# Patient Record
Sex: FEMALE | Race: BLACK OR AFRICAN AMERICAN | NOT HISPANIC OR LATINO | Employment: OTHER | URBAN - METROPOLITAN AREA
[De-identification: names, ages, dates, MRNs, and addresses within clinical notes are randomized per-mention and may not be internally consistent; named-entity substitution may affect disease eponyms.]

---

## 2020-08-10 ENCOUNTER — HISTORICAL (OUTPATIENT)
Dept: ADMINISTRATIVE | Facility: HOSPITAL | Age: 55
End: 2020-08-10

## 2020-08-10 LAB
ABS NEUT (OLG): 3.25 X10(3)/MCL (ref 2.1–9.2)
ALBUMIN SERPL-MCNC: 4.2 GM/DL (ref 3.4–5)
ALBUMIN/GLOB SERPL: 0.9 RATIO (ref 1.1–2)
ALP SERPL-CCNC: 122 UNIT/L (ref 45–117)
ALT SERPL-CCNC: 18 UNIT/L (ref 12–78)
APPEARANCE, UA: CLEAR
AST SERPL-CCNC: 15 UNIT/L (ref 15–37)
BACTERIA #/AREA URNS AUTO: ABNORMAL /HPF
BASOPHILS # BLD AUTO: 0 X10(3)/MCL (ref 0–0.2)
BASOPHILS NFR BLD AUTO: 1 %
BILIRUB SERPL-MCNC: 0.3 MG/DL (ref 0.2–1)
BILIRUB UR QL STRIP: NEGATIVE
BILIRUBIN DIRECT+TOT PNL SERPL-MCNC: <0.1 MG/DL (ref 0–0.2)
BILIRUBIN DIRECT+TOT PNL SERPL-MCNC: >0.2 MG/DL
BUN SERPL-MCNC: 9 MG/DL (ref 7–18)
CALCIUM SERPL-MCNC: 9.7 MG/DL (ref 8.5–10.1)
CHLORIDE SERPL-SCNC: 108 MMOL/L (ref 98–107)
CHOLEST SERPL-MCNC: 279 MG/DL
CHOLEST/HDLC SERPL: 5 {RATIO} (ref 0–4.4)
CO2 SERPL-SCNC: 26 MMOL/L (ref 21–32)
COLOR UR: ABNORMAL
CREAT SERPL-MCNC: 0.9 MG/DL (ref 0.6–1.3)
EOSINOPHIL # BLD AUTO: 0.1 X10(3)/MCL (ref 0–0.9)
EOSINOPHIL NFR BLD AUTO: 2 %
ERYTHROCYTE [DISTWIDTH] IN BLOOD BY AUTOMATED COUNT: 12 % (ref 11.5–14.5)
EST. AVERAGE GLUCOSE BLD GHB EST-MCNC: 146 MG/DL
GLOBULIN SER-MCNC: 4.9 GM/ML (ref 2.3–3.5)
GLUCOSE (UA): NEGATIVE
GLUCOSE SERPL-MCNC: 107 MG/DL (ref 74–106)
HBA1C MFR BLD: 6.7 % (ref 4.2–6.3)
HCT VFR BLD AUTO: 44.9 % (ref 35–46)
HDLC SERPL-MCNC: 56 MG/DL (ref 40–59)
HGB BLD-MCNC: 13.8 GM/DL (ref 12–16)
HGB UR QL STRIP: 0.03 MG/DL
HYALINE CASTS #/AREA URNS LPF: ABNORMAL /LPF
IMM GRANULOCYTES # BLD AUTO: 0.01 10*3/UL
IMM GRANULOCYTES NFR BLD AUTO: 0 %
KETONES UR QL STRIP: NEGATIVE
LDLC SERPL CALC-MCNC: 193 MG/DL
LEUKOCYTE ESTERASE UR QL STRIP: NEGATIVE
LYMPHOCYTES # BLD AUTO: 2.8 X10(3)/MCL (ref 0.6–4.6)
LYMPHOCYTES NFR BLD AUTO: 43 %
MCH RBC QN AUTO: 28.3 PG (ref 26–34)
MCHC RBC AUTO-ENTMCNC: 30.7 GM/DL (ref 31–37)
MCV RBC AUTO: 92 FL (ref 80–100)
MONOCYTES # BLD AUTO: 0.3 X10(3)/MCL (ref 0.1–1.3)
MONOCYTES NFR BLD AUTO: 5 %
NEUTROPHILS # BLD AUTO: 3.25 X10(3)/MCL (ref 2.1–9.2)
NEUTROPHILS NFR BLD AUTO: 50 %
NITRITE UR QL STRIP: NEGATIVE
PH UR STRIP: 6 [PH] (ref 4.5–8)
PLATELET # BLD AUTO: 256 X10(3)/MCL (ref 130–400)
PMV BLD AUTO: 10 FL (ref 7.4–10.4)
POTASSIUM SERPL-SCNC: 3.6 MMOL/L (ref 3.5–5.1)
PROT SERPL-MCNC: 9.1 GM/DL (ref 6.4–8.2)
PROT UR QL STRIP: NEGATIVE
RBC # BLD AUTO: 4.88 X10(6)/MCL (ref 4–5.2)
RBC #/AREA URNS AUTO: ABNORMAL /HPF
SODIUM SERPL-SCNC: 140 MMOL/L (ref 136–145)
SP GR UR STRIP: 1 (ref 1–1.03)
SQUAMOUS #/AREA URNS LPF: ABNORMAL /LPF
T4 FREE SERPL-MCNC: 1.09 NG/DL (ref 0.76–1.46)
TRIGL SERPL-MCNC: 152 MG/DL
TSH SERPL-ACNC: 1.26 MIU/L (ref 0.36–3.74)
UROBILINOGEN UR STRIP-ACNC: NORMAL
VLDLC SERPL CALC-MCNC: 30 MG/DL
WBC # SPEC AUTO: 6.5 X10(3)/MCL (ref 4.5–11)
WBC #/AREA URNS AUTO: ABNORMAL /HPF

## 2020-09-04 ENCOUNTER — HISTORICAL (OUTPATIENT)
Dept: RADIOLOGY | Facility: HOSPITAL | Age: 55
End: 2020-09-04

## 2020-09-09 ENCOUNTER — HISTORICAL (OUTPATIENT)
Dept: ADMINISTRATIVE | Facility: HOSPITAL | Age: 55
End: 2020-09-09

## 2020-09-09 LAB
CREAT UR-MCNC: 50 MG/DL
MICROALBUMIN UR-MCNC: <5 MG/L (ref 0–19)
MICROALBUMIN/CREAT RATIO PNL UR: <10 MCG/MG CR (ref 0–29)

## 2020-09-25 LAB
C TRACH DNA SPEC QL NAA+PROBE: NEGATIVE
HUMAN PAPILLOMAVIRUS (HPV): NORMAL
NEISSERIA GONORRHEA BY PCR: NEGATIVE
PAP RECOMMENDATION EXT: NORMAL
PAP SMEAR: NORMAL

## 2020-10-08 ENCOUNTER — HISTORICAL (OUTPATIENT)
Dept: RADIOLOGY | Facility: HOSPITAL | Age: 55
End: 2020-10-08

## 2020-12-24 ENCOUNTER — HISTORICAL (OUTPATIENT)
Dept: ADMINISTRATIVE | Facility: HOSPITAL | Age: 55
End: 2020-12-24

## 2020-12-24 LAB
ABS NEUT (OLG): 3.2 X10(3)/MCL (ref 2.1–9.2)
ALBUMIN SERPL-MCNC: 4.1 GM/DL (ref 3.5–5)
ALBUMIN/GLOB SERPL: 1.2 RATIO (ref 1.1–2)
ALP SERPL-CCNC: 108 UNIT/L (ref 40–150)
ALT SERPL-CCNC: 14 UNIT/L (ref 0–55)
AST SERPL-CCNC: 18 UNIT/L (ref 5–34)
BASOPHILS # BLD AUTO: 0 X10(3)/MCL (ref 0–0.2)
BASOPHILS NFR BLD AUTO: 0 %
BILIRUB SERPL-MCNC: 0.6 MG/DL
BILIRUBIN DIRECT+TOT PNL SERPL-MCNC: 0.3 MG/DL (ref 0–0.5)
BILIRUBIN DIRECT+TOT PNL SERPL-MCNC: 0.3 MG/DL (ref 0–0.8)
BUN SERPL-MCNC: 14 MG/DL (ref 9.8–20.1)
CALCIUM SERPL-MCNC: 9.5 MG/DL (ref 8.4–10.2)
CHLORIDE SERPL-SCNC: 105 MMOL/L (ref 98–107)
CHOLEST SERPL-MCNC: 169 MG/DL
CHOLEST/HDLC SERPL: 4 {RATIO} (ref 0–5)
CO2 SERPL-SCNC: 26 MMOL/L (ref 22–29)
CREAT SERPL-MCNC: 0.97 MG/DL (ref 0.55–1.02)
CREAT UR-MCNC: 82.8 MG/DL (ref 45–106)
EOSINOPHIL # BLD AUTO: 0.1 X10(3)/MCL (ref 0–0.9)
EOSINOPHIL NFR BLD AUTO: 1 %
ERYTHROCYTE [DISTWIDTH] IN BLOOD BY AUTOMATED COUNT: 12.5 % (ref 11.5–14.5)
EST. AVERAGE GLUCOSE BLD GHB EST-MCNC: 125.5 MG/DL
GLOBULIN SER-MCNC: 3.4 GM/DL (ref 2.4–3.5)
GLUCOSE SERPL-MCNC: 95 MG/DL (ref 74–100)
HBA1C MFR BLD: 6 %
HCT VFR BLD AUTO: 36.7 % (ref 35–46)
HDLC SERPL-MCNC: 39 MG/DL (ref 35–60)
HGB BLD-MCNC: 11.2 GM/DL (ref 12–16)
IMM GRANULOCYTES # BLD AUTO: 0.01 10*3/UL
IMM GRANULOCYTES NFR BLD AUTO: 0 %
LDLC SERPL CALC-MCNC: 116 MG/DL (ref 50–140)
LYMPHOCYTES # BLD AUTO: 2.4 X10(3)/MCL (ref 0.6–4.6)
LYMPHOCYTES NFR BLD AUTO: 40 %
MCH RBC QN AUTO: 28 PG (ref 26–34)
MCHC RBC AUTO-ENTMCNC: 30.5 GM/DL (ref 31–37)
MCV RBC AUTO: 91.8 FL (ref 80–100)
MICROALBUMIN UR-MCNC: <5 UG/ML
MICROALBUMIN/CREAT RATIO PNL UR: NORMAL MG/GM CR (ref 0–30)
MONOCYTES # BLD AUTO: 0.3 X10(3)/MCL (ref 0.1–1.3)
MONOCYTES NFR BLD AUTO: 5 %
NEUTROPHILS # BLD AUTO: 3.2 X10(3)/MCL (ref 2.1–9.2)
NEUTROPHILS NFR BLD AUTO: 53 %
PLATELET # BLD AUTO: 224 X10(3)/MCL (ref 130–400)
PMV BLD AUTO: 10.6 FL (ref 7.4–10.4)
POTASSIUM SERPL-SCNC: 4.1 MMOL/L (ref 3.5–5.1)
PROT SERPL-MCNC: 7.5 GM/DL (ref 6.4–8.3)
RBC # BLD AUTO: 4 X10(6)/MCL (ref 4–5.2)
SODIUM SERPL-SCNC: 143 MMOL/L (ref 136–145)
T4 FREE SERPL-MCNC: 1.19 NG/DL (ref 0.7–1.48)
TRIGL SERPL-MCNC: 72 MG/DL (ref 37–140)
TSH SERPL-ACNC: 1.03 UIU/ML (ref 0.35–4.94)
VLDLC SERPL CALC-MCNC: 14 MG/DL
WBC # SPEC AUTO: 6 X10(3)/MCL (ref 4.5–11)

## 2021-04-19 ENCOUNTER — HISTORICAL (OUTPATIENT)
Dept: LAB | Facility: HOSPITAL | Age: 56
End: 2021-04-19

## 2021-04-19 LAB
ABS NEUT (OLG): 3.39 X10(3)/MCL (ref 2.1–9.2)
ALBUMIN SERPL-MCNC: 4.3 GM/DL (ref 3.5–5)
ALBUMIN/GLOB SERPL: 1.3 RATIO (ref 1.1–2)
ALP SERPL-CCNC: 100 UNIT/L (ref 40–150)
ALT SERPL-CCNC: 14 UNIT/L (ref 0–55)
AST SERPL-CCNC: 19 UNIT/L (ref 5–34)
BASOPHILS # BLD AUTO: 0 X10(3)/MCL (ref 0–0.2)
BASOPHILS NFR BLD AUTO: 0 %
BILIRUB SERPL-MCNC: 0.5 MG/DL
BILIRUBIN DIRECT+TOT PNL SERPL-MCNC: 0.2 MG/DL (ref 0–0.8)
BILIRUBIN DIRECT+TOT PNL SERPL-MCNC: 0.3 MG/DL (ref 0–0.5)
BUN SERPL-MCNC: 9 MG/DL (ref 9.8–20.1)
CALCIUM SERPL-MCNC: 9.6 MG/DL (ref 8.4–10.2)
CHLORIDE SERPL-SCNC: 107 MMOL/L (ref 98–107)
CHOLEST SERPL-MCNC: 167 MG/DL
CHOLEST/HDLC SERPL: 4 {RATIO} (ref 0–5)
CO2 SERPL-SCNC: 25 MMOL/L (ref 22–29)
CREAT SERPL-MCNC: 0.79 MG/DL (ref 0.55–1.02)
CREAT UR-MCNC: 61.1 MG/DL (ref 45–106)
EOSINOPHIL # BLD AUTO: 0.1 X10(3)/MCL (ref 0–0.9)
EOSINOPHIL NFR BLD AUTO: 1 %
ERYTHROCYTE [DISTWIDTH] IN BLOOD BY AUTOMATED COUNT: 12.6 % (ref 11.5–17)
EST. AVERAGE GLUCOSE BLD GHB EST-MCNC: 125.5 MG/DL
GLOBULIN SER-MCNC: 3.4 GM/DL (ref 2.4–3.5)
GLUCOSE SERPL-MCNC: 82 MG/DL (ref 74–100)
HBA1C MFR BLD: 6 %
HCT VFR BLD AUTO: 39.9 % (ref 37–47)
HDLC SERPL-MCNC: 43 MG/DL (ref 35–60)
HGB BLD-MCNC: 12 GM/DL (ref 12–16)
LDLC SERPL CALC-MCNC: 108 MG/DL (ref 50–140)
LYMPHOCYTES # BLD AUTO: 2.4 X10(3)/MCL (ref 0.6–4.6)
LYMPHOCYTES NFR BLD AUTO: 39 %
MCH RBC QN AUTO: 28.2 PG (ref 27–31)
MCHC RBC AUTO-ENTMCNC: 30.1 GM/DL (ref 33–36)
MCV RBC AUTO: 93.9 FL (ref 80–94)
MICROALBUMIN UR-MCNC: <5 UG/ML
MICROALBUMIN/CREAT RATIO PNL UR: <8.2 MG/GM CR (ref 0–30)
MONOCYTES # BLD AUTO: 0.3 X10(3)/MCL (ref 0.1–1.3)
MONOCYTES NFR BLD AUTO: 5 %
NEUTROPHILS # BLD AUTO: 3.39 X10(3)/MCL (ref 1.4–7.9)
NEUTROPHILS NFR BLD AUTO: 55 %
PLATELET # BLD AUTO: 236 X10(3)/MCL (ref 130–400)
PMV BLD AUTO: 9.6 FL (ref 9.4–12.4)
POTASSIUM SERPL-SCNC: 3.8 MMOL/L (ref 3.5–5.1)
PROT SERPL-MCNC: 7.7 GM/DL (ref 6.4–8.3)
RBC # BLD AUTO: 4.25 X10(6)/MCL (ref 4.2–5.4)
SODIUM SERPL-SCNC: 144 MMOL/L (ref 136–145)
T4 FREE SERPL-MCNC: 1.06 NG/DL (ref 0.7–1.48)
TRIGL SERPL-MCNC: 82 MG/DL (ref 37–140)
TSH SERPL-ACNC: 1.05 UIU/ML (ref 0.35–4.94)
VLDLC SERPL CALC-MCNC: 16 MG/DL
WBC # SPEC AUTO: 6.2 X10(3)/MCL (ref 4.5–11.5)

## 2021-09-27 ENCOUNTER — HISTORICAL (OUTPATIENT)
Dept: FAMILY MEDICINE | Facility: CLINIC | Age: 56
End: 2021-09-27

## 2021-09-27 LAB
ABS NEUT (OLG): 2.49 X10(3)/MCL (ref 2.1–9.2)
ALBUMIN SERPL-MCNC: 4 GM/DL (ref 3.5–5)
ALBUMIN/GLOB SERPL: 1.1 RATIO (ref 1.1–2)
ALP SERPL-CCNC: 93 UNIT/L (ref 40–150)
ALT SERPL-CCNC: 11 UNIT/L (ref 0–55)
AST SERPL-CCNC: 18 UNIT/L (ref 5–34)
BASOPHILS # BLD AUTO: 0 X10(3)/MCL (ref 0–0.2)
BASOPHILS NFR BLD AUTO: 0 %
BILIRUB SERPL-MCNC: 0.5 MG/DL
BILIRUBIN DIRECT+TOT PNL SERPL-MCNC: 0.2 MG/DL (ref 0–0.5)
BILIRUBIN DIRECT+TOT PNL SERPL-MCNC: 0.3 MG/DL (ref 0–0.8)
BUN SERPL-MCNC: 9.6 MG/DL (ref 9.8–20.1)
CALCIUM SERPL-MCNC: 10.1 MG/DL (ref 8.4–10.2)
CHLORIDE SERPL-SCNC: 107 MMOL/L (ref 98–107)
CHOLEST SERPL-MCNC: 173 MG/DL
CHOLEST/HDLC SERPL: 4 {RATIO} (ref 0–5)
CO2 SERPL-SCNC: 27 MMOL/L (ref 22–29)
CREAT SERPL-MCNC: 0.84 MG/DL (ref 0.55–1.02)
CREAT UR-MCNC: 34.2 MG/DL (ref 45–106)
EOSINOPHIL # BLD AUTO: 0.1 X10(3)/MCL (ref 0–0.9)
EOSINOPHIL NFR BLD AUTO: 3 %
ERYTHROCYTE [DISTWIDTH] IN BLOOD BY AUTOMATED COUNT: 12.7 % (ref 11.5–14.5)
EST. AVERAGE GLUCOSE BLD GHB EST-MCNC: 122.6 MG/DL
GLOBULIN SER-MCNC: 3.7 GM/DL (ref 2.4–3.5)
GLUCOSE SERPL-MCNC: 78 MG/DL (ref 74–100)
HBA1C MFR BLD: 5.9 %
HCT VFR BLD AUTO: 38.8 % (ref 35–46)
HDLC SERPL-MCNC: 44 MG/DL (ref 35–60)
HGB BLD-MCNC: 11.8 GM/DL (ref 12–16)
IMM GRANULOCYTES # BLD AUTO: 0.01 10*3/UL
IMM GRANULOCYTES NFR BLD AUTO: 0 %
LDLC SERPL CALC-MCNC: 115 MG/DL (ref 50–140)
LYMPHOCYTES # BLD AUTO: 2.2 X10(3)/MCL (ref 0.6–4.6)
LYMPHOCYTES NFR BLD AUTO: 44 %
MCH RBC QN AUTO: 28.1 PG (ref 26–34)
MCHC RBC AUTO-ENTMCNC: 30.4 GM/DL (ref 31–37)
MCV RBC AUTO: 92.4 FL (ref 80–100)
MICROALBUMIN UR-MCNC: <5 MG/L
MICROALBUMIN/CREAT RATIO PNL UR: <14.6 MG/GM CR (ref 0–30)
MONOCYTES # BLD AUTO: 0.2 X10(3)/MCL (ref 0.1–1.3)
MONOCYTES NFR BLD AUTO: 4 %
NEUTROPHILS # BLD AUTO: 2.49 X10(3)/MCL (ref 2.1–9.2)
NEUTROPHILS NFR BLD AUTO: 49 %
NRBC BLD AUTO-RTO: 0 % (ref 0–0.2)
PLATELET # BLD AUTO: 265 X10(3)/MCL (ref 130–400)
PMV BLD AUTO: 10.1 FL (ref 7.4–10.4)
POTASSIUM SERPL-SCNC: 3.9 MMOL/L (ref 3.5–5.1)
PROT SERPL-MCNC: 7.7 GM/DL (ref 6.4–8.3)
RBC # BLD AUTO: 4.2 X10(6)/MCL (ref 4–5.2)
SODIUM SERPL-SCNC: 142 MMOL/L (ref 136–145)
T4 FREE SERPL-MCNC: 1.09 NG/DL (ref 0.7–1.48)
TRIGL SERPL-MCNC: 70 MG/DL (ref 37–140)
TSH SERPL-ACNC: 1.16 UIU/ML (ref 0.35–4.94)
VLDLC SERPL CALC-MCNC: 14 MG/DL
WBC # SPEC AUTO: 5.1 X10(3)/MCL (ref 4.5–11)

## 2021-10-07 ENCOUNTER — HISTORICAL (OUTPATIENT)
Dept: RADIOLOGY | Facility: HOSPITAL | Age: 56
End: 2021-10-07

## 2021-10-07 LAB — BCS RECOMMENDATION EXT: NORMAL

## 2022-01-14 ENCOUNTER — HISTORICAL (OUTPATIENT)
Dept: ADMINISTRATIVE | Facility: HOSPITAL | Age: 57
End: 2022-01-14

## 2022-01-14 LAB — SARS-COV-2 RNA RESP QL NAA+PROBE: NEGATIVE

## 2022-02-11 ENCOUNTER — HISTORICAL (OUTPATIENT)
Dept: RADIOLOGY | Facility: HOSPITAL | Age: 57
End: 2022-02-11

## 2022-02-11 ENCOUNTER — HISTORICAL (OUTPATIENT)
Dept: ADMINISTRATIVE | Facility: HOSPITAL | Age: 57
End: 2022-02-11

## 2022-02-16 ENCOUNTER — HISTORICAL (OUTPATIENT)
Dept: ADMINISTRATIVE | Facility: HOSPITAL | Age: 57
End: 2022-02-16

## 2022-02-16 LAB — SARS-COV-2 AG RESP QL IA.RAPID: NEGATIVE

## 2022-02-18 ENCOUNTER — HISTORICAL (OUTPATIENT)
Dept: ADMINISTRATIVE | Facility: HOSPITAL | Age: 57
End: 2022-02-18

## 2022-02-18 ENCOUNTER — HISTORICAL (OUTPATIENT)
Dept: SURGERY | Facility: HOSPITAL | Age: 57
End: 2022-02-18

## 2022-02-18 LAB
CBG: 119 (ref 70–115)
CBG: 130 (ref 70–115)
CBG: 224 (ref 70–115)

## 2022-03-02 ENCOUNTER — HISTORICAL (OUTPATIENT)
Dept: RADIATION THERAPY | Facility: HOSPITAL | Age: 57
End: 2022-03-02

## 2022-03-10 ENCOUNTER — HISTORICAL (OUTPATIENT)
Dept: ADMINISTRATIVE | Facility: HOSPITAL | Age: 57
End: 2022-03-10

## 2022-03-15 ENCOUNTER — HISTORICAL (OUTPATIENT)
Dept: RADIATION THERAPY | Facility: HOSPITAL | Age: 57
End: 2022-03-15

## 2022-03-17 ENCOUNTER — HISTORICAL (OUTPATIENT)
Dept: SPEECH THERAPY | Facility: HOSPITAL | Age: 57
End: 2022-03-17

## 2022-03-18 ENCOUNTER — HISTORICAL (OUTPATIENT)
Dept: ADMINISTRATIVE | Facility: HOSPITAL | Age: 57
End: 2022-03-18

## 2022-03-18 LAB
ABS NEUT (OLG): 7.61 (ref 2.1–9.2)
ALBUMIN SERPL-MCNC: 3.6 G/DL (ref 3.5–5)
ALBUMIN/GLOB SERPL: 0.8 {RATIO} (ref 1.1–2)
ALP SERPL-CCNC: 100 U/L (ref 40–150)
ALT SERPL-CCNC: 6 U/L (ref 0–55)
AST SERPL-CCNC: 12 U/L (ref 5–34)
BASOPHILS # BLD AUTO: 0 10*3/UL (ref 0–0.2)
BASOPHILS NFR BLD AUTO: 0 %
BILIRUB SERPL-MCNC: 0.5 MG/DL
BILIRUBIN DIRECT+TOT PNL SERPL-MCNC: 0.2 (ref 0–0.5)
BILIRUBIN DIRECT+TOT PNL SERPL-MCNC: 0.3 (ref 0–0.8)
BUN SERPL-MCNC: 19.5 MG/DL (ref 9.8–20.1)
CALCIUM SERPL-MCNC: 10.7 MG/DL (ref 8.7–10.5)
CHLORIDE SERPL-SCNC: 101 MMOL/L (ref 98–107)
CO2 SERPL-SCNC: 33 MMOL/L (ref 22–29)
CREAT SERPL-MCNC: 0.83 MG/DL (ref 0.55–1.02)
EOSINOPHIL # BLD AUTO: 0.1 10*3/UL (ref 0–0.9)
EOSINOPHIL NFR BLD AUTO: 1 %
ERYTHROCYTE [DISTWIDTH] IN BLOOD BY AUTOMATED COUNT: 12.9 % (ref 11.5–14.5)
FLAG2 (OHS): 70
FLAG3 (OHS): 80
FLAGS (OHS): 80
GLOBULIN SER-MCNC: 4.7 G/DL (ref 2.4–3.5)
GLUCOSE SERPL-MCNC: 182 MG/DL (ref 74–100)
HCT VFR BLD AUTO: 37.6 % (ref 35–46)
HEMOLYSIS INTERF INDEX SERPL-ACNC: <0
HGB BLD-MCNC: 11.7 G/DL (ref 12–16)
ICTERIC INTERF INDEX SERPL-ACNC: 0
IMM GRANULOCYTES # BLD AUTO: 0.04 10*3/UL
IMM GRANULOCYTES NFR BLD AUTO: 0 %
IMM. NE 2 SUSPECT FLAG (OHS): 10
LIPEMIC INTERF INDEX SERPL-ACNC: <0
LOW EVENT # SUSPECT FLAG (OHS): 80
LYMPHOCYTES # BLD AUTO: 1.7 10*3/UL (ref 0.6–4.6)
LYMPHOCYTES NFR BLD AUTO: 18 %
MANUAL DIFF? (OHS): NO
MCH RBC QN AUTO: 28.1 PG (ref 26–34)
MCHC RBC AUTO-ENTMCNC: 31.1 G/DL (ref 31–37)
MCV RBC AUTO: 90.4 FL (ref 80–100)
MO BLASTS SUSPECT FLAG (OHS): 40
MONOCYTES # BLD AUTO: 0.3 10*3/UL (ref 0.1–1.3)
MONOCYTES NFR BLD AUTO: 3 %
NEUTROPHILS # BLD AUTO: 7.61 10*3/UL (ref 2.1–9.2)
NEUTROPHILS NFR BLD AUTO: 78 %
NRBC BLD AUTO-RTO: 0 % (ref 0–0.2)
PLATELET # BLD AUTO: 373 10*3/UL (ref 130–400)
PLATELET CLUMPS SUSPECT FLAG (OHS): 30
PMV BLD AUTO: 10.3 FL (ref 7.4–10.4)
POTASSIUM SERPL-SCNC: 3.9 MMOL/L (ref 3.5–5.1)
PROT SERPL-MCNC: 8.3 G/DL (ref 6.4–8.3)
RBC # BLD AUTO: 4.16 10*6/UL (ref 4–5.2)
SARS-COV-2 AG RESP QL IA.RAPID: NEGATIVE
SODIUM SERPL-SCNC: 144 MMOL/L (ref 136–145)
WBC # SPEC AUTO: 9.8 10*3/UL (ref 4.5–11)

## 2022-03-19 ENCOUNTER — HISTORICAL (OUTPATIENT)
Dept: ADMINISTRATIVE | Facility: HOSPITAL | Age: 57
End: 2022-03-19

## 2022-03-21 ENCOUNTER — HISTORICAL (OUTPATIENT)
Dept: ADMINISTRATIVE | Facility: HOSPITAL | Age: 57
End: 2022-03-21

## 2022-04-04 ENCOUNTER — HISTORICAL (OUTPATIENT)
Dept: ADMINISTRATIVE | Facility: HOSPITAL | Age: 57
End: 2022-04-04

## 2022-04-06 ENCOUNTER — HISTORICAL (OUTPATIENT)
Dept: ADMINISTRATIVE | Facility: HOSPITAL | Age: 57
End: 2022-04-06

## 2022-04-10 ENCOUNTER — HISTORICAL (OUTPATIENT)
Dept: ADMINISTRATIVE | Facility: HOSPITAL | Age: 57
End: 2022-04-10
Payer: COMMERCIAL

## 2022-04-26 VITALS
OXYGEN SATURATION: 100 % | HEIGHT: 62 IN | WEIGHT: 125.25 LBS | SYSTOLIC BLOOD PRESSURE: 164 MMHG | BODY MASS INDEX: 23.05 KG/M2 | DIASTOLIC BLOOD PRESSURE: 15 MMHG

## 2022-04-28 ENCOUNTER — HISTORICAL (OUTPATIENT)
Dept: RADIATION THERAPY | Facility: HOSPITAL | Age: 57
End: 2022-04-28
Payer: COMMERCIAL

## 2022-04-29 DIAGNOSIS — Z90.02 STATUS POST LARYNGECTOMY: Primary | ICD-10-CM

## 2022-04-30 DIAGNOSIS — E11.9 TYPE 2 DIABETES MELLITUS WITHOUT COMPLICATION, WITHOUT LONG-TERM CURRENT USE OF INSULIN: ICD-10-CM

## 2022-04-30 DIAGNOSIS — I10 HYPERTENSION, UNSPECIFIED TYPE: ICD-10-CM

## 2022-04-30 DIAGNOSIS — D64.9 ANEMIA, UNSPECIFIED TYPE: ICD-10-CM

## 2022-04-30 DIAGNOSIS — C32.9 LARYNGEAL SQUAMOUS CELL CARCINOMA: Primary | ICD-10-CM

## 2022-05-02 ENCOUNTER — OFFICE VISIT (OUTPATIENT)
Dept: OTOLARYNGOLOGY | Facility: CLINIC | Age: 57
End: 2022-05-02
Payer: COMMERCIAL

## 2022-05-02 VITALS — DIASTOLIC BLOOD PRESSURE: 65 MMHG | TEMPERATURE: 98 F | SYSTOLIC BLOOD PRESSURE: 106 MMHG | HEART RATE: 101 BPM

## 2022-05-02 DIAGNOSIS — Z90.02 H/O LARYNGECTOMY: Primary | ICD-10-CM

## 2022-05-02 PROCEDURE — 77334 RADIATION TREATMENT AID(S): CPT | Performed by: RADIOLOGY

## 2022-05-02 PROCEDURE — 99213 OFFICE O/P EST LOW 20 MIN: CPT | Mod: PBBFAC | Performed by: OTOLARYNGOLOGY

## 2022-05-02 RX ORDER — PREDNISOLONE ACETATE 10 MG/ML
SUSPENSION/ DROPS OPHTHALMIC
COMMUNITY
Start: 2022-04-23 | End: 2023-03-01

## 2022-05-02 RX ORDER — APIXABAN 5 MG/1
5 TABLET, FILM COATED ORAL 2 TIMES DAILY
COMMUNITY
Start: 2022-04-24 | End: 2022-07-11 | Stop reason: ALTCHOICE

## 2022-05-02 RX ORDER — AMLODIPINE BESYLATE 5 MG/1
5 TABLET ORAL DAILY
COMMUNITY
Start: 2022-02-10 | End: 2022-08-04 | Stop reason: SDUPTHER

## 2022-05-02 RX ORDER — MONTELUKAST SODIUM 5 MG/1
TABLET, CHEWABLE ORAL
COMMUNITY
Start: 2022-03-15 | End: 2022-08-04 | Stop reason: SDUPTHER

## 2022-05-02 RX ORDER — CARBAMAZEPINE 200 MG/1
200 CAPSULE, EXTENDED RELEASE ORAL
COMMUNITY
Start: 2022-01-27 | End: 2023-03-01 | Stop reason: CLARIF

## 2022-05-02 RX ORDER — AZELASTINE 1 MG/ML
1 SPRAY, METERED NASAL
COMMUNITY
Start: 2021-06-28 | End: 2023-03-01 | Stop reason: CLARIF

## 2022-05-02 RX ORDER — METOPROLOL SUCCINATE 25 MG/1
25 TABLET, EXTENDED RELEASE ORAL
COMMUNITY
Start: 2022-01-14 | End: 2022-08-04 | Stop reason: SDUPTHER

## 2022-05-02 RX ORDER — METFORMIN HYDROCHLORIDE 500 MG/1
500 TABLET ORAL DAILY
COMMUNITY
Start: 2022-03-02 | End: 2022-08-04 | Stop reason: SDUPTHER

## 2022-05-02 RX ORDER — OMEPRAZOLE 40 MG/1
40 CAPSULE, DELAYED RELEASE ORAL DAILY
COMMUNITY
Start: 2022-01-27 | End: 2023-03-01

## 2022-05-02 RX ORDER — CETIRIZINE HYDROCHLORIDE 10 MG/1
10 TABLET ORAL DAILY
COMMUNITY
Start: 2022-03-31 | End: 2023-09-15 | Stop reason: SDUPTHER

## 2022-05-02 RX ORDER — ATORVASTATIN CALCIUM 80 MG/1
80 TABLET, FILM COATED ORAL
COMMUNITY
Start: 2022-02-10 | End: 2022-08-04 | Stop reason: SDUPTHER

## 2022-05-02 NOTE — PROGRESS NOTES
[facility]   [facility] OTOLARYNGOLOGY H&P NOTE      HISTORY OF PRESENT ILLNESS:   Heaven Boston is a 57 y.o. 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  Patient had an uneventful hospitalization course and was discharged on postoperative day 9 after an esophagram showed no pharyngeal leak.  However she re-presented to our facility on [id number] if it neck infection and a pharyngo cutaneous fistula.  Patient underwent a neck washout with primary repair on 04/11/2022.  Additionally she also underwent placement of a G-tube on the same day.  She was maintained on NPO and transferred to [place] for further evaluation and management on 415th 2022 after evidence of persistent pharyngocutaneous fistula.  There she underwent a 2nd neck washout with primary closure and placement of salivary bypass tube.  Patient had an uneventful hospitalization cord was healing as expected.  She was discharged last week and now presenting for postoperative evaluation.  Patient presents today without any complaints.  She is wondering about when he pointed that her x-rays for swallowing then.  She seen Dr. Laguna today after our visit to undergo-stimulation for radiation.    REVIEW OF SYSTEMS:     As above and otherwise negative X 10-point review.     PAST MEDICAL HISTORY:  No past medical history on file.     PAST SURGICAL HISTORY:  No past surgical history on file.      SOCIAL HISTORY:  Social History     Socioeconomic History    Marital status: Single   Tobacco Use    Smoking status: Former Smoker    Smokeless tobacco: Former User      ALLERGIES:  Review of patient's allergies indicates:  No Known Allergies     FAMILY  HISTORY:  No family history on file.     CURRENT MEDICATIONS:  Current Outpatient Medications on File Prior to Visit   Medication Sig Dispense Refill    atorvastatin (LIPITOR) 80 MG tablet Take 80 mg by mouth.      azelastine (ASTELIN) 137 mcg (0.1 %) nasal spray 1 spray by Nasal route.      carBAMazepine (CARBATROL) 200 MG CM12 Take 200 mg by mouth.      metoprolol succinate (TOPROL-XL) 25 MG 24 hr tablet Take 25 mg by mouth.      amLODIPine (NORVASC) 5 MG tablet Take 5 mg by mouth once daily.      cetirizine (ZYRTEC) 10 MG tablet Take 10 mg by mouth once daily.      ELIQUIS 5 mg Tab Take 5 mg by mouth 2 (two) times daily.      metFORMIN (GLUCOPHAGE) 500 MG tablet Take 500 mg by mouth once daily.      montelukast (SINGULAIR) 5 MG chewable tablet SMARTSI Tablet(s) By Mouth Every Evening      omeprazole (PRILOSEC) 40 MG capsule Take 40 mg by mouth once daily.      prednisoLONE acetate (PRED FORTE) 1 % DrpS Place into both eyes.       No current facility-administered medications on file prior to visit.        PHYSICAL EXAMINATION:  Vitals:    22 1151   BP: 106/65   Pulse: 101   Temp: 98.2 °F (36.8 °C)        General/ Voice: NAD, AAO, no dyspnea/inc WOB, no stridor, tolerates secretions;aphonic Neuro: CN II - XII exam: intact    Head/Face: normal  Eyes: EOMI, PERRLA  Ears: Hearing well at normal conversation volume  Nose: nares normal, septum midline, MMM  Oral Cavity: MMM, no lesions or ulcerations, no leukoplakia, no edema; BOT and FOM are soft/NT and without lesions/ ulcerations/ leukoplakia; dentitions is fair   Oropharynx: No uvular deviation or deviation of the oropharyngeal wall noted  Neck:  Well-healed stoma without any signs of dehiscence, incision line in the neck is also intact with staples intact-- these were removed today, no signs of erythema warmth or any fluctuance  Cardiovascular: RRR, no r/g/m, 2+ distal pulses,      ASSESSMENT/PLAN:    Heaven Boston is a 57 y.o. female with  T3N1M0 supraglottic squamous cell carcinoma now status post total laryngectomy.  Postoperative course complicated by pharyngocutaneous fistula for which patient would require repeated neck washout and primary closure.  Recently discharged from admission after her 2nd washout and placement of salivary bypass tube.  On discharge salivary bypass tube was removed    -- NPO. Continue PEG feeds  -- XR esophagram 5/6  -- f/u RAD ONC  -- RTC telemedicine 5/9    A total of 30 minutes were spent in this encounter.    Lynnette Chavez MD   [facility] Department of Otolaryngology  PGY 5

## 2022-05-04 PROCEDURE — 77301 RADIOTHERAPY DOSE PLAN IMRT: CPT | Performed by: RADIOLOGY

## 2022-05-04 PROCEDURE — 77338 DESIGN MLC DEVICE FOR IMRT: CPT | Performed by: RADIOLOGY

## 2022-05-04 PROCEDURE — 77300 RADIATION THERAPY DOSE PLAN: CPT | Performed by: RADIOLOGY

## 2022-05-05 PROCEDURE — 77386 HC IMRT, COMPLEX: CPT | Performed by: RADIOLOGY

## 2022-05-06 ENCOUNTER — HOSPITAL ENCOUNTER (OUTPATIENT)
Dept: RADIOLOGY | Facility: HOSPITAL | Age: 57
Discharge: HOME | End: 2022-05-06
Attending: STUDENT IN AN ORGANIZED HEALTH CARE EDUCATION/TRAINING PROGRAM
Payer: COMMERCIAL

## 2022-05-06 DIAGNOSIS — Z90.02 H/O LARYNGECTOMY: ICD-10-CM

## 2022-05-06 PROCEDURE — 77386 HC IMRT, COMPLEX: CPT | Performed by: RADIOLOGY

## 2022-05-06 PROCEDURE — 74220 X-RAY XM ESOPHAGUS 1CNTRST: CPT | Mod: TC

## 2022-05-09 ENCOUNTER — OFFICE VISIT (OUTPATIENT)
Dept: OTOLARYNGOLOGY | Facility: CLINIC | Age: 57
End: 2022-05-09
Payer: COMMERCIAL

## 2022-05-09 DIAGNOSIS — Z90.02 H/O LARYNGECTOMY: Primary | ICD-10-CM

## 2022-05-09 PROCEDURE — 77386 HC IMRT, COMPLEX: CPT | Performed by: RADIOLOGY

## 2022-05-09 NOTE — PROGRESS NOTES
U OTOLARYNGOLOGY Audio Only Telehealth Visit     The patient location is: [place]  The chief complaint leading to consultation is: s/p laryngectomy  Visit type: Virtual visit with audio only (telephone)  Total time spent with patient: No answer     Pt. Did not answer. Let voicemail to advance diet to [facility]. Also discussed this with son.  Plan to see in clinic in 2 weeks    Lynnette Chavez  [facility] Department of Otolaryngology   PGY 5

## 2022-05-10 ENCOUNTER — HOSPITAL ENCOUNTER (EMERGENCY)
Facility: HOSPITAL | Age: 57
Discharge: HOME | End: 2022-05-10
Attending: EMERGENCY MEDICINE
Payer: COMMERCIAL

## 2022-05-10 VITALS
RESPIRATION RATE: 20 BRPM | HEART RATE: 88 BPM | WEIGHT: 127.88 LBS | HEIGHT: 65 IN | BODY MASS INDEX: 21.31 KG/M2 | DIASTOLIC BLOOD PRESSURE: 72 MMHG | OXYGEN SATURATION: 100 % | TEMPERATURE: 98 F | SYSTOLIC BLOOD PRESSURE: 145 MMHG

## 2022-05-10 DIAGNOSIS — S31.109A WOUND OF ABDOMEN: ICD-10-CM

## 2022-05-10 DIAGNOSIS — Z93.1 GASTROSTOMY TUBE IN PLACE: Primary | ICD-10-CM

## 2022-05-10 DIAGNOSIS — K94.23 LEAKING PEG TUBE: ICD-10-CM

## 2022-05-10 LAB
ALBUMIN SERPL-MCNC: 3.8 GM/DL (ref 3.5–5)
ALBUMIN/GLOB SERPL: 1.1 RATIO (ref 1.1–2)
ALP SERPL-CCNC: 85 UNIT/L (ref 40–150)
ALT SERPL-CCNC: 17 UNIT/L (ref 0–55)
AST SERPL-CCNC: 22 UNIT/L (ref 5–34)
BASOPHILS # BLD AUTO: 0.02 X10(3)/MCL (ref 0–0.2)
BASOPHILS NFR BLD AUTO: 0.6 %
BILIRUBIN DIRECT+TOT PNL SERPL-MCNC: 0.1 MG/DL (ref 0–0.8)
BILIRUBIN DIRECT+TOT PNL SERPL-MCNC: 0.2 MG/DL (ref 0–0.5)
BILIRUBIN DIRECT+TOT PNL SERPL-MCNC: 0.3 MG/DL
BUN SERPL-MCNC: 15.1 MG/DL (ref 9.8–20.1)
CALCIUM SERPL-MCNC: 10 MG/DL (ref 8.4–10.2)
CHLORIDE SERPL-SCNC: 102 MMOL/L (ref 98–107)
CO2 SERPL-SCNC: 28 MMOL/L (ref 22–29)
CREAT SERPL-MCNC: 0.69 MG/DL (ref 0.55–1.02)
EOSINOPHIL # BLD AUTO: 0.04 X10(3)/MCL (ref 0–0.9)
EOSINOPHIL NFR BLD AUTO: 1.2 %
ERYTHROCYTE [DISTWIDTH] IN BLOOD BY AUTOMATED COUNT: 13.6 % (ref 11.5–17)
GLOBULIN SER-MCNC: 3.6 GM/DL (ref 2.4–3.5)
GLUCOSE SERPL-MCNC: 88 MG/DL (ref 74–100)
HCT VFR BLD AUTO: 34.8 % (ref 37–47)
HGB BLD-MCNC: 10.4 GM/DL (ref 12–16)
IMM GRANULOCYTES # BLD AUTO: 0 X10(3)/MCL (ref 0–0.02)
IMM GRANULOCYTES NFR BLD AUTO: 0 % (ref 0–0.43)
LYMPHOCYTES # BLD AUTO: 0.93 X10(3)/MCL (ref 0.6–4.6)
LYMPHOCYTES NFR BLD AUTO: 28.7 %
MCH RBC QN AUTO: 27.7 PG (ref 27–31)
MCHC RBC AUTO-ENTMCNC: 29.9 MG/DL (ref 33–36)
MCV RBC AUTO: 92.8 FL (ref 80–94)
MONOCYTES # BLD AUTO: 0.22 X10(3)/MCL (ref 0.1–1.3)
MONOCYTES NFR BLD AUTO: 6.8 %
NEUTROPHILS # BLD AUTO: 2 X10(3)/MCL (ref 2.1–9.2)
NEUTROPHILS NFR BLD AUTO: 62.7 %
NRBC BLD AUTO-RTO: 0 %
PLATELET # BLD AUTO: 255 X10(3)/MCL (ref 130–400)
PMV BLD AUTO: 10.8 FL (ref 9.4–12.4)
POTASSIUM SERPL-SCNC: 4.7 MMOL/L (ref 3.5–5.1)
PROT SERPL-MCNC: 7.4 GM/DL (ref 6.4–8.3)
RBC # BLD AUTO: 3.75 X10(6)/MCL (ref 4.2–5.4)
SODIUM SERPL-SCNC: 140 MMOL/L (ref 136–145)
WBC # SPEC AUTO: 3.2 X10(3)/MCL (ref 4.5–11.5)

## 2022-05-10 PROCEDURE — 85025 COMPLETE CBC W/AUTO DIFF WBC: CPT | Performed by: NURSE PRACTITIONER

## 2022-05-10 PROCEDURE — 36415 COLL VENOUS BLD VENIPUNCTURE: CPT | Performed by: NURSE PRACTITIONER

## 2022-05-10 PROCEDURE — 80053 COMPREHEN METABOLIC PANEL: CPT | Performed by: NURSE PRACTITIONER

## 2022-05-10 PROCEDURE — 25500020 PHARM REV CODE 255: Performed by: EMERGENCY MEDICINE

## 2022-05-10 PROCEDURE — 77386 HC IMRT, COMPLEX: CPT | Performed by: RADIOLOGY

## 2022-05-10 PROCEDURE — 99285 EMERGENCY DEPT VISIT HI MDM: CPT | Mod: 25

## 2022-05-10 PROCEDURE — 96372 THER/PROPH/DIAG INJ SC/IM: CPT | Performed by: NURSE PRACTITIONER

## 2022-05-10 PROCEDURE — 63600175 PHARM REV CODE 636 W HCPCS: Performed by: NURSE PRACTITIONER

## 2022-05-10 RX ORDER — MORPHINE SULFATE 2 MG/ML
4 INJECTION, SOLUTION INTRAMUSCULAR; INTRAVENOUS
Status: COMPLETED | OUTPATIENT
Start: 2022-05-10 | End: 2022-05-10

## 2022-05-10 RX ORDER — ONDANSETRON 2 MG/ML
4 INJECTION INTRAMUSCULAR; INTRAVENOUS ONCE
Status: COMPLETED | OUTPATIENT
Start: 2022-05-10 | End: 2022-05-10

## 2022-05-10 RX ADMIN — ONDANSETRON 4 MG: 2 INJECTION INTRAMUSCULAR; INTRAVENOUS at 12:05

## 2022-05-10 RX ADMIN — IOPAMIDOL 100 ML: 755 INJECTION, SOLUTION INTRAVENOUS at 04:05

## 2022-05-10 RX ADMIN — MORPHINE SULFATE 4 MG: 2 INJECTION, SOLUTION INTRAMUSCULAR; INTRAVENOUS at 12:05

## 2022-05-10 NOTE — CONSULTS
[facility]  Inpatient Consult        HPI:   56 yo female hx of DM, sinus tach, T3 N1 M1 supraglottic SCC s/p total laryngectomy, left hemithyroidectomy, cricopharyngeal myotomy, primary TEP on 2022.    Pt for evaluation of PEG site erythema, pain and drainage. She noticed it 3 days ago. She has cont to use it for feeds. Denies fever, chills, SOB.    PEG was placed 3/21     PMH:  History reviewed. No pertinent past medical history.      PSH:  History reviewed. No pertinent surgical history.      Medications:  No current facility-administered medications on file prior to encounter.     Current Outpatient Medications on File Prior to Encounter   Medication Sig Dispense Refill    amLODIPine (NORVASC) 5 MG tablet Take 5 mg by mouth once daily.      atorvastatin (LIPITOR) 80 MG tablet Take 80 mg by mouth.      azelastine (ASTELIN) 137 mcg (0.1 %) nasal spray 1 spray by Nasal route.      carBAMazepine (CARBATROL) 200 MG CM12 Take 200 mg by mouth.      cetirizine (ZYRTEC) 10 MG tablet Take 10 mg by mouth once daily.      ELIQUIS 5 mg Tab Take 5 mg by mouth 2 (two) times daily.      metFORMIN (GLUCOPHAGE) 500 MG tablet Take 500 mg by mouth once daily.      metoprolol succinate (TOPROL-XL) 25 MG 24 hr tablet Take 25 mg by mouth.      montelukast (SINGULAIR) 5 MG chewable tablet SMARTSI Tablet(s) By Mouth Every Evening      omeprazole (PRILOSEC) 40 MG capsule Take 40 mg by mouth once daily.      prednisoLONE acetate (PRED FORTE) 1 % DrpS Place into both eyes.          Allergies:  Review of patient's allergies indicates:  No Known Allergies      Social Hx:  Social History     Tobacco Use   Smoking Status Former Smoker   Smokeless Tobacco Former User      Social History     Substance and Sexual Activity   Alcohol Use Not Currently         Relevant Family Hx:  History reviewed. No pertinent family history.      Objective:     Vitals:  Temp:  [98.1 °F (36.7 °C)] 98.1 °F (36.7 °C)  Pulse:  [88]  88  Resp:  [16-20] 20  SpO2:  [100 %] 100 %  BP: (145)/(72) 145/72     Physical Exam:  Gen: NAD  Neuro: awake, alert, answering questions appropriately  CV: RRR  Resp: non-labored breathing, CHE  Abd: soft, ND, NT, Peg tube in place with no evidence of erythema or induration. Small amount of fibrinous exudate and tube feeds but no purulent material             Labs:  WBC 3  Hb 10     Imaging:  X-Ray Abdomen Flat And Erect   Final Result      Residual contrast throughout the colon.      Nonspecific gas pattern         Electronically signed by: Andrew Tan   Date:    05/10/2022   Time:    12:01      CT Abdomen Pelvis With Contrast    (Results Pending)          Assessment/Plan:  58 yo female hx of DM, sinus tach, T3 N1 M1 supraglottic SCC s/p total laryngectomy, left hemithyroidectomy, cricopharyngeal myotomy, primary TEP on 03/21/2022.    Pt for evaluation of PEG site erythema, pain and drainage. She noticed it 3 days ago. She has cont to use it for feeds. Denies fever, chills, SOB.    PEG was placed 3/21    - small amount of myositis from the tube which is normal. This can be treated with ibuprofen daily  - no evidence of overlying skin infection   - Granulation tissue and leakage is from tube being loose, tube needs to be bolstered and this will stabilize it.   - no surgical intervention, patient can be discharged from Surgical standpoint      Connor Castellanos MD   [facility], PGY1  05/10/2022 1:26 PM

## 2022-05-10 NOTE — ED PROVIDER NOTES
"Encounter Date: 5/10/2022       History     Chief Complaint   Patient presents with    Abdominal Pain     Patient here for PEG tube site infection. Patient reports pain around the site with discharge for multiple days.      Heaven Boston is a 57 y.o. 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     She reports drainage from PEG site with tenderness and redness to area x 3 days. Denies worsening drainage after feedings, change in stool color or consistency, fever, chest pain, or SOB; pt reports PEG has been in place since 4/11 and was provided here at [facility]; Pt is nonverbal and communicates with note pad.    The history is provided by the patient.     Review of patient's allergies indicates:  No Known Allergies  History reviewed. No pertinent past medical history.  History reviewed. No pertinent surgical history.  History reviewed. No pertinent family history.  Social History     Tobacco Use    Smoking status: Former Smoker    Smokeless tobacco: Former User   Substance Use Topics    Alcohol use: Not Currently    Drug use: Not Currently     Review of Systems   Constitutional: Negative for chills and fever.   HENT: Negative.    Respiratory: Negative for chest tightness and shortness of breath.    Cardiovascular: Negative for chest pain and palpitations.   Gastrointestinal: Negative for abdominal distention, diarrhea, nausea, rectal pain and vomiting.   Genitourinary: Negative for dysuria and frequency.   Musculoskeletal: Negative for back pain and myalgias.   Skin: Positive for wound (with drainage). " Negative for rash.   Neurological: Negative for weakness and light-headedness.   Hematological: Does not bruise/bleed easily.   All other systems reviewed and are negative.      Physical Exam     Initial Vitals [05/10/22 1110]   BP Pulse Resp Temp SpO2   (!) 145/72 88 16 98.1 °F (36.7 °C) 100 %      MAP       --         Physical Exam    Nursing note and vitals reviewed.  Constitutional: She appears well-developed.   HENT:   Head: Normocephalic and atraumatic.   Eyes: Pupils are equal, round, and reactive to light.   Neck: Neck supple. No tracheal tenderness present.       Normal range of motion.  Cardiovascular: Normal rate and regular rhythm.   Pulmonary/Chest: Breath sounds normal.   Abdominal: Abdomen is soft and flat. Bowel sounds are normal.     There is no rebound, no guarding, no tenderness at McBurney's point and negative Allen's sign. negative psoas sign  Musculoskeletal:         General: Normal range of motion.      Cervical back: Normal range of motion and neck supple.     Neurological: She is alert and oriented to person, place, and time. She has normal strength and normal reflexes.   Skin: Skin is warm and dry. Capillary refill takes less than 2 seconds.   Mild tenderness and drainage from PEG tube site; no warmth or fluctuance noted   Psychiatric: She has a normal mood and affect. Her speech is normal and behavior is normal. Judgment and thought content normal.         ED Course   Procedures  Labs Reviewed   COMPREHENSIVE METABOLIC PANEL - Abnormal; Notable for the following components:       Result Value    Globulin 3.6 (*)     All other components within normal limits   CBC WITH DIFFERENTIAL - Abnormal; Notable for the following components:    WBC 3.2 (*)     RBC 3.75 (*)     Hgb 10.4 (*)     Hct 34.8 (*)     MCHC 29.9 (*)     Neut # 2.0 (*)     All other components within normal limits   CBC W/ AUTO DIFFERENTIAL    Narrative:     The following orders were created for panel order CBC auto  differential.  Procedure                               Abnormality         Status                     ---------                               -----------         ------                     CBC with Differential[[id number]]        Abnormal            Final result                 Please view results for these tests on the individual orders.          Imaging Results          CT Abdomen Pelvis With Contrast (Final result)  Result time 05/10/22 16:54:30    Final result by Floresita Bynum MD (05/10/22 16:54:30)                 Impression:      Gastrostomy in place without evidence of malposition, abscess or leak.      Electronically signed by: Floresita Bynum  Date:    05/10/2022  Time:    16:54             Narrative:    EXAMINATION:  CT ABDOMEN PELVIS WITH CONTRAST    CLINICAL HISTORY:  Abdominal abscess/infection suspected;Eval G tube placement and surrounding skin; Gastrostomy status    TECHNIQUE:  Helically acquired images with axial, sagittal and coronal reformations were obtained from the lung bases to the pubic symphysis after the IV administration of contrast. Positive enteric contrast was administered.    Automated tube current modulation, weight-based exposure dosing, and/or iterative reconstruction technique utilized to reach lowest reasonably achievable exposure rate.    DLP: 251 mGy*cm    COMPARISON:  Plain radiograph 05/10/2022    FINDINGS:  HEART: Normal in size. No pericardial effusion.    LUNG BASES: Well aerated.    LIVER: Normal attenuation. No appreciable focal hepatic lesion.    BILIARY: No calcified gallstones.    PANCREAS: No inflammatory change.    SPLEEN: Normal in size    ADRENALS: No mass.    KIDNEYS/URETERS: The kidneys enhance symmetrically.  No hydronephrosis.    GI TRACT/MESENTERY: Percutaneous gastrostomy balloon is in place within the gastric lumen.  No fluid surrounding the gastrostomy tract.  Intraluminal enteric contrast is seen within the stomach.  No appreciable leak.  No  evidence of bowel obstruction or inflammation. Retained contrast is seen within the colon from a prior exam.  Moderate colonic stool burden.  Normal appendix.    PERITONEUM: No free fluid.No free air.    LYMPH NODES: No enlarged lymph nodes by size criteria.    VASCULATURE: Aortoiliac atherosclerosis without aneurysm.    BLADDER: Normal appearance given degree of distention.    REPRODUCTIVE ORGANS: Normal as visualized.    SOFT TISSUES: Unremarkable.    BONES: No acute osseous abnormality.                               X-Ray Abdomen Flat And Erect (Final result)  Result time 05/10/22 12:01:06    Final result by Andrew Tan MD (05/10/22 12:01:06)                 Impression:      Residual contrast throughout the colon.    Nonspecific gas pattern      Electronically signed by: Andrew Tan  Date:    05/10/2022  Time:    12:01             Narrative:    EXAMINATION:  XR ABDOMEN FLAT AND ERECT    CLINICAL HISTORY:  , Unspecified open wound of abdominal wall, unspecified quadrant without penetration into peritoneal cavity, initial encounter.    FINDINGS:  There is evidence of residual contrast throughout the colon gas pattern is nonspecific with no clear evidence of ileus or obstruction.    Gastrostomy catheter projects over the left hemiabdomen.    No evidence of free air                                 Medications   barium sulfate (READI-CAT) 2 % (w/v) suspension (has no administration in time range)   morphine injection 4 mg (4 mg Intramuscular Given 5/10/22 1200)   ondansetron injection 4 mg (4 mg Intramuscular Given 5/10/22 1245)   iopamidoL (ISOVUE-370) injection 100 mL (100 mLs Intravenous Given 5/10/22 1611)                 ED Course as of 05/10/22 1659   Tue May 10, 2022   1311 I have discussed pt status with Dr. Persaud, Surgery On Call. Physician recommends CT of abdomen and will evaluate pt in ED. Discussed plan with pt. Understanding and agreement voiced. [JA]   2084 I have transitioned pt care to  ANIBAL MOREJON. [JA]   1606 Assumed care from URIAH Elizabeth; will await CT results [AL]   1635 Discussed with surgery Dr Santizo, he recommends discharge patient home; a[pply new dressing with guaze and tape to peg tube site [AL]      ED Course User Index  [AL] DARLEEN Hawley  [JA] Travis Elizabeth Jr., FNP             Clinical Impression:   Final diagnoses:  [S31.109A] Wound of abdomen  [K94.23] Leaking PEG tube          ED Disposition Condition    Discharge Stable        ED Prescriptions     None        Follow-up Information     Follow up With Specialties Details Why Contact Info    discharge info    Discussed all pertinent ED information, results, diagnosis and treatment plan; All questions and concerns were addressed at this time. Patient voices understanding of information and instructions. Patient is comfortable with plan and discharge    Jacky Singh MD Family Medicine   [address] [address]  [phone]             DARLEEN Hawley  05/10/22 7219

## 2022-05-11 DIAGNOSIS — Z90.02 H/O LARYNGECTOMY: Primary | ICD-10-CM

## 2022-05-11 PROCEDURE — 77386 HC IMRT, COMPLEX: CPT | Performed by: RADIOLOGY

## 2022-05-12 ENCOUNTER — APPOINTMENT (OUTPATIENT)
Dept: RADIATION THERAPY | Facility: HOSPITAL | Age: 57
End: 2022-05-12
Attending: RADIOLOGY
Payer: COMMERCIAL

## 2022-05-12 PROCEDURE — 77386 HC IMRT, COMPLEX: CPT | Performed by: RADIOLOGY

## 2022-05-13 PROCEDURE — 77386 HC IMRT, COMPLEX: CPT | Performed by: RADIOLOGY

## 2022-05-14 NOTE — OP NOTE
Patient:   Heaven Boston             MRN: [id number]            FIN: [id number]               Age:   57 years     Sex:  Female     :  1965   Associated Diagnoses:   None   Author:   Lili Weinstein MD      DATE OF SURGERY: 22    ATTENDING PHYSICIAN: Orville Valencia MD    RESIDENT SURGEON: Lili Weinstein MD    PREOPERATIVE DIAGNOSIS: Left supraglottic mass    POSTOPERATIVE DIAGNOSIS: Left supraglottic squamous cell carcinoma    FINDINGS: Large bulky, exophytic and friable necrotic tumor appearing to emanate from the left supraglottis, specifically aryepiglottic fold. Left true cord was not involved nor piriform sinus. There was also extension along the left arytenoid, interarytenoid area, and superior post cricoid mucosa.     PROCEDURE:   1. Direct Laryngoscopy with biopsy  2. Bronchoscopy  3. EGD    INDICATIONS: This is a 57 year old female with past medical history significant for HTN, DM2, mixed HLD, Occipital neuralgia of left side presents to ENT clinic with hoarseness and dysphagia since 2021.  On flexible laryngoscopy she was found to have a large left supraglottic mass concerning for carcinoma. CT neck showed a large left tumor of the supraglottis with sparing of the left true vocal cord and a 1.3cm necrotic node of left Level IV. She was consented for panendoscopy, fiberoptic intubation with possible tracheostomy. All risk, benefits, and alternatives discussed.     PROCEDURE IN DETAIL: The patient was brought to the operating room and placed in supine position. Anesthesia was induced via awake nasal fiberoptic intubation.  The head of the bed was turned 90 degrees. The patient was draped in standard surgical fashion for direct laryngoscopy. The head was wrapped. The neck was palpated.     The tonsils, base of tongue, oral cavity and preepiglottic space were palpated. No fullness was appreciated and the tissue felt soft and healthy. The upper teeth were then cushioned with a  tooth guard and the dedo laryngoscope was introduced. The tonsils appeared healthy , symmetrically 2+ in size and there were no exophytic lesions or ulcerations. The base of tongue was visualized and palpated with no discrete lesions as well as the vallecula. The epiglottis was normal appearing along the lingual surface. The right piriform was normal. Upon entering the left piriform there was a small amount of irregularity along the lateral aspect of the left arytenoid but did not involve any of the remaining mucosa. The majority of the mass was in the left supraglottis, appearing to emanate from the left aryepiglottic fold extending to the left false vocal fold and left arytenoid. Biopsy was taken and this returned positive for SCCa. The interarytenoid area appeared to be involved with tumor and the post cricoid area appear largely free of tumor with the exception of the most superior aspect. The true cords were normal as well as the majority of the laryngeal surface of the epiglottis. Hemostasis was obtained with afrin soaked pledgets.   We then performed tracheoscopy and bronchoscopy with a flexible bronchoscope. No lesions were noted in the trachea or bronchi. Next the dedo laryngoscope was removed and the flexible esophagoscope was used to visualize the esophagus and stomach. The esophagus was without lesions and within the stomach he did not have any visible lesions; however, there was significant bile and thickened mucus in the stomach.  After confirming hemostasis, care was turned over the anesthesia. The patient was then extubated without difficulty and moved to recovery in stable condition. All counts were corrected x 2 at the end of the case and Dr. Valencia was available for all key portions.    Patient was handed back to Anesthesia to be awakened and transferred to the postanesthesia care unit in stable condition.    COMPLICATIONS: None.    ESTIMATED BLOOD LOSS: 5 cc.    SPECIMENS:   1. Left supraglottis  (frozen)- squamous cell carcinoma  2. Left supraglottis (permanent)    Lili Weinstein MD  HO-V

## 2022-05-16 PROCEDURE — 77386 HC IMRT, COMPLEX: CPT | Performed by: RADIOLOGY

## 2022-05-16 PROCEDURE — 77336 RADIATION PHYSICS CONSULT: CPT | Performed by: RADIOLOGY

## 2022-05-17 PROCEDURE — 77386 HC IMRT, COMPLEX: CPT | Performed by: RADIOLOGY

## 2022-05-18 PROCEDURE — 77386 HC IMRT, COMPLEX: CPT | Performed by: RADIOLOGY

## 2022-05-19 ENCOUNTER — OFFICE VISIT (OUTPATIENT)
Dept: OTOLARYNGOLOGY | Facility: CLINIC | Age: 57
End: 2022-05-19
Payer: COMMERCIAL

## 2022-05-19 VITALS
SYSTOLIC BLOOD PRESSURE: 109 MMHG | BODY MASS INDEX: 18.37 KG/M2 | TEMPERATURE: 98 F | WEIGHT: 107.63 LBS | HEIGHT: 64 IN | DIASTOLIC BLOOD PRESSURE: 65 MMHG

## 2022-05-19 DIAGNOSIS — Z92.3 HISTORY OF RADIATION TO HEAD AND NECK REGION: ICD-10-CM

## 2022-05-19 DIAGNOSIS — Z90.02 H/O LARYNGECTOMY: ICD-10-CM

## 2022-05-19 DIAGNOSIS — Z85.21 HISTORY OF LARYNGEAL CANCER: Primary | ICD-10-CM

## 2022-05-19 DIAGNOSIS — Z93.1 GASTROSTOMY TUBE IN PLACE: ICD-10-CM

## 2022-05-19 PROCEDURE — 77386 HC IMRT, COMPLEX: CPT | Performed by: RADIOLOGY

## 2022-05-19 PROCEDURE — 99213 OFFICE O/P EST LOW 20 MIN: CPT | Mod: PBBFAC

## 2022-05-19 NOTE — PROGRESS NOTES
[facility]  Otolaryngology Clinic Note    Heaven Boston  Encounter Date: 5/19/2022  YOB: 1965  Physician: Lauren Westfall MD    Chief Complaint: s/p total laryngectomy    HPI: Heaven Boston is a 57 y.o. 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 Patient had an uneventful hospitalization course and was discharged on postoperative day 9 after an esophagram showed no pharyngeal leak.  However she re-presented to our facility on 4/6/22 with surgical site infection and a pharyngocutaneous fistula. Patient underwent a neck washout with primary repair on 4/11/2022, with placement of a gastrostomy tube the same day. She was maintained on NPO and transferred to [place] for further evaluation and management on 4/15/22 after evidence of persistent pharyngocutaneous fistula. There she underwent a 2nd neck washout with primary closure and placement of salivary bypass tube.     5/2/22: Patient had an uneventful hospitalization course and removal of salivary bypass tube before discharge last week and now presenting for postoperative evaluation.  Patient presents today without any complaints.  She is wondering about when he pointed that her x-rays for swallowing then.  She seen Dr. Laguna today after our visit to undergo-stimulation for radiation.    5/9/22: Pt. Did not answer. Let voicemail to advance diet to CLD. Also discussed this with son.  Plan to see in clinic in 2 weeks    5/19/22: In radiation therapy, 5x weekly until 6/16/22. Has TEP in place, brought another TEP requesting swap out. Will go to speech therapy for exchange of TEP. Went to ER for bleeding from G-tube site 1 week ago but declines visit to Gen Surg clinic today. On CLD taking Ensure by G-tube,  water and soups by mouth. Had questions about advancing diet. No weight loss or appetite change. No other issues.     ROS:   As in HPI      Review of patient's allergies indicates:  No Known Allergies    No past medical history on file.    No past surgical history on file.    Social History     Socioeconomic History    Marital status: Single   Tobacco Use    Smoking status: Former Smoker    Smokeless tobacco: Former User   Substance and Sexual Activity    Alcohol use: Not Currently    Drug use: Not Currently    Sexual activity: Not Currently       No family history on file.    Outpatient Encounter Medications as of 2022   Medication Sig Dispense Refill    amLODIPine (NORVASC) 5 MG tablet Take 5 mg by mouth once daily.      atorvastatin (LIPITOR) 80 MG tablet Take 80 mg by mouth.      azelastine (ASTELIN) 137 mcg (0.1 %) nasal spray 1 spray by Nasal route.      carBAMazepine (CARBATROL) 200 MG CM12 Take 200 mg by mouth.      cetirizine (ZYRTEC) 10 MG tablet Take 10 mg by mouth once daily.      ELIQUIS 5 mg Tab Take 5 mg by mouth 2 (two) times daily.      metFORMIN (GLUCOPHAGE) 500 MG tablet Take 500 mg by mouth once daily.      metoprolol succinate (TOPROL-XL) 25 MG 24 hr tablet Take 25 mg by mouth.      montelukast (SINGULAIR) 5 MG chewable tablet SMARTSI Tablet(s) By Mouth Every Evening      omeprazole (PRILOSEC) 40 MG capsule Take 40 mg by mouth once daily.      prednisoLONE acetate (PRED FORTE) 1 % DrpS Place into both eyes.       No facility-administered encounter medications on file as of 2022.       Physical Exam:  There were no vitals filed for this visit.     General/ Voice: NAD, AAO, no dyspnea/inc WOB, no stridor, tolerates secretions;aphonic Neuro: CN II - XII exam: grossly intact    Head/Face: normal  Eyes: EOMI, PERRLA  Ears: Hearing well at normal conversation volume  Nose: nares normal, septum midline, MMM  Oral Cavity: MMM, no lesions or ulcerations, no leukoplakia, no  edema; BOT and FOM are soft/NT and without lesions/ ulcerations/ leukoplakia; dentition fair   Oropharynx: No uvular deviation or deviation of the oropharyngeal wall noted  Neck:  Well-healed stoma without any signs of dehiscence, sutures removed from superior aspect of stoma, incision line in the neck is also intact, TEP in place, leonie tube cleaned and replaced  Cardiovascular: RR, no r/g/m, 2+ distal pulses,        Pertinent Data:  ? LABS:  ? AUDIO:  ? CT Scan:  FL esophagram 5/2/22: no leak present    Imaging:   I personally reviewed the following images: FL esophagram 5/2/22      Assessment/Plan:  Heaven Boston is a 57 y.o. female with T3N1M0 supraglottic squamous cell carcinoma now status post total laryngectomy.  Postoperative course complicated by pharyngocutaneous fistula for which patient required repeated neck washout and primary closure.  Recently discharged from admission after her 2nd washout and placement of salivary bypass tube.  On discharge salivary bypass tube was removed. Staples removed previous visit, sutures removed this visit. Currently undergoing radiation therapy, will finish June 16.    - f/u Speech therapy for TEP management and education - referral placed today  - f/u RAD ONC - complete radiation as scheduled  - Continue PEG tube - okay to advance diet to soft by mouth  - RTC after radiation completed       Note adapted from Rachell Caceres MS3      Lauren Westfall  Otolaryngology PGYIV

## 2022-05-19 NOTE — PROGRESS NOTES
[facility]  Otolaryngology Clinic Note    Heaven Boston  Encounter Date: 5/19/2022  YOB: 1965  Physician: Lauren Westfall MD    Chief Complaint: s/p total laryngectomy    HPI: Heaven Boston is a 57 y.o. 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 Patient had an uneventful hospitalization course and was discharged on postoperative day 9 after an esophagram showed no pharyngeal leak.  However she re-presented to our facility on 4/6/22 with surgical site infection and a pharyngocutaneous fistula. Patient underwent a neck washout with primary repair on 4/11/2022, with placement of a gastrostomy tube the same day. She was maintained on NPO and transferred to [place] for further evaluation and management on 4/15/22 after evidence of persistent pharyngocutaneous fistula. There she underwent a 2nd neck washout with primary closure and placement of salivary bypass tube.     5/2/22: Patient had an uneventful hospitalization course and removal of salivary bypass tube before discharge last week and now presenting for postoperative evaluation.  Patient presents today without any complaints.  She is wondering about when he pointed that her x-rays for swallowing then.  She seen Dr. Laguna today after our visit to undergo-stimulation for radiation.    5/9/22: Pt. Did not answer. Let voicemail to advance diet to CLD. Also discussed this with son.  Plan to see in clinic in 2 weeks    5/19/22: In radiation therapy, 5x weekly until 6/16/22. Has TEP in place, brought another TEP requesting swap out. Will go to speech therapy for exchange of TEP. Went to ER for bleeding from G-tube site 1 week ago but declines visit to Gen Surg clinic today. On CLD taking Ensure by G-tube,  water and soups by mouth. Had questions about advancing diet. No weight loss or appetite change. No other issues.     ROS:   As in HPI      Review of patient's allergies indicates:  No Known Allergies    No past medical history on file.    No past surgical history on file.    Social History     Socioeconomic History    Marital status: Single   Tobacco Use    Smoking status: Former Smoker    Smokeless tobacco: Former User   Substance and Sexual Activity    Alcohol use: Not Currently    Drug use: Not Currently    Sexual activity: Not Currently       No family history on file.    Outpatient Encounter Medications as of 2022   Medication Sig Dispense Refill    amLODIPine (NORVASC) 5 MG tablet Take 5 mg by mouth once daily.      atorvastatin (LIPITOR) 80 MG tablet Take 80 mg by mouth.      azelastine (ASTELIN) 137 mcg (0.1 %) nasal spray 1 spray by Nasal route.      carBAMazepine (CARBATROL) 200 MG CM12 Take 200 mg by mouth.      cetirizine (ZYRTEC) 10 MG tablet Take 10 mg by mouth once daily.      ELIQUIS 5 mg Tab Take 5 mg by mouth 2 (two) times daily.      metFORMIN (GLUCOPHAGE) 500 MG tablet Take 500 mg by mouth once daily.      metoprolol succinate (TOPROL-XL) 25 MG 24 hr tablet Take 25 mg by mouth.      montelukast (SINGULAIR) 5 MG chewable tablet SMARTSI Tablet(s) By Mouth Every Evening      omeprazole (PRILOSEC) 40 MG capsule Take 40 mg by mouth once daily.      prednisoLONE acetate (PRED FORTE) 1 % DrpS Place into both eyes.       No facility-administered encounter medications on file as of 2022.       Physical Exam:  There were no vitals filed for this visit.     General/ Voice: NAD, AAO, no dyspnea/inc WOB, no stridor, tolerates secretions;aphonic Neuro: CN II - XII exam: grossly intact    Head/Face: normal  Eyes: EOMI, PERRLA  Ears: Hearing well at normal conversation volume  Nose: nares normal, septum midline, MMM  Oral Cavity: MMM, no lesions or ulcerations, no leukoplakia, no  edema; BOT and FOM are soft/NT and without lesions/ ulcerations/ leukoplakia; dentition fair   Oropharynx: No uvular deviation or deviation of the oropharyngeal wall noted  Neck:  Well-healed stoma without any signs of dehiscence, sutures removed from superior aspect of stoma, incision line in the neck is also intact, TEP in place, leonie tube cleaned and replaced  Cardiovascular: RR, no r/g/m, 2+ distal pulses,        Pertinent Data:  ? LABS:  ? AUDIO:  ? CT Scan:  FL esophagram 5/2/22: no leak present    Imaging:   I personally reviewed the following images: FL esophagram 5/2/22      Assessment/Plan:  Heaven Boston is a 57 y.o. female with T3N1M0 supraglottic squamous cell carcinoma now status post total laryngectomy.  Postoperative course complicated by pharyngocutaneous fistula for which patient required repeated neck washout and primary closure.  Recently discharged from admission after her 2nd washout and placement of salivary bypass tube.  On discharge salivary bypass tube was removed. Staples removed previous visit, sutures removed this visit. Currently undergoing radiation therapy, will finish June 16.    - f/u Speech therapy for TEP management and education - referral placed today  - f/u RAD ONC - complete radiation as scheduled  - Continue PEG tube - okay to advance diet to soft by mouth  - RTC after radiation completed       Note adapted from Rachell Caceres MS3      Lauren Westfall  Otolaryngology PGYIV

## 2022-05-20 PROCEDURE — 77386 HC IMRT, COMPLEX: CPT | Performed by: RADIOLOGY

## 2022-05-23 PROCEDURE — 77336 RADIATION PHYSICS CONSULT: CPT | Performed by: RADIOLOGY

## 2022-05-23 PROCEDURE — 77386 HC IMRT, COMPLEX: CPT | Performed by: RADIOLOGY

## 2022-05-24 PROCEDURE — 77386 HC IMRT, COMPLEX: CPT | Performed by: RADIOLOGY

## 2022-05-25 PROCEDURE — 77386 HC IMRT, COMPLEX: CPT | Performed by: RADIOLOGY

## 2022-05-26 PROCEDURE — 77386 HC IMRT, COMPLEX: CPT | Performed by: RADIOLOGY

## 2022-05-27 PROCEDURE — 77336 RADIATION PHYSICS CONSULT: CPT | Performed by: RADIOLOGY

## 2022-05-27 PROCEDURE — 77386 HC IMRT, COMPLEX: CPT | Performed by: RADIOLOGY

## 2022-05-27 NOTE — PROGRESS NOTES
I have reviewed the notes, assessments, and/or procedures performed by resident, I concur with her/his documentation of Heaven Boston.     Connor Patrick M.D.

## 2022-05-30 ENCOUNTER — CLINICAL SUPPORT (OUTPATIENT)
Dept: REHABILITATION | Facility: HOSPITAL | Age: 57
End: 2022-05-30
Payer: COMMERCIAL

## 2022-05-30 DIAGNOSIS — Z90.02 H/O LARYNGECTOMY: ICD-10-CM

## 2022-05-30 DIAGNOSIS — Z90.02 STATUS POST LARYNGECTOMY: ICD-10-CM

## 2022-05-30 DIAGNOSIS — Z85.21 HISTORY OF LARYNGEAL CANCER: ICD-10-CM

## 2022-05-30 PROCEDURE — 92597 ORAL SPEECH DEVICE EVAL: CPT

## 2022-05-30 PROCEDURE — L8509 TRACH-ESOPH VOICE PROS MD IN: HCPCS

## 2022-05-30 NOTE — PROGRESS NOTES
[facility]  Speech Therapy Evaluation   Laryngectomy  Date: 2022     Name: Heaven Boston   MRN: [id number]    Therapy Diagnosis:   Encounter Diagnoses   Name Primary?    History of laryngeal cancer     H/O laryngectomy       Physician: Lauren Westfall MD    Time In:  1300  Time Out:  1400     Procedure Min.   Prosthesis Evalution 60   Total Untimed Units: 60  Charges Billed/# of units: 1    Precautions: Standard  Subjective    Chief Complaint: ready to talk    AFFECTnormal affect    Pain:   0/10  Pain Location / Description: NA  Current Medical History: Heaven Boston is a 57 y.o. female referred by Dr. Westfall for TEP management to maximize alaryngeal communication without respiratory compromise.    Past Medical History: She is a 57 y.o. female of T4N1M0 left supraglottic squamous cell carcinoma s/p total laryngectomy hemithyroidectomy, BND 2-4, CPM and primary TEP on 3/21/22 at [facility]. Patient was discharged from [facility] post-op day 9 after esophagram ruled out any leak. She was re-admitted on 2022 with left neck dehiscence, left neck infection, and leak. Ms. Boston underwent neck washout and g-tube placement 2022. She was transferred to [facility] on 2022 for repair of salivary leak. Taken to the OR on 4/15/2022 - small pinhole leak identified and over sewn. She was discharged home from [facility] on 2022.     Past Medical History:   Diagnosis Date    Cancer     Diabetes mellitus     Hypertension     Heaven Boston  has a past surgical history that includes pr removal of larynx and  section.  Medical Hx and Allergies: Heaven has a current medication list which includes the following prescription(s): amlodipine, atorvastatin, azelastine, carbamazepine, cetirizine, eliquis, metformin, metoprolol succinate, montelukast, omeprazole, and prednisolone acetate. Review of patient's allergies indicates:  No Known Allergies    Current Voice Function: aphonia   Current  Level of Swallow Function/Complaints:  No complaints of dysphagia  Prior Therapy: 4/12/2022 while inpatient at [facility]. 17FR, 12.5mm Aquino placed. No voicing at that time due to recent surgery.    TEP ASSESSMENT   Initial Fitting: no    Re-fitting:yes    TEP Initial Fitting Date: 03/21/2022    TEP Current Status:Pt is currently wearing 17FR, 12.5mm Aquino placed during inpatient stay on 4/12/2022. Pt additionally wearing 10/55 standard leonie tube and push to talk HMEs.    TEP Patient Complaints:No complaints, pt here for voice initiation    TEP Accessories: 10/55 standard leonie tube and push to talk HMEs.    Stoma Size:  adequate     Lymphedema:  yes    TEP Fitting/Re-sizing:     Removed current TEP: yes no biofilm noted    TEP Removed catheter : no     TEP Sizing:yes 8mm    Puncture Dilation: no     TEP Prosthesis Placed:yes 8mm, 17FR [employer] Aquino    TEP Voicing with Open Tract:no      TEP Voicing with Prosthesis:no intermittent, strained vocal productions noted with frequent attempts    TEP Leaking through Prothesis:no     TEP Leaking around Prosthesis:no       Treatment   Total Treatment Time Separate from Evaluation: n/a   no treatment performed 2/2 time to complete evaluation.    Education: Plan of Care, role of SLP in care and TEP, stomal care and daily HME usage, RRC placement in case of TEP dislodgement were discussed with pt. Patient and family members expressed understanding. All comments and questions were addressed and comprehension was demonstrated by both parties.     Assessment       LongTerm Goals:  Current Progress:   1.  Patient will utilize alaryngeal communication via TEP to express needs and desires without respiratory compromise >90% of the time.  Initial       Short Term Goals:  Current Progress:   1. Patient will demonstrate care and use of HME system for maximum pulmonary benefit >90% of the time. Initial   2.  Patient will demonstrate adequate care and use of TEP to maintain TEP voicing >90% of the  "time.  Initial   3.  Patient will demonstrate adequate occlusion and cleaning of TEP to maintain voicing >90% of the time.  Initial   4. Patient will increase laryngectomy tube usage daily or PRN to maintain and increase stomal patency.  Initial     Ms. 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  She will begin utilizing esophageal speech via a voice prosthesis for alaryngeal communication to effectively communicate basic and medical wants and needs and participate socially in all functional environments.  She will continue to receive SLP services for TEP management and to maximize alaryngeal communication without respiratory compromise 1 x month, or PRN for communication needs. Ms. Boston requires the use of a laryngectomy tube at all times to keep the airway patent and prevent stomal stenosis. HMEs are required daily for mucus management and pulmonary optimization .     Plan     SLP intervention is warranted 1-2 times a month or PRN for communication needs for a minimum of 1 year due to the chronic nature of the impairment.     Additional Information     Ms. Boston, son, Cee, and grandson entered this date anxious to initiate voicing. SLP assessing stoma and TEP. SLP visualizing thick, discolored mucus in leonie tube. Patient reported removing tube for 3 hours at a time for "a break". SLP provided pt educational video from [employer] regarding "benefits of using HME". SLP educated that HME should be used 24 hours daily and changed every 24 hours. SLP noted increased TEP length. SLP cleaning TEP and attempting voice production with finger occlusion use of leonie tube and HME push to talk system. Minimal vocal productions noted this date. Patient note to have very strained voice when attempts successful. SLP opting to change TEP to 8mm, 17 FR Aquino for possible vocal improvement. No change in vocal quality noted. Patient becoming emotional regarding lack of voice. SLP to follow up in 2 weeks and " encouraged pt to continue with voice attempts. SLP to contact ENT to discuss next course of action.     Sunitha Jean MS, CCC-SLP   5/30/2022

## 2022-05-31 PROCEDURE — 77386 HC IMRT, COMPLEX: CPT | Performed by: RADIOLOGY

## 2022-05-31 PROCEDURE — 77336 RADIATION PHYSICS CONSULT: CPT | Performed by: RADIOLOGY

## 2022-06-01 ENCOUNTER — APPOINTMENT (OUTPATIENT)
Dept: RADIATION THERAPY | Facility: HOSPITAL | Age: 57
End: 2022-06-01
Attending: RADIOLOGY
Payer: COMMERCIAL

## 2022-06-13 ENCOUNTER — CLINICAL SUPPORT (OUTPATIENT)
Dept: REHABILITATION | Facility: HOSPITAL | Age: 57
End: 2022-06-13
Payer: COMMERCIAL

## 2022-06-13 DIAGNOSIS — Z90.02 H/O LARYNGECTOMY: Primary | ICD-10-CM

## 2022-06-13 PROCEDURE — 92507 TX SP LANG VOICE COMM INDIV: CPT

## 2022-06-13 NOTE — PROGRESS NOTES
[facility]  Speech Therapy Progress Note  Laryngectomy  Date: 2022     Name: Heaven Boston   MRN: [id number]    Therapy Diagnosis:   S/P Total Laryngectomy  Physician: Dr. Barkley    Time In:  1055  Time Out:  1120     Procedure Min.   Speech-Language Therapy 25   Total Untimed Units: 25  Charges Billed/# of units: 1    Precautions: Standard  Subjective    Chief Complaint: Can't talk    AFFECTnormal affect    Pain:   0/10  Pain Location / Description: NA  Current Medical History: Heaven Boston is a 57 y.o. female referred by Dr. Barkley for TEP management to maximize alaryngeal communication without respiratory compromise.    Past Medical History: She is a 57 y.o. female of T4N1M0 left supraglottic squamous cell carcinoma s/p total laryngectomy hemithyroidectomy, BND 2-4, CPM and primary TEP on 3/21/22 at [facility]. Patient was discharged from [facility] post-op day 9 after esophagram ruled out any leak. She was re-admitted on 2022 with left neck dehiscence, left neck infection, and leak. Ms. Boston underwent neck washout and g-tube placement 2022. She was transferred to [facility] on 2022 for repair of salivary leak. Taken to the OR on 4/15/2022 - small pinhole leak identified and over sewn. She was discharged home from [facility] on 2022.     Past Medical History:   Diagnosis Date    Cancer     Diabetes mellitus     Hypertension     Heaven Boston  has a past surgical history that includes pr removal of larynx and  section.  Medical Hx and Allergies: Heaven has a current medication list which includes the following prescription(s): amlodipine, atorvastatin, azelastine, carbamazepine, cetirizine, eliquis, metformin, metoprolol succinate, montelukast, omeprazole, and prednisolone acetate. Review of patient's allergies indicates:  No Known Allergies    Current Voice Function: aphonia   Current Level of Swallow Function/Complaints:  No complaints of dysphagia  Prior  Therapy: 05/30/2022, TEP downsized to 8mm. Pt aphonic despite downsize    TEP ASSESSMENT   Initial Fitting: no    Re-fitting: no    TEP Initial Fitting Date: 03/21/2022    TEP Current Status:Pt is currently wearing 17FR, 8 mm Aquino placed on 05/30/2022. Pt additionally wearing 10/55 fenestrated leonie tube and push to talk HMEs.    TEP Patient Complaints:No complaints, pt here to re-attempt voicing    TEP Accessories: 10/55 fenestrated leonie tube and push to talk HMEs.    Stoma Size:  adequate     Lymphedema:  yes    TEP Fitting/Re-sizing:     Removed current TEP: no    TEP Removed catheter : no     TEP Sizing: no    Puncture Dilation: no     TEP Prosthesis Placed: no    TEP Voicing with Open Tract:no      TEP Voicing with Prosthesis:no, no voicing noted with frequent attempts and MAX pressure    TEP Leaking through Prothesis:no     TEP Leaking around Prosthesis:no       Treatment   Education: Plan of Care, role of SLP in care and TEP, stomal care and daily HME usage, RRC placement in case of TEP dislodgement were discussed with pt. Patient and family members expressed understanding. All comments and questions were addressed and comprehension was demonstrated by both parties.     Assessment       LongTerm Goals:  Current Progress:   1.  Patient will utilize alaryngeal communication via TEP to express needs and desires without respiratory compromise >90% of the time.  Initial       Short Term Goals:  Current Progress:   1. Patient will demonstrate care and use of HME system for maximum pulmonary benefit >90% of the time. Initial   2.  Patient will demonstrate adequate care and use of TEP to maintain TEP voicing >90% of the time.  Initial   3.  Patient will demonstrate adequate occlusion and cleaning of TEP to maintain voicing >90% of the time.  Initial   4. Patient will increase laryngectomy tube usage daily or PRN to maintain and increase stomal patency.  Initial     Ms. Boston presents with chronic aphonia due to total  laryngectomy.  She will begin utilizing esophageal speech via a voice prosthesis for alaryngeal communication to effectively communicate basic and medical wants and needs and participate socially in all functional environments.  She will continue to receive SLP services for TEP management and to maximize alaryngeal communication without respiratory compromise 1 x month, or PRN for communication needs. Ms. Boston requires the use of a laryngectomy tube at all times to keep the airway patent and prevent stomal stenosis. HMEs are required daily for mucus management and pulmonary optimization .     Plan     SLP intervention is warranted 1-2 times a month or PRN for communication needs for a minimum of 1 year due to the chronic nature of the impairment.     Additional Information     Ms. Boston entered for voicing re-attempt this date. No voice noted this date despite MAX pressure and numerous trials. SLP noted external lymphedema and suspecting internal lymphedema as to reason for aphonia. SLP to contact ENT to discuss need to imaging and further assessment recommendation of MBSS or laryngoscope. SLP to initiate lymphedema intervention.       Sunitha Jean MS, CCC-SLP   6/13/2022

## 2022-06-21 ENCOUNTER — OFFICE VISIT (OUTPATIENT)
Dept: OTOLARYNGOLOGY | Facility: CLINIC | Age: 57
End: 2022-06-21
Payer: COMMERCIAL

## 2022-06-21 VITALS
HEIGHT: 63 IN | SYSTOLIC BLOOD PRESSURE: 131 MMHG | DIASTOLIC BLOOD PRESSURE: 56 MMHG | BODY MASS INDEX: 20.02 KG/M2 | WEIGHT: 113 LBS | HEART RATE: 76 BPM | TEMPERATURE: 99 F

## 2022-06-21 DIAGNOSIS — Z90.02 H/O LARYNGECTOMY: Primary | ICD-10-CM

## 2022-06-21 DIAGNOSIS — C32.9 LARYNGEAL CANCER: ICD-10-CM

## 2022-06-21 PROCEDURE — 31575 DIAGNOSTIC LARYNGOSCOPY: CPT | Mod: PBBFAC | Performed by: STUDENT IN AN ORGANIZED HEALTH CARE EDUCATION/TRAINING PROGRAM

## 2022-06-21 PROCEDURE — 99214 OFFICE O/P EST MOD 30 MIN: CPT | Mod: PBBFAC,25

## 2022-06-21 RX ORDER — LIDOCAINE HYDROCHLORIDE 40 MG/ML
2 INJECTION, SOLUTION RETROBULBAR
Status: DISCONTINUED | OUTPATIENT
Start: 2022-06-21 | End: 2023-02-14

## 2022-06-21 NOTE — PROGRESS NOTES
[facility]  Otolaryngology Clinic Note    Heaven Boston  Encounter Date: 5/19/2022  YOB: 1965  Physician: Lauren Westfall MD    Chief Complaint: s/p total laryngectomy    HPI: Heaven Boston is a 57 y.o. 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 Patient had an uneventful hospitalization course and was discharged on postoperative day 9 after an esophagram showed no pharyngeal leak.  However she re-presented to our facility on 4/6/22 with surgical site infection and a pharyngocutaneous fistula. Patient underwent a neck washout with primary repair on 4/11/2022, with placement of a gastrostomy tube the same day. She was maintained on NPO and transferred to [place] for further evaluation and management on 4/15/22 after evidence of persistent pharyngocutaneous fistula. There she underwent a 2nd neck washout with primary closure and placement of salivary bypass tube.     5/2/22: Patient had an uneventful hospitalization course and removal of salivary bypass tube before discharge last week and now presenting for postoperative evaluation.  Patient presents today without any complaints.  She is wondering about when he pointed that her x-rays for swallowing then.  She seen Dr. Laguna today after our visit to undergo-stimulation for radiation.    5/9/22: Pt. Did not answer. Let voicemail to advance diet to CLD. Also discussed this with son.  Plan to see in clinic in 2 weeks    5/19/22: In radiation therapy, 5x weekly until 6/16/22. Has TEP in place, brought another TEP requesting swap out. Will go to speech therapy for exchange of TEP. Went to ER for bleeding from G-tube site 1 week ago but declines visit to Gen Surg clinic today. On CLD taking Ensure by G-tube,  water and soups by mouth. Had questions about advancing diet. No weight loss or appetite change. No other issues.     22:  Returns today for follow up.  Completed radiation last week 22.  Is having difficulty voicing with TEP though it has been swapped out by SLP.  Able to eat what she wants.  Having some tightness of her throat and soreness, otherwise no issues.    ROS:   As in HPI      Review of patient's allergies indicates:  No Known Allergies    No past medical history on file.    No past surgical history on file.    Social History     Socioeconomic History    Marital status: Single   Tobacco Use    Smoking status: Former Smoker    Smokeless tobacco: Former User   Substance and Sexual Activity    Alcohol use: Not Currently    Drug use: Not Currently    Sexual activity: Not Currently       No family history on file.    Outpatient Encounter Medications as of 2022   Medication Sig Dispense Refill    amLODIPine (NORVASC) 5 MG tablet Take 5 mg by mouth once daily.      atorvastatin (LIPITOR) 80 MG tablet Take 80 mg by mouth.      azelastine (ASTELIN) 137 mcg (0.1 %) nasal spray 1 spray by Nasal route.      carBAMazepine (CARBATROL) 200 MG CM12 Take 200 mg by mouth.      cetirizine (ZYRTEC) 10 MG tablet Take 10 mg by mouth once daily.      ELIQUIS 5 mg Tab Take 5 mg by mouth 2 (two) times daily.      metFORMIN (GLUCOPHAGE) 500 MG tablet Take 500 mg by mouth once daily.      metoprolol succinate (TOPROL-XL) 25 MG 24 hr tablet Take 25 mg by mouth.      montelukast (SINGULAIR) 5 MG chewable tablet SMARTSI Tablet(s) By Mouth Every Evening      omeprazole (PRILOSEC) 40 MG capsule Take 40 mg by mouth once daily.      prednisoLONE acetate (PRED FORTE) 1 % DrpS Place into both eyes.       No facility-administered encounter medications on file as of 2022.       Physical Exam:  There were no vitals filed for this visit.     General/ Voice: NAD, AAO, no dyspnea/inc WOB, no stridor,  tolerates secretions;aphonic Neuro: CN II - XII exam: grossly intact    Head/Face: normal  Eyes: EOMI, PERRLA  Ears: Hearing well at normal conversation volume  Nose: nares normal, septum midline, MMM  Oral Cavity: MMM, no lesions or ulcerations, no leukoplakia, no edema; BOT and FOM are soft/NT and without lesions/ ulcerations/ leukoplakia; dentition poor  Oropharynx: No uvular deviation or deviation of the oropharyngeal wall noted  Neck:  Well-healed stoma without any signs of dehiscence, skin excoriations secondary to radiation, peristomal, submental, and left anteriolateral neck, sutures removed from superior aspect of stoma, incision line in the neck well healed, TEP in place, not wearing leonie tube currently  Cardiovascular: RR, no r/g/m, 2+ distal pulses,        Flexible Fiberoptic Neopharyngoscopy via right nare  Anesthesia: Topical 2% lidocaine  Adverse Events: None  Resident Surgeon: Gregory Martins MD  Blood loss: none  Condition: good    Procedure in Detail: Informed consent was obtained from the patient after explanation of procedure, indications, risks and benefits. Flexible endoscopy was performed on Heaven Boston through the right nasal passages.     Findings:  NP/OP: no masses/lesions of NC, eustachian tube, fossa of Rosenmuller, no adenoid hypertrophy  BOT/vallecula: no lingual hypertrophy, no masses/lesions, no secretions obscuring visualization  Neopharynx:  Unable to visualize TEP, mild pharyngeal edema, no evidence of disease    Tracheoscopy: Laryngoscope advanced to aleja.  No evidence of disease, pink mucosa, no secretions      Pertinent Data:  ? LABS:  ? AUDIO:  ? CT Scan:  FL esophagram 5/2/22: no leak present    Imaging:   I personally reviewed the following images: FL esophagram 5/2/22      Assessment/Plan:  Heaven Boston is a 57 y.o. female with T3N1M0 supraglottic squamous cell carcinoma now status post total laryngectomy.  Postoperative course complicated by pharyngocutaneous fistula  for which patient required repeated neck washout and primary closure x 2.  RT completed 6/16/22.  Unable to voice currently with TEP    - f/u Speech therapy for TEP management and education - may not be able to voice currently due to significant pharyngeal edema, unable to visualize end of TEP in neopharynx  - f/u RAD ONC - will reach out to see when posttreatment PET is scheduled  - Continue PEG tube - okay to advance diet to soft by mouth; will discuss PEG removal once posttreatment Pet is complete  - RTC 1 month    Gregory Martins MD  [facility] Otolaryngology PGY III

## 2022-06-22 NOTE — PROGRESS NOTES
I have reviewed the notes, assessments, and/or procedures performed this visit, and I concur with the documentation.    Connor Patrick M.D.

## 2022-06-24 ENCOUNTER — TELEPHONE (OUTPATIENT)
Dept: OTOLARYNGOLOGY | Facility: CLINIC | Age: 57
End: 2022-06-24

## 2022-07-11 ENCOUNTER — OFFICE VISIT (OUTPATIENT)
Dept: CARDIOLOGY | Facility: CLINIC | Age: 57
End: 2022-07-11
Payer: COMMERCIAL

## 2022-07-11 VITALS
HEIGHT: 63 IN | DIASTOLIC BLOOD PRESSURE: 79 MMHG | WEIGHT: 114.63 LBS | SYSTOLIC BLOOD PRESSURE: 144 MMHG | RESPIRATION RATE: 20 BRPM | HEART RATE: 82 BPM | BODY MASS INDEX: 20.31 KG/M2 | TEMPERATURE: 98 F | OXYGEN SATURATION: 100 %

## 2022-07-11 DIAGNOSIS — E78.01 FAMILIAL HYPERCHOLESTEROLEMIA: Primary | ICD-10-CM

## 2022-07-11 DIAGNOSIS — Z93.0 TRACHEOSTOMY IN PLACE: ICD-10-CM

## 2022-07-11 DIAGNOSIS — I10 HYPERTENSION, UNSPECIFIED TYPE: ICD-10-CM

## 2022-07-11 DIAGNOSIS — R55 SYNCOPE, UNSPECIFIED SYNCOPE TYPE: ICD-10-CM

## 2022-07-11 DIAGNOSIS — Z93.1 PEG (PERCUTANEOUS ENDOSCOPIC GASTROSTOMY) STATUS: ICD-10-CM

## 2022-07-11 DIAGNOSIS — I47.10 SVT (SUPRAVENTRICULAR TACHYCARDIA): ICD-10-CM

## 2022-07-11 DIAGNOSIS — E11.9 TYPE 2 DIABETES MELLITUS WITHOUT COMPLICATION, WITHOUT LONG-TERM CURRENT USE OF INSULIN: ICD-10-CM

## 2022-07-11 PROCEDURE — 99214 OFFICE O/P EST MOD 30 MIN: CPT | Mod: PBBFAC | Performed by: INTERNAL MEDICINE

## 2022-07-11 RX ORDER — ASPIRIN 81 MG/1
81 TABLET ORAL DAILY
Refills: 3
Start: 2022-07-11 | End: 2023-03-01

## 2022-07-11 NOTE — PROGRESS NOTES
Chief Complaint   Patient presents with    4 month f/u denies chest pain/sob       This is a 56-year-old AAF with HTN, DM, HLP, occipital neuralgia of left side who presents for routine follow-up and ongoing care.  In Feb 2022, she was diagnosed with squamous cell carcinoma of larynx (T4N1M0) now s/p laryngectomy, trach and PEG and XRT. She was diagnosed with a DVT in April 2022 and was given eliquis for 30 days.  Today, she reports feeling well. She is not followed by oncology. She has been able to eat and has gained a few pounds. She has not been using the PEG tube.She is doing well from a cardiovascular standpoint and denies chest pain, SOB, palpitations, lower ext edema, syncope, presyncope. She checks her BP daily and it is around 120-130/70s.  To note, pt was initially referred to cardiology for 2 episodes of syncope in 2020 that were thought to be neurocardiogenic. EKG, Echocardiogram, Carotid US were all benign. She completed a subsequent 30-day event monitor from July -August 2021 that revealed runs of SVT, also nonsustained VT versus SVT with aberrancy. She was started on metoprolol 12.5 mg at that time.       Review of Systems   Constitutional: negative for fever,chills, sweats, weakness, fatigue, decreased activity   Eye: negative for blurry vision, vision change  ENMT: negative for sore throat, nasal congestion  Respiratory: negative for shortness of breath, cough, wheezing  Cardiovascular: negative for chest pain, dyspnea on exertion, orthopnea, PND, lower extremity edema, palpitations, claudication  Gastrointestinal: negative for nausea, vomiting, abdominal pain, constipation, diarrhea, blood in stool  Genitourinary: negative for hematuria, nocturia, dysuria  Endocrine: negative for abnormal thirst, temperature  Musculoskeletal: negative for back pain, joint pain  Integumentary: negative for abnormal mole  Neurologic: negative for weakness, numbness, headaches, shooting pain  Hematology: negative for  easy bruising, easy bleeding  Allergy: negative for watery eyes, sneezing  Psychiatric: negative for loss of interest, anxiety, stress      Physical Exam Vitals & Measurements  Vitals:    22 0833   BP: (!) 144/79   Pulse: 82   Resp: 20   Temp: 98.4 °F (36.9 °C)         General: alert and oriented/no acute distress  Eye: EOMI/normal conjunctiva  HENT: normocephalic/moist oral mucosa  Neck: trach in place   Respiratory: lungs CTA/nonlabored respirations/BS equal/symmetrical expansion/no chest wall tenderness  Cardiovascular: normal rate/normal rhythm/no murmur/normal peripheral perfusion/no edema  Gastrointestinal: soft/nontender/nondistended, PEG in place  Musculoskeletal: normal ROM/normal strength  Integumentary: warm/dry/pink/intact  Neurologic: alert/oriented/normal sensory/no focal deficits  Psychiatric: cooperative/appropriate mood and affect/normal judgment       Current Outpatient Medications:  . ASA 81mg daily     amLODIPine (NORVASC) 5 MG tablet, Take 5 mg by mouth once daily., Disp: , Rfl:     atorvastatin (LIPITOR) 80 MG tablet, Take 80 mg by mouth., Disp: , Rfl:     azelastine (ASTELIN) 137 mcg (0.1 %) nasal spray, 1 spray by Nasal route., Disp: , Rfl:     carBAMazepine (CARBATROL) 200 MG CM12, Take 200 mg by mouth., Disp: , Rfl:     cetirizine (ZYRTEC) 10 MG tablet, Take 10 mg by mouth once daily., Disp: , Rfl:     metFORMIN (GLUCOPHAGE) 500 MG tablet, Take 500 mg by mouth once daily., Disp: , Rfl:     metoprolol succinate (TOPROL-XL) 25 MG 24 hr tablet, Take 25 mg by mouth., Disp: , Rfl:     montelukast (SINGULAIR) 5 MG chewable tablet, SMARTSI Tablet(s) By Mouth Every Evening, Disp: , Rfl:     prednisoLONE acetate (PRED FORTE) 1 % DrpS, Place into both eyes., Disp: , Rfl:     ELIQUIS 5 mg Tab, Take 5 mg by mouth 2 (two) times daily., Disp: , Rfl:     omeprazole (PRILOSEC) 40 MG capsule, Take 40 mg by mouth once daily., Disp: , Rfl:       Assessment/Plan   Syncope   - Denies any  further syncopal episodes. Denies CP, SOB or palpitations  - Previous EKG and EKG on 7/8, TTE, CXR, and carotid Doppler reviewed  - 30 day event monitor- revealed runs of SVT, also nonsustained VT versus SVT with aberrancy  - continue metoprolol succinate 25 mg qd d/t Holter findings  - Carotid stenosis of less than 50% noted on right carotid ultrasound in 2020  - Recommended repeat carotid ultrasound in 2 years (2023)    Laryngeal cancer  S/p laryngectomy and trach and XRT  Currently not followed by onc, but referral sent in Feb 2022    Tobacco user   She quit smoking    DVT  Diagnosed in April 2022  Was placed on eliquis in [place] but only given 1 month supply  Eliquis was unaffordable and will send xarelto 20mg with supper to be taken at least for a total duration of 6 months (till Oct 2022)  If pt with active cancer, will need to continue for longer than 6 months.    DM  - A1C-5.9 in 9/2021  - Continue management per PCP    HTN -144/79  Pt has not taken her AM meds yet  BP at home in 120s   Will not make changes today, continue amlodipine 5mg and toprol    HLD   patient's LDL was greater than 190 in the past consistent with familial hypercholesterolemia.  Goal <100  LDL in 9/2021 was 115, lipitor increased to 80mg on last visit  Check FLP with next labs

## 2022-07-13 RX ORDER — FLUTICASONE PROPIONATE 50 MCG
1 SPRAY, SUSPENSION (ML) NASAL 2 TIMES DAILY
COMMUNITY
Start: 2022-02-13 | End: 2023-09-15 | Stop reason: SDUPTHER

## 2022-07-13 RX ORDER — FLUTICASONE PROPIONATE AND SALMETEROL 100; 50 UG/1; UG/1
1 POWDER RESPIRATORY (INHALATION) EVERY 12 HOURS
COMMUNITY
End: 2023-03-01 | Stop reason: CLARIF

## 2022-07-13 RX ORDER — HYDROCODONE BITARTRATE AND ACETAMINOPHEN 5; 300 MG/1; MG/1
1 TABLET ORAL EVERY 8 HOURS PRN
COMMUNITY
Start: 2022-01-21 | End: 2023-03-01 | Stop reason: CLARIF

## 2022-07-13 RX ORDER — IBUPROFEN 800 MG/1
800 TABLET ORAL EVERY 6 HOURS PRN
COMMUNITY
Start: 2022-01-14 | End: 2023-03-01

## 2022-07-13 RX ORDER — DULOXETIN HYDROCHLORIDE 30 MG/1
30 CAPSULE, DELAYED RELEASE ORAL 2 TIMES DAILY
COMMUNITY
Start: 2022-02-07 | End: 2023-03-30 | Stop reason: SDUPTHER

## 2022-07-13 NOTE — TELEPHONE ENCOUNTER
Message sent for refill      ----- Message from Nikki Astorga sent at 7/12/2022  2:57 PM CDT -----  Regarding: Prescription Refill  Patient called and would like a refill on her blood pressure medicine.  Please and Thank You.

## 2022-07-14 DIAGNOSIS — Z93.1 PEG (PERCUTANEOUS ENDOSCOPIC GASTROSTOMY) STATUS: Primary | ICD-10-CM

## 2022-07-21 ENCOUNTER — OFFICE VISIT (OUTPATIENT)
Dept: OTOLARYNGOLOGY | Facility: CLINIC | Age: 57
End: 2022-07-21
Payer: COMMERCIAL

## 2022-07-21 VITALS
TEMPERATURE: 98 F | SYSTOLIC BLOOD PRESSURE: 129 MMHG | WEIGHT: 116.38 LBS | BODY MASS INDEX: 20.37 KG/M2 | HEART RATE: 72 BPM | DIASTOLIC BLOOD PRESSURE: 62 MMHG

## 2022-07-21 DIAGNOSIS — Z90.02 H/O LARYNGECTOMY: ICD-10-CM

## 2022-07-21 DIAGNOSIS — K94.23 PEG TUBE MALFUNCTION: Primary | ICD-10-CM

## 2022-07-21 DIAGNOSIS — B37.9 YEAST INFECTION: ICD-10-CM

## 2022-07-21 PROCEDURE — 99214 OFFICE O/P EST MOD 30 MIN: CPT | Mod: PBBFAC | Performed by: OTOLARYNGOLOGY

## 2022-07-21 RX ORDER — FLUCONAZOLE 150 MG/1
150 TABLET ORAL ONCE
Qty: 2 TABLET | Refills: 0 | Status: SHIPPED | OUTPATIENT
Start: 2022-07-21 | End: 2022-07-21

## 2022-07-21 NOTE — PROGRESS NOTES
The scope used for the exam was:  Flexible scope ENF-P4  Serial Number:  1)    [id number]    []   2)    [id number]    []   3)    [id number]    []   4)    [id number]    [x]   5)    [id number]    []   6)    [id number]    []       The scope used for the exam was:  Rigid scope   Serial Number:  1)   [id number]    []   2)   [id number]    []   3)   [id number]    []   4)    3384   []   5)    0824   []   6)    5554   []     7)   7425    []   8)   2240    []   9)   1109    []

## 2022-07-27 RX ORDER — AMLODIPINE BESYLATE 5 MG/1
5 TABLET ORAL DAILY
Qty: 30 TABLET | Refills: 5 | OUTPATIENT
Start: 2022-07-27

## 2022-07-27 RX ORDER — METOPROLOL SUCCINATE 25 MG/1
25 TABLET, EXTENDED RELEASE ORAL DAILY
Qty: 30 TABLET | Refills: 5 | OUTPATIENT
Start: 2022-07-27

## 2022-08-04 ENCOUNTER — OFFICE VISIT (OUTPATIENT)
Dept: FAMILY MEDICINE | Facility: CLINIC | Age: 57
End: 2022-08-04
Payer: COMMERCIAL

## 2022-08-04 VITALS
RESPIRATION RATE: 20 BRPM | HEART RATE: 96 BPM | DIASTOLIC BLOOD PRESSURE: 67 MMHG | WEIGHT: 120 LBS | SYSTOLIC BLOOD PRESSURE: 135 MMHG | BODY MASS INDEX: 21.26 KG/M2 | HEIGHT: 63 IN | OXYGEN SATURATION: 100 % | TEMPERATURE: 98 F

## 2022-08-04 DIAGNOSIS — H57.9 ITCHY EYES: ICD-10-CM

## 2022-08-04 DIAGNOSIS — E11.9 TYPE 2 DIABETES MELLITUS WITHOUT COMPLICATION, WITHOUT LONG-TERM CURRENT USE OF INSULIN: ICD-10-CM

## 2022-08-04 DIAGNOSIS — I47.10 SVT (SUPRAVENTRICULAR TACHYCARDIA): ICD-10-CM

## 2022-08-04 DIAGNOSIS — Z90.02 H/O LARYNGECTOMY: ICD-10-CM

## 2022-08-04 DIAGNOSIS — Z93.1 PEG (PERCUTANEOUS ENDOSCOPIC GASTROSTOMY) STATUS: ICD-10-CM

## 2022-08-04 DIAGNOSIS — I10 HYPERTENSION, UNSPECIFIED TYPE: ICD-10-CM

## 2022-08-04 DIAGNOSIS — M54.81 OCCIPITAL NEURALGIA, UNSPECIFIED LATERALITY: ICD-10-CM

## 2022-08-04 DIAGNOSIS — E78.01 FAMILIAL HYPERCHOLESTEROLEMIA: ICD-10-CM

## 2022-08-04 DIAGNOSIS — C32.9 LARYNGEAL CANCER: ICD-10-CM

## 2022-08-04 DIAGNOSIS — N76.1 SUBACUTE VAGINITIS: Primary | ICD-10-CM

## 2022-08-04 LAB
CLUE CELLS VAG QL WET PREP: NORMAL
T VAGINALIS VAG QL WET PREP: NORMAL
WBC #/AREA VAG WET PREP: NORMAL
YEAST SPEC QL WET PREP: NORMAL

## 2022-08-04 PROCEDURE — 99215 OFFICE O/P EST HI 40 MIN: CPT | Mod: PBBFAC | Performed by: FAMILY MEDICINE

## 2022-08-04 PROCEDURE — 1159F PR MEDICATION LIST DOCUMENTED IN MEDICAL RECORD: ICD-10-PCS | Mod: CPTII,,, | Performed by: FAMILY MEDICINE

## 2022-08-04 PROCEDURE — 3075F SYST BP GE 130 - 139MM HG: CPT | Mod: CPTII,,, | Performed by: FAMILY MEDICINE

## 2022-08-04 PROCEDURE — 3075F PR MOST RECENT SYSTOLIC BLOOD PRESS GE 130-139MM HG: ICD-10-PCS | Mod: CPTII,,, | Performed by: FAMILY MEDICINE

## 2022-08-04 PROCEDURE — 3008F PR BODY MASS INDEX (BMI) DOCUMENTED: ICD-10-PCS | Mod: CPTII,,, | Performed by: FAMILY MEDICINE

## 2022-08-04 PROCEDURE — 87210 SMEAR WET MOUNT SALINE/INK: CPT | Performed by: FAMILY MEDICINE

## 2022-08-04 PROCEDURE — 99214 PR OFFICE/OUTPT VISIT, EST, LEVL IV, 30-39 MIN: ICD-10-PCS | Mod: S$PBB,,, | Performed by: FAMILY MEDICINE

## 2022-08-04 PROCEDURE — 3078F PR MOST RECENT DIASTOLIC BLOOD PRESSURE < 80 MM HG: ICD-10-PCS | Mod: CPTII,,, | Performed by: FAMILY MEDICINE

## 2022-08-04 PROCEDURE — 3008F BODY MASS INDEX DOCD: CPT | Mod: CPTII,,, | Performed by: FAMILY MEDICINE

## 2022-08-04 PROCEDURE — 99214 OFFICE O/P EST MOD 30 MIN: CPT | Mod: S$PBB,,, | Performed by: FAMILY MEDICINE

## 2022-08-04 PROCEDURE — 1159F MED LIST DOCD IN RCRD: CPT | Mod: CPTII,,, | Performed by: FAMILY MEDICINE

## 2022-08-04 PROCEDURE — 3078F DIAST BP <80 MM HG: CPT | Mod: CPTII,,, | Performed by: FAMILY MEDICINE

## 2022-08-04 RX ORDER — METOPROLOL SUCCINATE 25 MG/1
12.5 TABLET, EXTENDED RELEASE ORAL DAILY
Qty: 45 TABLET | Refills: 3 | Status: SHIPPED | OUTPATIENT
Start: 2022-08-04 | End: 2023-05-04 | Stop reason: SDUPTHER

## 2022-08-04 RX ORDER — ATORVASTATIN CALCIUM 80 MG/1
80 TABLET, FILM COATED ORAL NIGHTLY
Qty: 90 TABLET | Refills: 3 | Status: SHIPPED | OUTPATIENT
Start: 2022-08-04 | End: 2023-05-04 | Stop reason: SDUPTHER

## 2022-08-04 RX ORDER — FLUCONAZOLE 150 MG/1
150 TABLET ORAL DAILY
Qty: 1 TABLET | Refills: 0 | Status: SHIPPED | OUTPATIENT
Start: 2022-08-04 | End: 2022-08-05

## 2022-08-04 RX ORDER — METFORMIN HYDROCHLORIDE 500 MG/1
500 TABLET ORAL DAILY
Qty: 90 TABLET | Refills: 3 | Status: SHIPPED | OUTPATIENT
Start: 2022-08-04 | End: 2024-02-20 | Stop reason: SDUPTHER

## 2022-08-04 RX ORDER — AMLODIPINE BESYLATE 5 MG/1
5 TABLET ORAL DAILY
Qty: 90 TABLET | Refills: 3 | Status: SHIPPED | OUTPATIENT
Start: 2022-08-04 | End: 2023-01-11 | Stop reason: ALTCHOICE

## 2022-08-04 RX ORDER — LORATADINE 10 MG/1
10 TABLET ORAL DAILY
Qty: 90 TABLET | Refills: 0 | Status: SHIPPED | OUTPATIENT
Start: 2022-08-04 | End: 2023-03-01 | Stop reason: CLARIF

## 2022-08-04 RX ORDER — MONTELUKAST SODIUM 5 MG/1
5 TABLET, CHEWABLE ORAL NIGHTLY
Qty: 90 TABLET | Refills: 3 | Status: SHIPPED | OUTPATIENT
Start: 2022-08-04 | End: 2023-03-01 | Stop reason: CLARIF

## 2022-08-04 NOTE — PROGRESS NOTES
Heaven Boston  2022  [id number]              Visit Type:a scheduled routine follow-up visit    Chief Complaint:  Chief Complaint   Patient presents with    Establish Care       History of Present Illness:  57 Years old Female presents to clinic to establish care  She has a past medical history of HTN, DM2, mixed HLD, Occipital neuralgia of left side, odynophagia,T3N1M0 supraglottic squamous cell carcinoma status post total laryngectomy. History difficult to obtain. Pen and paper used  Patient reporting itchy eyes  Ready for PEG tube to be removed  Needs medication refills  Has vaginal itching        History:  Past Medical History:   Diagnosis Date    Cancer     Diabetes mellitus     Hypertension      Past Surgical History:   Procedure Laterality Date     SECTION      VA REMOVAL OF LARYNX       History reviewed. No pertinent family history.  Social History     Socioeconomic History    Marital status: Single   Tobacco Use    Smoking status: Former Smoker    Smokeless tobacco: Former User   Substance and Sexual Activity    Alcohol use: Never    Drug use: Never    Sexual activity: Not Currently     Patient Active Problem List   Diagnosis    H/O laryngectomy    Laryngeal cancer    Hypertension    Familial hypercholesterolemia    Syncope    SVT (supraventricular tachycardia)    Type 2 diabetes mellitus without complication, without long-term current use of insulin    Tracheostomy in place    PEG (percutaneous endoscopic gastrostomy) status     Review of patient's allergies indicates:  No Known Allergies      Medications:  Current Outpatient Medications on File Prior to Visit   Medication Sig Dispense Refill    amLODIPine (NORVASC) 5 MG tablet Take 5 mg by mouth once daily.      aspirin (ECOTRIN) 81 MG EC tablet Take 1 tablet (81 mg total) by mouth once daily.  3    atorvastatin (LIPITOR) 80 MG tablet Take 80 mg by mouth.      azelastine (ASTELIN) 137 mcg (0.1 %) nasal spray 1 spray by  Nasal route.      carBAMazepine (CARBATROL) 200 MG CM12 Take 200 mg by mouth.      cetirizine (ZYRTEC) 10 MG tablet Take 10 mg by mouth once daily.      fluticasone propionate (FLONASE) 50 mcg/actuation nasal spray 1 spray by Each Nostril route 2 (two) times daily.      fluticasone-salmeterol diskus inhaler 100-50 mcg Inhale 1 puff into the lungs every 12 (twelve) hours.      ibuprofen (ADVIL,MOTRIN) 800 MG tablet Take 800 mg by mouth every 6 (six) hours as needed.      metFORMIN (GLUCOPHAGE) 500 MG tablet Take 500 mg by mouth once daily.      metoprolol succinate (TOPROL-XL) 25 MG 24 hr tablet Take 25 mg by mouth.      montelukast (SINGULAIR) 5 MG chewable tablet SMARTSI Tablet(s) By Mouth Every Evening      rivaroxaban (XARELTO) 10 mg Tab Take 2 tablets (20 mg total) by mouth daily with dinner or evening meal. 90 tablet 0    DULoxetine (CYMBALTA) 30 MG capsule Take 30 mg by mouth 2 (two) times daily.      HYDROcodone-acetaminophen 5-300 mg Tab Take 1 tablet by mouth every 8 (eight) hours as needed.      omeprazole (PRILOSEC) 40 MG capsule Take 40 mg by mouth once daily.      prednisoLONE acetate (PRED FORTE) 1 % DrpS Place into both eyes.       Current Facility-Administered Medications on File Prior to Visit   Medication Dose Route Frequency Provider Last Rate Last Admin    LIDOcaine (PF) 40 mg/mL (4 %) injection 2 mL  2 mL Other 1 time in Clinic/HOD Gregory Martins MD        phenylephrine HCL 1% nasal spray 1 spray  1 spray Each Nostril 1 time in Clinic/HOD Gregory Martins MD           Medications have been reviewed and reconciled with patient at this visit.    Adverse reactions to current medications reviewed with patient..      Exam:  Wt Readings from Last 3 Encounters:   22 54.4 kg (120 lb)   22 52.8 kg (116 lb 6.4 oz)   22 52 kg (114 lb 10.2 oz)     Temp Readings from Last 3 Encounters:   22 98.4 °F (36.9 °C)   22 98.3 °F (36.8 °C)   07/11/22 98.4 °F (36.9 °C)  (Oral)     BP Readings from Last 3 Encounters:   08/04/22 135/67   07/21/22 129/62   07/11/22 (!) 144/79     Pulse Readings from Last 3 Encounters:   08/04/22 96   07/21/22 72   07/11/22 82     Body mass index is 21.26 kg/m².      ROS:  See HPI for details    Constitutional: Denies Change in appetite. Denies Chills. Denies Fever. Denies Night sweats.  Eye: Denies Blurred vision. Denies Discharge. Denies Eye pain.  ENT: Denies Decreased hearing. Denies Sore throat. Denies Swollen glands.  Respiratory: Denies Cough. Denies Shortness of breath. Denies Shortness of breath with exertion. Denies Wheezing.  Cardiovascular: Denies Chest pain at rest. Denies Chest pain with exertion. Denies Irregular heartbeat. Denies Palpitations.  Gastrointestinal: Denies Abdominal pain. Denies Diarrhea. Denies Nausea. Denies Vomiting. Denies Hematemesis or Hematochezia.  Genitourinary: Denies Dysuria. Denies Urinary frequency. Denies Urinary urgency. Denies Blood in urine.  Endocrine: Denies Cold intolerance. Denies Excessive thirst. Denies Heat intolerance. Denies Weight loss. Denies Weight gain.  Musculoskeletal: Denies Painful joints. Denies Weakness.  Integumentary: Denies Rash. Denies Itching. Denies Dry skin.  Neurologic: Denies Dizziness. Denies Fainting. Denies Headache.  Psychiatric: Denies Depression. Denies Anxiety. Denies Suicidal/Homicidal ideations.    All Other ROS: Negative except as stated in HPI.    PE:  General: well-developed well-nourished in no acute distress  Eye: PERRLA, EOMI, clear conjunctiva, eyelids normal  HENT: Head-normocephalic and atraumatic  Neck: full range of motion, LT in place without erythema or exudate surrounding entry.  Minimal secretions.  Respiratory: clear to auscultation bilaterally without wheezes, rales, rhonchi  Cardiovascular: regular rate and NSR without murmurs. Nondisplaced PMI.  No gallops or rubs.  No JVD.  Capillary refill within normal limits  .Gastrointestinal: soft, non-tender,  non-distended with normal bowel sounds, without masses to palpation PEG tube with surrounding erythema, no drainage, non tender  Genitourinary: no CVA tenderness to palpation.    Musculoskeletal: full range of motion of all extremities/spine without limitation or discomfort  Integumentary: no rashes or skin lesions present  Neurologic: no signs of peripheral neurological deficit, motor/sensory function intact  Psychiatric:  alert and oriented, cognitive function intact, cooperative with exam, good eye contact, judgement and insight intact, mood and affect full range.     Laboratory Reviewed ({Yes)  Lab Results   Component Value Date    WBC 3.2 (L) 05/10/2022    HGB 10.4 (L) 05/10/2022    HCT 34.8 (L) 05/10/2022     05/10/2022    CHOL 173 09/27/2021    TRIG 70 09/27/2021    HDL 44 09/27/2021    ALT 17 05/10/2022    AST 22 05/10/2022     05/10/2022    K 4.7 05/10/2022    CREATININE 0.69 05/10/2022    BUN 15.1 05/10/2022    CO2 28 05/10/2022    TSH 2.0747 04/06/2022    INR 1.04 04/14/2022    HGBA1C 5.9 09/27/2021       Heaven was seen today for establish care.    Diagnoses and all orders for this visit:    Subacute vaginitis  -     Wet Prep, Genital    Itchy eyes    Type 2 diabetes mellitus without complication, without long-term current use of insulin    Familial hypercholesterolemia    Hypertension, unspecified type    SVT (supraventricular tachycardia)    Laryngeal cancer    H/O laryngectomy    PEG (percutaneous endoscopic gastrostomy) status    Occipital neuralgia, unspecified laterality        Self swab wet prep performed  Diflucan sent to pharmacy  No medication changes  Patient to discontinue Zyrtec, claritin sent to pharmacy for itchy eyes  Has appt with surgery for Peg tub eval/removal  ED precuations  Labs before next visit        Care Plan/Goals: Reviewed    Goals    None         Follow up: Follow up in about 6 months (around 2/4/2023).

## 2022-08-08 ENCOUNTER — OUTPATIENT CASE MANAGEMENT (OUTPATIENT)
Dept: ADMINISTRATIVE | Facility: OTHER | Age: 57
End: 2022-08-08
Payer: COMMERCIAL

## 2022-08-09 ENCOUNTER — HOSPITAL ENCOUNTER (EMERGENCY)
Facility: HOSPITAL | Age: 57
Discharge: HOME | End: 2022-08-09
Attending: INTERNAL MEDICINE
Payer: COMMERCIAL

## 2022-08-09 VITALS
SYSTOLIC BLOOD PRESSURE: 168 MMHG | BODY MASS INDEX: 21.48 KG/M2 | OXYGEN SATURATION: 100 % | HEIGHT: 63 IN | TEMPERATURE: 97 F | WEIGHT: 121.25 LBS | HEART RATE: 76 BPM | RESPIRATION RATE: 20 BRPM | DIASTOLIC BLOOD PRESSURE: 53 MMHG

## 2022-08-09 DIAGNOSIS — Z43.0 ENCOUNTER FOR TRACHEOSTOMY TUBE CHANGE: Primary | ICD-10-CM

## 2022-08-09 PROCEDURE — 99282 EMERGENCY DEPT VISIT SF MDM: CPT

## 2022-08-10 NOTE — ED PROVIDER NOTES
Encounter Date: 2022       History     Chief Complaint   Patient presents with    Tracheostomy Tube Change     Trach tube almanzar broke earlier this PM. No resp distress observed. Trach cannula still intact     Presents requesting a tracheostomy replacement due to broken part that attached her voice box to the tracheostomy.    The history is provided by the patient.     Review of patient's allergies indicates:  No Known Allergies  Past Medical History:   Diagnosis Date    Cancer     Diabetes mellitus     Hypertension      Past Surgical History:   Procedure Laterality Date     SECTION      AL REMOVAL OF LARYNX       No family history on file.  Social History     Tobacco Use    Smoking status: Former Smoker    Smokeless tobacco: Former User   Substance Use Topics    Alcohol use: Never    Drug use: Never     Review of Systems   Constitutional: Negative for fever.   HENT: Negative for sore throat.    Respiratory: Negative for shortness of breath.    Cardiovascular: Negative for chest pain.   Gastrointestinal: Negative for nausea.   Genitourinary: Negative for dysuria.   Musculoskeletal: Negative for back pain.   Skin: Negative for rash.   Neurological: Negative for weakness.   Hematological: Does not bruise/bleed easily.   All other systems reviewed and are negative.      Physical Exam     Initial Vitals   BP Pulse Resp Temp SpO2   -- -- -- -- --      MAP       --         Physical Exam    Nursing note and vitals reviewed.  Constitutional: She appears well-developed and well-nourished. No distress.   HENT:   Head: Normocephalic and atraumatic.   Mouth/Throat: Oropharynx is clear and moist.   Neck: Neck supple.   Tracheostomy noticed with tracheal tube   Normal range of motion.  Cardiovascular: Normal rate, regular rhythm, normal heart sounds and intact distal pulses.   Pulmonary/Chest: Breath sounds normal.   Musculoskeletal:         General: No edema. Normal range of motion.      Cervical back:  Normal range of motion and neck supple.     Neurological: She is alert and oriented to person, place, and time. She has normal strength. GCS score is 15. GCS eye subscore is 4. GCS verbal subscore is 5. GCS motor subscore is 6.   Skin: Skin is warm and dry. No rash noted.   Psychiatric: Her behavior is normal.         ED Course   Procedures  Labs Reviewed - No data to display       Imaging Results    None          Medications - No data to display      Pt is stable and in no distress, tracheal tube is functional and no impending respiratory problem expected. No adequate tracheal tube available in the hospital at this time for which will discharge with F/U at ENT clinic for further assistance with device.                  Clinical Impression:   Final diagnoses:  [Z43.0] Encounter for tracheostomy tube change (Primary)          ED Disposition Condition    Discharge Stable        ED Prescriptions     None        Follow-up Information    None          Shaw Trotter MD  08/09/22 [id number]

## 2022-08-16 ENCOUNTER — OFFICE VISIT (OUTPATIENT)
Dept: SURGERY | Facility: CLINIC | Age: 57
End: 2022-08-16
Payer: COMMERCIAL

## 2022-08-16 ENCOUNTER — DOCUMENTATION ONLY (OUTPATIENT)
Dept: ADMINISTRATIVE | Facility: HOSPITAL | Age: 57
End: 2022-08-16
Payer: COMMERCIAL

## 2022-08-16 VITALS
DIASTOLIC BLOOD PRESSURE: 70 MMHG | SYSTOLIC BLOOD PRESSURE: 136 MMHG | HEART RATE: 88 BPM | HEIGHT: 63 IN | BODY MASS INDEX: 21.61 KG/M2 | RESPIRATION RATE: 18 BRPM | TEMPERATURE: 98 F | OXYGEN SATURATION: 100 % | WEIGHT: 121.94 LBS

## 2022-08-16 DIAGNOSIS — K94.23 PEG TUBE MALFUNCTION: ICD-10-CM

## 2022-08-16 PROCEDURE — 99215 OFFICE O/P EST HI 40 MIN: CPT | Mod: PBBFAC

## 2022-08-16 RX ORDER — SILVER NITRATE 38.21; 12.74 MG/1; MG/1
1 STICK TOPICAL
Status: SHIPPED | OUTPATIENT
Start: 2022-08-16

## 2022-08-16 NOTE — PROGRESS NOTES
[facility] General Surgery   Clinic Note    Subjective  Patient seen approximately 5 months after total laryngectomy. She has healed well since surgery but does have significant pain and discomfort related to her PEG. She is not using this tube and has been able to tolerate adequate PO. The area has some skin breakdown, erythema and granulation tissue.       Physical Exam  Wt Readings from Last 3 Encounters:   08/16/22 55.3 kg (121 lb 14.6 oz)   08/09/22 55 kg (121 lb 4.1 oz)   08/04/22 54.4 kg (120 lb)     Temp Readings from Last 3 Encounters:   08/16/22 98.4 °F (36.9 °C)   08/09/22 97.2 °F (36.2 °C) (Temporal)   08/04/22 98.4 °F (36.9 °C)     BP Readings from Last 3 Encounters:   08/16/22 136/70   08/09/22 (!) 168/53   08/04/22 135/67     Pulse Readings from Last 3 Encounters:   08/16/22 88   08/09/22 76   08/04/22 96       NAD  RRR  Non-labored breathing, CHE  S, ND, NT  Gastrostomy site with erythema and granulation tissue   Moves all extremities      A/P  58 yo F s/p laryngectomy presenting for PEG tube removal.     - Treated granulation tissue with silver nitrate   - Removed PEG  - Re-check 3 weeks for wound check    Patricia Montes MD   [facility] General Surgery, PGY3  08/16/2022 10:06 AM

## 2022-08-21 NOTE — DISCHARGE INSTRUCTIONS
Case Management Assessment & Discharge Planning Note    Patient name Usama Cottrell  Location CW2 210/CW2 210-02 MRN [id number]  : 1950 Date 2022       Current Admission Date: 2022  Current Admission Diagnosis:Ambulatory dysfunction   Patient Active Problem List    Diagnosis Date Noted    Incidental pulmonary nodule 2022    Urinary catheter in place 2022    Abnormal chest x-ray 2022    Hallucinations 2022    Vitamin B12 deficiency 2022    Cervical spondylosis 02/15/2022    Cervical radiculopathy 2021    Stress fracture of femur 2020    Hearing loss, bilateral 2020    Compression fracture of T12 first lumbar vertebra ([facility] [address] ) 2019    Back pain 2019    Ambulatory dysfunction 2019    Abnormal thyroid function test 2019    Hyperlipidemia 2017    Vitamin D deficiency 2017    Mild cognitive impairment 2014    REM sleep behavior disorder 2014    Esophagitis, reflux 2012    Localized osteoporosis with current pathological fracture with routine healing 2012    Parkinson's disease ([facility] [address] ) 2012    Torticollis 10/21/2009      LOS (days): 0  Geometric Mean LOS (GMLOS) (days):   Days to GMLOS:     OBJECTIVE:    Risk of Unplanned Readmission Score: 10 14         Current admission status: Inpatient       Preferred Pharmacy:   [address]  Phone: [phone] Fax: [address] [place], [address] [facility]  [address] [address]  Phone: [phone] Fax: [phone]    [facility] (Specialty) [address], [address]  [address]  Phone: [phone] Fax: [phone]    Primary Care Provider: Jimmie Walker MD    Primary Insurance: MEDICARE  Secondary Insurance: Melody Followup with your PCP within x5 days, or return to ER if your symptoms do not improve or worsen; also follow-up with ENT as scheduled     CROSS    ASSESSMENT:  [place] Suzette Rollins Representative - Daughter   Primary Phone: [phone] (Home)               Advance Directives  Does patient have a [address]?: Yes  Does patient have Advance Directives?: Yes  Advance Directives: Power of  for health care, Power of  for finance, Living will  Primary Contact: Kristopher Farmer (Daughter)    Readmission Root Cause  30 Day Readmission: No    Patient Information  Admitted from[de-identified] Home  Mental Status: Alert  During Assessment patient was accompanied by: Daughter  Assessment information provided by[de-identified] Daughter  Primary Caregiver: Family  Caregiver's Name[de-identified] Support is provided by patient's daughters Ruth Raphael, and ΦΑΡΜΑΚΑΣ  Daughter Brayan Chiang lives next door and two daughters live close by  (Daughter reports patient has applied for Critical access hospital waiver program to assist with additional help in the home  Applicaiton for same is currently in progress at this time )  Caregiver's Relationship to Patient[de-identified] Family Member  Support Systems: Spouse/significant other, Family members, Daughter  Home entry access options   Select all that apply : Stairs  Number of steps to enter home : 2  Type of Current Residence: [address] [place] home  Upon entering residence, is there a bedroom on the main floor (no further steps)?: No  A bedroom is located on the following floor levels of residence (select all that apply):: 2nd Floor  Upon entering residence, is there a bathroom on the main floor (no further steps)?: No  Indicate which floors of current residence have a bathroom (select all the apply):: 2nd Floor  Number of steps to 2nd floor from main floor: One Flight  Living Arrangements: Lives w/ Spouse/significant other (Daughters reside within close proximity to pt)    Activities of Daily Living Prior to Admission  Functional Status: Assistance  Completes ADLs independently?: No  Level of ADL dependence: Assistance  Ambulates independently?: Yes  Does patient use assisted devices?: Yes  Assisted Devices (DME) used: [employer]  Does patient currently own DME?: Yes  What DME does the patient currently own?: [employer]  Does patient have a history of Outpatient Therapy (PT/OT)?: Yes  Does the patient have a history of Short-Term Rehab?: No  Does patient have a history of HHC?: Yes  Does patient currently have [facility]?: Yes    Current Home Health Care  Type of Current Home Care Services: Home PT, [address][de-identified] Other (please enter name in comment) ([facility])  [address] Provider[de-identified] PCP    Patient Information Continued  Does patient have prescription coverage?: Yes  Does patient have a history of substance abuse?: No  Does patient have a history of Mental Health Diagnosis?: No         Means of Transportation  Means of Transport to Appts[de-identified] Family transport        DISCHARGE DETAILS:    Discharge planning discussed with[de-identified] Pt and daughter Pacheco Cortez at bedside  Freedom of Choice: Yes  Comments - Freedom of Choice: PT/OT evaluations pending, Daughter interested in STR for patient if recommendated  Pt [facility]  Were Treatment Team discharge recommendations reviewed with patient/caregiver?: Yes  Did patient/caregiver verbalize understanding of patient care needs?: Yes  Were patient/caregiver advised of the risks associated with not following Treatment Team discharge recommendations?: Yes    Other Referral/Resources/Interventions Provided:  Referral Comments: [facility] in the event [facility] would be recommended  PT/OT evaluations currently pending, Patient/family open to any therapy recommendations  CM team will continue to  follow

## 2022-08-23 ENCOUNTER — OFFICE VISIT (OUTPATIENT)
Dept: OTOLARYNGOLOGY | Facility: CLINIC | Age: 57
End: 2022-08-23
Payer: COMMERCIAL

## 2022-08-23 ENCOUNTER — TELEPHONE (OUTPATIENT)
Dept: FAMILY MEDICINE | Facility: CLINIC | Age: 57
End: 2022-08-23
Payer: COMMERCIAL

## 2022-08-23 VITALS
SYSTOLIC BLOOD PRESSURE: 139 MMHG | WEIGHT: 124.38 LBS | OXYGEN SATURATION: 100 % | TEMPERATURE: 98 F | DIASTOLIC BLOOD PRESSURE: 68 MMHG | HEART RATE: 95 BPM | BODY MASS INDEX: 22.04 KG/M2

## 2022-08-23 DIAGNOSIS — C32.9 LARYNGEAL CANCER: Primary | ICD-10-CM

## 2022-08-23 DIAGNOSIS — Z12.31 VISIT FOR SCREENING MAMMOGRAM: Primary | ICD-10-CM

## 2022-08-23 PROCEDURE — 31575 DIAGNOSTIC LARYNGOSCOPY: CPT | Mod: PBBFAC | Performed by: STUDENT IN AN ORGANIZED HEALTH CARE EDUCATION/TRAINING PROGRAM

## 2022-08-23 PROCEDURE — 99214 OFFICE O/P EST MOD 30 MIN: CPT | Mod: PBBFAC,25

## 2022-08-23 NOTE — PROGRESS NOTES
[facility]  Otolaryngology Clinic Note    Heaven Boston  Encounter Date: 8/23/2022  YOB: 1965    Chief Complaint: s/p total laryngectomy     HPI: Heaven Boston is a 57 y.o. 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 Patient had an uneventful hospitalization course and was discharged on postoperative day 9 after an esophagram showed no pharyngeal leak.  However she re-presented to our facility on 4/6/22 with surgical site infection and a pharyngocutaneous fistula. Patient underwent a neck washout with primary repair on 4/11/2022, with placement of a gastrostomy tube the same day. She was maintained on NPO and transferred to [place] for further evaluation and management on 4/15/22 after evidence of persistent pharyngocutaneous fistula. There she underwent a 2nd neck washout with primary closure and placement of salivary bypass tube.      5/2/22: Patient had an uneventful hospitalization course and removal of salivary bypass tube before discharge last week and now presenting for postoperative evaluation.  Patient presents today without any complaints.  She is wondering about when he pointed that her x-rays for swallowing then.  She seen Dr. Laguna today after our visit to undergo-stimulation for radiation.     5/9/22: Pt. Did not answer. Let voicemail to advance diet to CLD. Also discussed this with son.  Plan to see in clinic in 2 weeks     5/19/22: In radiation therapy, 5x weekly until 6/16/22. Has TEP in place, brought another TEP requesting swap out. Will go to speech therapy for exchange of TEP. Went to ER for bleeding from G-tube site 1 week ago but declines visit to Gen Surg clinic today. On CLD taking Ensure by G-tube, water and soups by mouth.  Had questions about advancing diet. No weight loss or appetite change. No other issues.      6/21/22:  Returns today for follow up.  Completed radiation last week 6/16/22.  Is having difficulty voicing with TEP though it has been swapped out by SLP.  Able to eat what she wants.  Having some tightness of her throat and soreness, otherwise no issues.     7/21/22: Here today for follow up. Has overall been doing very well. She is tolerating her diet by mouth and gaining weight. No dysphagia. Has not used her PEG tube since prior to radiation. She is interested in having it removed. Has some fullness in the left neck which intermittently swells and then resolves again. Otherwise no new lumps/bumps of the head and neck.     8/23/22: Ms. Boston returns today for follow up. She complains of neck pain exacerbated by flexion and movement to the right that began 2 weeks ago. She also reports neck swelling and tightness which occasionally increases in size and is tender to palpation. She reports cough with clear mucus production as well as nasal congestion without drainage. She also notes dry, itchy eyes that have not improved with Visine. She also notes dysuria. She is tolerating her diet by mouth and gaining weight. She had her PEG tube removed on 8/16/22. She has been to SLP twice and is still having difficulty voicing with TEP.    ROS:   General: Negative except per HPI  Skin: Denies rash, ulcer, or lesion.  Eyes: Denies vision changes or diplopia.  Ears: Negative except per HPI  Nose: Negative except per HPI  Throat/mouth: Negative except per HPI  Cardiovascular: Negative except per HPI  Respiratory: Negative except per HPI  Neck: Negative except per HPI  Endocrine: Negative except per HPI  Neurologic: Negative except per HPI    Other 10-point review of systems negative except per HPI    Review of patient's allergies indicates:  No Known Allergies    Past Medical History:   Diagnosis Date    Cancer     Diabetes mellitus      Hypertension        Past Surgical History:   Procedure Laterality Date     SECTION      CA REMOVAL OF LARYNX         Social History     Socioeconomic History    Marital status: Single   Tobacco Use    Smoking status: Former Smoker    Smokeless tobacco: Former User   Substance and Sexual Activity    Alcohol use: Never    Drug use: Never    Sexual activity: Not Currently       History reviewed. No pertinent family history.    Outpatient Encounter Medications as of 2022   Medication Sig Dispense Refill    amLODIPine (NORVASC) 5 MG tablet Take 1 tablet (5 mg total) by mouth once daily. 90 tablet 3    aspirin (ECOTRIN) 81 MG EC tablet Take 1 tablet (81 mg total) by mouth once daily.  3    atorvastatin (LIPITOR) 80 MG tablet Take 1 tablet (80 mg total) by mouth every evening. 90 tablet 3    azelastine (ASTELIN) 137 mcg (0.1 %) nasal spray 1 spray by Nasal route.      carBAMazepine (CARBATROL) 200 MG CM12 Take 200 mg by mouth.      cetirizine (ZYRTEC) 10 MG tablet Take 10 mg by mouth once daily.      DULoxetine (CYMBALTA) 30 MG capsule Take 30 mg by mouth 2 (two) times daily.      fluticasone propionate (FLONASE) 50 mcg/actuation nasal spray 1 spray by Each Nostril route 2 (two) times daily.      fluticasone-salmeterol diskus inhaler 100-50 mcg Inhale 1 puff into the lungs every 12 (twelve) hours.      HYDROcodone-acetaminophen 5-300 mg Tab Take 1 tablet by mouth every 8 (eight) hours as needed.      ibuprofen (ADVIL,MOTRIN) 800 MG tablet Take 800 mg by mouth every 6 (six) hours as needed.      loratadine (CLARITIN) 10 mg tablet Take 1 tablet (10 mg total) by mouth once daily. 90 tablet 0    metFORMIN (GLUCOPHAGE) 500 MG tablet Take 1 tablet (500 mg total) by mouth once daily. 90 tablet 3    metoprolol succinate (TOPROL-XL) 25 MG 24 hr tablet Take 0.5 tablets (12.5 mg total) by mouth once daily. 45 tablet 3    montelukast (SINGULAIR) 5 MG chewable tablet Take 1 tablet (5 mg total) by  mouth every evening. 90 tablet 3    omeprazole (PRILOSEC) 40 MG capsule Take 40 mg by mouth once daily.      prednisoLONE acetate (PRED FORTE) 1 % DrpS Place into both eyes.      rivaroxaban (XARELTO) 10 mg Tab Take 2 tablets (20 mg total) by mouth daily with dinner or evening meal. 90 tablet 0     Facility-Administered Encounter Medications as of 8/23/2022   Medication Dose Route Frequency Provider Last Rate Last Admin    LIDOcaine (PF) 40 mg/mL (4 %) injection 2 mL  2 mL Other 1 time in Clinic/HOD Gregory Martins MD        phenylephrine HCL 1% nasal spray 1 spray  1 spray Each Nostril 1 time in Clinic/HOD Gregory Martins MD        silver nitrate applicators applicator 1 applicator  1 applicator Topical (Top) 1 time in Clinic/HOD Patricia Montes MD         Physical Exam:  Vitals:    08/23/22 0809   BP: 139/68   Pulse: 95   Temp: 97.7 °F (36.5 °C)   TempSrc: Oral   SpO2: 100%   Weight: 56.4 kg (124 lb 6.4 oz)     Physical Exam:  General/ Voice: NAD, AAO, no increased WOB, no stridor, clear secretions; aphonic   Head/Face: normal  Eyes: EOMI, PERRLA. Crusting noted at lower lids bilaterally without erythema or drainage. Conjunctiva normal  Ears: Hearing well at normal conversation volume  Nose: nares normal, septum midline, MMM. Erythema and dryness noted at opening of nares.  Oral Cavity: MMM, no lesions or ulcerations, no leukoplakia, no edema; BOT and FOM are soft/NT and without lesions/ ulcerations/ leukoplakia  Oropharynx: No uvular deviation or deviation of the oropharyngeal wall noted  Neck:  Well-healed stoma without any signs of dehiscence. TEP in place without any leakage. Prior radiation burns well healed. Suprastomal neck fullness along the incision  to palpation.   Neuro: CN II - XII exam: grossly intact       Procedures:Flexible Fiberoptic Laryngoscopy/Nasopharyngoscopy via right nare    Procedure in Detail: Informed consent was obtained from the patient after explanation of  procedure, indications, risks and benefits. Flexible endoscopy was performed through the nasal passages. The nasal cavity, nasopharynx, and neopharynx were adequately visualized. Tracheoscopy performed via laryngectomy stoma.     Anesthesia: None  Adverse Events: None  Resident Surgeon: Lili Thornton  Blood loss: none  Condition: good    Findings:  NP/OP: no masses/lesions of NC, eustachian tube, fossa of Rosenmuller, no adenoid hypertrophy  BOT/vallecula: no lingual hypertrophy, no masses/lesions, no secretions obscuring visualization  Neopharynx:  Unable to visualize TEP, mild pharyngeal edema, no evidence of disease  Tracheoscopy: Laryngoscope advanced to aleja.  No evidence of disease, pink mucosa, thin clear secretions    Assessment/Plan:  Heaven Boston is a 57 y.o. female with T3N1M0 supraglottic squamous cell carcinoma now status post total laryngectomy with bilateral necks in May 2022. Postoperative course complicated by pharyngocutaneous fistula for which patient required repeated neck washout and primary closure x 2. She completed radiation therapy in June 2022.      - Still unable to voice via TEP. Will follow up with speech therapy for further evaluation.   - Needs post treatment PET at 3 months. Will continue to monitor neck swelling until that time as may be related to healing.    - CT scan in 1 month.  - TSH, T4  - Advised to use OTC eye cream for dry eyes and Guaifenesin for cough.  - RTC in 1 month for continued surveillance.    HEAD & NECK CANCER SURVEILLANCE   Primary Surgical Treatment     Head & Neck Staff: Dr. Celina Arboleda  Radiation Oncologist: Dr. Laguna    Primary tumor: T3N1M0  Squamous cell carcinoma     Date of Diagnosis: 2/18/2022  Surgery Date: 3/21/2022  Induction Chemotherapy: No  Adjuvant Therapy: Radiation  Completion Date: 6/16/2022    Pertinent Treatment History:  Initial post operative course complicated by pharyngocutaneous fistula.   Transferred to [facility] for  management - wash out, oversewn, and treated by salivary bypass tube    Place date if completed   3 6 12 18 24 36 48 60   CT Neck X X X X X X X X   PET/CT X          CXR  X X X X X X X   TFT X  X  X X X X     Current Surveillance Interval: 1 month         Ashely Gil, MS3  [facility] Department of Otolaryngology    Lili Thornton MD  [facility] HO-III

## 2022-08-23 NOTE — TELEPHONE ENCOUNTER
----- Message from Sorin Colno LPN sent at 8/23/2022 11:00 AM CDT -----  Regarding: FW: Mammo order    ----- Message -----  From: Jeanette Iraheta  Sent: 8/23/2022  10:44 AM CDT  To: [facility] Family Medicine Clinical Support Staff  Subject: Mammo order                                      General Phone Message    Caller is:  ( x) Patient  (  ) Family  (  ) Pharmacy    (  ) Home Health  (  ) Other     Provider: Dr. Marli Corado    Last Visit:    Next Visit: 2/6/2023    Reason for Call: Patient states she received a letter stating that she is due for her next mammogram in 10/2022 , can you please put in an order?                     Best Time to Contact:    Preferred Phone Number:         Subjective:      Joanne Pardo is a 25 y.o. female being seen today for her obstetrical visit. She is at 38w1d gestation. Patient reports no complaints. Fetal movement: normal.    Menstrual History:  OB History        1    Para        Term                AB        Living           SAB        IAB        Ectopic        Multiple        Live Births                    Menarche age:    Patient's last menstrual period was 2021.       The following portions of the patient's history were reviewed and updated as appropriate: allergies, current medications, past family history, past medical history, past social history, past surgical history and problem list.    Review of Systems  Pertinent items are noted in HPI.     Objective:      /80   Wt 80.7 kg (178 lb)   LMP 2021   BMI 31.04 kg/m²   FHT: 142 BPM   Uterine Size: size equals dates   Presentations: cephalic   Pelvic Exam:               Dilation: Closed        Effacement: 25%              Station:  Floating     Consistency: soft             Position: middle        Assessment:      Pregnancy 38 and 1/7 weeks     Plan:    Plans for delivery: Vaginal anticipated  Beta strep culture: done  Counseling: L&D discussion: discussed hospital routine.  Fetal testing: none  Follow up in 1 Week.

## 2022-08-23 NOTE — PROGRESS NOTES
The scope used for the exam was:  Flexible scope ENF-P4  Serial Number:  1)    [id number]    []   2)    [id number]    []   3)    [id number]    []   4)    [id number]    [x]   5)    [id number]    []   6)    [id number]    []       The scope used for the exam was:  Rigid scope   Serial Number:  1)   [id number]    []   2)   [id number]    []   3)   7330    []   4)    3384   []   5)    0824   []   6)    5554   []     7)   7425    []   8)   2240    []   9)   1109    []

## 2022-08-25 NOTE — PROGRESS NOTES
I have reviewed the notes, assessments, and/or procedures performed this visit, and I concur with the documentation.    Connor Patirck M.D.

## 2022-09-13 ENCOUNTER — OFFICE VISIT (OUTPATIENT)
Dept: SURGERY | Facility: CLINIC | Age: 57
End: 2022-09-13
Payer: COMMERCIAL

## 2022-09-13 ENCOUNTER — LAB VISIT (OUTPATIENT)
Dept: LAB | Facility: HOSPITAL | Age: 57
End: 2022-09-13
Payer: COMMERCIAL

## 2022-09-13 VITALS
SYSTOLIC BLOOD PRESSURE: 149 MMHG | WEIGHT: 127.81 LBS | RESPIRATION RATE: 18 BRPM | DIASTOLIC BLOOD PRESSURE: 77 MMHG | HEIGHT: 63 IN | OXYGEN SATURATION: 97 % | BODY MASS INDEX: 22.64 KG/M2 | HEART RATE: 81 BPM

## 2022-09-13 DIAGNOSIS — K94.23 PEG TUBE MALFUNCTION: Primary | ICD-10-CM

## 2022-09-13 DIAGNOSIS — K94.23 PEG TUBE MALFUNCTION: ICD-10-CM

## 2022-09-13 LAB — PREALB SERPL-MCNC: 27 MG/DL (ref 16–38)

## 2022-09-13 PROCEDURE — 84134 ASSAY OF PREALBUMIN: CPT

## 2022-09-13 PROCEDURE — 99215 OFFICE O/P EST HI 40 MIN: CPT | Mod: PBBFAC

## 2022-09-13 RX ORDER — SILVER NITRATE 38.21; 12.74 MG/1; MG/1
1 STICK TOPICAL ONCE
Status: COMPLETED | OUTPATIENT
Start: 2022-09-13 | End: 2022-09-13

## 2022-09-13 RX ADMIN — SILVER NITRATE 1 APPLICATOR: 38.21; 12.74 STICK TOPICAL at 10:09

## 2022-09-13 NOTE — PROGRESS NOTES
Surgery Clinic Note:    Chief Complaint: Prior PEG site drainage    HPI: Heaven Boston is a 57 y.o. 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 She is 6 months s/p total laryngectomy, she was seen 4 weeks ago for trouble using her surgical G-tube, and G-tube was removed. She reports some redness, pain and drainage from the site. She is changing her dressing >10 x daily and has a towel covering it that she reports changing up to 3 times daily. Otherwise denies any fevers, chills, SOB, chest pains, or abdominal pain, having good PO intake, bowel function and UOP. On Xarelto for hospital acquired DVT during her stay for laryngectomy.    Of note, pt unable to communicate over the phone 2/2 laryngectomy. Please call her son Danilo Ramos at [phone] for telephone correspondence regarding appointments/plans. Will have phone number added to chart    PMH: DM, HTN, supraglottic squamous cell carcinoma s/p total laryngectomy and TEP  PSH:  section; total larygectomy, left hemithyroidectomy, cricopharyngeal myotomy, TEP  Soc: former smoker, denies alcohol use, denies other substance use  Allergies: NKDA    Physical Exam:  Vitals:    22 0904   BP: (!) 149/77   Pulse: 81   Resp: 18     Gen: NAD, alert and oriented to person, place, and date  CV: RR, well perfused extremities  Resp: even and unlabored respirations, no accessory muscle use  Abdomen: soft, nontender, nondistended  Extremities: warm and dry    Wound/Incision site: prior G-tube site with erythema, excoriation, and white opaque drainage from fistula tract. Per patient increased oral intake yields increased drainage, particularly liquids.      Assessment/Plan:  Heaven Boston is a 57 y.o. female presenting with drainage  from prior G-tube site that was removed 4 weeks ago, possible enterocutaneous fistula tract from the site.  -silver nitrate applied to granulation tissue  -Prealbumin ordered to assess nutritional status  -referral to GI for endoscopic fistula tract clipping  -RTC after clipping with GI      -Hipolito Carcamo, [employer] MS4    Above note by Hipolito Carcamo, MS4 edited as needed  Dale Nash MD  [employer] General Surgery PGY-1

## 2022-09-13 NOTE — PATIENT INSTRUCTIONS
Pt seen by provider. Pt will RTC after clipping with GI. Blood work was also ordered for pt to have done. Pt education given both written and verbal.

## 2022-09-19 ENCOUNTER — HOSPITAL ENCOUNTER (OUTPATIENT)
Dept: RADIOLOGY | Facility: HOSPITAL | Age: 57
Discharge: HOME | End: 2022-09-19
Attending: STUDENT IN AN ORGANIZED HEALTH CARE EDUCATION/TRAINING PROGRAM
Payer: COMMERCIAL

## 2022-09-19 DIAGNOSIS — C32.9 LARYNGEAL CANCER: ICD-10-CM

## 2022-09-19 LAB
CREAT SERPL-MCNC: 1.06 MG/DL (ref 0.55–1.02)
GFR SERPLBLD CREATININE-BSD FMLA CKD-EPI: >60 MLS/MIN/1.73/M2

## 2022-09-19 PROCEDURE — 25500020 PHARM REV CODE 255

## 2022-09-19 PROCEDURE — 70491 CT SOFT TISSUE NECK W/DYE: CPT | Mod: TC

## 2022-09-19 PROCEDURE — 82565 ASSAY OF CREATININE: CPT | Performed by: STUDENT IN AN ORGANIZED HEALTH CARE EDUCATION/TRAINING PROGRAM

## 2022-09-19 PROCEDURE — 36415 COLL VENOUS BLD VENIPUNCTURE: CPT | Performed by: STUDENT IN AN ORGANIZED HEALTH CARE EDUCATION/TRAINING PROGRAM

## 2022-09-19 RX ADMIN — IOPAMIDOL: 755 INJECTION, SOLUTION INTRAVENOUS at 08:09

## 2022-09-27 ENCOUNTER — OFFICE VISIT (OUTPATIENT)
Dept: OTOLARYNGOLOGY | Facility: CLINIC | Age: 57
End: 2022-09-27
Payer: COMMERCIAL

## 2022-09-27 VITALS
DIASTOLIC BLOOD PRESSURE: 68 MMHG | WEIGHT: 127.19 LBS | BODY MASS INDEX: 22.53 KG/M2 | SYSTOLIC BLOOD PRESSURE: 148 MMHG | HEART RATE: 80 BPM | TEMPERATURE: 98 F

## 2022-09-27 DIAGNOSIS — C32.9 LARYNGEAL CANCER: Primary | ICD-10-CM

## 2022-09-27 DIAGNOSIS — I89.0 LYMPHEDEMA: ICD-10-CM

## 2022-09-27 PROCEDURE — 99214 OFFICE O/P EST MOD 30 MIN: CPT | Mod: PBBFAC | Performed by: OTOLARYNGOLOGY

## 2022-09-27 NOTE — PROGRESS NOTES
[facility]  Otolaryngology Clinic Note    Heaven Boston  Encounter Date: 9/27/2022  YOB: 1965    Chief Complaint: s/p total laryngectomy     HPI: Heaven Boston is a 57 y.o. 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 Patient had an uneventful hospitalization course and was discharged on postoperative day 9 after an esophagram showed no pharyngeal leak.  However she re-presented to our facility on 4/6/22 with surgical site infection and a pharyngocutaneous fistula. Patient underwent a neck washout with primary repair on 4/11/2022, with placement of a gastrostomy tube the same day. She was maintained on NPO and transferred to [place] for further evaluation and management on 4/15/22 after evidence of persistent pharyngocutaneous fistula. There she underwent a 2nd neck washout with primary closure and placement of salivary bypass tube.      5/2/22: Patient had an uneventful hospitalization course and removal of salivary bypass tube before discharge last week and now presenting for postoperative evaluation.  Patient presents today without any complaints.  She is wondering about when he pointed that her x-rays for swallowing then.  She seen Dr. Laguna today after our visit to undergo-stimulation for radiation.     5/9/22: Pt. Did not answer. Let voicemail to advance diet to CLD. Also discussed this with son.  Plan to see in clinic in 2 weeks     5/19/22: In radiation therapy, 5x weekly until 6/16/22. Has TEP in place, brought another TEP requesting swap out. Will go to speech therapy for exchange of TEP. Went to ER for bleeding from G-tube site 1 week ago but declines visit to Gen Surg clinic today. On CLD taking Ensure by G-tube, water and soups by mouth.  Had questions about advancing diet. No weight loss or appetite change. No other issues.      6/21/22:  Returns today for follow up.  Completed radiation last week 6/16/22.  Is having difficulty voicing with TEP though it has been swapped out by SLP.  Able to eat what she wants.  Having some tightness of her throat and soreness, otherwise no issues.     7/21/22: Here today for follow up. Has overall been doing very well. She is tolerating her diet by mouth and gaining weight. No dysphagia. Has not used her PEG tube since prior to radiation. She is interested in having it removed. Has some fullness in the left neck which intermittently swells and then resolves again. Otherwise no new lumps/bumps of the head and neck.     8/23/22: Ms. Boston returns today for follow up. She complains of neck pain exacerbated by flexion and movement to the right that began 2 weeks ago. She also reports neck swelling and tightness which occasionally increases in size and is tender to palpation. She reports cough with clear mucus production as well as nasal congestion without drainage. She also notes dry, itchy eyes that have not improved with Visine. She also notes dysuria. She is tolerating her diet by mouth and gaining weight. She had her PEG tube removed on 8/16/22. She has been to SLP twice and is still having difficulty voicing with TEP.    9/27/22: Here for follow up. Reports she is doing okay. Still coughing a lot and not voicing well with TEP. No weight loss, tolerating diet. No otalgia/lumps bumps. Still having intermittent neck swelling/pain.     ROS:   General: Negative except per HPI  Skin: Denies rash, ulcer, or lesion.  Eyes: Denies vision changes or diplopia.  Ears: Negative except per HPI  Nose: Negative except per HPI  Throat/mouth: Negative except per HPI  Cardiovascular: Negative except per HPI  Respiratory: Negative except per HPI  Neck: Negative except per HPI  Endocrine: Negative except per HPI  Neurologic: Negative  except per HPI    Other 10-point review of systems negative except per HPI    Review of patient's allergies indicates:  No Known Allergies    Past Medical History:   Diagnosis Date    Cancer     Diabetes mellitus     Hypertension        Past Surgical History:   Procedure Laterality Date     SECTION      OH REMOVAL OF LARYNX         Social History     Socioeconomic History    Marital status: Single   Tobacco Use    Smoking status: Former    Smokeless tobacco: Former   Substance and Sexual Activity    Alcohol use: Never    Drug use: Never    Sexual activity: Not Currently       History reviewed. No pertinent family history.    Outpatient Encounter Medications as of 2022   Medication Sig Dispense Refill    amLODIPine (NORVASC) 5 MG tablet Take 1 tablet (5 mg total) by mouth once daily. 90 tablet 3    aspirin (ECOTRIN) 81 MG EC tablet Take 1 tablet (81 mg total) by mouth once daily.  3    atorvastatin (LIPITOR) 80 MG tablet Take 1 tablet (80 mg total) by mouth every evening. 90 tablet 3    azelastine (ASTELIN) 137 mcg (0.1 %) nasal spray 1 spray by Nasal route.      carBAMazepine (CARBATROL) 200 MG CM12 Take 200 mg by mouth.      cetirizine (ZYRTEC) 10 MG tablet Take 10 mg by mouth once daily.      DULoxetine (CYMBALTA) 30 MG capsule Take 30 mg by mouth 2 (two) times daily.      fluticasone propionate (FLONASE) 50 mcg/actuation nasal spray 1 spray by Each Nostril route 2 (two) times daily.      fluticasone-salmeterol diskus inhaler 100-50 mcg Inhale 1 puff into the lungs every 12 (twelve) hours.      HYDROcodone-acetaminophen 5-300 mg Tab Take 1 tablet by mouth every 8 (eight) hours as needed.      ibuprofen (ADVIL,MOTRIN) 800 MG tablet Take 800 mg by mouth every 6 (six) hours as needed.      loratadine (CLARITIN) 10 mg tablet Take 1 tablet (10 mg total) by mouth once daily. 90 tablet 0    metFORMIN (GLUCOPHAGE) 500 MG tablet Take 1 tablet (500 mg total) by mouth once daily. 90 tablet 3    metoprolol  succinate (TOPROL-XL) 25 MG 24 hr tablet Take 0.5 tablets (12.5 mg total) by mouth once daily. 45 tablet 3    montelukast (SINGULAIR) 5 MG chewable tablet Take 1 tablet (5 mg total) by mouth every evening. 90 tablet 3    omeprazole (PRILOSEC) 40 MG capsule Take 40 mg by mouth once daily.      prednisoLONE acetate (PRED FORTE) 1 % DrpS Place into both eyes.      rivaroxaban (XARELTO) 10 mg Tab Take 2 tablets (20 mg total) by mouth daily with dinner or evening meal. 90 tablet 0     Facility-Administered Encounter Medications as of 9/27/2022   Medication Dose Route Frequency Provider Last Rate Last Admin    LIDOcaine (PF) 40 mg/mL (4 %) injection 2 mL  2 mL Other 1 time in Clinic/HOD Gregory Martins MD        phenylephrine HCL 1% nasal spray 1 spray  1 spray Each Nostril 1 time in Clinic/HOD Gregory Martins MD        silver nitrate applicators applicator 1 applicator  1 applicator Topical (Top) 1 time in Clinic/HOD Patricia Montes MD         Physical Exam:  Vitals:    09/27/22 0807   BP: (!) 148/68   Pulse: 80   Temp: 98.1 °F (36.7 °C)   Weight: 57.7 kg (127 lb 3.2 oz)     Physical Exam:  General/ Voice: NAD, AAO, no increased WOB, no stridor, clear secretions; aphonic   Head/Face: normal  Eyes: EOMI, PERRLA. Crusting noted at lower lids bilaterally without erythema or drainage. Conjunctiva normal  Ears: Hearing well at normal conversation volume  Nose: nares normal, septum midline, MMM. Erythema and dryness noted at opening of nares.  Oral Cavity: MMM, no lesions or ulcerations, no leukoplakia, no edema; BOT and FOM are soft/NT and without lesions/ ulcerations/ leukoplakia  Oropharynx: No uvular deviation or deviation of the oropharyngeal wall noted  Neck:  Well-healed stoma without any signs of dehiscence. TEP in place without any leakage. Prior radiation burns well healed. Suprastomal neck fullness along the incision  to palpation.   Neuro: CN II - XII exam: grossly intact        Procedures:Flexible Fiberoptic Laryngoscopy/Nasopharyngoscopy via right nare    Procedure in Detail: Informed consent was obtained from the patient after explanation of procedure, indications, risks and benefits. Flexible endoscopy was performed through the nasal passages. The nasal cavity, nasopharynx, and neopharynx were adequately visualized. Tracheoscopy performed via laryngectomy stoma.     Anesthesia: None  Adverse Events: None  Resident Surgeon: Lili Thornton  Blood loss: none  Condition: good    Findings:  NP/OP: no masses/lesions of NC, eustachian tube, fossa of Rosenmuller, no adenoid hypertrophy  BOT/vallecula: no lingual hypertrophy, no masses/lesions, no secretions obscuring visualization  Neopharynx:  Unable to visualize TEP, mild pharyngeal edema, no evidence of disease  Tracheoscopy: Laryngoscope advanced to aleja.  No evidence of disease, pink mucosa, thin clear secretions    IMAGING:  CT scan 9/19/22  FINDINGS:  There are postoperative changes of prior total laryngectomy.  There are also treatment related changes with diffuse mucosal edema and subcutaneous soft tissue edema.  There is no definite suspicious enhancement.  There are no abnormally enlarged cervical lymph nodes.  There is no drainable fluid collection.     There are treatment related changes of the parotid and submandibular glands.  Residual thyroid gland is unremarkable.  The major arteries in the neck are patent.  There are no acute findings in the orbits or visualized brain parenchyma.  There is no destructive bone lesion.  The lung apices are clear.     Impression:     Post treatment changes of the neck without evidence to suggest progressive disease.    Assessment/Plan:  Heaven Boston is a 57 y.o. female with T3N1M0 supraglottic squamous cell carcinoma now status post total laryngectomy with bilateral necks in May 2022. Postoperative course complicated by pharyngocutaneous fistula for which patient required repeated neck  washout and primary closure x 2. She completed radiation therapy in June 2022.      - Still unable to voice via TEP. Will follow up with speech therapy for further evaluation.   - Needs post treatment PET at 3 months.   - Will refer to SLP for lymphedema therapy   - TSH, T4  - Advised to use OTC eye cream for dry eyes and Guaifenesin for cough.  - RTC in 1 month for continued surveillance.    HEAD & NECK CANCER SURVEILLANCE   Primary Surgical Treatment     Head & Neck Staff: Dr. Celina Arboleda  Radiation Oncologist: Dr. Laguna    Primary tumor: T3N1M0  Squamous cell carcinoma     Date of Diagnosis: 2/18/2022  Surgery Date: 3/21/2022  Induction Chemotherapy:  No  Adjuvant Therapy:  Radiation  Completion Date: 6/16/2022    Pertinent Treatment History:  Initial post operative course complicated by pharyngocutaneous fistula.   Transferred to [facility] for management - wash out, oversewn, and treated by salivary bypass tube    Place date if completed   3 6 12 18 24 36 48 60   CT Neck X X X X X X X X   PET/CT X          CXR  X X X X X X X   TFT X  X  X X X X     Current Surveillance Interval:  1 month       Shelby Marcos MD  [facility]

## 2022-09-27 NOTE — PROGRESS NOTES
The scope used for the exam was:  Flexible scope ENF-P4  Serial Number:  1)    [id number]    []   2)    [id number]    [x]   3)    [id number]    []   4)    [id number]    []   5)    [id number]    []   6)    [id number]    []       The scope used for the exam was:  Rigid scope   Serial Number:  1)   [id number]    []   2)   [id number]    []   3)   7330    []   4)    3384   []   5)    0824   []   6)    5554   []     7)   7425    []   8)   2240    []   9)   1109    []

## 2022-09-30 DIAGNOSIS — C32.9 CARCINOMA LARYNX: Primary | ICD-10-CM

## 2022-10-03 ENCOUNTER — CLINICAL SUPPORT (OUTPATIENT)
Dept: REHABILITATION | Facility: HOSPITAL | Age: 57
End: 2022-10-03
Payer: COMMERCIAL

## 2022-10-03 DIAGNOSIS — C32.9 LARYNGEAL CANCER: ICD-10-CM

## 2022-10-03 DIAGNOSIS — I89.0 LYMPHEDEMA: ICD-10-CM

## 2022-10-03 PROCEDURE — 92507 TX SP LANG VOICE COMM INDIV: CPT

## 2022-10-03 NOTE — PROGRESS NOTES
[facility]  Speech Therapy Progress Note  Laryngectomy-Lymphedema    Date: 10/3/2022     Name: Heaven Boston   MRN: [id number]    Therapy Diagnosis:   Encounter Diagnoses   Name Primary?    Laryngeal cancer     Lymphedema       Physician: Shelby Marcos MD    PATIENT IS A NECK-BREATHER - DO NOT ORALLY INTUBATE    Time In:  0800   Time Out:  0900     Procedure Min.   Speech Language Voice Therapy  60   Total Untimed Units: 60  Charges Billed/# of units: 60/1    Precautions: Standard  Subjective    Chief Complaint: difficulty voicing despite esophageal dilation. Pt referred to SLP services for lymphedema management.     AFFECTnormal affect    Pain:   0/10  Pain Location / Description: NA  Current Medical History: Heaven Boston is a 57 y.o. female referred by Dr. Marcos for TEP management to maximize alaryngeal communication without respiratory compromise.    Past Medical History:   Past Medical History:   Diagnosis Date    Cancer     Diabetes mellitus     Hypertension     Heaven Boston  has a past surgical history that includes pr removal of larynx and  section.  Medical Hx and Allergies: Heaven has a current medication list which includes the following prescription(s): amlodipine, aspirin, atorvastatin, azelastine, carbamazepine, cetirizine, duloxetine, fluticasone propionate, fluticasone-salmeterol 100-50 mcg/dose, hydrocodone-acetaminophen, ibuprofen, loratadine, metformin, metoprolol succinate, montelukast, omeprazole, prednisolone acetate, and rivaroxaban, and the following Facility-Administered Medications: lidocaine (pf), phenylephrine hcl 1%, and silver nitrate applicators. Review of patient's allergies indicates:  No Known Allergies    Current Voice Function: alaryngeal communication via voice prosthesis   Current Level of Swallow Function/Complaints:  no complaints of dysphagia. Currently on regular consistency diet.   Prior Therapy:  2022. TEP change. No voice  required esophogeal dilation.     OBJECTIVE   TEP Current Status: 17FR, 12.5mm Aquino placed on 04/12/2022    TEP Patient Complaints:no complaints, intermittent good voicing with periods of strained voicing.     TEP Accessories: 10/55 fenestrated leonie tube with push to talk HMEs.     Stoma Size:  adequate     Lymphedema:  yes      Manual therapy: MLD/CDP continues for head and neck. Measurement taken of face and neck and is as follows.     Facial Lymphedema Measurement Form  PRE-TREATMENT  Neck Composite Right   Superior Circumference (A):  (just below mandible)  8.5 7.5   Middle Circumference (B):  (midway between top & bottom 7 6.5   Inferior Circumference (C):  (lowest circumferential location)  6.5 5   TOTAL: 22 19       Facial Composite   Location Right Left   1.Tragus to mental protuberance  5 5.75   2.Tragus to mouth angle  4.5 5   3.Mandibular angle to nasal wing  4 4   4.Mandibular angle to internal eye corner  3 4.5   5.Mandibular angle to external eye corner  3 4   6.Mental Protuberance to internal eye corner to external eye corner  6 4.25   7.Mandibular angle to mental protuberance 3.5 4   TOTALS: 29 31.5     POST-TREATMENT  Neck Composite Right   Superior Circumference (A):  (just below mandible)  7.5 6.75   Middle Circumference (B):  (midway between top & bottom 6.5 5.25   Inferior Circumference (C):  (lowest circumferential location)  6.5 5   TOTAL: 20.5 17       Facial Composite   Location Right Left   1.Tragus to mental protuberance  5 5.75   2.Tragus to mouth angle  4 4   3.Mandibular angle to nasal wing  3.5 4   4.Mandibular angle to internal eye corner  4.5 4   5.Mandibular angle to external eye corner  3.5 3.5   6.Mental Protuberance to internal eye corner to external eye corner  3.75 3.5   7.Mandibular angle to mental protuberance 3 3.5   TOTALS: 27.25 28.25   Greater than a 2% difference from pre-to post-treatment? yes     Treatment   Heaven Borel noted to tolerated MLD/CDP with good results  this date. Decreased facial composite total of 1.75 inches on the right and 3.25 inches on the left and neck composite total of 1.5 inches on left and 2.0 inches on the right. SLP to follow up on 10/5/2022 at 10:30.  Good de-congestion and compliance with all training.    Education: Plan of Care, role of SLP in care, and neck stretches  were discussed with pt. Patient expressed understanding.     Assessment       LongTerm Goals:  Current Progress:   1. Patient will demonstrate understanding and implementation of long-term home management interventions as noted by improvement in lymphatic function, conduction of oral care, completion of daily exercises/stretches, and use of home management tools  Initial       Short Term Goals:  Current Progress:   1. Patient will participate in manual interventions to target improvement in alaryngeal communication function related to reduced lymphatic function Initial   2.  Patient will completed diaphragmatic breathing exercises at an independent level.  Initial   3.  Patient will improve alaryngeal communication to maintain voicing >90% of the time.  Initial     Ms Boston presents with chronic aphonia due to total laryngectomy.  She is currently utilizing esophageal speech via a voice prosthesis for alaryngeal communication for functional communication with her family, friends, medical providers, and others to perform daily life activities.  Ms. Boston requires the use of a laryngectomy tube at all times to keep the airway patent and prevent stomal stenosis. HMEs are required daily for mucus management and pulmonary optimization . SLP to additionally provided MLD/CDP for head and neck lymphedema to improve functional communication.    Plan     SLP intervention is warranted 1-2 times a week or PRN for communication needs for 1-6 weeks due to the chronic nature of the impairment.     Additional Information     SLP completed intervention in ENT clinic this session. All comments and  questions addressed and comprehension demonstrated. SLP to follow up on 10/5/2022 for continued intervention. SLP to contact [employer]  for possible initiation of Flexitouch device.     Sunitha Jean CCC-SLP   10/3/2022

## 2022-10-05 ENCOUNTER — CLINICAL SUPPORT (OUTPATIENT)
Dept: REHABILITATION | Facility: HOSPITAL | Age: 57
End: 2022-10-05
Payer: COMMERCIAL

## 2022-10-05 DIAGNOSIS — Z90.02 H/O LARYNGECTOMY: Primary | ICD-10-CM

## 2022-10-05 PROCEDURE — 92507 TX SP LANG VOICE COMM INDIV: CPT

## 2022-10-05 NOTE — PROGRESS NOTES
[facility]  Speech Therapy Progress Note  Laryngectomy-Lymphedema    Date: 10/5/2022     Name: Heaven Boston   MRN: [id number]    Therapy Diagnosis:   No diagnosis found.     Physician: Daniella ref. provider found    PATIENT IS A NECK-BREATHER - DO NOT ORALLY INTUBATE    Time In:  1030  Time Out:  1130    Procedure Min.   Speech Language Voice Therapy  60   Total Untimed Units: 60  Charges Billed/# of units: 60/1    Precautions: Standard  Subjective    Chief Complaint: it's better    AFFECTnormal affect    Pain:   0/10  Pain Location / Description: NA  Current Medical History: Heaven Boston is a 57 y.o. female referred by Dr. Daniella willis. provider found for TEP management to maximize alaryngeal communication without respiratory compromise.    Past Medical History:   Past Medical History:   Diagnosis Date    Cancer     Diabetes mellitus     Hypertension     Heaven Boston  has a past surgical history that includes pr removal of larynx and  section.  Medical Hx and Allergies: Heaven has a current medication list which includes the following prescription(s): amlodipine, aspirin, atorvastatin, azelastine, carbamazepine, cetirizine, duloxetine, fluticasone propionate, fluticasone-salmeterol 100-50 mcg/dose, hydrocodone-acetaminophen, ibuprofen, loratadine, metformin, metoprolol succinate, montelukast, omeprazole, prednisolone acetate, and rivaroxaban, and the following Facility-Administered Medications: lidocaine (pf), phenylephrine hcl 1%, and silver nitrate applicators. Review of patient's allergies indicates:  No Known Allergies    Current Voice Function: alaryngeal communication via voice prosthesis   Current Level of Swallow Function/Complaints:  no complaints of dysphagia. Currently on regular consistency diet.   Prior Therapy:  10/096150. TEP change. No voice required esophogeal dilation.     OBJECTIVE   TEP Current Status: 17FR, 12.5mm Aquino placed on 2022    TEP Patient  Complaints:no complaints, intermittent good voicing with periods of strained voicing.     TEP Accessories: 10/55 fenestrated leonie tube with push to talk HMEs.     Stoma Size:  adequate     Lymphedema:  yes      Manual therapy: MLD/CDP continues for head and neck. Measurement taken of face and neck and is as follows.     Facial Lymphedema Measurement Form  PRE-TREATMENT  Neck Composite Right   Superior Circumference (A):  (just below mandible)  8 7.5   Middle Circumference (B):  (midway between top & bottom 6.5 5.5   Inferior Circumference (C):  (lowest circumferential location)  6 5.5   TOTAL: 20.5 18.5       Facial Composite   Location Right Left   1.Tragus to mental protuberance  5 5.25   2.Tragus to mouth angle  4 4.25   3.Mandibular angle to nasal wing  3.5 3.5   4.Mandibular angle to internal eye corner  4 4   5.Mandibular angle to external eye corner  3.25 3.5   6.Mental Protuberance to internal eye corner to external eye corner  4.25 4   7.Mandibular angle to mental protuberance 4.25 4   TOTALS: 32.5 28.5     POST-TREATMENT  Neck Composite Right   Superior Circumference (A):  (just below mandible)  7 7.5   Middle Circumference (B):  (midway between top & bottom 5.5 5.5   Inferior Circumference (C):  (lowest circumferential location)  6 5.5   TOTAL: 18.5 18.5       Facial Composite   Location Right Left   1.Tragus to mental protuberance  5 5   2.Tragus to mouth angle  4 4.5   3.Mandibular angle to nasal wing  3.5 3.5   4.Mandibular angle to internal eye corner  4 4   5.Mandibular angle to external eye corner  3 3   6.Mental Protuberance to internal eye corner to external eye corner  3.5 3.5   7.Mandibular angle to mental protuberance 7 4   TOTALS: 30 27.5   Greater than a 2% difference from pre-to post-treatment? yes     Treatment   Heavenondina Malloryl noted to tolerated MLD/CDP with good results this date. Decreased facial composite total of 2.5 inches on the right and 1.0 inches on the left and neck composite  total of 2.0 inches on left and no change on the right. SLP to follow up on 10/7/2022 at 09:00  Good de-congestion and compliance with all training. SLP ended session with diaphragmatic breathing    Education: Plan of Care, role of SLP in care, and neck stretches  were discussed with pt. Patient expressed understanding.     Assessment       LongTerm Goals:  Current Progress:   1. Patient will demonstrate understanding and implementation of long-term home management interventions as noted by improvement in lymphatic function, conduction of oral care, completion of daily exercises/stretches, and use of home management tools  Initial       Short Term Goals:  Current Progress:   1. Patient will participate in manual interventions to target improvement in alaryngeal communication function related to reduced lymphatic function Initial   2.  Patient will completed diaphragmatic breathing exercises at an independent level.  Initial   3.  Patient will improve alaryngeal communication to maintain voicing >90% of the time.  Initial     Ms Boston presents with chronic aphonia due to total laryngectomy.  She is currently utilizing esophageal speech via a voice prosthesis for alaryngeal communication for functional communication with her family, friends, medical providers, and others to perform daily life activities.  Ms. Boston requires the use of a laryngectomy tube at all times to keep the airway patent and prevent stomal stenosis. HMEs are required daily for mucus management and pulmonary optimization . SLP to additionally provided MLD/CDP for head and neck lymphedema to improve functional communication.    Plan     SLP intervention is warranted 1-2 times a week or PRN for communication needs for 1-6 weeks due to the chronic nature of the impairment.     Additional Information     SLP completed intervention in wound care clinic this session. All comments and questions addressed and comprehension demonstrated. SLP to follow up  on 10/7/2022 for continued intervention. SLP to contact [employer]  for possible initiation of Flexitouch device.     Sunitha Jean CCC-SLP   10/5/2022

## 2022-10-07 ENCOUNTER — CLINICAL SUPPORT (OUTPATIENT)
Dept: REHABILITATION | Facility: HOSPITAL | Age: 57
End: 2022-10-07
Payer: COMMERCIAL

## 2022-10-07 DIAGNOSIS — Z90.02 H/O LARYNGECTOMY: Primary | ICD-10-CM

## 2022-10-07 PROCEDURE — 92507 TX SP LANG VOICE COMM INDIV: CPT

## 2022-10-07 NOTE — PROGRESS NOTES
[facility]  Speech Therapy Progress Note  Laryngectomy-Lymphedema    Date: 10/7/2022     Name: Heaven Boston   MRN: [id number]    Therapy Diagnosis:   Encounter Diagnosis   Name Primary?    H/O laryngectomy Yes        Physician: No ref. provider found    PATIENT IS A NECK-BREATHER - DO NOT ORALLY INTUBATE    Time In:  0900  Time Out:  1000    Procedure Min.   Speech Language Voice Therapy  60   Total Untimed Units: 60  Charges Billed/# of units: 60/1    Precautions: Standard  Subjective    Chief Complaint: Doing good, pt reported improvement in alaryngeal communication and swallow function since lymphedema treatment initiation.     AFFECTnormal affect    Pain:   0/10  Pain Location / Description: NA  Current Medical History: Heaven Bsoton is a 57 y.o. female referred by Dr. Patrick provider found for TEP management to maximize alaryngeal communication without respiratory compromise.    Past Medical History:   Past Medical History:   Diagnosis Date    Cancer     Diabetes mellitus     Hypertension     Heaven Boston  has a past surgical history that includes pr removal of larynx and  section.  Medical Hx and Allergies: Heaven has a current medication list which includes the following prescription(s): amlodipine, aspirin, atorvastatin, azelastine, carbamazepine, cetirizine, duloxetine, fluticasone propionate, fluticasone-salmeterol 100-50 mcg/dose, hydrocodone-acetaminophen, ibuprofen, loratadine, metformin, metoprolol succinate, montelukast, omeprazole, prednisolone acetate, and rivaroxaban, and the following Facility-Administered Medications: lidocaine (pf), phenylephrine hcl 1%, and silver nitrate applicators. Review of patient's allergies indicates:  No Known Allergies    Current Voice Function: alaryngeal communication via voice prosthesis   Current Level of Swallow Function/Complaints:  no complaints of dysphagia. Currently on regular consistency diet.   Prior Therapy:   Please see below and place order for holter monitor as appropriate  Routed to PCP   [id number]/170221. TEP change. No voice required esophogeal dilation.     OBJECTIVE   TEP Current Status: 17FR, 12.5mm Aquino placed on 04/12/2022    TEP Patient Complaints:no complaints, intermittent good voicing with periods of strained voicing.     TEP Accessories: 10/55 fenestrated leonie tube with push to talk HMEs.     Stoma Size:  adequate     Lymphedema:  yes      Manual therapy: MLD/CDP continues for head and neck. Measurement taken of face and neck and is as follows.     Facial Lymphedema Measurement Form  PRE-TREATMENT  Neck Composite Left   Superior Circumference (A):  (just below mandible)  8 7.5   Middle Circumference (B):  (midway between top & bottom 6 6   Inferior Circumference (C):  (lowest circumferential location)  5.5 6   TOTAL: 19.5 19       Facial Composite   Location Right Left   1.Tragus to mental protuberance  5.5 5.5   2.Tragus to mouth angle  4 4.5   3.Mandibular angle to nasal wing  3.25 3.5   4.Mandibular angle to internal eye corner  4.25 4   5.Mandibular angle to external eye corner  3.25 3   6.Mental Protuberance to internal eye corner to external eye corner  3.5 3.5   7.Mandibular angle to mental protuberance 3.5 4   TOTALS: 27.25 28     POST-TREATMENT  Neck Composite Left   Superior Circumference (A):  (just below mandible)  7.5 7   Middle Circumference (B):  (midway between top & bottom 6 6   Inferior Circumference (C):  (lowest circumferential location)  6 5.5   TOTAL: 19.5 18.5       Facial Composite   Location Right Left   1.Tragus to mental protuberance  5 5.25   2.Tragus to mouth angle  4 4.25   3.Mandibular angle to nasal wing  3.25 3.5   4.Mandibular angle to internal eye corner  4.25 4   5.Mandibular angle to external eye corner  3 3   6.Mental Protuberance to internal eye corner to external eye corner  3.5 3.5   7.Mandibular angle to mental protuberance 3.5 4   TOTALS: 26.5 27.5   Greater than a 2% difference from pre-to post-treatment? yes     Treatment   Heaven Borel noted to  tolerated MLD/CDP with good results this date. Decreased facial composite total of .75 inches on the right and inches on the 1.5 left and neck composite total of 0 inches on left and 1.5 inches on the right. SLP to follow up on 10/10/2022 at 10:00.  Good de-congestion and compliance with all training. SLP ended session with diaphragmatic breathing    Education: Plan of Care, role of SLP in care, and neck stretches  were discussed with pt. Patient expressed understanding.     Assessment       LongTerm Goals:  Current Progress:   1. Patient will demonstrate understanding and implementation of long-term home management interventions as noted by improvement in lymphatic function, conduction of oral care, completion of daily exercises/stretches, and use of home management tools  Progressing towards goal       Short Term Goals:  Current Progress:   1. Patient will participate in manual interventions to target improvement in alaryngeal communication function related to reduced lymphatic function Progressing towards goal   2.  Patient will completed diaphragmatic breathing exercises at an independent level.  Progressing towards goal   3.  Patient will improve alaryngeal communication to maintain voicing >90% of the time.  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  She is currently utilizing esophageal speech via a voice prosthesis for alaryngeal communication for functional communication with her family, friends, medical providers, and others to perform daily life activities.  Ms. Boston requires the use of a laryngectomy tube at all times to keep the airway patent and prevent stomal stenosis. HMEs are required daily for mucus management and pulmonary optimization . SLP to additionally provided MLD/CDP for head and neck lymphedema to improve functional communication.    Plan     SLP intervention is warranted 1-2 times a week or PRN for communication needs for 1-6 weeks due  to the chronic nature of the impairment.     Additional Information     SLP completed intervention in wound care clinic this session. All comments and questions addressed and comprehension demonstrated. SLP to follow up on 10/10/2022 for continued intervention. SLP to contact [employer]  as schedule permits for possible initiation of Flexitouch device.     Sunitha Jean CCC-SLP   10/7/2022

## 2022-10-10 ENCOUNTER — CLINICAL SUPPORT (OUTPATIENT)
Dept: REHABILITATION | Facility: HOSPITAL | Age: 57
End: 2022-10-10
Payer: COMMERCIAL

## 2022-10-10 DIAGNOSIS — I89.0 LYMPHEDEMA: Primary | ICD-10-CM

## 2022-10-10 PROCEDURE — 92507 TX SP LANG VOICE COMM INDIV: CPT

## 2022-10-10 NOTE — PROGRESS NOTES
[facility]  Speech Therapy Progress Note  Laryngectomy-Lymphedema    Date: 10/10/2022     Name: Heaven Boston   MRN: [id number]    Therapy Diagnosis:   Encounter Diagnosis   Name Primary?    Lymphedema Yes        Physician: No ref. provider found    PATIENT IS A NECK-BREATHER - DO NOT ORALLY INTUBATE    Time In:  1000  Time Out:  1100    Procedure Min.   Speech Language Voice Therapy  60   Total Untimed Units: 60  Charges Billed/# of units: 60/1    Precautions: Standard  Subjective    Chief Complaint: Doing good, pt reported improvement in alaryngeal communication and swallow function since lymphedema treatment initiation.     AFFECT: normal affect    Pain:   0/10  Pain Location / Description: NA  Current Medical History: Heaven Boston is a 57 y.o. female referred by Dr. Patrick provider found for TEP management to maximize alaryngeal communication without respiratory compromise.    Past Medical History:   Past Medical History:   Diagnosis Date    Cancer     Diabetes mellitus     Hypertension     Heaven Boston  has a past surgical history that includes pr removal of larynx and  section.  Medical Hx and Allergies: Heaven has a current medication list which includes the following prescription(s): amlodipine, aspirin, atorvastatin, azelastine, carbamazepine, cetirizine, duloxetine, fluticasone propionate, fluticasone-salmeterol 100-50 mcg/dose, hydrocodone-acetaminophen, ibuprofen, loratadine, metformin, metoprolol succinate, montelukast, omeprazole, prednisolone acetate, and rivaroxaban, and the following Facility-Administered Medications: lidocaine (pf), phenylephrine hcl 1%, and silver nitrate applicators. Review of patient's allergies indicates:  No Known Allergies    Current Voice Function: alaryngeal communication via voice prosthesis   Current Level of Swallow Function/Complaints:  no complaints of dysphagia. Currently on regular consistency diet.   Prior Therapy:   [id number]. TEP change. No voice required esophogeal dilation.     OBJECTIVE   TEP Current Status: 17FR, 12.5mm Aquino placed on 04/12/2022    TEP Patient Complaints:no complaints, intermittent good voicing with periods of strained voicing.     TEP Accessories: 10/55 fenestrated leonie tube with push to talk HMEs.     Stoma Size:  adequate     Lymphedema:  yes      Manual therapy: MLD/CDP continues for head and neck. Measurement taken of face and neck and is as follows.     Facial Lymphedema Measurement Form  PRE-TREATMENT  Neck Composite                                        Right Left   Superior Circumference (A):  (just below mandible)  8 8   Middle Circumference (B):  (midway between top & bottom 6.25 6.25   Inferior Circumference (C):  (lowest circumferential location)  6 6   TOTAL: 20.25 20.25       Facial Composite   Location Right Left   1.Tragus to mental protuberance  5 5.25   2.Tragus to mouth angle  3.75 4.25   3.Mandibular angle to nasal wing  3.75 3.5   4.Mandibular angle to internal eye corner  4 4   5.Mandibular angle to external eye corner  3 3   6.Mental Protuberance to internal eye corner to external eye corner  3.5 3.5   7.Mandibular angle to mental protuberance 4 3.75   TOTALS: 27 27.25     POST-TREATMENT  Neck Composite                                       Right Left   Superior Circumference (A):  (just below mandible)  7.5 7.5   Middle Circumference (B):  (midway between top & bottom 5.25 5.75   Inferior Circumference (C):  (lowest circumferential location)  5 5.25   TOTAL: 17.75 18.5       Facial Composite   Location Right Left   1.Tragus to mental protuberance  4.75 5   2.Tragus to mouth angle  3.75 4   3.Mandibular angle to nasal wing  3.75 3.5   4.Mandibular angle to internal eye corner  4 4   5.Mandibular angle to external eye corner  3 3   6.Mental Protuberance to internal eye corner to external eye corner  3.5 3.5   7.Mandibular angle to mental protuberance 4 3.75   TOTALS: 26.75 26.75    Greater than a 2% difference from pre-to post-treatment? yes     Treatment   Heaven Boston noted to tolerated MLD/CDP with good results this date. Decreased facial composite total of .25 of an inch on the right and 1 inch on the left. Decreased neck composite total of 1.75 inches on the left and 2.5 inches on the right. SLP to follow up on 10/24/2022 at 9:00.  Good de-congestion and compliance with all training. SLP ended session with diaphragmatic breathing    Education: Plan of Care, role of SLP in care, and neck stretches  were discussed with pt. Patient expressed understanding.     Assessment       LongTerm Goals:  Current Progress:   1. Patient will demonstrate understanding and implementation of long-term home management interventions as noted by improvement in lymphatic function, conduction of oral care, completion of daily exercises/stretches, and use of home management tools  Progressing towards goal       Short Term Goals:  Current Progress:   1. Patient will participate in manual interventions to target improvement in alaryngeal communication function related to reduced lymphatic function Progressing towards goal   2.  Patient will completed diaphragmatic breathing exercises at an independent level.  Progressing towards goal   3.  Patient will improve alaryngeal communication to maintain voicing >90% of the time.  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  She is currently utilizing esophageal speech via a voice prosthesis for alaryngeal communication for functional communication with her family, friends, medical providers, and others to perform daily life activities.  Ms. Boston requires the use of a laryngectomy tube at all times to keep the airway patent and prevent stomal stenosis. HMEs are required daily for mucus management and pulmonary optimization . SLP to additionally provided MLD/CDP for head and neck lymphedema to improve functional  communication.    Plan     SLP intervention is warranted 1-2 times a week or PRN for communication needs for 1-6 weeks due to the chronic nature of the impairment.     Additional Information     SLP completed intervention in wound care clinic this session. All comments and questions addressed and comprehension demonstrated. SLP to follow up on 10/10/2022 for continued intervention. SLP to contact [employer]  as schedule permits for possible initiation of Flexitouch device.     Sunitha Jean MS, CCC-SLP   10/10/2022

## 2022-10-11 ENCOUNTER — OFFICE VISIT (OUTPATIENT)
Dept: SURGERY | Facility: CLINIC | Age: 57
End: 2022-10-11
Payer: COMMERCIAL

## 2022-10-11 ENCOUNTER — HOSPITAL ENCOUNTER (OUTPATIENT)
Dept: RADIOLOGY | Facility: HOSPITAL | Age: 57
Discharge: HOME | End: 2022-10-11
Attending: FAMILY MEDICINE
Payer: COMMERCIAL

## 2022-10-11 VITALS
HEIGHT: 63 IN | RESPIRATION RATE: 20 BRPM | SYSTOLIC BLOOD PRESSURE: 156 MMHG | WEIGHT: 128 LBS | DIASTOLIC BLOOD PRESSURE: 74 MMHG | BODY MASS INDEX: 22.68 KG/M2 | HEART RATE: 58 BPM | TEMPERATURE: 98 F

## 2022-10-11 DIAGNOSIS — K31.6 GASTROCUTANEOUS FISTULA DUE TO GASTROSTOMY TUBE: Primary | ICD-10-CM

## 2022-10-11 DIAGNOSIS — Z12.31 VISIT FOR SCREENING MAMMOGRAM: ICD-10-CM

## 2022-10-11 PROCEDURE — 77063 BREAST TOMOSYNTHESIS BI: CPT | Mod: 26,,, | Performed by: RADIOLOGY

## 2022-10-11 PROCEDURE — 99215 OFFICE O/P EST HI 40 MIN: CPT | Mod: PBBFAC

## 2022-10-11 PROCEDURE — 77067 SCR MAMMO BI INCL CAD: CPT | Mod: 26,,, | Performed by: RADIOLOGY

## 2022-10-11 PROCEDURE — 77063 MAMMO DIGITAL SCREENING BILAT WITH TOMO: ICD-10-PCS | Mod: 26,,, | Performed by: RADIOLOGY

## 2022-10-11 PROCEDURE — 77067 SCR MAMMO BI INCL CAD: CPT | Mod: TC

## 2022-10-11 PROCEDURE — 77067 MAMMO DIGITAL SCREENING BILAT WITH TOMO: ICD-10-PCS | Mod: 26,,, | Performed by: RADIOLOGY

## 2022-10-11 RX ORDER — SILVER NITRATE 38.21; 12.74 MG/1; MG/1
1 STICK TOPICAL
Status: COMPLETED | OUTPATIENT
Start: 2022-10-11 | End: 2022-10-11

## 2022-10-11 RX ADMIN — SILVER NITRATE 1 APPLICATOR: 38.21; 12.74 STICK TOPICAL at 10:10

## 2022-10-11 NOTE — PROGRESS NOTES
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 Fistula developed following PEG tube placement. PEG tube removed in clinic 22. Patient states that she has not yet had her appointment with GI for endoscopic fistula clipping. She states that the fistula is producing far less output than previously and has become smaller in size. She states that she does not desire surgical intervention at this time. Denies f/c/n/v/CP/SOB.    PMH:   Past Medical History:   Diagnosis Date    Cancer     Diabetes mellitus     Hypertension     -SCC of the larynx    PSH:   Past Surgical History:   Procedure Laterality Date     SECTION      DC REMOVAL OF LARYNX  2022        Fam Hx:   No family history on file.     Social Hx:   Social History     Socioeconomic History    Marital status: Single   Tobacco Use    Smoking status: Former    Smokeless tobacco: Former   Substance and Sexual Activity    Alcohol use: Never    Drug use: Never    Sexual activity: Not Currently        Allergies:   Review of patient's allergies indicates:  No Known Allergies     ROS: Negative except above     Current Outpatient Medications on File Prior to Visit   Medication Sig Dispense Refill    amLODIPine (NORVASC) 5 MG tablet Take 1 tablet (5 mg total) by mouth once daily. 90 tablet 3    aspirin (ECOTRIN) 81 MG EC tablet Take 1 tablet (81 mg total) by mouth once daily.  3    atorvastatin (LIPITOR) 80 MG tablet Take 1 tablet (80 mg total) by mouth every evening. 90 tablet 3    azelastine (ASTELIN) 137 mcg (0.1 %) nasal spray 1 spray by Nasal route.      carBAMazepine (CARBATROL) 200 MG CM12 Take 200 mg by mouth.      cetirizine (ZYRTEC) 10 MG tablet Take 10 mg by mouth once daily.      DULoxetine (CYMBALTA) 30 MG capsule Take 30 mg by  "mouth 2 (two) times daily.      fluticasone propionate (FLONASE) 50 mcg/actuation nasal spray 1 spray by Each Nostril route 2 (two) times daily.      fluticasone-salmeterol diskus inhaler 100-50 mcg Inhale 1 puff into the lungs every 12 (twelve) hours.      HYDROcodone-acetaminophen 5-300 mg Tab Take 1 tablet by mouth every 8 (eight) hours as needed.      ibuprofen (ADVIL,MOTRIN) 800 MG tablet Take 800 mg by mouth every 6 (six) hours as needed.      loratadine (CLARITIN) 10 mg tablet Take 1 tablet (10 mg total) by mouth once daily. 90 tablet 0    metFORMIN (GLUCOPHAGE) 500 MG tablet Take 1 tablet (500 mg total) by mouth once daily. 90 tablet 3    metoprolol succinate (TOPROL-XL) 25 MG 24 hr tablet Take 0.5 tablets (12.5 mg total) by mouth once daily. 45 tablet 3    montelukast (SINGULAIR) 5 MG chewable tablet Take 1 tablet (5 mg total) by mouth every evening. 90 tablet 3    omeprazole (PRILOSEC) 40 MG capsule Take 40 mg by mouth once daily.      prednisoLONE acetate (PRED FORTE) 1 % DrpS Place into both eyes.      rivaroxaban (XARELTO) 10 mg Tab Take 2 tablets (20 mg total) by mouth daily with dinner or evening meal. 90 tablet 0     Current Facility-Administered Medications on File Prior to Visit   Medication Dose Route Frequency Provider Last Rate Last Admin    LIDOcaine (PF) 40 mg/mL (4 %) injection 2 mL  2 mL Other 1 time in Clinic/HOD Gregory Martins MD        phenylephrine HCL 1% nasal spray 1 spray  1 spray Each Nostril 1 time in Clinic/HOD Gregory Martins MD        silver nitrate applicators applicator 1 applicator  1 applicator Topical (Top) 1 time in Clinic/HOD Patricia Montes MD           Physical Exam  BP (!) 156/74 (BP Location: Right arm, Patient Position: Sitting, BP Method: Medium (Automatic))   Pulse (!) 58   Temp 97.7 °F (36.5 °C) (Oral)   Resp 20   Ht 5' 2.99" (1.6 m)   Wt 58.1 kg (128 lb)   BMI 22.68 kg/m²   General: NAD, AAO X 3  CV: Regular rate and rhythm without murmurs  Resp: " Clear to ascultation bilaterally  Abdomen: soft, non-tender, non-distended, bowel sounds present; fistula opening in middle left abdomen without erythema or swelling  and with small amount of purulent drainage on bandage, opening is tender to palpation and has a small knot of granulation tissue on superior aspect      ASSESSMENT/PLAN  57-year-old female presents to clinic with smaller than previously noted enterocutaneous fistula. Patient does not desire surgical intervention at this time and prefers silver nitrate treatment. She has a GI appointment scheduled in November.    -applied silver nitrate to fistula opening in clinic; instructed patient to cover with gauze and that she may shower and clean the area with soap and water tomorrow  -patient to return to clinic in 2 weeks for follow-up      Pool Hurley MD  General Surgery [facility]

## 2022-10-12 ENCOUNTER — HOSPITAL ENCOUNTER (OUTPATIENT)
Dept: RADIOLOGY | Facility: HOSPITAL | Age: 57
Discharge: HOME | End: 2022-10-12
Attending: RADIOLOGY
Payer: COMMERCIAL

## 2022-10-12 DIAGNOSIS — C32.9 CARCINOMA LARYNX: ICD-10-CM

## 2022-10-12 LAB — POCT GLUCOSE: 115 MG/DL (ref 70–110)

## 2022-10-12 PROCEDURE — 78815 PET IMAGE W/CT SKULL-THIGH: CPT | Mod: TC

## 2022-10-24 ENCOUNTER — CLINICAL SUPPORT (OUTPATIENT)
Dept: REHABILITATION | Facility: HOSPITAL | Age: 57
End: 2022-10-24
Payer: COMMERCIAL

## 2022-10-24 DIAGNOSIS — Z90.02 H/O LARYNGECTOMY: Primary | ICD-10-CM

## 2022-10-24 PROCEDURE — 92507 TX SP LANG VOICE COMM INDIV: CPT

## 2022-10-24 NOTE — PROGRESS NOTES
[facility]  Speech Therapy Progress Note  Laryngectomy-Lymphedema    Date: 10/24/2022     Name: Heaven Boston   MRN: [id number]    Therapy Diagnosis:   Encounter Diagnosis   Name Primary?    H/O laryngectomy Yes      Physician: Shelby Marcos MD    PATIENT IS A NECK-BREATHER - DO NOT ORALLY INTUBATE    Time In:  1000   Time Out:  1050     Procedure Min.   Speech Language Voice Therapy  50   Total Untimed Units: 50  Charges Billed/# of units: 50/1    Precautions: Standard  Subjective    Chief Complaint: Lymphedema    AFFECT: normal affect    Pain:   0/10  Pain Location / Description: NA  Current Medical History: Heaven Boston is a 57 y.o. female referred by Dr. Marcos for TEP management to maximize alaryngeal communication without respiratory compromise.    Past Medical History:   Past Medical History:   Diagnosis Date    Cancer     Laryngeal    Diabetes mellitus     Hypertension     Heaven Boston  has a past surgical history that includes pr removal of larynx (2022) and  section.  Medical Hx and Allergies: Heaven has a current medication list which includes the following prescription(s): amlodipine, aspirin, atorvastatin, azelastine, carbamazepine, cetirizine, duloxetine, fluticasone propionate, fluticasone-salmeterol 100-50 mcg/dose, hydrocodone-acetaminophen, ibuprofen, loratadine, metformin, metoprolol succinate, montelukast, omeprazole, prednisolone acetate, and rivaroxaban, and the following Facility-Administered Medications: lidocaine (pf), phenylephrine hcl 1%, and silver nitrate applicators. Review of patient's allergies indicates:  No Known Allergies    Current Voice Function: currently using alaryngeal communication via voice prosthesis   Current Level of Swallow Function/Complaints:  Pt does not report dysphagia  Prior Therapy:  10/10/2022    OBJECTIVE   TEP Current Status:17FR, 12.5mm Aquino placed on 2022    TEP Patient Complaints: Intermittent strained  voicing with periods of good voicing    TEP Accessories: 10/55 fenestrated leonie tube with push to talk HMEs.     Stoma Size:  adequate     Lymphedema:  yes      Manual therapy: MLD/CDP continues for head and neck. Measurement taken of face and neck and is as follows.     Facial Lymphedema Measurement Form  PRE-TREATMENT  Neck Composite   Location Right Left   Superior Circumference (A):   (just below mandible)  8 7.5   Middle Circumference (B):  (midway between top & bottom 6 6   Inferior Circumference (C):  (lowest circumferential location)  6 5.75   TOTAL: 20 19.25       Facial Composite   Location Right Left   1.Tragus to mental protuberance  5.5 5.5   2.Tragus to mouth angle  4.5 4.25   3.Mandibular angle to nasal wing  3.75 3.75   4.Mandibular angle to internal eye corner  4 4.5   5.Mandibular angle to external eye corner  3 3.25   6.Mental Protuberance to internal eye corner to external eye corner  3.75 3.75   7.Mandibular angle to mental protuberance 4 4   TOTALS: 28.5 29     POST-TREATMENT  Neck Composite   Location Right Left   Superior Circumference (A):   (just below mandible)  8 7.5   Middle Circumference (B):   (midway between top & bottom 6 6   Inferior Circumference (C):   (lowest circumferential location)  6 5.75   TOTAL: 20 19.25       Facial Composite   Location Right Left   1.Tragus to mental protuberance  5.25 5   2.Tragus to mouth angle  4.25 4.25   3.Mandibular angle to nasal wing  3.75 3.75   4.Mandibular angle to internal eye corner  4 4   5.Mandibular angle to external eye corner  3 3   6.Mental Protuberance to internal eye corner to external eye corner  3.75 3.75   7.Mandibular angle to mental protuberance 4 4   TOTALS: 28 27.75   Greater than a 2% difference from pre-to post-treatment? yes     Treatment   Heaven Boston noted to tolerated MLD/CDP with good results this date. Decreased Neck composite total of 0 inches on right and 0 inches on left and Face composite total of .5 of an inch on  right and 1.25 inches on left. SLP to follow up on 10/26/2022.  Good de-congestion and compliance with all training.    Education: Plan of Care, role of SLP in care, and scheduling/ cancellation policy were discussed with pt. Patient expressed understanding.     Assessment       LongTerm Goals:  Current Progress:   1. Patient will demonstrate understanding and implementation of long-term home management interventions as noted by improvement in lymphatic function, conduction of oral care, completion of daily exercises/stretches, and use of home management tools  Progressing towards goal       Short Term Goals:  Current Progress:   1. Patient will participate in manual interventions to target improvement in alaryngeal communication function related to reduced lymphatic function Progressing towards goal   2.  Patient will completed diaphragmatic breathing exercises at an independent level.  Progressing towards goal   3.  Patient will improve alaryngeal communication to maintain voicing >90% of the time.  Progressing towards goal     Ms. 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  She is currently utilizing esophageal speech via a voice prosthesis for alaryngeal communication for functional communication with her family, friends, medical providers, and others to perform her daily life activities.  She requires the use of a laryngectomy tube at all times to keep the airway patent and prevent stomal stenosis. HMEs are required daily for mucus management and pulmonary optimization .     SLP to additionally provided MLD/CDP for head and neck lymphedema to improve functional communication.    Plan     SLP intervention is warranted 1-2 times a week or PRN for communication needs for 1-6 weeks due to the chronic nature of the impairment.     Additional Information   Ms. Boston had minimal changes with manual lymphedema treatment today. SLP will continue to address POC. Pt reported that a pneumatic  compression rep came to her house and that she was satisfied with the results. SLP working on getting a signature from ENT clinic for a home pneumatic compression device.    Sunitha Jean MS, CCC-SLP   10/24/2022

## 2022-10-25 ENCOUNTER — OFFICE VISIT (OUTPATIENT)
Dept: SURGERY | Facility: CLINIC | Age: 57
End: 2022-10-25
Payer: COMMERCIAL

## 2022-10-25 VITALS
SYSTOLIC BLOOD PRESSURE: 145 MMHG | BODY MASS INDEX: 23.57 KG/M2 | RESPIRATION RATE: 20 BRPM | HEART RATE: 80 BPM | TEMPERATURE: 98 F | DIASTOLIC BLOOD PRESSURE: 77 MMHG | WEIGHT: 133 LBS | HEIGHT: 63 IN | OXYGEN SATURATION: 99 %

## 2022-10-25 DIAGNOSIS — K63.2 ENTEROCUTANEOUS FISTULA: Primary | ICD-10-CM

## 2022-10-25 PROCEDURE — 99215 OFFICE O/P EST HI 40 MIN: CPT | Mod: PBBFAC

## 2022-10-25 RX ORDER — SILVER NITRATE 38.21; 12.74 MG/1; MG/1
1 STICK TOPICAL
Status: COMPLETED | OUTPATIENT
Start: 2022-10-25 | End: 2022-10-25

## 2022-10-25 RX ADMIN — SILVER NITRATE 1 APPLICATOR: 38.21; 12.74 STICK TOPICAL at 09:10

## 2022-10-25 NOTE — PROGRESS NOTES
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 She states that the fistula does not drain. The area is non tender with no overlying skin changes. She endorses mild pruritis. Denies N/V/F/C, abdominal pain, and diarrhea. Pt is following up with GI in November for possible endoscopic fistula clipping. No additional complaints.     PMH:   Past Medical History:   Diagnosis Date    Cancer     Laryngeal    Diabetes mellitus     Hypertension         PSH:   Past Surgical History:   Procedure Laterality Date     SECTION      ND REMOVAL OF LARYNX  2022     Fam Hx: No family history on file.    Social Hx:   Social History     Tobacco Use    Smoking status: Former    Smokeless tobacco: Former   Substance Use Topics    Alcohol use: Never    Drug use: Never         Allergies: Review of patient's allergies indicates:  No Known Allergies     ROS: Negative except above     Current Outpatient Medications on File Prior to Visit   Medication Sig Dispense Refill    amLODIPine (NORVASC) 5 MG tablet Take 1 tablet (5 mg total) by mouth once daily. 90 tablet 3    aspirin (ECOTRIN) 81 MG EC tablet Take 1 tablet (81 mg total) by mouth once daily.  3    atorvastatin (LIPITOR) 80 MG tablet Take 1 tablet (80 mg total) by mouth every evening. 90 tablet 3    azelastine (ASTELIN) 137 mcg (0.1 %) nasal spray 1 spray by Nasal route.      carBAMazepine (CARBATROL) 200 MG CM12 Take 200 mg by mouth.      cetirizine (ZYRTEC) 10 MG tablet Take 10 mg by mouth once daily.      DULoxetine (CYMBALTA) 30 MG capsule Take 30 mg by mouth 2 (two) times daily.      fluticasone propionate (FLONASE) 50 mcg/actuation nasal spray 1 spray by Each Nostril route 2 (two) times daily.      fluticasone-salmeterol diskus inhaler 100-50 mcg Inhale 1 puff into the lungs every 12 (twelve) hours.       HYDROcodone-acetaminophen 5-300 mg Tab Take 1 tablet by mouth every 8 (eight) hours as needed.      ibuprofen (ADVIL,MOTRIN) 800 MG tablet Take 800 mg by mouth every 6 (six) hours as needed.      loratadine (CLARITIN) 10 mg tablet Take 1 tablet (10 mg total) by mouth once daily. (Patient not taking: Reported on 10/11/2022) 90 tablet 0    metFORMIN (GLUCOPHAGE) 500 MG tablet Take 1 tablet (500 mg total) by mouth once daily. 90 tablet 3    metoprolol succinate (TOPROL-XL) 25 MG 24 hr tablet Take 0.5 tablets (12.5 mg total) by mouth once daily. 45 tablet 3    montelukast (SINGULAIR) 5 MG chewable tablet Take 1 tablet (5 mg total) by mouth every evening. 90 tablet 3    omeprazole (PRILOSEC) 40 MG capsule Take 40 mg by mouth once daily.      prednisoLONE acetate (PRED FORTE) 1 % DrpS Place into both eyes.      rivaroxaban (XARELTO) 10 mg Tab Take 2 tablets (20 mg total) by mouth daily with dinner or evening meal. 90 tablet 0     Current Facility-Administered Medications on File Prior to Visit   Medication Dose Route Frequency Provider Last Rate Last Admin    LIDOcaine (PF) 40 mg/mL (4 %) injection 2 mL  2 mL Other 1 time in Clinic/HOD Gregory Martins MD        phenylephrine HCL 1% nasal spray 1 spray  1 spray Each Nostril 1 time in Clinic/HOD Gregory Martins MD        silver nitrate applicators applicator 1 applicator  1 applicator Topical (Top) 1 time in Clinic/HOD Patricia Montes MD           Physical Exam  There were no vitals taken for this visit.  General: NAD, AAO X 3  CV: Regular rate and rhythm without murmurs  Resp: Clear to ascultation bilaterally  Abdomen: soft, non-tender, non-distended, bowel sounds present, fistula opening on lower abdomen. No erythema or swelling. Granulation tissue present at opening.     ASSESSMENT/PLAN  Heaven Boston is a 57F with pmh T2DM, HTN and SCC of the larynx who presents to clinic for evaluation of an enterocutaneous fistula.     - Follow up with GI on Nov 29  - Applied  Silver Nitrate to fistula opening in clinic. Pt covering wound with gauze routinely. Instructed to keep area clean with soap and water.   - No surgical intervention at this time  - RTC in December after GI appointment    Becca Patel, MS3    Haritha Robbins MD  [employer] General Surgery PGY-1

## 2022-10-25 NOTE — PROGRESS NOTES
Seen by Dr. Robbins.  RTC 12/6/22 after GI appt.  Discharge instructions verbal and written given.

## 2022-10-26 ENCOUNTER — CLINICAL SUPPORT (OUTPATIENT)
Dept: REHABILITATION | Facility: HOSPITAL | Age: 57
End: 2022-10-26
Payer: COMMERCIAL

## 2022-10-26 DIAGNOSIS — Z90.02 H/O LARYNGECTOMY: Primary | ICD-10-CM

## 2022-10-26 PROCEDURE — 92507 TX SP LANG VOICE COMM INDIV: CPT

## 2022-10-26 NOTE — PROGRESS NOTES
[facility]  Speech Therapy Progress Note  Laryngectomy-Lymphedema    Date: 10/26/2022     Name: Heaven Boston   MRN: [id number]    Therapy Diagnosis:   No diagnosis found.     Physician: Shelby Marcos MD    PATIENT IS A NECK-BREATHER - DO NOT ORALLY INTUBATE    Time In:  0900  Time Out:  1000    Procedure Min.   Speech Language Voice Therapy  60   Total Untimed Units: 60  Charges Billed/# of units: 0/1    Precautions: Standard  Subjective    Chief Complaint:Increased facial lymphedema noted on L side noted this date versus noted last session. Pt seen in conjunction with representatives from [employer], who completed demonstration of pneumatic compression device.     AFFECT: normal affect    Pain:   0/10  Pain Location / Description: NA  Current Medical History: Heaven Boston is a 57 y.o. female referred by Dr. Marcos for TEP management to maximize alaryngeal communication without respiratory compromise.    Past Medical History:   Past Medical History:   Diagnosis Date    Cancer     Laryngeal    Diabetes mellitus     Hypertension     Heaven Boston  has a past surgical history that includes pr removal of larynx (2022) and  section.  Medical Hx and Allergies: Heaven has a current medication list which includes the following prescription(s): amlodipine, aspirin, atorvastatin, azelastine, carbamazepine, cetirizine, duloxetine, fluticasone propionate, fluticasone-salmeterol 100-50 mcg/dose, hydrocodone-acetaminophen, ibuprofen, loratadine, metformin, metoprolol succinate, montelukast, omeprazole, prednisolone acetate, and rivaroxaban, and the following Facility-Administered Medications: lidocaine (pf), phenylephrine hcl 1%, and silver nitrate applicators. Review of patient's allergies indicates:  No Known Allergies    Current Voice Function: currently using alaryngeal communication via voice prosthesis   Current Level of Swallow Function/Complaints:  Pt  does not report dysphagia  Prior Therapy:  10/24/2022    OBJECTIVE   TEP Current Status:17FR, 12.5mm Aquino placed on 04/12/2022    TEP Patient Complaints: Intermittent strained voicing with periods of good voicing    TEP Accessories: 10/55 fenestrated leonie tube with push to talk HMEs.     Stoma Size:  adequate     Lymphedema:  yes      Manual therapy: MLD not completed this date a [employer] rep placed pneumatic compression devices. Measurement taken of face and neck and is as follows.     Facial Lymphedema Measurement Form  PRE-TREATMENT  Neck Composite   Location Right Left   Superior Circumference (A):   (just below mandible)  7.5 8   Middle Circumference (B):  (midway between top & bottom 6 6   Inferior Circumference (C):  (lowest circumferential location)  6 6   TOTAL: 19.5 20       Facial Composite   Location Right Left   1.Tragus to mental protuberance  5 5.25   2.Tragus to mouth angle  4 4   3.Mandibular angle to nasal wing  3.75 4   4.Mandibular angle to internal eye corner  4 4   5.Mandibular angle to external eye corner  3 3.5   6.Mental Protuberance to internal eye corner to external eye corner  3.5 4   7.Mandibular angle to mental protuberance 4 4.5   TOTALS: 27.25 29.25     POST-TREATMENT  Neck Composite   Location Right Left   Superior Circumference (A):   (just below mandible)  7.5 7.5   Middle Circumference (B):   (midway between top & bottom 5.5 5.75   Inferior Circumference (C):   (lowest circumferential location)  5 5.75   TOTAL: 18 19       Facial Composite   Location Right Left   1.Tragus to mental protuberance  5 5   2.Tragus to mouth angle  4 4   3.Mandibular angle to nasal wing  3.25 3.75   4.Mandibular angle to internal eye corner  4 4.25   5.Mandibular angle to external eye corner  2.75 3   6.Mental Protuberance to internal eye corner to external eye corner  3.5 3.5   7.Mandibular angle to mental protuberance 4 4.25   TOTALS: 26.5 27.75   Greater than a 2% difference from pre-to  post-treatment? yes     Treatment   Heaven Boston noted to tolerated pneumatic compression with good results this date. Decreased Neck composite total of 1.5 inches on right and 1.0 inches on left and Face composite total of .75 of an inch on right and 1.5 inches on left. SLP to follow up on 10/27/2022.  SLP ended session with neck stretches, ROM and diaphragmatic breathing. Good de-congestion and compliance with all training.    Education: Plan of Care, role of SLP in care, and scheduling/ cancellation policy were discussed with pt. Patient expressed understanding.     Assessment       LongTerm Goals:  Current Progress:   1. Patient will demonstrate understanding and implementation of long-term home management interventions as noted by improvement in lymphatic function, conduction of oral care, completion of daily exercises/stretches, and use of home management tools  Progressing towards goal       Short Term Goals:  Current Progress:   1. Patient will participate in manual interventions to target improvement in alaryngeal communication function related to reduced lymphatic function Progressing towards goal   2.  Patient will completed diaphragmatic breathing exercises at an independent level.  Progressing towards goal   3.  Patient will improve alaryngeal communication to maintain voicing >90% of the time.  Progressing towards goal     Ms. 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  She is currently utilizing esophageal speech via a voice prosthesis for alaryngeal communication for functional communication with her family, friends, medical providers, and others to perform her daily life activities.  She requires the use of a laryngectomy tube at all times to keep the airway patent and prevent stomal stenosis. HMEs are required daily for mucus management and pulmonary optimization .     SLP to additionally provided MLD/CDP for head and neck lymphedema to improve functional communication.    Plan      SLP intervention is warranted 1-2 times a week or PRN for communication needs for 1-6 weeks due to the chronic nature of the impairment.     Additional Information   Ms. Boston had improved changes with head and neck lymphedema intervention due to pneumatic compression device. SLP will continue to address POC as stated. [employer] representatives working on getting insurance approval for home pneumatic device.       Sunitha Jean MS, CCC-SLP   10/26/2022

## 2022-10-27 ENCOUNTER — CLINICAL SUPPORT (OUTPATIENT)
Dept: REHABILITATION | Facility: HOSPITAL | Age: 57
End: 2022-10-27
Payer: COMMERCIAL

## 2022-10-27 ENCOUNTER — OFFICE VISIT (OUTPATIENT)
Dept: OTOLARYNGOLOGY | Facility: CLINIC | Age: 57
End: 2022-10-27
Payer: COMMERCIAL

## 2022-10-27 VITALS
BODY MASS INDEX: 23.21 KG/M2 | HEART RATE: 71 BPM | SYSTOLIC BLOOD PRESSURE: 147 MMHG | WEIGHT: 131 LBS | DIASTOLIC BLOOD PRESSURE: 75 MMHG | TEMPERATURE: 98 F

## 2022-10-27 DIAGNOSIS — C32.9 LARYNGEAL CANCER: Primary | ICD-10-CM

## 2022-10-27 DIAGNOSIS — Z90.02 H/O LARYNGECTOMY: Primary | ICD-10-CM

## 2022-10-27 PROCEDURE — 99214 OFFICE O/P EST MOD 30 MIN: CPT | Mod: PBBFAC,25 | Performed by: STUDENT IN AN ORGANIZED HEALTH CARE EDUCATION/TRAINING PROGRAM

## 2022-10-27 PROCEDURE — 92507 TX SP LANG VOICE COMM INDIV: CPT

## 2022-10-27 PROCEDURE — 31575 DIAGNOSTIC LARYNGOSCOPY: CPT | Mod: PBBFAC | Performed by: STUDENT IN AN ORGANIZED HEALTH CARE EDUCATION/TRAINING PROGRAM

## 2022-10-27 NOTE — PROGRESS NOTES
[facility]  Otolaryngology Clinic Note    Haeven Boston  Encounter Date: 10/27/2022  YOB: 1965    Chief Complaint: s/p total laryngectomy     HPI: Heaven Boston is a 57 y.o. 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 Patient had an uneventful hospitalization course and was discharged on postoperative day 9 after an esophagram showed no pharyngeal leak.  However she re-presented to our facility on 4/6/22 with surgical site infection and a pharyngocutaneous fistula. Patient underwent a neck washout with primary repair on 4/11/2022, with placement of a gastrostomy tube the same day. She was maintained on NPO and transferred to [facility] for further evaluation and management on 4/15/22 after evidence of persistent pharyngocutaneous fistula. There she underwent a 2nd neck washout with primary closure and placement of salivary bypass tube.      5/2/22: Patient had an uneventful hospitalization course and removal of salivary bypass tube before discharge last week and now presenting for postoperative evaluation.  Patient presents today without any complaints.  She is wondering about when he pointed that her x-rays for swallowing then.  She seen Dr. Laguna today after our visit to undergo-stimulation for radiation.     5/9/22: Pt. Did not answer. Let voicemail to advance diet to CLD. Also discussed this with son.  Plan to see in clinic in 2 weeks     5/19/22: In radiation therapy, 5x weekly until 6/16/22. Has TEP in place, brought another TEP requesting swap out. Will go to speech therapy for exchange of TEP. Went to ER for bleeding from G-tube site 1 week ago but declines visit to Gen Surg clinic today. On CLD taking Ensure by G-tube, water and soups by mouth.  Had questions about advancing diet. No weight loss or appetite change. No other issues.      6/21/22:  Returns today for follow up.  Completed radiation last week 6/16/22.  Is having difficulty voicing with TEP though it has been swapped out by SLP.  Able to eat what she wants.  Having some tightness of her throat and soreness, otherwise no issues.     7/21/22: Here today for follow up. Has overall been doing very well. She is tolerating her diet by mouth and gaining weight. No dysphagia. Has not used her PEG tube since prior to radiation. She is interested in having it removed. Has some fullness in the left neck which intermittently swells and then resolves again. Otherwise no new lumps/bumps of the head and neck.     8/23/22: Ms. Boston returns today for follow up. She complains of neck pain exacerbated by flexion and movement to the right that began 2 weeks ago. She also reports neck swelling and tightness which occasionally increases in size and is tender to palpation. She reports cough with clear mucus production as well as nasal congestion without drainage. She also notes dry, itchy eyes that have not improved with Visine. She also notes dysuria. She is tolerating her diet by mouth and gaining weight. She had her PEG tube removed on 8/16/22. She has been to SLP twice and is still having difficulty voicing with TEP.    9/27/22: Here for follow up. Reports she is doing okay. Still coughing a lot and not voicing well with TEP. No weight loss, tolerating diet. No otalgia/lumps bumps. Still having intermittent neck swelling/pain.     10/27/22:  Returns for surveillance.  Did not get TSH, T4.  Has been going to SLP, now can talk well.  Cough has improved.  No new H&N complaints    ROS:   General: Negative except per HPI  Skin: Denies rash, ulcer, or lesion.  Eyes: Denies vision changes or diplopia.  Ears: Negative except per HPI  Nose: Negative except per HPI  Throat/mouth: Negative except per HPI  Cardiovascular:  Negative except per HPI  Respiratory: Negative except per HPI  Neck: Negative except per HPI  Endocrine: Negative except per HPI  Neurologic: Negative except per HPI    Other 10-point review of systems negative except per HPI    Review of patient's allergies indicates:  No Known Allergies    Past Medical History:   Diagnosis Date    Cancer     Laryngeal    Diabetes mellitus     Hypertension        Past Surgical History:   Procedure Laterality Date     SECTION      SC REMOVAL OF LARYNX  2022       Social History     Socioeconomic History    Marital status: Single   Tobacco Use    Smoking status: Former    Smokeless tobacco: Former   Substance and Sexual Activity    Alcohol use: Never    Drug use: Never    Sexual activity: Not Currently       No family history on file.    Outpatient Encounter Medications as of 10/27/2022   Medication Sig Dispense Refill    amLODIPine (NORVASC) 5 MG tablet Take 1 tablet (5 mg total) by mouth once daily. 90 tablet 3    aspirin (ECOTRIN) 81 MG EC tablet Take 1 tablet (81 mg total) by mouth once daily.  3    atorvastatin (LIPITOR) 80 MG tablet Take 1 tablet (80 mg total) by mouth every evening. 90 tablet 3    azelastine (ASTELIN) 137 mcg (0.1 %) nasal spray 1 spray by Nasal route.      carBAMazepine (CARBATROL) 200 MG CM12 Take 200 mg by mouth.      cetirizine (ZYRTEC) 10 MG tablet Take 10 mg by mouth once daily.      DULoxetine (CYMBALTA) 30 MG capsule Take 30 mg by mouth 2 (two) times daily.      fluticasone propionate (FLONASE) 50 mcg/actuation nasal spray 1 spray by Each Nostril route 2 (two) times daily.      fluticasone-salmeterol diskus inhaler 100-50 mcg Inhale 1 puff into the lungs every 12 (twelve) hours.      HYDROcodone-acetaminophen 5-300 mg Tab Take 1 tablet by mouth every 8 (eight) hours as needed.      ibuprofen (ADVIL,MOTRIN) 800 MG tablet Take 800 mg by mouth every 6 (six) hours as needed.      loratadine (CLARITIN) 10 mg tablet Take 1 tablet (10 mg total)  by mouth once daily. (Patient not taking: Reported on 10/25/2022) 90 tablet 0    metFORMIN (GLUCOPHAGE) 500 MG tablet Take 1 tablet (500 mg total) by mouth once daily. 90 tablet 3    metoprolol succinate (TOPROL-XL) 25 MG 24 hr tablet Take 0.5 tablets (12.5 mg total) by mouth once daily. 45 tablet 3    montelukast (SINGULAIR) 5 MG chewable tablet Take 1 tablet (5 mg total) by mouth every evening. 90 tablet 3    omeprazole (PRILOSEC) 40 MG capsule Take 40 mg by mouth once daily.      prednisoLONE acetate (PRED FORTE) 1 % DrpS Place into both eyes.      rivaroxaban (XARELTO) 10 mg Tab Take 2 tablets (20 mg total) by mouth daily with dinner or evening meal. 90 tablet 0     Facility-Administered Encounter Medications as of 10/27/2022   Medication Dose Route Frequency Provider Last Rate Last Admin    LIDOcaine (PF) 40 mg/mL (4 %) injection 2 mL  2 mL Other 1 time in Clinic/HOD Gregory Martins MD        phenylephrine HCL 1% nasal spray 1 spray  1 spray Each Nostril 1 time in Clinic/HOD Gregory Martins MD        silver nitrate applicators applicator 1 applicator  1 applicator Topical (Top) 1 time in Clinic/HOD Patricia Montes MD         Physical Exam:  Vitals:    10/27/22 1158 10/27/22 1204   BP: (!) 156/64 (!) 147/75   BP Method:  Medium (Manual)   Pulse: 71    Temp: 98.1 °F (36.7 °C)    Weight: 59.4 kg (131 lb)      Physical Exam:  General/ Voice: NAD, AAO, no increased WOB, no stridor, clear secretions; aphonic   Head/Face: normal  Eyes: EOMI, PERRLA. Crusting noted at lower lids bilaterally without erythema or drainage. Conjunctiva normal  Ears: Hearing well at normal conversation volume  Nose: nares normal, septum midline, MMM. Erythema and dryness noted at opening of nares.  Oral Cavity: MMM, no lesions or ulcerations, no leukoplakia, no edema; BOT and FOM are soft/NT and without lesions/ ulcerations/ leukoplakia  Oropharynx: No uvular deviation or deviation of the oropharyngeal wall noted  Neck:   Well-healed stoma without any signs of dehiscence. TEP in place without any leakage. Prior radiation burns well healed. Suprastomal neck fullness along the incision  to palpation.   Neuro: CN II - XII exam: grossly intact       Procedures:Flexible Fiberoptic Laryngoscopy/Nasopharyngoscopy via right nare    Procedure in Detail: Informed consent was obtained from the patient after explanation of procedure, indications, risks and benefits. Flexible endoscopy was performed through the nasal passages. The nasal cavity, nasopharynx, and neopharynx were adequately visualized. Tracheoscopy performed via laryngectomy stoma.     Anesthesia: None  Adverse Events: None  Resident Surgeon: Lili Thornton  Blood loss: none  Condition: good    Findings:  NP/OP: no masses/lesions of NC, eustachian tube, fossa of Rosenmuller, no adenoid hypertrophy  BOT/vallecula: no lingual hypertrophy, no masses/lesions, no secretions obscuring visualization  Neopharynx:  Unable to visualize TEP, no  pharyngeal edema, no evidence of disease  Tracheoscopy: Laryngoscope advanced to aleja.  No evidence of disease, pink mucosa, thin clear secretions    IMAGING:  CT scan 9/19/22  FINDINGS:  There are postoperative changes of prior total laryngectomy.  There are also treatment related changes with diffuse mucosal edema and subcutaneous soft tissue edema.  There is no definite suspicious enhancement.  There are no abnormally enlarged cervical lymph nodes.  There is no drainable fluid collection.     There are treatment related changes of the parotid and submandibular glands.  Residual thyroid gland is unremarkable.  The major arteries in the neck are patent.  There are no acute findings in the orbits or visualized brain parenchyma.  There is no destructive bone lesion.  The lung apices are clear.     Impression:     Post treatment changes of the neck without evidence to suggest progressive disease.    NM PET CT ROUTINE     CLINICAL  HISTORY  c32.9; Malignant neoplasm of larynx, unspecified (surveillance/restaging)     TECHNIQUE  Intravenous injection of 9.4 mCi 18F-FDG was performed, with subsequent PET imaging from skull base through the upper thighs.  Corresponding non-contrast helical-acquisition CT images were obtained for anatomic correlation and attenuation correction.  Fused-modality multiplanar, PET rotational planar, and PET coronal MIP reconstructions were accomplished by a technologist at a separate workstation and submitted to PACS for physician review.     COMPARISON  *Outside facility PET-CT dated 8 March 2022.  *Contrast enhanced CT of the neck dated 19 September 2022.     FINDINGS  Exam quality: adequate for evaluation     Head / Neck: There is symmetric radiotracer activity through the visualized brain. The previously visualized hypermetabolic left supraglottic neck mass is no longer clearly identified, with interval changes of laryngectomy and placement of tracheostomy tube.  There is extensive edematous appearing soft tissue prominence surrounding the surgical bed, with diffuse low-grade FDG activity likely physiologic/reactive.  Localized hypermetabolic activity is appreciated at the upper esophagus near the site of tracheostomy insertion (fused axial series, image 53), with regional SUV max of 4.4; no definite expansile CT-correlate lesion is visualized.  Another focal area of locally increased metabolic activity is appreciated at the right lower neck with SUV max of 5.3 (fused axial series, image 48); no discrete CT-correlate expansile lesion is identified within limitations of non-contrast protocol and regional postoperative changes, but this region appears to correlate to the residual right thyroid lobe identified on the above referenced neck CT.  There is a mildly FDG-avid left supraclavicular region with SUV max of 3.8 (fused axial series, image 51); this of limited assessment secondary to exam protocol and poor  delineation from adjacent tissues but favored artifactual secondary to muscular activity given overall appearance and corresponding anatomy on the recent neck CT.  Otherwise, no definite newly enlarged or hypermetabolic adenopathy is identified through the neck.     Chest: No new or enlarging hypermetabolic lung lesion, and no development of organized airspace consolidation or pleural effusion. Mediastinal vasculature is without significant change.  The heart chambers are of normal-appearing volume. There is no significant pericardial fluid. No development of FDG-avid adenopathy or necrotic ravin changes.     Abdomen / Pelvis: Normal physiologic distribution of activity is appreciated through the abdomen and pelvis.  No findings to suggest new or worsened focal FDG-avid process or CT-evident lesion involving the upper abdominal solid organs, and no acute findings appreciated.  The urinary bladder and reproductive structures are unremarkable for PET-CT evaluation. Abdominopelvic vascular structures are unchanged.  No evidence of new/worsening hypermetabolic ravin disease.     Musculoskeletal / Subcutaneous: No development of suspicious FDG uptake, destructive lesion, or acute process.     IMPRESSION  1. Postoperative changes of the neck, overall similar CT appearance to the recent contrast enhanced evaluation obtained September 2022.  2. No definite sites of mass-like hypermetabolic activity to suggest residual disease or distant metastasis.  3. Suspected physiologic/reactive diffuse low-grade FDG activity through the surgical bed and through the left neck musculature; recommend close inspection on follow-up imaging in order to ensure stability/improvement.  4. Diffuse metabolic uptake through the right thyroid lobe, otherwise limited assessment.     RADIATION DOSE  Automated tube current modulation, weight-based exposure dosing, and/or iterative reconstruction technique utilized to reach lowest reasonably achievable  exposure rate.     DLP: 461 mGy*cm        Electronically signed by: Jesus Joy  Date:                                            10/12/2022    Assessment/Plan:  Heaven Boston is a 57 y.o. female with T3N1M0 supraglottic squamous cell carcinoma now status post total laryngectomy with bilateral necks in May 2022. Postoperative course complicated by pharyngocutaneous fistula for which patient required repeated neck washout and primary closure x 2. She completed radiation therapy in June 2022.      - Voicing improved, continue SLP   - Needs post treatment PET at 3 months.   - Continue SLP for lymphedema therapy   - TSH, T4  - RTC in 1 month for continued surveillance.    HEAD & NECK CANCER SURVEILLANCE   Primary Surgical Treatment     Head & Neck Staff: Dr. Celina Arboleda  Radiation Oncologist: Dr. Laguna    Primary tumor: T3N1M0  Squamous cell carcinoma     Date of Diagnosis: 2/18/2022  Surgery Date: 3/21/2022  Induction Chemotherapy:  No  Adjuvant Therapy:  Radiation  Completion Date: 6/16/2022    Pertinent Treatment History:  Initial post operative course complicated by pharyngocutaneous fistula.   Transferred to [facility] for management - wash out, oversewn, and treated by salivary bypass tube    Place date if completed   3 6 12 18 24 36 48 60   CT Neck X X X X X X X X   PET/CT 10/12/22          CXR  X X X X X X X   TFT 10/27/22  X  X X X X     Current Surveillance Interval:  1 month       Gregory Martins MD  [facility] Otolaryngology PGY IV

## 2022-10-27 NOTE — PROGRESS NOTES
[facility]  Speech Therapy Progress Note  Laryngectomy-Lymphedema    Date: 10/27/2022     Name: Heaven Boston   MRN: [id number]    Therapy Diagnosis:   Encounter Diagnosis   Name Primary?    H/O laryngectomy Yes        Physician: Shelby Marcos MD    PATIENT IS A NECK-BREATHER - DO NOT ORALLY INTUBATE    Time In:  1000  Time Out:  1040    Procedure Min.   Speech Language Voice Therapy  40   Total Untimed Units: 40  Charges Billed/# of units: 40/1    Precautions: Standard  Subjective    Chief Complaint: Patient noted to have good symmetry this date. Reduced lymphedema noted overall as compared to yesterday.    AFFECT: normal affect    Pain:   0/10  Pain Location / Description: NA  Current Medical History: Heaven Boston is a 57 y.o. female referred by Dr. Marcos for TEP management to maximize alaryngeal communication without respiratory compromise.    Past Medical History:   Past Medical History:   Diagnosis Date    Cancer     Laryngeal    Diabetes mellitus     Hypertension     Heaven Boston  has a past surgical history that includes pr removal of larynx (2022) and  section.  Medical Hx and Allergies: Heaven has a current medication list which includes the following prescription(s): amlodipine, aspirin, atorvastatin, azelastine, carbamazepine, cetirizine, duloxetine, fluticasone propionate, fluticasone-salmeterol 100-50 mcg/dose, hydrocodone-acetaminophen, ibuprofen, loratadine, metformin, metoprolol succinate, montelukast, omeprazole, prednisolone acetate, and rivaroxaban, and the following Facility-Administered Medications: lidocaine (pf), phenylephrine hcl 1%, and silver nitrate applicators. Review of patient's allergies indicates:  No Known Allergies    Current Voice Function: currently using alaryngeal communication via voice prosthesis   Current Level of Swallow Function/Complaints:  Pt does not report dysphagia  Prior Therapy:  10/26/2022    OBJECTIVE   TEP Current  Status:17FR, 12.5mm Aquino placed on 04/12/2022    TEP Patient Complaints: Intermittent strained voicing with periods of good voicing    TEP Accessories: 10/55 fenestrated leonie tube with push to talk HMEs.     Stoma Size:  adequate     Lymphedema:  yes      Manual therapy: MLD not completed this date a [employer] rep placed pneumatic compression devices. Measurement taken of face and neck and is as follows.     Facial Lymphedema Measurement Form  PRE-TREATMENT  Neck Composite   Location Right Left   Superior Circumference (A):   (just below mandible)  8 8   Middle Circumference (B):  (midway between top & bottom 6 6   Inferior Circumference (C):  (lowest circumferential location)  6 6   TOTAL: 20 20       Facial Composite   Location Right Left   1.Tragus to mental protuberance  5 5   2.Tragus to mouth angle  4 4.25   3.Mandibular angle to nasal wing  3.5 3.5   4.Mandibular angle to internal eye corner  4 4   5.Mandibular angle to external eye corner  3 3   6.Mental Protuberance to internal eye corner to external eye corner  3.5 3.5   7.Mandibular angle to mental protuberance 4 4   TOTALS: 27 27.25     POST-TREATMENT  Neck Composite   Location Right Left   Superior Circumference (A):   (just below mandible)  8 7.5   Middle Circumference (B):   (midway between top & bottom 6 6   Inferior Circumference (C):   (lowest circumferential location)  5.75 6   TOTAL: 19.75 19.5       Facial Composite   Location Right Left   1.Tragus to mental protuberance  5 5   2.Tragus to mouth angle  4 4   3.Mandibular angle to nasal wing  3.5 3.5   4.Mandibular angle to internal eye corner  4 4   5.Mandibular angle to external eye corner  3 3   6.Mental Protuberance to internal eye corner to external eye corner  3.5 3.5   7.Mandibular angle to mental protuberance 4 4   TOTALS: 27 27   Greater than a 2% difference from pre-to post-treatment? no     Treatment   Heaven Borel noted to tolerated pneumatic compression with good results this  date. Decreased Neck composite total of .25 inches on right and 0 inches on left and Face composite total of .5 of an inch on right and .25 inches on left. SLP ended session with neck stretches, ROM and diaphragmatic breathing. Good de-congestion and compliance with all training. SLP to follow up on 10/31/2022.    Education: Plan of Care, role of SLP in care, and scheduling/ cancellation policy were discussed with pt. Patient expressed understanding.     Assessment       LongTerm Goals:  Current Progress:   1. Patient will demonstrate understanding and implementation of long-term home management interventions as noted by improvement in lymphatic function, conduction of oral care, completion of daily exercises/stretches, and use of home management tools  Progressing towards goal       Short Term Goals:  Current Progress:   1. Patient will participate in manual interventions to target improvement in alaryngeal communication function related to reduced lymphatic function Progressing towards goal   2.  Patient will completed diaphragmatic breathing exercises at an independent level.  Progressing towards goal   3.  Patient will improve alaryngeal communication to maintain voicing >90% of the time.  Progressing towards goal     Ms. 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  She is currently utilizing esophageal speech via a voice prosthesis for alaryngeal communication for functional communication with her family, friends, medical providers, and others to perform her daily life activities.  She requires the use of a laryngectomy tube at all times to keep the airway patent and prevent stomal stenosis. HMEs are required daily for mucus management and pulmonary optimization .     SLP to additionally provided MLD/CDP for head and neck lymphedema to improve functional communication.    Plan     SLP intervention is warranted 1-2 times a week or PRN for communication needs for 1-6 weeks due to the chronic  nature of the impairment.     Additional Information   Ms. Boston had good facial symmetry upon entering session. Good decongestion noted with manual techniques this date. Shortened session due to ENT appointment immediately after session. SLP will continue to address POC as stated. [employer] representatives working on getting insurance approval for home pneumatic device.       Sunitha Jean MS, CCC-SLP   10/27/2022

## 2022-10-31 ENCOUNTER — CLINICAL SUPPORT (OUTPATIENT)
Dept: REHABILITATION | Facility: HOSPITAL | Age: 57
End: 2022-10-31
Payer: COMMERCIAL

## 2022-10-31 DIAGNOSIS — I89.0 LYMPHEDEMA: Primary | ICD-10-CM

## 2022-10-31 PROCEDURE — 92507 TX SP LANG VOICE COMM INDIV: CPT

## 2022-10-31 NOTE — PROGRESS NOTES
[facility]  Speech Therapy Progress Note  Laryngectomy-Lymphedema    Date: 10/31/2022     Name: Heaven Boston   MRN: [id number]    Therapy Diagnosis:   Encounter Diagnosis   Name Primary?    Lymphedema Yes        Physician: Shelby Marcos MD    PATIENT IS A NECK-BREATHER - DO NOT ORALLY INTUBATE    Time In:  905  Time Out:  50    Procedure Min.   Speech Language Voice Therapy  45   Total Untimed Units: 45  Charges Billed/# of units: 45/1    Precautions: Standard  Subjective    Chief Complaint: Patient noted to have good symmetry this date. Reduced lymphedema noted overall as compared to last session.    AFFECT: normal affect    Pain:   0/10  Pain Location / Description: NA  Current Medical History: Heaven Boston is a 57 y.o. female referred by Dr. Marcos for TEP management to maximize alaryngeal communication without respiratory compromise.    Past Medical History:   Past Medical History:   Diagnosis Date    Cancer     Laryngeal    Diabetes mellitus     Hypertension     Heaven Boston  has a past surgical history that includes pr removal of larynx (2022) and  section.  Medical Hx and Allergies: Heaven has a current medication list which includes the following prescription(s): amlodipine, aspirin, atorvastatin, azelastine, carbamazepine, cetirizine, duloxetine, fluticasone propionate, fluticasone-salmeterol 100-50 mcg/dose, hydrocodone-acetaminophen, ibuprofen, loratadine, metformin, metoprolol succinate, montelukast, omeprazole, prednisolone acetate, and rivaroxaban, and the following Facility-Administered Medications: lidocaine (pf), phenylephrine hcl 1%, and silver nitrate applicators. Review of patient's allergies indicates:  No Known Allergies    Current Voice Function: currently using alaryngeal communication via voice prosthesis   Current Level of Swallow Function/Complaints:  Pt does not report dysphagia  Prior Therapy:  10/26/2022    OBJECTIVE   TEP Current  Status:17FR, 12.5mm Aquino placed on 04/12/2022    TEP Patient Complaints: Intermittent strained voicing with periods of good voicing    TEP Accessories: 10/55 fenestrated leonie tube with push to talk HMEs.     Stoma Size:  adequate     Lymphedema:  yes      Manual therapy: MLD not completed this date a [employer] rep placed pneumatic compression devices. Measurement taken of face and neck and is as follows.     Facial Lymphedema Measurement Form  PRE-TREATMENT  Neck Composite   Location Right Left   Superior Circumference (A):   (just below mandible)  8 8   Middle Circumference (B):  (midway between top & bottom 6 6.5   Inferior Circumference (C):  (lowest circumferential location)  6 6   TOTAL: 20 20.5       Facial Composite   Location Right Left   1.Tragus to mental protuberance  5.5 5   2.Tragus to mouth angle  4.5 4   3.Mandibular angle to nasal wing  3.75 3.75   4.Mandibular angle to internal eye corner  4 4   5.Mandibular angle to external eye corner  2.75 3   6.Mental Protuberance to internal eye corner to external eye corner  3.75 4   7.Mandibular angle to mental protuberance 4.5 4.5   TOTALS: 28.75 28.25     POST-TREATMENT  Neck Composite   Location Right Left   Superior Circumference (A):   (just below mandible)  8 8   Middle Circumference (B):   (midway between top & bottom 5.75 6.25   Inferior Circumference (C):   (lowest circumferential location)  5.75 6   TOTAL: 19.5 20.25       Facial Composite   Location Right Left   1.Tragus to mental protuberance  5.5 5   2.Tragus to mouth angle  4.5 4   3.Mandibular angle to nasal wing  3.75 3.75   4.Mandibular angle to internal eye corner  4 4   5.Mandibular angle to external eye corner  2.75 3   6.Mental Protuberance to internal eye corner to external eye corner  3.75 4   7.Mandibular angle to mental protuberance 4.5 4.5   TOTALS: 28.75 28..25   Greater than a 2% difference from pre-to post-treatment? no     Treatment   Heaven Borel noted to tolerated  pneumatic compression with good results this date. Decreased Neck composite total of  .5 inches on right and .25 inches on left and Face composite total of 0 inches on right and 0 inches on left. SLP ended session with neck stretches, ROM and diaphragmatic breathing. Good de-congestion and compliance with all training. SLP to follow up on 10/31/2022.    Education: Plan of Care, role of SLP in care, and scheduling/ cancellation policy were discussed with pt. Patient expressed understanding.     Assessment       LongTerm Goals:  Current Progress:   1. Patient will demonstrate understanding and implementation of long-term home management interventions as noted by improvement in lymphatic function, conduction of oral care, completion of daily exercises/stretches, and use of home management tools  Progressing towards goal       Short Term Goals:  Current Progress:   1. Patient will participate in manual interventions to target improvement in alaryngeal communication function related to reduced lymphatic function Progressing towards goal   2.  Patient will completed diaphragmatic breathing exercises at an independent level.  Progressing towards goal   3.  Patient will improve alaryngeal communication to maintain voicing >90% of the time.  Progressing towards goal     Ms. 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  She is currently utilizing esophageal speech via a voice prosthesis for alaryngeal communication for functional communication with her family, friends, medical providers, and others to perform her daily life activities.  She requires the use of a laryngectomy tube at all times to keep the airway patent and prevent stomal stenosis. HMEs are required daily for mucus management and pulmonary optimization .     SLP to additionally provided MLD/CDP for head and neck lymphedema to improve functional communication.    Plan     SLP intervention is warranted 1-2 times a week or PRN for communication  needs for 1-6 weeks due to the chronic nature of the impairment.     Additional Information   Ms. Boston had good facial symmetry upon entering session. Fair decongestion noted with manual techniques this date. SLP will continue to address POC as stated. [employer] representatives working on getting insurance approval for home pneumatic device.     Sunitha Jean MS, CCC-SLP   10/31/2022

## 2022-11-01 ENCOUNTER — CLINICAL SUPPORT (OUTPATIENT)
Dept: REHABILITATION | Facility: HOSPITAL | Age: 57
End: 2022-11-01
Payer: COMMERCIAL

## 2022-11-01 DIAGNOSIS — I89.0 LYMPHEDEMA: Primary | ICD-10-CM

## 2022-11-01 PROCEDURE — 92507 TX SP LANG VOICE COMM INDIV: CPT

## 2022-11-01 NOTE — PROGRESS NOTES
[facility]  Speech Therapy Progress Note  Laryngectomy-Lymphedema    Date: 2022     Name: Heaven Boston   MRN: [id number]    Therapy Diagnosis:   Encounter Diagnosis   Name Primary?    Lymphedema Yes        Physician: Shelby Marcos MD    PATIENT IS A NECK-BREATHER - DO NOT ORALLY INTUBATE    Time In:  1005  Time Out:  1100    Procedure Min.   Speech Language Voice Therapy  55   Total Untimed Units: 55  Charges Billed/# of units: 55/1    Precautions: Standard  Subjective    Chief Complaint: Patient noted to mildly increased facial swelling noted this date.    AFFECT: normal affect    Pain:   0/10  Pain Location / Description: NA  Current Medical History: Heaven Boston is a 57 y.o. female referred by Dr. Marcos for TEP management to maximize alaryngeal communication without respiratory compromise.    Past Medical History:   Past Medical History:   Diagnosis Date    Cancer     Laryngeal    Diabetes mellitus     Hypertension     Heaven Boston  has a past surgical history that includes pr removal of larynx (2022) and  section.  Medical Hx and Allergies: Heaven has a current medication list which includes the following prescription(s): amlodipine, aspirin, atorvastatin, azelastine, carbamazepine, cetirizine, duloxetine, fluticasone propionate, fluticasone-salmeterol 100-50 mcg/dose, hydrocodone-acetaminophen, ibuprofen, loratadine, metformin, metoprolol succinate, montelukast, omeprazole, prednisolone acetate, and rivaroxaban, and the following Facility-Administered Medications: lidocaine (pf), phenylephrine hcl 1%, and silver nitrate applicators. Review of patient's allergies indicates:  No Known Allergies    Current Voice Function: currently using alaryngeal communication via voice prosthesis   Current Level of Swallow Function/Complaints:  Pt does not report dysphagia  Prior Therapy:  10/31/2022    OBJECTIVE   TEP Current Status:17FR, 12.5mm Aquino placed on  04/12/2022    TEP Patient Complaints: Intermittent strained voicing with periods of good voicing    TEP Accessories: 10/55 fenestrated leonie tube with push to talk HMEs.     Stoma Size:  adequate     Lymphedema:  yes      Manual therapy: MLD completed this date. Measurement taken of face and neck as follows.     Facial Lymphedema Measurement Form  PRE-TREATMENT  Neck Composite   Location Right Left   Superior Circumference (A):   (just below mandible)  7.75 7.5   Middle Circumference (B):  (midway between top & bottom 5.5 6   Inferior Circumference (C):  (lowest circumferential location)  5.5 6   TOTAL: 18.75 19.5       Facial Composite   Location Right Left   1.Tragus to mental protuberance  5 5.5   2.Tragus to mouth angle  3.75 4.5   3.Mandibular angle to nasal wing  3.75 3.5   4.Mandibular angle to internal eye corner  4.25 4   5.Mandibular angle to external eye corner  3 3.5   6.Mental Protuberance to internal eye corner to external eye corner  4 4   7.Mandibular angle to mental protuberance 4 4   TOTALS: 27.75 29     POST-TREATMENT  Neck Composite   Location Right Left   Superior Circumference (A):   (just below mandible)  7.5 7.5   Middle Circumference (B):   (midway between top & bottom 5.5 6   Inferior Circumference (C):   (lowest circumferential location)  5.5 5.5   TOTAL: 18.5 19       Facial Composite   Location Right Left   1.Tragus to mental protuberance  5 5   2.Tragus to mouth angle  3.75 4   3.Mandibular angle to nasal wing  3.5 3.5   4.Mandibular angle to internal eye corner  4 4   5.Mandibular angle to external eye corner  3 3.25   6.Mental Protuberance to internal eye corner to external eye corner  3.75 3.75   7.Mandibular angle to mental protuberance 4 4   TOTALS: 27 27.5   Greater than a 2% difference from pre-to post-treatment? no     Treatment   Heaven Borel noted to tolerated pneumatic compression with good results this date. Decreased Neck composite total of  .25 inches on right and .5  inches on left and Face composite total of .75 inches on right and 2.0 inches on left. SLP ended session with neck stretches, ROM and diaphragmatic breathing. Good de-congestion and compliance with all training. SLP to follow up on 11/03/2022.    Education: Plan of Care, role of SLP in care, and scheduling/ cancellation policy were discussed with pt. Patient expressed understanding.     Assessment       LongTerm Goals:  Current Progress:   1. Patient will demonstrate understanding and implementation of long-term home management interventions as noted by improvement in lymphatic function, conduction of oral care, completion of daily exercises/stretches, and use of home management tools  Progressing towards goal       Short Term Goals:  Current Progress:   1. Patient will participate in manual interventions to target improvement in alaryngeal communication function related to reduced lymphatic function Progressing towards goal   2.  Patient will completed diaphragmatic breathing exercises at an independent level.  Progressing towards goal   3.  Patient will improve alaryngeal communication to maintain voicing >90% of the time.  Progressing towards goal     Ms. Bosotn presents with chronic aphonia due to total laryngectomy.  She is currently utilizing esophageal speech via a voice prosthesis for alaryngeal communication for functional communication with her family, friends, medical providers, and others to perform her daily life activities.  She requires the use of a laryngectomy tube at all times to keep the airway patent and prevent stomal stenosis. HMEs are required daily for mucus management and pulmonary optimization .     SLP to additionally provided MLD/CDP for head and neck lymphedema to improve functional communication.    Plan     SLP intervention is warranted 1-2 times a week or PRN for communication needs for 1-6 weeks due to the chronic nature of the impairment.     Additional Information   Ms. Boston had  good facial symmetry upon entering session. Good decongestion noted with manual techniques this date. SLP will continue to address POC as stated. [employer] representatives working on getting insurance approval for home pneumatic device.     Sunitha Jean MS, CCC-SLP   11/1/2022

## 2022-11-03 ENCOUNTER — CLINICAL SUPPORT (OUTPATIENT)
Dept: REHABILITATION | Facility: HOSPITAL | Age: 57
End: 2022-11-03
Payer: COMMERCIAL

## 2022-11-03 DIAGNOSIS — I89.0 LYMPHEDEMA: Primary | ICD-10-CM

## 2022-11-03 PROCEDURE — 92507 TX SP LANG VOICE COMM INDIV: CPT

## 2022-11-03 NOTE — PROGRESS NOTES
HistoryObtained From:  Patient  and Father     CHIEF COMPLAINT: Juana Maldonado is here today for Well Child (8 year)    Parental/PhysicianConcerns:  Concerns for celiac disease- has celiac, mother is as well  Chronic Illness:  None  Diet/Eating Habits:     Milk:  12 ounces per day   Sweetened beverages: 0-1 servings per day   Foods:  fruits Yes, vegetables Yes and favorites corn ,apple   Traditional fast food:  0-1 days per week   Family dinner:  6+ times per week   Multivitamin:  No  Oral Hygiene:  Brushes in the morning and Brushes at bedtime  Dentist:  Within last 6 months  ScreenTime:  4 hours per day  School:  In/entering 2nd grade and Favorite subjects are art  Activity:  30 minutes or more 4-5 days per week; favorites ride bike      Medications Reviewed:   No current outpatient medications on file.     No current facility-administered medications for this visit.     Immunization History   Administered Date(s) Administered   • DTaP/HIB/IPV 2014, 2014, 2014, 12/15/2015   • DTaP/IPV 07/05/2018   • Hep A, ped/adol, 2 dose 09/28/2015, 06/16/2016   • Hep B, adolescent or pediatric 2014, 2014, 03/17/2015   • Influenza, injectable, quadrivalent 01/31/2020   • Influenza, injectable, quadrivalent, preservative-free 2014, 09/28/2015, 08/28/2020   • Measles Mumps Rubella Varicella 06/26/2015, 07/05/2018   • Pneumococcal Conjugate 13 valent 2014, 2014, 2014, 06/26/2015   • Rotavirus - pentavalent 2014, 2014, 2014      [facility]  Speech Therapy Progress Note  Laryngectomy-Lymphedema    Date: 11/3/2022     Name: Heaven Boston   MRN: [id number]    Therapy Diagnosis:   Encounter Diagnosis   Name Primary?    Lymphedema Yes        Physician: Shelby Marcos MD    PATIENT IS A NECK-BREATHER - DO NOT ORALLY INTUBATE    Time In:  1005  Time Out:  1100    Procedure Min.   Speech Language Voice Therapy  55   Total Untimed Units: 55  Charges Billed/# of units: 55/1    Precautions: Standard  Subjective    Chief Complaint: Patient noted to reduced neck swelling due to use of decompression sleeve. Ridge on lower neck superior to stoma due to decompression chip bag.     AFFECT: normal affect    Pain:   0/10  Pain Location / Description: NA  Current Medical History: Heaven Boston is a 57 y.o. female referred by Dr. Marcos for TEP management to maximize alaryngeal communication without respiratory compromise.    Past Medical History:   Past Medical History:   Diagnosis Date    Cancer     Laryngeal    Diabetes mellitus     Hypertension     Heaven Boston  has a past surgical history that includes pr removal of larynx (2022) and  section.  Medical Hx and Allergies: Heaven has a current medication list which includes the following prescription(s): amlodipine, aspirin, atorvastatin, azelastine, carbamazepine, cetirizine, duloxetine, fluticasone propionate, fluticasone-salmeterol 100-50 mcg/dose, hydrocodone-acetaminophen, ibuprofen, loratadine, metformin, metoprolol succinate, montelukast, omeprazole, prednisolone acetate, and rivaroxaban, and the following Facility-Administered Medications: lidocaine (pf), phenylephrine hcl 1%, and silver nitrate applicators. Review of patient's allergies indicates:  No Known Allergies    Current Voice Function: currently using alaryngeal communication via voice prosthesis   Current Level of Swallow Function/Complaints:  Pt does not report dysphagia  Prior Therapy:   1/1/2022    OBJECTIVE   TEP Current Status:17FR, 12.5mm Aquino placed on 04/12/2022    TEP Patient Complaints: Intermittent strained voicing with periods of good voicing    TEP Accessories: 10/55 fenestrated leonie tube with push to talk HMEs.     Stoma Size:  adequate     Lymphedema:  yes      Manual therapy: MLD completed this date. Measurement taken of face and neck as follows.     Facial Lymphedema Measurement Form  PRE-TREATMENT  Neck Composite   Location Right Left   Superior Circumference (A):   (just below mandible)  7.5 7.5   Middle Circumference (B):  (midway between top & bottom 5.5 6   Inferior Circumference (C):  (lowest circumferential location)  5.25 5.5   TOTAL: 18.25 19       Facial Composite   Location Right Left   1.Tragus to mental protuberance  4.5 5   2.Tragus to mouth angle  3.75 4.25   3.Mandibular angle to nasal wing  3.5 3.5   4.Mandibular angle to internal eye corner  4 4   5.Mandibular angle to external eye corner  3 3   6.Mental Protuberance to internal eye corner to external eye corner  3.75 3.75   7.Mandibular angle to mental protuberance 4 4   TOTALS: 26.5 27.5     POST-TREATMENT  Neck Composite   Location Right Left   Superior Circumference (A):   (just below mandible)  7 7.5   Middle Circumference (B):   (midway between top & bottom 5 6   Inferior Circumference (C):   (lowest circumferential location)  5 5   TOTAL: 17 18.5       Facial Composite   Location Right Left   1.Tragus to mental protuberance  4.5 5   2.Tragus to mouth angle  3.75 4.25   3.Mandibular angle to nasal wing  3.5 3.5   4.Mandibular angle to internal eye corner  4 4   5.Mandibular angle to external eye corner  3 3   6.Mental Protuberance to internal eye corner to external eye corner  3.75 3.75   7.Mandibular angle to mental protuberance 4 4   TOTALS: 26.5 27.5   Greater than a 2% difference from pre-to post-treatment? no     Treatment   Heaven Borel noted to tolerated pneumatic compression with good results this  date. Decreased Neck composite total of  1.25 inches on right and .5 inches on left and Face composite total of .0 inches on right and 0 inches on left. SLP ended session with neck stretches, ROM and diaphragmatic breathing. Good de-congestion and compliance with all training. SLP to follow up on 11/03/2022.    Education: Plan of Care, role of SLP in care, and scheduling/ cancellation policy were discussed with pt. Patient expressed understanding.     Assessment       LongTerm Goals:  Current Progress:   1. Patient will demonstrate understanding and implementation of long-term home management interventions as noted by improvement in lymphatic function, conduction of oral care, completion of daily exercises/stretches, and use of home management tools  Progressing towards goal       Short Term Goals:  Current Progress:   1. Patient will participate in manual interventions to target improvement in alaryngeal communication function related to reduced lymphatic function Progressing towards goal   2.  Patient will completed diaphragmatic breathing exercises at an independent level.  Progressing towards goal   3.  Patient will improve alaryngeal communication to maintain voicing >90% of the time.  Progressing towards goal     Ms. 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  She is currently utilizing esophageal speech via a voice prosthesis for alaryngeal communication for functional communication with her family, friends, medical providers, and others to perform her daily life activities.  She requires the use of a laryngectomy tube at all times to keep the airway patent and prevent stomal stenosis. HMEs are required daily for mucus management and pulmonary optimization .     SLP to additionally provided MLD/CDP for head and neck lymphedema to improve functional communication.    Plan     SLP intervention is warranted 1-2 times a week or PRN for communication needs for 1-6 weeks due to the chronic  "nature of the impairment.     Additional Information   Ms. Boston had good facial symmetry upon entering session. Good decongestion noted with manual techniques this date.  SLP provided decompression sleeve with "chip" bag for additional home management. Bilateral neck taping complete this date to assist with decongestion. SLP will continue to address POC as stated. [employer] representatives working on getting insurance approval for home pneumatic device.     Sunitha Jean MS, CCC-SLP   11/3/2022                    "

## 2022-11-08 ENCOUNTER — CLINICAL SUPPORT (OUTPATIENT)
Dept: REHABILITATION | Facility: HOSPITAL | Age: 57
End: 2022-11-08
Payer: COMMERCIAL

## 2022-11-08 DIAGNOSIS — I89.0 LYMPHEDEMA: Primary | ICD-10-CM

## 2022-11-08 PROCEDURE — 92507 TX SP LANG VOICE COMM INDIV: CPT

## 2022-11-08 NOTE — PROGRESS NOTES
[facility]  Speech Therapy Progress Note  Laryngectomy-Lymphedema    Date: 2022     Name: Heaven Boston   MRN: [id number]    Therapy Diagnosis:   Encounter Diagnosis   Name Primary?    Lymphedema Yes        Physician: Dr. Aparicio    PATIENT IS A NECK-BREATHER - DO NOT ORALLY INTUBATE    Time In:  0900  Time Out:  0950    Procedure Min.   Speech Language Voice Therapy  50   Total Untimed Units: 50  Charges Billed/# of units: 50/1    Precautions: Standard  Subjective    Chief Complaint: Patient noted to reduced neck swelling due to use of taping technique. However, SLP noted area of irritation on L lateral aspect of stoma approximately 1.75 inches x 1.25 inches.      AFFECT: normal affect    Pain:   0/10  Pain Location / Description: NA  Current Medical History: Heaven Boston is a 57 y.o. female referred by Dr. Aparicio for TEP management to maximize alaryngeal communication without respiratory compromise and lymphedema management.     Past Medical History:   Past Medical History:   Diagnosis Date    Cancer     Laryngeal    Diabetes mellitus     Hypertension     Heaven Boston  has a past surgical history that includes pr removal of larynx (2022) and  section.  Medical Hx and Allergies: Heaven has a current medication list which includes the following prescription(s): amlodipine, aspirin, atorvastatin, azelastine, carbamazepine, cetirizine, duloxetine, fluticasone propionate, fluticasone-salmeterol 100-50 mcg/dose, hydrocodone-acetaminophen, ibuprofen, loratadine, metformin, metoprolol succinate, montelukast, omeprazole, prednisolone acetate, and rivaroxaban, and the following Facility-Administered Medications: lidocaine (pf), phenylephrine hcl 1%, and silver nitrate applicators. Review of patient's allergies indicates:  No Known Allergies    Current Voice Function: currently using alaryngeal communication via voice prosthesis   Current Level of Swallow Function/Complaints:   Pt does not report dysphagia  Prior Therapy:  11/13/2022    OBJECTIVE   TEP Current Status:17FR, 12.5mm Aquino placed on 04/12/2022    TEP Patient Complaints: Intermittent strained voicing with periods of good voicing    TEP Accessories: 10/55 fenestrated leonie tube with push to talk HMEs.     Stoma Size:  adequate     Lymphedema:  yes      Manual therapy: MLD/CDP continues for head and neck.    Measurement taken of face and neck as follows.     Facial Lymphedema Measurement Form  PRE-TREATMENT  Neck Composite   Location Right Left   Superior Circumference (A):   (just below mandible)  7 7   Middle Circumference (B):  (midway between top & bottom 6 5.75   Inferior Circumference (C):  (lowest circumferential location)  5.5 5.5   TOTAL: 18.5 18.25       Facial Composite   Location Right Left   1.Tragus to mental protuberance  5 4.75   2.Tragus to mouth angle  4 4   3.Mandibular angle to nasal wing  3.75 3.5   4.Mandibular angle to internal eye corner  4 4   5.Mandibular angle to external eye corner  3 3   6.Mental Protuberance to internal eye corner to external eye corner  3.75 4   7.Mandibular angle to mental protuberance 4 4   TOTALS: 27.5 27.25     POST-TREATMENT  Neck Composite   Location Right Left   Superior Circumference (A):   (just below mandible)  7 7   Middle Circumference (B):   (midway between top & bottom 5 5.5   Inferior Circumference (C):   (lowest circumferential location)  5 5.25   TOTAL: 17 17.75       Facial Composite   Location Right Left   1.Tragus to mental protuberance  4.75 4.75   2.Tragus to mouth angle  4 4   3.Mandibular angle to nasal wing  3.75 3.5   4.Mandibular angle to internal eye corner  4 4   5.Mandibular angle to external eye corner  3 3   6.Mental Protuberance to internal eye corner to external eye corner  3.75 4   7.Mandibular angle to mental protuberance 4 4   TOTALS: 27.25 27.25   Greater than a 2% difference from pre-to post-treatment? no     Treatment   Heaven Borel noted to  tolerated pneumatic compression with good results this date. Decreased Neck composite total of  1.5 inches on right and .5 inches on left and Face composite total of.25 inches on right and 0 inches on left. SLP ended session with neck stretches, ROM and diaphragmatic breathing. Good de-congestion and compliance with all training. SLP to follow up on 11/10/2022.    Education: Plan of Care, role of SLP in care, and scheduling/ cancellation policy were discussed with pt. Patient expressed understanding.     Assessment       LongTerm Goals:  Current Progress:   1. Patient will demonstrate understanding and implementation of long-term home management interventions as noted by improvement in lymphatic function, conduction of oral care, completion of daily exercises/stretches, and use of home management tools  Progressing towards goal       Short Term Goals:  Current Progress:   1. Patient will participate in manual interventions to target improvement in alaryngeal communication function related to reduced lymphatic function Progressing towards goal   2.  Patient will completed diaphragmatic breathing exercises at an independent level.  Progressing towards goal   3.  Patient will improve alaryngeal communication to maintain voicing >90% of the time.  Progressing towards goal     Ms. 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  She is currently utilizing esophageal speech via a voice prosthesis for alaryngeal communication for functional communication with her family, friends, medical providers, and others to perform her daily life activities.  She requires the use of a laryngectomy tube at all times to keep the airway patent and prevent stomal stenosis. HMEs are required daily for mucus management and pulmonary optimization .     SLP to additionally provided MLD/CDP for head and neck lymphedema to improve functional communication.    Plan     SLP intervention is warranted 1-2 times a week or PRN for  "communication needs for 1-6 weeks due to the chronic nature of the impairment.     Additional Information   Ms. Boston had increased sublingual swelling upon entering session. Overall reduced swelling noted in neck region this date. Kinesio tape remained in tact from session on 11/03/2022.  SLP visualized area of irritation on L lateral aspect of stoma approximately 1.75 inches x 1.25 inches.  SLP discussed not replacing tape to allow skin to "rest", pt acknowledeged. Good decongestion noted with manual techniques this date.  SLP educated pt to continue use of decompression sleeve with "chip" bag for additional home management. SLP will continue to address POC as stated. [employer] representatives working on getting insurance approval for home pneumatic device.     Sunitha Jean MS, CCC-SLP   11/8/2022                      "

## 2022-11-10 ENCOUNTER — CLINICAL SUPPORT (OUTPATIENT)
Dept: REHABILITATION | Facility: HOSPITAL | Age: 57
End: 2022-11-10
Payer: COMMERCIAL

## 2022-11-10 DIAGNOSIS — I89.0 LYMPHEDEMA: Primary | ICD-10-CM

## 2022-11-10 PROCEDURE — 92507 TX SP LANG VOICE COMM INDIV: CPT

## 2022-11-10 NOTE — PROGRESS NOTES
[facility]  Speech Therapy Progress Note  Laryngectomy-Lymphedema    Date: 11/10/2022     Name: Heaven Boston   MRN: [id number]    Therapy Diagnosis:   Encounter Diagnosis   Name Primary?    Lymphedema Yes        Physician: Dr. Aparicio    PATIENT IS A NECK-BREATHER - DO NOT ORALLY INTUBATE    Time In:  0950  Time Out:  1030    Procedure Min.   Speech Language Voice Therapy  40   Total Untimed Units: 40  Charges Billed/# of units: 40/1    Precautions: Standard  Subjective    Chief Complaint: Patient noted to reduced neck swelling due to use of taping technique. Area of irritation on L lateral aspect of stoma noted to be reduced in size and color this date.     AFFECT: normal affect    Pain:   0/10  Pain Location / Description: NA  Current Medical History: Heaven Boston is a 57 y.o. female referred by Dr. Aparicio for TEP management to maximize alaryngeal communication without respiratory compromise and lymphedema management.     Past Medical History:   Past Medical History:   Diagnosis Date    Cancer     Laryngeal    Diabetes mellitus     Hypertension     Heaven Boston  has a past surgical history that includes pr removal of larynx (2022) and  section.  Medical Hx and Allergies: Heaven has a current medication list which includes the following prescription(s): amlodipine, aspirin, atorvastatin, azelastine, carbamazepine, cetirizine, duloxetine, fluticasone propionate, fluticasone-salmeterol 100-50 mcg/dose, hydrocodone-acetaminophen, ibuprofen, loratadine, metformin, metoprolol succinate, montelukast, omeprazole, prednisolone acetate, and rivaroxaban, and the following Facility-Administered Medications: lidocaine (pf), phenylephrine hcl 1%, and silver nitrate applicators. Review of patient's allergies indicates:  No Known Allergies    Current Voice Function: currently using alaryngeal communication via voice prosthesis   Current Level of Swallow Function/Complaints:  Pt does not  report dysphagia  Prior Therapy:  11/13/2022    OBJECTIVE   TEP Current Status:17FR, 12.5mm Aquino placed on 04/12/2022    TEP Patient Complaints: Intermittent strained voicing with periods of good voicing    TEP Accessories: 10/55 fenestrated leonie tube with push to talk HMEs.     Stoma Size:  adequate     Lymphedema:  yes      Manual therapy: MLD/CDP continues for head and neck.    Measurement taken of face and neck as follows.     Facial Lymphedema Measurement Form  PRE-TREATMENT  Neck Composite   Location Right Left   Superior Circumference (A):   (just below mandible)  7 7   Middle Circumference (B):  (midway between top & bottom 5.5 5.5   Inferior Circumference (C):  (lowest circumferential location)  5.5 5.5   TOTAL: 18 18       Facial Composite   Location Right Left   1.Tragus to mental protuberance  4.75 5   2.Tragus to mouth angle  3.75 3.75   3.Mandibular angle to nasal wing  3.5 3.5   4.Mandibular angle to internal eye corner  4 4   5.Mandibular angle to external eye corner  3 3   6.Mental Protuberance to internal eye corner to external eye corner  3.5 3.5   7.Mandibular angle to mental protuberance 4 4   TOTALS: 26.5 26.75     POST-TREATMENT  Neck Composite   Location Right Left   Superior Circumference (A):   (just below mandible)  6.5 6.5   Middle Circumference (B):   (midway between top & bottom 5 5   Inferior Circumference (C):   (lowest circumferential location)  4.75 5   TOTAL: 16.25 16.5       Facial Composite   Location Right Left   1.Tragus to mental protuberance  4.75 5   2.Tragus to mouth angle  3.75 3.75   3.Mandibular angle to nasal wing  3.5 3.5   4.Mandibular angle to internal eye corner  4 4   5.Mandibular angle to external eye corner  3 3   6.Mental Protuberance to internal eye corner to external eye corner  3.5 3.5   7.Mandibular angle to mental protuberance 4 4   TOTALS: 26.5 26.75   Greater than a 2% difference from pre-to post-treatment? no     Treatment   Heaven Borel noted to  tolerated pneumatic compression with good results this date. Decreased Neck composite total of  1.75 inches on right and 1.5 inches on left and Face composite total of 0 inches on right and 0 inches on left. SLP ended session with neck stretches, ROM and diaphragmatic breathing. Good de-congestion and compliance with all training. SLP to follow up on 11/10/2022.    Education: Plan of Care, role of SLP in care, and scheduling/ cancellation policy were discussed with pt. Patient expressed understanding.     Assessment       LongTerm Goals:  Current Progress:   1. Patient will demonstrate understanding and implementation of long-term home management interventions as noted by improvement in lymphatic function, conduction of oral care, completion of daily exercises/stretches, and use of home management tools  Progressing towards goal       Short Term Goals:  Current Progress:   1. Patient will participate in manual interventions to target improvement in alaryngeal communication function related to reduced lymphatic function Progressing towards goal   2.  Patient will completed diaphragmatic breathing exercises at an independent level.  Progressing towards goal   3.  Patient will improve alaryngeal communication to maintain voicing >90% of the time.  Progressing towards goal     Ms. 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  She is currently utilizing esophageal speech via a voice prosthesis for alaryngeal communication for functional communication with her family, friends, medical providers, and others to perform her daily life activities.  She requires the use of a laryngectomy tube at all times to keep the airway patent and prevent stomal stenosis. HMEs are required daily for mucus management and pulmonary optimization .     SLP to additionally provided MLD/CDP for head and neck lymphedema to improve functional communication.    Plan     SLP intervention is warranted 1-2 times a week or PRN for  "communication needs for 1-6 weeks due to the chronic nature of the impairment.     Additional Information   Ms. Boston had reduced sublingual swelling upon entering session. Overall reduced swelling noted in neck region this date. SLP assessed area of irritation on L lateral aspect of stoma and noted reduced irritation and size since last visit. SLP continued to recommend not replacing tape to allow skin to "rest", pt acknowledeged. Good decongestion noted with manual techniques this date.  SLP educated pt to continue use of decompression sleeve with "chip" bag for additional home management. SLP will continue to address POC as stated. [employer] representatives working on getting insurance approval for home pneumatic device.     Sunitha Jean MS, CCC-SLP   11/10/2022                        "

## 2022-11-15 ENCOUNTER — DOCUMENTATION ONLY (OUTPATIENT)
Dept: REHABILITATION | Facility: HOSPITAL | Age: 57
End: 2022-11-15

## 2022-11-15 NOTE — PROGRESS NOTES
[facility]  No Show Note    Patient: Heaven Boston  Date of Session: 11/15/2022  Diagnosis: s/p total laryngectomy   MRN: [id number]    Heaven Boston did not attend her  scheduled OP SLP therapy session at 09:00 on 11/15/. She did not call to cancel nor reschedule. Office staff contacted pt to question missed session. Pt's son reported that pt requested he call to cancel as pt was feel unwell; however, he forgot.   /She will resume sessions on 11/17/2022    Sunitha Jean MS, CCC-SLP  11/15/2022

## 2022-11-17 ENCOUNTER — CLINICAL SUPPORT (OUTPATIENT)
Dept: REHABILITATION | Facility: HOSPITAL | Age: 57
End: 2022-11-17
Payer: COMMERCIAL

## 2022-11-17 ENCOUNTER — OFFICE VISIT (OUTPATIENT)
Dept: SURGERY | Facility: CLINIC | Age: 57
End: 2022-11-17
Payer: COMMERCIAL

## 2022-11-17 VITALS
DIASTOLIC BLOOD PRESSURE: 80 MMHG | WEIGHT: 130 LBS | HEART RATE: 81 BPM | BODY MASS INDEX: 23.04 KG/M2 | OXYGEN SATURATION: 100 % | HEIGHT: 63 IN | RESPIRATION RATE: 18 BRPM | TEMPERATURE: 98 F | SYSTOLIC BLOOD PRESSURE: 140 MMHG

## 2022-11-17 DIAGNOSIS — K31.6 GASTROCUTANEOUS FISTULA DUE TO GASTROSTOMY TUBE: Primary | ICD-10-CM

## 2022-11-17 DIAGNOSIS — I89.0 LYMPHEDEMA: Primary | ICD-10-CM

## 2022-11-17 PROCEDURE — 99215 OFFICE O/P EST HI 40 MIN: CPT | Mod: PBBFAC

## 2022-11-17 PROCEDURE — 92507 TX SP LANG VOICE COMM INDIV: CPT

## 2022-11-17 RX ORDER — CARBAMAZEPINE 200 MG/1
200 TABLET, EXTENDED RELEASE ORAL 2 TIMES DAILY
COMMUNITY
Start: 2022-09-12 | End: 2023-08-15 | Stop reason: SDUPTHER

## 2022-11-17 NOTE — PROGRESS NOTES
[facility]  Speech Therapy Progress Note  Laryngectomy-Lymphedema    Date: 2022     Name: Heaven Boston   MRN: [id number]    Therapy Diagnosis:   Encounter Diagnosis   Name Primary?    Lymphedema Yes        Physician: Dr. Aparicio    PATIENT IS A NECK-BREATHER - DO NOT ORALLY INTUBATE    Time In:  905  Time Out:  955    Procedure Min.   Speech Language Voice Therapy  50   Total Untimed Units: 50  Charges Billed/# of units: 50/1    Precautions: Standard  Subjective    Chief Complaint: Patient noted to reduced neck swelling overall this date.     AFFECT: normal affect    Pain:   0/10  Pain Location / Description: NA  Current Medical History: Heaven Boston is a 57 y.o. female referred by Dr. Aparicio for TEP management to maximize alaryngeal communication without respiratory compromise and lymphedema management.     Past Medical History:   Past Medical History:   Diagnosis Date    Cancer     Laryngeal    Diabetes mellitus     Hypertension     Heaven Boston  has a past surgical history that includes pr removal of larynx (2022) and  section.  Medical Hx and Allergies: Heaven has a current medication list which includes the following prescription(s): amlodipine, aspirin, atorvastatin, azelastine, carbamazepine, carbamazepine, cetirizine, duloxetine, fluticasone propionate, fluticasone-salmeterol 100-50 mcg/dose, hydrocodone-acetaminophen, ibuprofen, loratadine, metformin, metoprolol succinate, montelukast, omeprazole, prednisolone acetate, and rivaroxaban, and the following Facility-Administered Medications: lidocaine (pf), phenylephrine hcl 1%, and silver nitrate applicators. Review of patient's allergies indicates:  No Known Allergies    Current Voice Function: currently using alaryngeal communication via voice prosthesis   Current Level of Swallow Function/Complaints:  Pt does not report dysphagia  Prior Therapy:  2022    OBJECTIVE   TEP Current Status:17FR, 12.5mm  Aquino placed on 04/12/2022    TEP Patient Complaints: Intermittent strained voicing with periods of good voicing    TEP Accessories: 10/55 fenestrated leonie tube with push to talk HMEs.     Stoma Size:  adequate     Lymphedema:  yes      Manual therapy: MLD/CDP continues for head and neck.    Measurement taken of face and neck as follows.     Facial Lymphedema Measurement Form  PRE-TREATMENT  Neck Composite   Location Right Left   Superior Circumference (A):   (just below mandible)  7 6.5   Middle Circumference (B):  (midway between top & bottom 5.5 5   Inferior Circumference (C):  (lowest circumferential location)  5 5   TOTAL: 17.5 16.5       Facial Composite   Location Right Left   1.Tragus to mental protuberance  5 5   2.Tragus to mouth angle  4 4   3.Mandibular angle to nasal wing  3.5 3.5   4.Mandibular angle to internal eye corner  3.75 4   5.Mandibular angle to external eye corner  2.75 2.5   6.Mental Protuberance to internal eye corner to external eye corner  3.75 3.5   7.Mandibular angle to mental protuberance 4 4   TOTALS: 26.75 26.5     POST-TREATMENT  Neck Composite   Location Right Left   Superior Circumference (A):   (just below mandible)  6.5 6.5   Middle Circumference (B):   (midway between top & bottom 5 5   Inferior Circumference (C):   (lowest circumferential location)  5 5   TOTAL: 16.5 16.5       Facial Composite   Location Right Left   1.Tragus to mental protuberance  4.5 5   2.Tragus to mouth angle  3.75 4   3.Mandibular angle to nasal wing  3.5 3.5   4.Mandibular angle to internal eye corner  3.75 4   5.Mandibular angle to external eye corner  2.75 2.5   6.Mental Protuberance to internal eye corner to external eye corner  3.5 3.5   7.Mandibular angle to mental protuberance 4 4   TOTALS: 25.75 26.5   Greater than a 2% difference from pre-to post-treatment? no     Treatment   Heaven Borel noted to tolerated pneumatic compression with good results this date. Decreased Neck composite total of   1.0 inches on right and  inches on left and Face composite total of 1 inches on right and 0 inches on left. SLP ended session with neck stretches, ROM and diaphragmatic breathing. Good de-congestion and compliance with all training. SLP to follow up on 11/22/2022.    Education: Plan of Care, role of SLP in care, and scheduling/ cancellation policy were discussed with pt. Patient expressed understanding.     Assessment       LongTerm Goals:  Current Progress:   1. Patient will demonstrate understanding and implementation of long-term home management interventions as noted by improvement in lymphatic function, conduction of oral care, completion of daily exercises/stretches, and use of home management tools  Progressing towards goal       Short Term Goals:  Current Progress:   1. Patient will participate in manual interventions to target improvement in alaryngeal communication function related to reduced lymphatic function Progressing towards goal   2.  Patient will completed diaphragmatic breathing exercises at an independent level.  Progressing towards goal   3.  Patient will improve alaryngeal communication to maintain voicing >90% of the time.  Progressing towards goal     Ms. 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  She is currently utilizing esophageal speech via a voice prosthesis for alaryngeal communication for functional communication with her family, friends, medical providers, and others to perform her daily life activities.  She requires the use of a laryngectomy tube at all times to keep the airway patent and prevent stomal stenosis. HMEs are required daily for mucus management and pulmonary optimization .     SLP to additionally provided MLD/CDP for head and neck lymphedema to improve functional communication.    Plan     SLP intervention is warranted 1-2 times a week or PRN for communication needs for 1-6 weeks due to the chronic nature of the impairment.     Additional  "Information   Ms. Boston had reduced overall swelling upon entering session. Good decongestion noted with manual techniques this date.  SLP educated pt to continue use of decompression sleeve with "chip" bag for additional home management. SLP completed taping post session this date. SLP educated to monitor skin changes and remove tape if changes occur. SLP will continue to address POC as stated. [employer] representatives working on getting insurance approval for home pneumatic device.     Sunitha Jean MS, CCC-SLP   11/17/2022                          "

## 2022-11-17 NOTE — PROGRESS NOTES
[facility]    HPI: Heaven Boston is a 57F with a medical hx of T2DM, HTN and SCC of the larynx who presents to clinic for follow-up of a PEG c/b gastrocutaneous fistula. Patient reports the fistula is much improved with decreased drainage. Now she reports only changing her dressing once a day and denies increased drainage after meals. Denies N/V/F/C, abdominal pain, and diarrhea. No additional complaints.     PMH:   Past Medical History:   Diagnosis Date    Cancer     Laryngeal    Diabetes mellitus     Hypertension       Meds:   Current Outpatient Medications:     amLODIPine (NORVASC) 5 MG tablet, Take 1 tablet (5 mg total) by mouth once daily., Disp: 90 tablet, Rfl: 3    aspirin (ECOTRIN) 81 MG EC tablet, Take 1 tablet (81 mg total) by mouth once daily., Disp: , Rfl: 3    atorvastatin (LIPITOR) 80 MG tablet, Take 1 tablet (80 mg total) by mouth every evening., Disp: 90 tablet, Rfl: 3    azelastine (ASTELIN) 137 mcg (0.1 %) nasal spray, 1 spray by Nasal route., Disp: , Rfl:     carBAMazepine (CARBATROL) 200 MG CM12, Take 200 mg by mouth., Disp: , Rfl:     carBAMazepine (TEGRETOL XR) 200 MG 12 hr tablet, Take 200 mg by mouth 2 (two) times daily., Disp: , Rfl:     cetirizine (ZYRTEC) 10 MG tablet, Take 10 mg by mouth once daily., Disp: , Rfl:     DULoxetine (CYMBALTA) 30 MG capsule, Take 30 mg by mouth 2 (two) times daily., Disp: , Rfl:     fluticasone propionate (FLONASE) 50 mcg/actuation nasal spray, 1 spray by Each Nostril route 2 (two) times daily., Disp: , Rfl:     fluticasone-salmeterol diskus inhaler 100-50 mcg, Inhale 1 puff into the lungs every 12 (twelve) hours., Disp: , Rfl:     HYDROcodone-acetaminophen 5-300 mg Tab, Take 1 tablet by mouth every 8 (eight) hours as needed., Disp: , Rfl:     ibuprofen (ADVIL,MOTRIN) 800 MG tablet, Take 800 mg by mouth every 6 (six) hours as needed., Disp: , Rfl:     loratadine (CLARITIN) 10 mg tablet, Take 1 tablet (10 mg total) by mouth once daily.  (Patient not taking: Reported on 10/25/2022), Disp: 90 tablet, Rfl: 0    metFORMIN (GLUCOPHAGE) 500 MG tablet, Take 1 tablet (500 mg total) by mouth once daily., Disp: 90 tablet, Rfl: 3    metoprolol succinate (TOPROL-XL) 25 MG 24 hr tablet, Take 0.5 tablets (12.5 mg total) by mouth once daily., Disp: 45 tablet, Rfl: 3    montelukast (SINGULAIR) 5 MG chewable tablet, Take 1 tablet (5 mg total) by mouth every evening., Disp: 90 tablet, Rfl: 3    omeprazole (PRILOSEC) 40 MG capsule, Take 40 mg by mouth once daily., Disp: , Rfl:     prednisoLONE acetate (PRED FORTE) 1 % DrpS, Place into both eyes., Disp: , Rfl:     rivaroxaban (XARELTO) 10 mg Tab, Take 2 tablets (20 mg total) by mouth daily with dinner or evening meal., Disp: 90 tablet, Rfl: 0    Current Facility-Administered Medications:     LIDOcaine (PF) 40 mg/mL (4 %) injection 2 mL, 2 mL, Other, 1 time in Clinic/HOD, Gregory Martins MD    phenylephrine HCL 1% nasal spray 1 spray, 1 spray, Each Nostril, 1 time in Clinic/HOD, Gregory Martins MD    silver nitrate applicators applicator 1 applicator, 1 applicator, Topical (Top), 1 time in Clinic/HOD, Patricia Montes MD  Allergies: Review of patient's allergies indicates:  No Known Allergies  Social History:   Social History     Tobacco Use    Smoking status: Former    Smokeless tobacco: Former   Substance Use Topics    Alcohol use: Never    Drug use: Never     Family History: History reviewed. No pertinent family history.  Surgical History:   Past Surgical History:   Procedure Laterality Date     SECTION      SD REMOVAL OF LARYNX  2022     Review of Systems:  Head: Denies headache   Respiratory: Denies shortness of breath or chest pain  Cardiac: HTN  Gastrointestinal: Denies nausea, denies vomiting.   Urinary: Denies dysuria or hematuria.    Objective:    Vitals:  Vitals:    22 1055   BP: (!) 140/80   Pulse: 81   Resp: 18   Temp: 97.9 °F (36.6 °C)        Physical Exam:  Gen: NAD  Neuro:  awake, alert, answering questions appropriately  CV: RRR  Resp: non-labored breathing  Ext: moves all 4 spontaneously and purposefully  Abd: Soft, Nondistended, Abdominal fistula with a small amount of drainage. Healing well, nontender to palpation in all quadrants. No fluid expressed                 Assessment/Plan:  Heaven Boston is a 57F with pmh T2DM, HTN and SCC of the larynx who presents to clinic for follow-up of an PEG c/b gastrocutaneous fistula that is healing well.    - Encouraged soap and water to clean wound daily. OK to shower. No baths until cleared by surgery.   - RTC in 1 mo    [facility] MS3  11/17/2022 10:56 AM      I have seen the patient with the medical student. I have reviewed and edited this note as appropriate.     Jenise Alegre MD  [employer] General Surgery PGY1

## 2022-11-22 ENCOUNTER — CLINICAL SUPPORT (OUTPATIENT)
Dept: REHABILITATION | Facility: HOSPITAL | Age: 57
End: 2022-11-22
Payer: COMMERCIAL

## 2022-11-22 DIAGNOSIS — C32.9 CARCINOMA LARYNX: Primary | ICD-10-CM

## 2022-11-22 DIAGNOSIS — I89.0 LYMPHEDEMA: Primary | ICD-10-CM

## 2022-11-22 PROCEDURE — 92507 TX SP LANG VOICE COMM INDIV: CPT

## 2022-11-22 NOTE — PROGRESS NOTES
[facility]  Speech Therapy Progress Note  Laryngectomy-Lymphedema    Date: 2022     Name: Heaven Boston   MRN: [id number]    Therapy Diagnosis:   Encounter Diagnosis   Name Primary?    Lymphedema Yes        Physician: Dr. Aparicio    PATIENT IS A NECK-BREATHER - DO NOT ORALLY INTUBATE    Time In:  905  Time Out:  955    Procedure Min.   Speech Language Voice Therapy  50   Total Untimed Units: 50  Charges Billed/# of units: 50/1    Precautions: Standard  Subjective    Chief Complaint: Patient noted to reduced neck swelling overall this date.     AFFECT: normal affect    Pain:   0/10  Pain Location / Description: NA  Current Medical History: Heaven Boston is a 57 y.o. female referred by Dr. Aparicio for TEP management to maximize alaryngeal communication without respiratory compromise and lymphedema management.     Past Medical History:   Past Medical History:   Diagnosis Date    Cancer     Laryngeal    Diabetes mellitus     Hypertension     Heaven Boston  has a past surgical history that includes pr removal of larynx (2022) and  section.  Medical Hx and Allergies: Heaven has a current medication list which includes the following prescription(s): amlodipine, aspirin, atorvastatin, azelastine, carbamazepine, carbamazepine, cetirizine, duloxetine, fluticasone propionate, fluticasone-salmeterol 100-50 mcg/dose, hydrocodone-acetaminophen, ibuprofen, loratadine, metformin, metoprolol succinate, montelukast, omeprazole, prednisolone acetate, and rivaroxaban, and the following Facility-Administered Medications: lidocaine (pf), phenylephrine hcl 1%, and silver nitrate applicators. Review of patient's allergies indicates:  No Known Allergies    Current Voice Function: currently using alaryngeal communication via voice prosthesis   Current Level of Swallow Function/Complaints:  Pt does not report dysphagia  Prior Therapy:  2022    OBJECTIVE   TEP Current Status:17FR, 12.5mm  Aquino placed on 04/12/2022    TEP Patient Complaints: Intermittent strained voicing with periods of good voicing    TEP Accessories: 10/55 fenestrated leonie tube with push to talk HMEs.     Stoma Size:  adequate     Lymphedema:  yes      Manual therapy: MLD/CDP continues for head and neck.    Measurement taken of face and neck as follows.     Facial Lymphedema Measurement Form  PRE-TREATMENT  Neck Composite   Location Right Left   Superior Circumference (A):   (just below mandible)  6.5 6.5   Middle Circumference (B):  (midway between top & bottom 5 5   Inferior Circumference (C):  (lowest circumferential location)  5 5   TOTAL: 16.5 16.5       Facial Composite   Location Right Left   1.Tragus to mental protuberance  4.75 4.75   2.Tragus to mouth angle  3.75 4   3.Mandibular angle to nasal wing  3.5 3.75   4.Mandibular angle to internal eye corner  4 4   5.Mandibular angle to external eye corner  3 3   6.Mental Protuberance to internal eye corner to external eye corner  3.75 4   7.Mandibular angle to mental protuberance 4 4.25   TOTALS: 26.75 27.75     POST-TREATMENT  Neck Composite   Location Right Left   Superior Circumference (A):   (just below mandible)  6.5 6.5   Middle Circumference (B):   (midway between top & bottom 5 5   Inferior Circumference (C):   (lowest circumferential location)  5 5   TOTAL: 16.5 16.5       Facial Composite   Location Right Left   1.Tragus to mental protuberance  4.75 4.75   2.Tragus to mouth angle  3.75 4   3.Mandibular angle to nasal wing  3.5 3.75   4.Mandibular angle to internal eye corner  4 4   5.Mandibular angle to external eye corner  3 3   6.Mental Protuberance to internal eye corner to external eye corner  3.75 4   7.Mandibular angle to mental protuberance 4 4.25   TOTALS: 26.75 27.75   Greater than a 2% difference from pre-to post-treatment? no     Treatment   Heaven Borel noted to tolerated pneumatic compression with good results this date. Decreased Neck composite total of   0 inches on right and  0 inches on left and Face composite total of 0 inches on right and 0 inches on left. SLP ended session with neck stretches, ROM and diaphragmatic breathing. Despite no measurable noted after de-congestion, Mrs. Boston exhibited visual reduction bilaterally. Additionally, reduced fibrotic texture and increased neck ROM noted after intervention. Good compliance with all training. SLP to follow up on 11/29/2022. 11/24/2022 to be missed due to holiday.    Education: Plan of Care, role of SLP in care, and scheduling/ cancellation policy were discussed with pt. Patient expressed understanding.     Assessment       LongTerm Goals:  Current Progress:   1. Patient will demonstrate understanding and implementation of long-term home management interventions as noted by improvement in lymphatic function, conduction of oral care, completion of daily exercises/stretches, and use of home management tools  Progressing towards goal       Short Term Goals:  Current Progress:   1. Patient will participate in manual interventions to target improvement in alaryngeal communication function related to reduced lymphatic function Progressing towards goal   2.  Patient will completed diaphragmatic breathing exercises at an independent level.  Progressing towards goal   3.  Patient will improve alaryngeal communication to maintain voicing >90% of the time.  Progressing towards goal     Ms. 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  She is currently utilizing esophageal speech via a voice prosthesis for alaryngeal communication for functional communication with her family, friends, medical providers, and others to perform her daily life activities.  She requires the use of a laryngectomy tube at all times to keep the airway patent and prevent stomal stenosis. HMEs are required daily for mucus management and pulmonary optimization .     SLP to additionally provided MLD/CDP for head and neck lymphedema  "to improve functional communication.    Plan     SLP intervention is warranted 1-2 times a week or PRN for communication needs for 1-6 weeks due to the chronic nature of the impairment.     Additional Information   Ms. Boston continues to exhibit reduced overall swelling upon entering session. Good decongestion noted visually with tissue texture with manual techniques this date.  SLP educated pt to continue use of decompression sleeve with "chip" bag for additional home management. Pt with reduced dysphagia symptoms and increased tolerance of "regular" consistencies. SLP will continue to address POC as stated. [employer] representatives working on getting insurance approval for home pneumatic device.     Sunitha Jean MS, CCC-SLP   11/22/2022                            "

## 2022-11-29 ENCOUNTER — CLINICAL SUPPORT (OUTPATIENT)
Dept: REHABILITATION | Facility: HOSPITAL | Age: 57
End: 2022-11-29
Payer: COMMERCIAL

## 2022-11-29 DIAGNOSIS — I89.0 LYMPHEDEMA: Primary | ICD-10-CM

## 2022-11-29 PROCEDURE — 92507 TX SP LANG VOICE COMM INDIV: CPT

## 2022-11-29 NOTE — PROGRESS NOTES
[facility]  Speech Therapy Progress Note  Laryngectomy-Lymphedema    Date: 2022     Name: Heaven Boston   MRN: [id number]    Therapy Diagnosis:   Encounter Diagnosis   Name Primary?    Lymphedema Yes        Physician: Dr. Aparicio    PATIENT IS A NECK-BREATHER - DO NOT ORALLY INTUBATE    Time In:  905  Time Out:  955    Procedure Min.   Speech Language Voice Therapy  50   Total Untimed Units: 50  Charges Billed/# of units: 50/1    Precautions: Standard  Subjective    Chief Complaint: Patient noted to reduced neck swelling overall this date.     AFFECT: normal affect    Pain:   0/10  Pain Location / Description: NA  Current Medical History: Heaven Boston is a 57 y.o. female referred by Dr. Aparicio for TEP management to maximize alaryngeal communication without respiratory compromise and lymphedema management.     Past Medical History:   Past Medical History:   Diagnosis Date    Cancer     Laryngeal    Diabetes mellitus     Hypertension     Heaven Boston  has a past surgical history that includes pr removal of larynx (2022) and  section.  Medical Hx and Allergies: Heaven has a current medication list which includes the following prescription(s): amlodipine, aspirin, atorvastatin, azelastine, carbamazepine, carbamazepine, cetirizine, duloxetine, fluticasone propionate, fluticasone-salmeterol 100-50 mcg/dose, hydrocodone-acetaminophen, ibuprofen, loratadine, metformin, metoprolol succinate, montelukast, omeprazole, prednisolone acetate, and rivaroxaban, and the following Facility-Administered Medications: lidocaine (pf), phenylephrine hcl 1%, and silver nitrate applicators. Review of patient's allergies indicates:  No Known Allergies    Current Voice Function: currently using alaryngeal communication via voice prosthesis   Current Level of Swallow Function/Complaints:  Pt does not report dysphagia  Prior Therapy:  2022    OBJECTIVE   TEP Current Status:17FR, 12.5mm  Aquino placed on 04/12/2022    TEP Patient Complaints: Intermittent strained voicing with periods of good voicing    TEP Accessories: 10/55 fenestrated leonie tube with push to talk HMEs.     Stoma Size:  adequate     Lymphedema:  yes      Manual therapy: MLD/CDP continues for head and neck.    Measurement taken of face and neck as follows.     Facial Lymphedema Measurement Form  PRE-TREATMENT  Neck Composite   Location Right Left   Superior Circumference (A):   (just below mandible)  6 7   Middle Circumference (B):  (midway between top & bottom 5 6   Inferior Circumference (C):  (lowest circumferential location)  5 6   TOTAL: 16 19       Facial Composite   Location Right Left   1.Tragus to mental protuberance  5 5   2.Tragus to mouth angle  3.75 4   3.Mandibular angle to nasal wing  3.5 3.75   4.Mandibular angle to internal eye corner  4 4   5.Mandibular angle to external eye corner  2.25 3   6.Mental Protuberance to internal eye corner to external eye corner  4 3.75   7.Mandibular angle to mental protuberance 4.25 4.5   TOTALS: 26.75 28     POST-TREATMENT  Neck Composite   Location Right Left   Superior Circumference (A):   (just below mandible)  6.5 6.5   Middle Circumference (B):   (midway between top & bottom 4.75 5.5   Inferior Circumference (C):   (lowest circumferential location)  4.75 5   TOTAL: 16 17       Facial Composite   Location Right Left   1.Tragus to mental protuberance  5 5   2.Tragus to mouth angle  3.75 4   3.Mandibular angle to nasal wing  3.5 3.75   4.Mandibular angle to internal eye corner  4 4   5.Mandibular angle to external eye corner  2.25 3   6.Mental Protuberance to internal eye corner to external eye corner  4 3.75   7.Mandibular angle to mental protuberance 4.25 4.5   TOTALS: 26.75 28   Greater than a 2% difference from pre-to post-treatment? no     Treatment   Heaven Borel noted to tolerated pneumatic compression with good results this date. Decreased Neck composite total of  0 inches  on right and  2 inches on left and Face composite total of 0 inches on right and 0 inches on left. SLP ended session with neck stretches, ROM and diaphragmatic breathing. Despite minimal measurable changes noted after de-congestion, Mrs. Boston exhibited visual reduction bilaterally. Additionally, reduced fibrotic texture and increased neck ROM noted after intervention. Good compliance with all training. SLP to follow up on 12/019/2022. SLP applied taping to neck region to aid with decongestion.     Education: Plan of Care, role of SLP in care, and scheduling/ cancellation policy were discussed with pt. Patient expressed understanding.     Assessment       LongTerm Goals:  Current Progress:   1. Patient will demonstrate understanding and implementation of long-term home management interventions as noted by improvement in lymphatic function, conduction of oral care, completion of daily exercises/stretches, and use of home management tools  Progressing towards goal       Short Term Goals:  Current Progress:   1. Patient will participate in manual interventions to target improvement in alaryngeal communication function related to reduced lymphatic function Progressing towards goal   2.  Patient will completed diaphragmatic breathing exercises at an independent level.  Progressing towards goal   3.  Patient will improve alaryngeal communication to maintain voicing >90% of the time.  Progressing towards goal     Ms. 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  She is currently utilizing esophageal speech via a voice prosthesis for alaryngeal communication for functional communication with her family, friends, medical providers, and others to perform her daily life activities.  She requires the use of a laryngectomy tube at all times to keep the airway patent and prevent stomal stenosis. HMEs are required daily for mucus management and pulmonary optimization .     SLP to additionally provided MLD/CDP for  "head and neck lymphedema to improve functional communication.    Plan     SLP intervention is warranted 1-2 times a week or PRN for communication needs for 1-6 weeks due to the chronic nature of the impairment.     Additional Information   Ms. Boston continues to exhibit reduced overall swelling upon entering session. Good decongestion noted visually with tissue texture with manual techniques this date.  SLP educated pt to continue use of decompression sleeve with "chip" bag for additional home management. Pt with reduced dysphagia symptoms and increased tolerance of "regular" consistencies. SLP will continue to address POC as stated. Patient entered with denial letter from [facility] this date. SLP reached out to representative to clarify due to suspected error in insurance.     Sunitha Jena MS, CCC-SLP   11/29/2022                              "

## 2022-12-01 ENCOUNTER — CLINICAL SUPPORT (OUTPATIENT)
Dept: REHABILITATION | Facility: HOSPITAL | Age: 57
End: 2022-12-01
Payer: COMMERCIAL

## 2022-12-01 DIAGNOSIS — I89.0 LYMPHEDEMA: Primary | ICD-10-CM

## 2022-12-01 PROCEDURE — 92507 TX SP LANG VOICE COMM INDIV: CPT

## 2022-12-01 NOTE — PROGRESS NOTES
[facility]  Speech Therapy Progress Note  Laryngectomy-Lymphedema    Date: 2022     Name: Heaven Boston   MRN: [id number]    Therapy Diagnosis:   Encounter Diagnosis   Name Primary?    Lymphedema Yes        Physician: Dr. Aparicio    PATIENT IS A NECK-BREATHER - DO NOT ORALLY INTUBATE    Time In:  0900  Time Out:  0955    Procedure Min.   Speech Language Voice Therapy  55   Total Untimed Units: 55  Charges Billed/# of units: 55/1    Precautions: Standard  Subjective    Chief Complaint: Patient noted to have bulging superiorly over kinesio tape bilaterally on neck    AFFECT: normal affect    Pain:   0/10  Pain Location / Description: NA  Current Medical History: Heaven Boston is a 57 y.o. female referred by Dr. Apariico for TEP management to maximize alaryngeal communication without respiratory compromise and lymphedema management.     Past Medical History:   Past Medical History:   Diagnosis Date    Cancer     Laryngeal    Diabetes mellitus     Hypertension     Heaven Boston  has a past surgical history that includes pr removal of larynx (2022) and  section.  Medical Hx and Allergies: Heaven has a current medication list which includes the following prescription(s): amlodipine, aspirin, atorvastatin, azelastine, carbamazepine, carbamazepine, cetirizine, duloxetine, fluticasone propionate, fluticasone-salmeterol 100-50 mcg/dose, hydrocodone-acetaminophen, ibuprofen, loratadine, metformin, metoprolol succinate, montelukast, omeprazole, prednisolone acetate, and rivaroxaban, and the following Facility-Administered Medications: lidocaine (pf), phenylephrine hcl 1%, and silver nitrate applicators. Review of patient's allergies indicates:  No Known Allergies    Current Voice Function: currently using alaryngeal communication via voice prosthesis   Current Level of Swallow Function/Complaints:  Pt does not report dysphagia  Prior Therapy:  2022    OBJECTIVE   TEP Current  Status:17FR, 12.5mm Aquino placed on 04/12/2022    TEP Patient Complaints: Intermittent strained voicing with periods of good voicing    TEP Accessories: 10/55 fenestrated leonie tube with push to talk HMEs.     Stoma Size:  adequate     Lymphedema:  yes      Manual therapy: MLD/CDP continues for head and neck.    Measurement taken of face and neck as follows.     Facial Lymphedema Measurement Form  PRE-TREATMENT  Neck Composite   Location Right Left   Superior Circumference (A):   (just below mandible)  7 6   Middle Circumference (B):  (midway between top & bottom 6 5.5   Inferior Circumference (C):  (lowest circumferential location)  5.5 5   TOTAL: 18.5 16.5       Facial Composite   Location Right Left   1.Tragus to mental protuberance  5 4.5   2.Tragus to mouth angle  3.75 4   3.Mandibular angle to nasal wing  3.5 3.5   4.Mandibular angle to internal eye corner  4 4   5.Mandibular angle to external eye corner  2.75 3   6.Mental Protuberance to internal eye corner to external eye corner  4 3.5   7.Mandibular angle to mental protuberance 4 4   TOTALS: 27 26.5     POST-TREATMENT  Neck Composite   Location Right Left   Superior Circumference (A):   (just below mandible)  6 6   Middle Circumference (B):   (midway between top & bottom 5.25 5   Inferior Circumference (C):   (lowest circumferential location)  5 4.5   TOTAL: 16.25 15.5       Facial Composite   Location Right Left   1.Tragus to mental protuberance  5 4.5   2.Tragus to mouth angle  3.75 4   3.Mandibular angle to nasal wing  3.5 3.5   4.Mandibular angle to internal eye corner  4 4   5.Mandibular angle to external eye corner  2.75 3   6.Mental Protuberance to internal eye corner to external eye corner  4 3.5   7.Mandibular angle to mental protuberance 4 4   TOTALS: 27 26.5   Greater than a 2% difference from pre-to post-treatment? no     Treatment   Heavne Borel noted to tolerated pneumatic compression with good results this date. Decreased Neck composite total  of  2.25 inches on right and  1 inch on left and Face composite total of 0 inches on right and 0 inches on left. SLP ended session with neck stretches, ROM and diaphragmatic breathing. Reduced fibrotic texture and increased neck ROM noted after intervention. Good compliance with all training. SLP to follow up on 12/06/2022. Pt with scheduled ENT appointment at 8:00 12/06/2022. Pt educated to contact clinic if ENT appointment running late and she is unable to make session.     Education: Plan of Care, role of SLP in care, and scheduling/ cancellation policy were discussed with pt. Patient expressed understanding.     Assessment       LongTerm Goals:  Current Progress:   1. Patient will demonstrate understanding and implementation of long-term home management interventions as noted by improvement in lymphatic function, conduction of oral care, completion of daily exercises/stretches, and use of home management tools  Progressing towards goal       Short Term Goals:  Current Progress:   1. Patient will participate in manual interventions to target improvement in alaryngeal communication function related to reduced lymphatic function Progressing towards goal   2.  Patient will completed diaphragmatic breathing exercises at an independent level.  Progressing towards goal   3.  Patient will improve alaryngeal communication to maintain voicing >90% of the time.  Progressing towards goal     Ms. 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  She is currently utilizing esophageal speech via a voice prosthesis for alaryngeal communication for functional communication with her family, friends, medical providers, and others to perform her daily life activities.  She requires the use of a laryngectomy tube at all times to keep the airway patent and prevent stomal stenosis. HMEs are required daily for mucus management and pulmonary optimization .     SLP to additionally provided MLD/CDP for head and neck  "lymphedema to improve functional communication.    Plan     SLP intervention is warranted 1-2 times a week or PRN for communication needs for 1-6 weeks due to the chronic nature of the impairment.     Additional Information   Ms. Boston continues to exhibit mild bulging bilaterally superiorly to kinesio tape. Good decongestion noted visually with tissue texture with manual techniques this date.  SLP educated pt to continue use of decompression sleeve with "chip" bag for additional home management. Pt with reduced dysphagia symptoms and increased tolerance of "regular" consistencies. SLP will continue to address POC as stated.   Sunitha Jean MS, CCC-SLP   12/1/2022                                "

## 2022-12-06 ENCOUNTER — CLINICAL SUPPORT (OUTPATIENT)
Dept: REHABILITATION | Facility: HOSPITAL | Age: 57
End: 2022-12-06
Payer: COMMERCIAL

## 2022-12-06 DIAGNOSIS — I89.0 LYMPHEDEMA: Primary | ICD-10-CM

## 2022-12-06 PROCEDURE — 92507 TX SP LANG VOICE COMM INDIV: CPT

## 2022-12-06 NOTE — PROGRESS NOTES
"[facility]  Speech Therapy Progress Note  Laryngectomy-Lymphedema    Date: 2022     Name: Heaven Boston   MRN: [id number]    Therapy Diagnosis:   Encounter Diagnosis   Name Primary?    Lymphedema Yes        Physician: Dr. Aparicio    PATIENT IS A NECK-BREATHER - DO NOT ORALLY INTUBATE    Time In:  0900  Time Out:  0945    Procedure Min.   Speech Language Voice Therapy  45   Total Untimed Units: 45  Charges Billed/# of units: 45/1    Precautions: Standard  Subjective    Chief Complaint: Patient noted to have skin irritation and areas of pink discoloration bilaterally. Pt reported changing soap yesterday, she reported "itching" since changing soap.     AFFECT: normal affect    Pain:   0/10  Pain Location / Description: NA  Current Medical History: Heaven Boston is a 57 y.o. female referred by Dr. Aparicio for TEP management to maximize alaryngeal communication without respiratory compromise and lymphedema management.     Past Medical History:   Past Medical History:   Diagnosis Date    Cancer     Laryngeal    Diabetes mellitus     Hypertension     Heaven Boston  has a past surgical history that includes pr removal of larynx (2022) and  section.  Medical Hx and Allergies: Heaven has a current medication list which includes the following prescription(s): amlodipine, aspirin, atorvastatin, azelastine, carbamazepine, carbamazepine, cetirizine, duloxetine, fluticasone propionate, fluticasone-salmeterol 100-50 mcg/dose, hydrocodone-acetaminophen, ibuprofen, loratadine, metformin, metoprolol succinate, montelukast, omeprazole, prednisolone acetate, and rivaroxaban, and the following Facility-Administered Medications: lidocaine (pf), phenylephrine hcl 1%, and silver nitrate applicators. Review of patient's allergies indicates:  No Known Allergies    Current Voice Function: currently using alaryngeal communication via voice prosthesis   Current Level of Swallow Function/Complaints:  " Pt does not report dysphagia  Prior Therapy:  11/13/2022    OBJECTIVE   TEP Current Status:17FR, 12.5mm Aquino placed on 04/12/2022    TEP Patient Complaints: Intermittent strained voicing with periods of good voicing    TEP Accessories: 10/55 fenestrated leonie tube with push to talk HMEs.     Stoma Size:  adequate     Lymphedema:  yes    Manual therapy: MLD/CDP continues for head and neck.    Measurement taken of face and neck as follows.     Facial Lymphedema Measurement Form  PRE-TREATMENT  Neck Composite   Location Right Left   Superior Circumference (A):   (just below mandible)  6.5 6.5   Middle Circumference (B):  (midway between top & bottom 5.25 5   Inferior Circumference (C):  (lowest circumferential location)  5 5   TOTAL: 16.75 16.5       Facial Composite   Location Right Left   1.Tragus to mental protuberance  4.5 4.75   2.Tragus to mouth angle  3.75 3.75   3.Mandibular angle to nasal wing  3.25 3.5   4.Mandibular angle to internal eye corner  4 4   5.Mandibular angle to external eye corner  2.5 3   6.Mental Protuberance to internal eye corner to external eye corner  4 4   7.Mandibular angle to mental protuberance 4 4.5   TOTALS: 26 27.5     POST-TREATMENT  Neck Composite   Location Right Left   Superior Circumference (A):   (just below mandible)  6 6.25   Middle Circumference (B):   (midway between top & bottom 5 5   Inferior Circumference (C):   (lowest circumferential location)  5 5   TOTAL: 16 16.25       Facial Composite   Location Right Left   1.Tragus to mental protuberance  4.5 4.75   2.Tragus to mouth angle  3.75 3.75   3.Mandibular angle to nasal wing  3.25 3.5   4.Mandibular angle to internal eye corner  4 4   5.Mandibular angle to external eye corner  2.5 3   6.Mental Protuberance to internal eye corner to external eye corner  4 4   7.Mandibular angle to mental protuberance 4 4.5   TOTALS: 26 27.5   Greater than a 2% difference from pre-to post-treatment? no     Treatment   Heaven Borel noted  to tolerated pneumatic compression with good results this date. Decreased Neck composite total of  .75 of an inches on right and  .25 of an inch on left and Face composite total of 0 inches on right and 0 inches on left. SLP ended session with neck stretches, ROM and diaphragmatic breathing. Reduced fibrotic texture and increased neck ROM noted after intervention. Good compliance with all training. SLP to follow up on 12/06/2022. Pt with scheduled ENT appointment at 8:00 12/06/2022. Pt educated to contact clinic if ENT appointment running late and she is unable to make session.     Education: Plan of Care, role of SLP in care, and scheduling/ cancellation policy were discussed with pt. Patient expressed understanding.     Assessment       LongTerm Goals:  Current Progress:   1. Patient will demonstrate understanding and implementation of long-term home management interventions as noted by improvement in lymphatic function, conduction of oral care, completion of daily exercises/stretches, and use of home management tools  Progressing towards goal       Short Term Goals:  Current Progress:   1. Patient will participate in manual interventions to target improvement in alaryngeal communication function related to reduced lymphatic function Progressing towards goal   2.  Patient will completed diaphragmatic breathing exercises at an independent level.  Progressing towards goal   3.  Patient will improve alaryngeal communication to maintain voicing >90% of the time.  Progressing towards goal     Ms. 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  She is currently utilizing esophageal speech via a voice prosthesis for alaryngeal communication for functional communication with her family, friends, medical providers, and others to perform her daily life activities.  She requires the use of a laryngectomy tube at all times to keep the airway patent and prevent stomal stenosis. HMEs are required daily for mucus  "management and pulmonary optimization .     SLP to additionally provided MLD/CDP for head and neck lymphedema to improve functional communication.    Plan     SLP intervention is warranted 1-2 times a week or PRN for communication needs for 1-6 weeks due to the chronic nature of the impairment.     Additional Information   Ms. Boston entered with areas of pink skin discoloration bilaterally. She reported "itching" since change of soap last night. SLP educated to stop use and return to previous soap. Good decongestion noted visually with tissue texture with manual techniques this date.  SLP educated pt to continue use of decompression sleeve with "chip" bag for additional home management. Pt with reduced dysphagia symptoms and increased tolerance of "regular" consistencies. SLP will continue to address POC as stated.   Sunitha Jean MS, CCC-SLP   12/6/2022                                  "

## 2022-12-08 ENCOUNTER — CLINICAL SUPPORT (OUTPATIENT)
Dept: REHABILITATION | Facility: HOSPITAL | Age: 57
End: 2022-12-08
Payer: COMMERCIAL

## 2022-12-08 DIAGNOSIS — I89.0 LYMPHEDEMA: Primary | ICD-10-CM

## 2022-12-08 PROCEDURE — 92507 TX SP LANG VOICE COMM INDIV: CPT

## 2022-12-08 NOTE — PROGRESS NOTES
[facility]  Speech Therapy Progress Note  Laryngectomy-Lymphedema    Date: 2022     Name: Heaven Boston   MRN: [id number]    Therapy Diagnosis:   Encounter Diagnosis   Name Primary?    Lymphedema Yes        Physician: Dr. Aparicio    PATIENT IS A NECK-BREATHER - DO NOT ORALLY INTUBATE    Time In:  0915  Time Out:  1000    Procedure Min.   Speech Language Voice Therapy  45   Total Untimed Units: 45  Charges Billed/# of units: 45/1    Precautions: Standard  Subjective    Chief Complaint: Patient noted to continue have areas of pink discoloration bilaterally.      AFFECT: normal affect    Pain:   0/10  Pain Location / Description: NA  Current Medical History: Heaven Boston is a 57 y.o. female referred by Dr. Aparicio for TEP management to maximize alaryngeal communication without respiratory compromise and lymphedema management.     Past Medical History:   Past Medical History:   Diagnosis Date    Cancer     Laryngeal    Diabetes mellitus     Hypertension     Heaven Boston  has a past surgical history that includes pr removal of larynx (2022) and  section.  Medical Hx and Allergies: Heaven has a current medication list which includes the following prescription(s): amlodipine, aspirin, atorvastatin, azelastine, carbamazepine, carbamazepine, cetirizine, duloxetine, fluticasone propionate, fluticasone-salmeterol 100-50 mcg/dose, hydrocodone-acetaminophen, ibuprofen, loratadine, metformin, metoprolol succinate, montelukast, omeprazole, prednisolone acetate, and rivaroxaban, and the following Facility-Administered Medications: lidocaine (pf), phenylephrine hcl 1%, and silver nitrate applicators. Review of patient's allergies indicates:  No Known Allergies    Current Voice Function: currently using alaryngeal communication via voice prosthesis   Current Level of Swallow Function/Complaints:  Pt does not report dysphagia  Prior Therapy:  2022    OBJECTIVE   TEP Current  Status:17FR, 12.5mm Aquino placed on 04/12/2022    TEP Patient Complaints: Intermittent strained voicing with periods of good voicing    TEP Accessories: 10/55 fenestrated leonie tube with push to talk HMEs.     Stoma Size:  adequate     Lymphedema:  yes    Manual therapy: MLD/CDP continues for head and neck.    Measurement taken of face and neck as follows.     Facial Lymphedema Measurement Form  PRE-TREATMENT  Neck Composite   Location Right Left   Superior Circumference (A):   (just below mandible)  6.5 6.5   Middle Circumference (B):  (midway between top & bottom 5 5.5   Inferior Circumference (C):  (lowest circumferential location)  5 5   TOTAL: 16.5 17       Facial Composite   Location Right Left   1.Tragus to mental protuberance  4.5 5   2.Tragus to mouth angle  3.5 4   3.Mandibular angle to nasal wing  3.5 3   4.Mandibular angle to internal eye corner  4.25 4   5.Mandibular angle to external eye corner  2.75 3   6.Mental Protuberance to internal eye corner to external eye corner  4 4   7.Mandibular angle to mental protuberance 4 4.5   TOTALS: 26.5 27.5     POST-TREATMENT  Neck Composite   Location Right Left   Superior Circumference (A):   (just below mandible)  6 6.25   Middle Circumference (B):   (midway between top & bottom 5 5   Inferior Circumference (C):   (lowest circumferential location)  4.5 5   TOTAL: 15.5 16.25       Facial Composite   Location Right Left   1.Tragus to mental protuberance  4.5 4.5   2.Tragus to mouth angle  3.5 4   3.Mandibular angle to nasal wing  3.25 3   4.Mandibular angle to internal eye corner  4 4   5.Mandibular angle to external eye corner  2.5 3   6.Mental Protuberance to internal eye corner to external eye corner  4 4   7.Mandibular angle to mental protuberance 4 4   TOTALS: 26 26.5   Greater than a 2% difference from pre-to post-treatment? no     Treatment   Heaven Borel noted to tolerated pneumatic compression with good results this date. Decreased Neck composite total of  1.0 inches on right and 1.25 inches on left and Face composite total of .5 of an inch on right and 1 inch on left. SLP ended session with neck stretches, ROM and diaphragmatic breathing. Reduced fibrotic texture and increased neck ROM noted after intervention. Good compliance with all training. SLP to follow up on 12/13/2022.     Education: Plan of Care, role of SLP in care, and scheduling/ cancellation policy were discussed with pt. Patient expressed understanding.     Assessment       LongTerm Goals:  Current Progress:   1. Patient will demonstrate understanding and implementation of long-term home management interventions as noted by improvement in lymphatic function, conduction of oral care, completion of daily exercises/stretches, and use of home management tools  Progressing towards goal       Short Term Goals:  Current Progress:   1. Patient will participate in manual interventions to target improvement in alaryngeal communication function related to reduced lymphatic function Progressing towards goal   2.  Patient will completed diaphragmatic breathing exercises at an independent level.  Progressing towards goal   3.  Patient will improve alaryngeal communication to maintain voicing >90% of the time.  Progressing towards goal     Ms. 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  She is currently utilizing esophageal speech via a voice prosthesis for alaryngeal communication for functional communication with her family, friends, medical providers, and others to perform her daily life activities.  She requires the use of a laryngectomy tube at all times to keep the airway patent and prevent stomal stenosis. HMEs are required daily for mucus management and pulmonary optimization .     SLP to additionally provided MLD/CDP for head and neck lymphedema to improve functional communication.    Plan     SLP intervention is warranted 1-2 times a week or PRN for communication needs for 1-6 weeks due  "to the chronic nature of the impairment.     Additional Information   Ms. Boston entered with continued areas of pink skin discoloration bilaterally. Good decongestion noted visually with tissue texture with manual techniques this date.  SLP educated pt to continue use of decompression sleeve with "chip" bag for additional home management. Pt with reduced dysphagia symptoms and increased tolerance of "regular" consistencies. SLP will continue to address POC as stated.   Sunitha Jean MS, CCC-SLP   12/8/2022                                    "

## 2022-12-13 ENCOUNTER — OFFICE VISIT (OUTPATIENT)
Dept: SURGERY | Facility: CLINIC | Age: 57
End: 2022-12-13
Payer: COMMERCIAL

## 2022-12-13 ENCOUNTER — CLINICAL SUPPORT (OUTPATIENT)
Dept: REHABILITATION | Facility: HOSPITAL | Age: 57
End: 2022-12-13
Payer: COMMERCIAL

## 2022-12-13 VITALS
HEIGHT: 62 IN | HEART RATE: 75 BPM | BODY MASS INDEX: 24.29 KG/M2 | WEIGHT: 132 LBS | SYSTOLIC BLOOD PRESSURE: 146 MMHG | TEMPERATURE: 98 F | DIASTOLIC BLOOD PRESSURE: 77 MMHG

## 2022-12-13 DIAGNOSIS — K31.6 GASTROCUTANEOUS FISTULA DUE TO GASTROSTOMY TUBE: Primary | ICD-10-CM

## 2022-12-13 DIAGNOSIS — I89.0 LYMPHEDEMA: Primary | ICD-10-CM

## 2022-12-13 PROCEDURE — 92507 TX SP LANG VOICE COMM INDIV: CPT

## 2022-12-13 PROCEDURE — 99215 OFFICE O/P EST HI 40 MIN: CPT | Mod: PBBFAC

## 2022-12-13 NOTE — PROGRESS NOTES
[facility]    HPI: Ms. Boston is a 56 yo F with a PMH of T2DM, HTN, and SCC of the larynx who presents to clinic for follow-up of a PEG c/b gastrocutaneous fistula. She had PEG tube removed on 8/16/2022. Patient reports the fistula is much improved in size with decreased drainage. She reports she does not have to have dressing cover usually. Denies pain, N/V/F/C, abdominal pain, diarrhea.     PMH:   Past Medical History:   Diagnosis Date    Cancer     Laryngeal    Diabetes mellitus     Hypertension       Meds:   Current Outpatient Medications:     amLODIPine (NORVASC) 5 MG tablet, Take 1 tablet (5 mg total) by mouth once daily., Disp: 90 tablet, Rfl: 3    atorvastatin (LIPITOR) 80 MG tablet, Take 1 tablet (80 mg total) by mouth every evening., Disp: 90 tablet, Rfl: 3    azelastine (ASTELIN) 137 mcg (0.1 %) nasal spray, 1 spray by Nasal route., Disp: , Rfl:     carBAMazepine (CARBATROL) 200 MG CM12, Take 200 mg by mouth., Disp: , Rfl:     carBAMazepine (TEGRETOL XR) 200 MG 12 hr tablet, Take 200 mg by mouth 2 (two) times daily., Disp: , Rfl:     cetirizine (ZYRTEC) 10 MG tablet, Take 10 mg by mouth once daily., Disp: , Rfl:     DULoxetine (CYMBALTA) 30 MG capsule, Take 30 mg by mouth 2 (two) times daily., Disp: , Rfl:     fluticasone propionate (FLONASE) 50 mcg/actuation nasal spray, 1 spray by Each Nostril route 2 (two) times daily., Disp: , Rfl:     fluticasone-salmeterol diskus inhaler 100-50 mcg, Inhale 1 puff into the lungs every 12 (twelve) hours., Disp: , Rfl:     ibuprofen (ADVIL,MOTRIN) 800 MG tablet, Take 800 mg by mouth every 6 (six) hours as needed., Disp: , Rfl:     metFORMIN (GLUCOPHAGE) 500 MG tablet, Take 1 tablet (500 mg total) by mouth once daily., Disp: 90 tablet, Rfl: 3    metoprolol succinate (TOPROL-XL) 25 MG 24 hr tablet, Take 0.5 tablets (12.5 mg total) by mouth once daily., Disp: 45 tablet, Rfl: 3    montelukast (SINGULAIR) 5 MG chewable tablet, Take 1 tablet (5 mg total)  by mouth every evening., Disp: 90 tablet, Rfl: 3    rivaroxaban (XARELTO) 10 mg Tab, Take 2 tablets (20 mg total) by mouth daily with dinner or evening meal., Disp: 90 tablet, Rfl: 0    aspirin (ECOTRIN) 81 MG EC tablet, Take 1 tablet (81 mg total) by mouth once daily., Disp: , Rfl: 3    HYDROcodone-acetaminophen 5-300 mg Tab, Take 1 tablet by mouth every 8 (eight) hours as needed., Disp: , Rfl:     loratadine (CLARITIN) 10 mg tablet, Take 1 tablet (10 mg total) by mouth once daily. (Patient not taking: Reported on 10/25/2022), Disp: 90 tablet, Rfl: 0    omeprazole (PRILOSEC) 40 MG capsule, Take 40 mg by mouth once daily., Disp: , Rfl:     prednisoLONE acetate (PRED FORTE) 1 % DrpS, Place into both eyes., Disp: , Rfl:     Current Facility-Administered Medications:     LIDOcaine (PF) 40 mg/mL (4 %) injection 2 mL, 2 mL, Other, 1 time in Clinic/HOD, Gregory Martins MD    phenylephrine HCL 1% nasal spray 1 spray, 1 spray, Each Nostril, 1 time in Clinic/HOD, Gregory Martins MD    silver nitrate applicators applicator 1 applicator, 1 applicator, Topical (Top), 1 time in Clinic/HOD, Patricia Montes MD  Allergies: Review of patient's allergies indicates:  No Known Allergies  Social History:   Social History     Tobacco Use    Smoking status: Former    Smokeless tobacco: Former   Substance Use Topics    Alcohol use: Never    Drug use: Never     Family History: No family history on file.  Surgical History:   Past Surgical History:   Procedure Laterality Date     SECTION      FL REMOVAL OF LARYNX  2022     Review of Systems:  Skin: No rashes or itching.  Head: Denies headache   Respiratory: Denies shortness of breath or chest pain  Cardiac: Denies palpitations or swelling in hands/feet.  Gastrointestinal: Denies nausea, denies vomiting, denies diarrhea  Urinary: Denies dysuria or hematuria.    Objective:    Vitals:  Vitals:    22 1215   BP: (!) 146/77   Pulse: 75   Temp: 98.1 °F (36.7 °C)           Physical Exam:  Gen: NAD  Neuro: awake, alert, answering questions appropriately  CV: RRR  Resp: non-labored breathing, CHE  Abd: soft, ND, NT. Minimal drainage on the gastrocutaneous fistula (see clinical media)  Ext: moves all 4 spontaneously and purposefully  Skin: warm, well perfused        Assessment/Plan:    Ms. Boston is a 56 yo F with a PMH of T2DM, HTN, and SCC of the larynx who presents to clinic for follow-up of a PEG c/b gastrocutaneous fistula. She had PEG tube removed on 8/16/2022.Gastrocutaneous fistula appears to be well-healing     - RTC in 4 weeks for recheck of fistula    Jaeyeon Kweon   [facility] MS-3  12/13/2022 12:33 PM      Patient seen and evaluated with the student. I agree with the documentation above.      Michoacano Sahni MD  PGY-1 [employer] [facility] of Surgery

## 2022-12-13 NOTE — PROGRESS NOTES
[facility]  Speech Therapy Progress Note  Laryngectomy-Lymphedema    Date: 2022     Name: Heaven Boston   MRN: [id number]    Therapy Diagnosis:   Encounter Diagnosis   Name Primary?    Lymphedema Yes        Physician: Dr. Aparicio    PATIENT IS A NECK-BREATHER - DO NOT ORALLY INTUBATE    Time In:  0900  Time Out:  0950    Procedure Min.   Speech Language Voice Therapy  50   Total Untimed Units: 50  Charges Billed/# of units: 50/1    Precautions: Standard  Subjective    Chief Complaint: Patient with no complaints this date     AFFECT: normal affect    Pain:   0/10  Pain Location / Description: NA  Current Medical History: Heaven Boston is a 57 y.o. female referred by Dr. Aparicio for TEP management to maximize alaryngeal communication without respiratory compromise and lymphedema management.     Past Medical History:   Past Medical History:   Diagnosis Date    Cancer     Laryngeal    Diabetes mellitus     Hypertension       Heaven Boston  has a past surgical history that includes pr removal of larynx (2022) and  section.  Medical Hx and Allergies: Heaven has a current medication list which includes the following prescription(s): amlodipine, aspirin, atorvastatin, azelastine, carbamazepine, carbamazepine, cetirizine, duloxetine, fluticasone propionate, fluticasone-salmeterol 100-50 mcg/dose, hydrocodone-acetaminophen, ibuprofen, loratadine, metformin, metoprolol succinate, montelukast, omeprazole, prednisolone acetate, and rivaroxaban, and the following Facility-Administered Medications: lidocaine (pf), phenylephrine hcl 1%, and silver nitrate applicators. Review of patient's allergies indicates:  No Known Allergies    Current Voice Function: currently using alaryngeal communication via voice prosthesis   Current Level of Swallow Function/Complaints:  Pt does not report dysphagia  Prior Therapy:  2022    OBJECTIVE   TEP Current Status:17FR, 12.5mm Aquino placed on  04/12/2022    TEP Patient Complaints: Intermittent strained voicing with periods of good voicing    TEP Accessories: 10/55 fenestrated leonie tube with push to talk HMEs.     Stoma Size:  adequate     Lymphedema:  yes    Manual therapy: MLD/CDP continues for head and neck.    Measurement taken of face and neck as follows.     Facial Lymphedema Measurement Form  PRE-TREATMENT  Neck Composite   Location Right Left   Superior Circumference (A):   (just below mandible)  7 6.5   Middle Circumference (B):  (midway between top & bottom 5.25 5   Inferior Circumference (C):  (lowest circumferential location)  5 5   TOTAL: 17.25 16.5       Facial Composite   Location Right Left   1.Tragus to mental protuberance  5 5   2.Tragus to mouth angle  4 4   3.Mandibular angle to nasal wing  3.5 3.5   4.Mandibular angle to internal eye corner  4 4   5.Mandibular angle to external eye corner  2.75 3   6.Mental Protuberance to internal eye corner to external eye corner  4 4   7.Mandibular angle to mental protuberance 4.25 4.25   TOTALS: 27.5 27.75     POST-TREATMENT  Neck Composite   Location Right Left   Superior Circumference (A):   (just below mandible)  6 6   Middle Circumference (B):   (midway between top & bottom 5 5   Inferior Circumference (C):   (lowest circumferential location)  4.5 5   TOTAL: 15.5 16       Facial Composite   Location Right Left   1.Tragus to mental protuberance  4.5 4.5   2.Tragus to mouth angle  3.75 4   3.Mandibular angle to nasal wing  3.5 3.5   4.Mandibular angle to internal eye corner  4 4   5.Mandibular angle to external eye corner  2.75 3   6.Mental Protuberance to internal eye corner to external eye corner  4 4   7.Mandibular angle to mental protuberance 4.25 4.25   TOTALS: 26.75 27.25   Greater than a 2% difference from pre-to post-treatment? no     Treatment   Heaven Borel noted to tolerated pneumatic compression with good results this date. Decreased Neck composite total of 1.75 inches on right and  .5 of an inches on left and Face composite total of .75 of an inch on right and .5 of an inch on left. SLP ended session with neck stretches, ROM and diaphragmatic breathing. Reduced fibrotic texture and increased neck ROM noted after intervention. Good compliance with all training. SLP to follow up on 12/15/2022.     Education: Plan of Care, role of SLP in care, and scheduling/ cancellation policy were discussed with pt. Patient expressed understanding.     Assessment       LongTerm Goals:  Current Progress:   1. Patient will demonstrate understanding and implementation of long-term home management interventions as noted by improvement in lymphatic function, conduction of oral care, completion of daily exercises/stretches, and use of home management tools  Progressing towards goal       Short Term Goals:  Current Progress:   1. Patient will participate in manual interventions to target improvement in alaryngeal communication function related to reduced lymphatic function Progressing towards goal   2.  Patient will completed diaphragmatic breathing exercises at an independent level.  Progressing towards goal   3.  Patient will improve alaryngeal communication to maintain voicing >90% of the time.  Progressing towards goal     Ms. 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  She is currently utilizing esophageal speech via a voice prosthesis for alaryngeal communication for functional communication with her family, friends, medical providers, and others to perform her daily life activities.  She requires the use of a laryngectomy tube at all times to keep the airway patent and prevent stomal stenosis. HMEs are required daily for mucus management and pulmonary optimization .     SLP to additionally provided MLD/CDP for head and neck lymphedema to improve functional communication.    Plan     SLP intervention is warranted 1-2 times a week or PRN for communication needs for 1-6 weeks due to the  "chronic nature of the impairment.     Additional Information   Ms. Boston entered with reduced areas of discoloration bilaterally. Good decongestion noted visually with tissue texture with manual techniques this date.  SLP educated pt to continue use of decompression sleeve with "chip" bag for additional home management. Pt with reduced dysphagia symptoms and increased tolerance of "regular" consistencies. SLP will continue to address POC as stated.       Sunitha Jean MS, CCC-SLP   12/13/2022                                      "

## 2022-12-13 NOTE — PROGRESS NOTES
Patient seen by Dr. DRAKE Sahni. Will return in 4 weeks. Written and verbal discharge instructions given.

## 2022-12-15 ENCOUNTER — CLINICAL SUPPORT (OUTPATIENT)
Dept: REHABILITATION | Facility: HOSPITAL | Age: 57
End: 2022-12-15
Payer: COMMERCIAL

## 2022-12-15 DIAGNOSIS — I89.0 LYMPHEDEMA: Primary | ICD-10-CM

## 2022-12-15 PROCEDURE — 92507 TX SP LANG VOICE COMM INDIV: CPT

## 2022-12-15 NOTE — PROGRESS NOTES
[facility]  Speech Therapy Progress Note  Laryngectomy-Lymphedema    Date: 12/15/2022     Name: Heaven Boston   MRN: [id number]    Therapy Diagnosis:   No diagnosis found.       Physician: Dr. Aparicio    PATIENT IS A NECK-BREATHER - DO NOT ORALLY INTUBATE    Time In:  0900  Time Out:  0950    Procedure Min.   Speech Language Voice Therapy  50   Total Untimed Units: 50  Charges Billed/# of units: 50/1    Precautions: Standard  Subjective    Chief Complaint: Patient with no complaints this date     AFFECT: normal affect    Pain:   0/10  Pain Location / Description: NA  Current Medical History: Heaven Boston is a 57 y.o. female referred by Dr. Aparicio for TEP management to maximize alaryngeal communication without respiratory compromise and lymphedema management.     Past Medical History:   Past Medical History:   Diagnosis Date    Cancer     Laryngeal    Diabetes mellitus     Hypertension       Heaven Boston  has a past surgical history that includes pr removal of larynx (2022) and  section.  Medical Hx and Allergies: Heaven has a current medication list which includes the following prescription(s): amlodipine, aspirin, atorvastatin, azelastine, carbamazepine, carbamazepine, cetirizine, duloxetine, fluticasone propionate, fluticasone-salmeterol 100-50 mcg/dose, hydrocodone-acetaminophen, ibuprofen, loratadine, metformin, metoprolol succinate, montelukast, omeprazole, prednisolone acetate, and rivaroxaban, and the following Facility-Administered Medications: lidocaine (pf), phenylephrine hcl 1%, and silver nitrate applicators. Review of patient's allergies indicates:  No Known Allergies    Current Voice Function: currently using alaryngeal communication via voice prosthesis   Current Level of Swallow Function/Complaints:  Pt does not report dysphagia  Prior Therapy:  2022    OBJECTIVE   TEP Current Status:17FR, 12.5mm Aquino placed on 2022    TEP Patient Complaints:  Intermittent strained voicing with periods of good voicing    TEP Accessories: 10/55 fenestrated leonie tube with push to talk HMEs.     Stoma Size:  adequate     Lymphedema:  yes    Manual therapy: MLD/CDP continues for head and neck.    Measurement taken of face and neck as follows.     Facial Lymphedema Measurement Form  PRE-TREATMENT  Neck Composite   Location Right Left   Superior Circumference (A):   (just below mandible)  6.5 6.5   Middle Circumference (B):  (midway between top & bottom 5.5 5.5   Inferior Circumference (C):  (lowest circumferential location)  5 5   TOTAL: 17 17       Facial Composite   Location Right Left   1.Tragus to mental protuberance  5 5   2.Tragus to mouth angle  4 4   3.Mandibular angle to nasal wing  3.25 3.5   4.Mandibular angle to internal eye corner  4 3.75   5.Mandibular angle to external eye corner  2.75 3   6.Mental Protuberance to internal eye corner to external eye corner  4 3.75   7.Mandibular angle to mental protuberance 4 4   TOTALS: 27 27     POST-TREATMENT  Neck Composite   Location Right Left   Superior Circumference (A):   (just below mandible)  6.25 6   Middle Circumference (B):   (midway between top & bottom 5 5   Inferior Circumference (C):   (lowest circumferential location)  5 5   TOTAL: 16.25 16       Facial Composite   Location Right Left   1.Tragus to mental protuberance  4.5 5   2.Tragus to mouth angle  3.75 4   3.Mandibular angle to nasal wing  3.25 3.5   4.Mandibular angle to internal eye corner  4 3.75   5.Mandibular angle to external eye corner  2.5 3   6.Mental Protuberance to internal eye corner to external eye corner  4 3.75   7.Mandibular angle to mental protuberance 4 4   TOTALS: 26 27   Greater than a 2% difference from pre-to post-treatment? no     Treatment   Heaven Borel noted to tolerated pneumatic compression with good results this date. Decreased Neck composite total of .75 of an inches on right and 1.0 inches on left and Face composite total of  1.0 inches on right and 0 inches on left. SLP ended session with neck stretches, ROM and diaphragmatic breathing. Reduced fibrotic texture and increased neck ROM noted after intervention. Good compliance with all training. SLP to follow up on 12/20/2022.     Education: Plan of Care, role of SLP in care, and scheduling/ cancellation policy were discussed with pt. Patient expressed understanding.     Assessment       LongTerm Goals:  Current Progress:   1. Patient will demonstrate understanding and implementation of long-term home management interventions as noted by improvement in lymphatic function, conduction of oral care, completion of daily exercises/stretches, and use of home management tools  Progressing towards goal       Short Term Goals:  Current Progress:   1. Patient will participate in manual interventions to target improvement in alaryngeal communication function related to reduced lymphatic function Progressing towards goal   2.  Patient will completed diaphragmatic breathing exercises at an independent level.  Progressing towards goal   3.  Patient will improve alaryngeal communication to maintain voicing >90% of the time.  Progressing towards goal     Ms. 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  She is currently utilizing esophageal speech via a voice prosthesis for alaryngeal communication for functional communication with her family, friends, medical providers, and others to perform her daily life activities.  She requires the use of a laryngectomy tube at all times to keep the airway patent and prevent stomal stenosis. HMEs are required daily for mucus management and pulmonary optimization .     SLP to additionally provided MLD/CDP for head and neck lymphedema to improve functional communication.    Plan     SLP intervention is warranted 1-2 times a week or PRN for communication needs for 1-6 weeks due to the chronic nature of the impairment.     Additional Information   Ms.  "Borel entered with reduced areas of discoloration bilaterally. Good decongestion noted visually with tissue texture with manual techniques this date.  SLP educated pt to continue use of decompression sleeve with "chip" bag for additional home management. Pt with reduced dysphagia symptoms and increased tolerance of "regular" consistencies. SLP will continue to address POC as stated.       Sunitha Jean MS, CCC-SLP   12/15/2022                                        "

## 2022-12-20 ENCOUNTER — CLINICAL SUPPORT (OUTPATIENT)
Dept: REHABILITATION | Facility: HOSPITAL | Age: 57
End: 2022-12-20
Payer: COMMERCIAL

## 2022-12-20 DIAGNOSIS — I89.0 LYMPHEDEMA: Primary | ICD-10-CM

## 2022-12-20 PROCEDURE — 92507 TX SP LANG VOICE COMM INDIV: CPT

## 2022-12-20 NOTE — PROGRESS NOTES
[facility]  Speech Therapy Progress Note  Laryngectomy-Lymphedema    Date: 2022     Name: Heaven Boston   MRN: [id number]    Therapy Diagnosis:   Encounter Diagnosis   Name Primary?    Lymphedema Yes          Physician: Dr. Aparicio    PATIENT IS A NECK-BREATHER - DO NOT ORALLY INTUBATE    Time In:  0900  Time Out:  0950    Procedure Min.   Speech Language Voice Therapy  50   Total Untimed Units: 50  Charges Billed/# of units: 50/1    Precautions: Standard  Subjective    Chief Complaint: Patient with no complaints this date. Overall reduced swelling noted this date.     AFFECT: normal affect    Pain:   0/10  Pain Location / Description: NA  Current Medical History: Heaven Boston is a 57 y.o. female referred by Dr. Aparicio for TEP management to maximize alaryngeal communication without respiratory compromise and lymphedema management.     Past Medical History:   Past Medical History:   Diagnosis Date    Cancer     Laryngeal    Diabetes mellitus     Hypertension       Heaven Boston  has a past surgical history that includes pr removal of larynx (2022) and  section.  Medical Hx and Allergies: Heaven has a current medication list which includes the following prescription(s): amlodipine, aspirin, atorvastatin, azelastine, carbamazepine, carbamazepine, cetirizine, duloxetine, fluticasone propionate, fluticasone-salmeterol 100-50 mcg/dose, hydrocodone-acetaminophen, ibuprofen, loratadine, metformin, metoprolol succinate, montelukast, omeprazole, prednisolone acetate, and rivaroxaban, and the following Facility-Administered Medications: lidocaine (pf), phenylephrine hcl 1%, and silver nitrate applicators. Review of patient's allergies indicates:  No Known Allergies    Current Voice Function: currently using alaryngeal communication via voice prosthesis   Current Level of Swallow Function/Complaints:  Pt does not report dysphagia  Prior Therapy:  2022    OBJECTIVE   TEP  Current Status:17FR, 12.5mm Aquino placed on 04/12/2022    TEP Patient Complaints: Intermittent strained voicing with periods of good voicing    TEP Accessories: 10/55 fenestrated leonie tube with push to talk HMEs.     Stoma Size:  adequate     Lymphedema:  yes    Manual therapy: MLD/CDP continues for head and neck.    Measurement taken of face and neck as follows.     Facial Lymphedema Measurement Form  PRE-TREATMENT  Neck Composite   Location Right Left   Superior Circumference (A):   (just below mandible)  6 6   Middle Circumference (B):  (midway between top & bottom 5 5   Inferior Circumference (C):  (lowest circumferential location)  4.75 4.5   TOTAL: 15.75 15.5       Facial Composite   Location Right Left   1.Tragus to mental protuberance  5 5   2.Tragus to mouth angle  4 4   3.Mandibular angle to nasal wing  3.75 3.5   4.Mandibular angle to internal eye corner  4 4   5.Mandibular angle to external eye corner  2.75 3   6.Mental Protuberance to internal eye corner to external eye corner  4 3.75   7.Mandibular angle to mental protuberance 4 4   TOTALS: 27.5 27.25     POST-TREATMENT  Neck Composite   Location Right Left   Superior Circumference (A):   (just below mandible)  6 6   Middle Circumference (B):   (midway between top & bottom 5 4.75   Inferior Circumference (C):   (lowest circumferential location)  4.5 4.25   TOTAL: 15.5 15       Facial Composite   Location Right Left   1.Tragus to mental protuberance  5 5   2.Tragus to mouth angle  3.5 4   3.Mandibular angle to nasal wing  3.5 3.5   4.Mandibular angle to internal eye corner  4 4   5.Mandibular angle to external eye corner  2.75 3   6.Mental Protuberance to internal eye corner to external eye corner  4 3   7.Mandibular angle to mental protuberance 4 4   TOTALS: 26.75 26.5   Greater than a 2% difference from pre-to post-treatment? no     Treatment   Heaven Borel noted to tolerated pneumatic compression with good results this date. Decreased Neck composite  total of .25 of an inches on right and .5 of an inch on left and Face composite total of .75 of an inch on right and .75 of an inch on left. SLP ended session with neck stretches, ROM and diaphragmatic breathing. Reduced fibrotic texture and increased neck ROM noted after intervention. Good compliance with all training. SLP to follow up on 12/22/2022.     Education: Plan of Care, role of SLP in care, and scheduling/ cancellation policy were discussed with pt. Patient expressed understanding.     Assessment       LongTerm Goals:  Current Progress:   1. Patient will demonstrate understanding and implementation of long-term home management interventions as noted by improvement in lymphatic function, conduction of oral care, completion of daily exercises/stretches, and use of home management tools  Progressing towards goal       Short Term Goals:  Current Progress:   1. Patient will participate in manual interventions to target improvement in alaryngeal communication function related to reduced lymphatic function Progressing towards goal   2.  Patient will completed diaphragmatic breathing exercises at an independent level.  Progressing towards goal   3.  Patient will improve alaryngeal communication to maintain voicing >90% of the time.  Progressing towards goal     Ms. 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  She is currently utilizing esophageal speech via a voice prosthesis for alaryngeal communication for functional communication with her family, friends, medical providers, and others to perform her daily life activities.  She requires the use of a laryngectomy tube at all times to keep the airway patent and prevent stomal stenosis. HMEs are required daily for mucus management and pulmonary optimization .     SLP to additionally provided MLD/CDP for head and neck lymphedema to improve functional communication.    Plan     SLP intervention is warranted 1-2 times a week or PRN for communication  "needs for 1-6 weeks due to the chronic nature of the impairment.     Additional Information   Ms. Boston entered with overall reduced swelling and good skin color this date. Good decongestion noted visually with tissue texture with manual techniques this date.  Pt requested second decompression sleeve this date. SLP demonstrated appropriate placement for maximal decongestion. SLP educated pt to continue use of decompression sleeve with "chip" bag for additional home management. Pt with reduced dysphagia symptoms and increased tolerance of "regular" consistencies. SLP will continue to address POC as stated.       Sunitha Jean MS, CCC-SLP   12/20/2022                                          "

## 2022-12-22 ENCOUNTER — CLINICAL SUPPORT (OUTPATIENT)
Dept: REHABILITATION | Facility: HOSPITAL | Age: 57
End: 2022-12-22
Payer: COMMERCIAL

## 2022-12-22 ENCOUNTER — OFFICE VISIT (OUTPATIENT)
Dept: OTOLARYNGOLOGY | Facility: CLINIC | Age: 57
End: 2022-12-22
Payer: COMMERCIAL

## 2022-12-22 VITALS
BODY MASS INDEX: 25.79 KG/M2 | DIASTOLIC BLOOD PRESSURE: 74 MMHG | SYSTOLIC BLOOD PRESSURE: 132 MMHG | WEIGHT: 141 LBS | TEMPERATURE: 98 F | HEART RATE: 86 BPM

## 2022-12-22 DIAGNOSIS — Z90.02 H/O LARYNGECTOMY: ICD-10-CM

## 2022-12-22 DIAGNOSIS — Z92.3 H/O HEAD AND NECK RADIATION: ICD-10-CM

## 2022-12-22 DIAGNOSIS — C32.9 LARYNGEAL CANCER: ICD-10-CM

## 2022-12-22 DIAGNOSIS — Z92.3 HISTORY OF RADIATION TO HEAD AND NECK REGION: ICD-10-CM

## 2022-12-22 DIAGNOSIS — I89.0 LYMPHEDEMA: Primary | ICD-10-CM

## 2022-12-22 DIAGNOSIS — E03.9 HYPOTHYROIDISM, UNSPECIFIED TYPE: Primary | ICD-10-CM

## 2022-12-22 PROCEDURE — 92507 TX SP LANG VOICE COMM INDIV: CPT

## 2022-12-22 PROCEDURE — 99214 OFFICE O/P EST MOD 30 MIN: CPT | Mod: PBBFAC | Performed by: STUDENT IN AN ORGANIZED HEALTH CARE EDUCATION/TRAINING PROGRAM

## 2022-12-22 PROCEDURE — 31575 DIAGNOSTIC LARYNGOSCOPY: CPT | Mod: PBBFAC | Performed by: STUDENT IN AN ORGANIZED HEALTH CARE EDUCATION/TRAINING PROGRAM

## 2022-12-22 RX ORDER — LEVOTHYROXINE SODIUM 88 UG/1
88 TABLET ORAL
Qty: 30 TABLET | Refills: 11 | Status: SHIPPED | OUTPATIENT
Start: 2022-12-22 | End: 2023-05-15

## 2022-12-22 NOTE — PROGRESS NOTES
The scope used for the exam was:  Flexible scope ENF-P4  Serial Number:  1)    [id number]    []   2)    [id number]    [x]   3)    [id number]    []   4)    [id number]    []   5)    [id number]    []   6)    [id number]    []       The scope used for the exam was:  Rigid scope   Serial Number:  1)   [id number]    []   2)   [id number]    []   3)   [id number]    []   4)    3384   []   5)    0824   []   6)    5554   []     7)   7425    []   8)   2240    []   9)   1109    []

## 2022-12-22 NOTE — PROGRESS NOTES
[facility]  Otolaryngology Clinic Note    Heaven Boston  Encounter Date: 12/22/2022  YOB: 1965    Chief Complaint: s/p total laryngectomy     HPI: Heaven Boston is a 57 y.o. 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 Patient had an uneventful hospitalization course and was discharged on postoperative day 9 after an esophagram showed no pharyngeal leak.  However she re-presented to our facility on 4/6/22 with surgical site infection and a pharyngocutaneous fistula. Patient underwent a neck washout with primary repair on 4/11/2022, with placement of a gastrostomy tube the same day. She was maintained on NPO and transferred to [place] for further evaluation and management on 4/15/22 after evidence of persistent pharyngocutaneous fistula. There she underwent a 2nd neck washout with primary closure and placement of salivary bypass tube.      5/2/22: Patient had an uneventful hospitalization course and removal of salivary bypass tube before discharge last week and now presenting for postoperative evaluation.  Patient presents today without any complaints.  She is wondering about when he pointed that her x-rays for swallowing then.  She seen Dr. Laguna today after our visit to undergo-stimulation for radiation.     5/9/22: Pt. Did not answer. Let voicemail to advance diet to CLD. Also discussed this with son.  Plan to see in clinic in 2 weeks     5/19/22: In radiation therapy, 5x weekly until 6/16/22. Has TEP in place, brought another TEP requesting swap out. Will go to speech therapy for exchange of TEP. Went to ER for bleeding from G-tube site 1 week ago but declines visit to Gen Surg clinic today. On [facility]D taking Ensure by G-tube, water and soups by mouth.  Had questions about advancing diet. No weight loss or appetite change. No other issues.      6/21/22:  Returns today for follow up.  Completed radiation last week 6/16/22.  Is having difficulty voicing with TEP though it has been swapped out by SLP.  Able to eat what she wants.  Having some tightness of her throat and soreness, otherwise no issues.     7/21/22: Here today for follow up. Has overall been doing very well. She is tolerating her diet by mouth and gaining weight. No dysphagia. Has not used her PEG tube since prior to radiation. She is interested in having it removed. Has some fullness in the left neck which intermittently swells and then resolves again. Otherwise no new lumps/bumps of the head and neck.     8/23/22: Ms. Boston returns today for follow up. She complains of neck pain exacerbated by flexion and movement to the right that began 2 weeks ago. She also reports neck swelling and tightness which occasionally increases in size and is tender to palpation. She reports cough with clear mucus production as well as nasal congestion without drainage. She also notes dry, itchy eyes that have not improved with Visine. She also notes dysuria. She is tolerating her diet by mouth and gaining weight. She had her PEG tube removed on 8/16/22. She has been to SLP twice and is still having difficulty voicing with TEP.    9/27/22: Here for follow up. Reports she is doing okay. Still coughing a lot and not voicing well with TEP. No weight loss, tolerating diet. No otalgia/lumps bumps. Still having intermittent neck swelling/pain.     10/27/22:  Returns for surveillance.  Did not get TSH, T4.  Has been going to SLP, now can talk well.  Cough has improved.  No new H&N complaints    12/22/22: Doing very well. Voicing well. No pain. Doing well with speech and lymphedema therapy. Eating well, gaining weight constantly. Always tired. Sometimes sleeps all day.    ROS:   General: Negative except per HPI  Skin: Denies rash,  ulcer, or lesion.  Eyes: Denies vision changes or diplopia.  Ears: Negative except per HPI  Nose: Negative except per HPI  Throat/mouth: Negative except per HPI  Cardiovascular: Negative except per HPI  Respiratory: Negative except per HPI  Neck: Negative except per HPI  Endocrine: Negative except per HPI  Neurologic: Negative except per HPI    Other 10-point review of systems negative except per HPI    Review of patient's allergies indicates:  No Known Allergies    Past Medical History:   Diagnosis Date    Cancer     Laryngeal    Diabetes mellitus     Hypertension        Past Surgical History:   Procedure Laterality Date     SECTION      NC REMOVAL OF LARYNX  2022       Social History     Socioeconomic History    Marital status: Single   Tobacco Use    Smoking status: Former    Smokeless tobacco: Former   Substance and Sexual Activity    Alcohol use: Never    Drug use: Never    Sexual activity: Not Currently       History reviewed. No pertinent family history.    Outpatient Encounter Medications as of 2022   Medication Sig Dispense Refill    amLODIPine (NORVASC) 5 MG tablet Take 1 tablet (5 mg total) by mouth once daily. 90 tablet 3    aspirin (ECOTRIN) 81 MG EC tablet Take 1 tablet (81 mg total) by mouth once daily.  3    atorvastatin (LIPITOR) 80 MG tablet Take 1 tablet (80 mg total) by mouth every evening. 90 tablet 3    azelastine (ASTELIN) 137 mcg (0.1 %) nasal spray 1 spray by Nasal route.      carBAMazepine (CARBATROL) 200 MG CM12 Take 200 mg by mouth.      carBAMazepine (TEGRETOL XR) 200 MG 12 hr tablet Take 200 mg by mouth 2 (two) times daily.      cetirizine (ZYRTEC) 10 MG tablet Take 10 mg by mouth once daily.      DULoxetine (CYMBALTA) 30 MG capsule Take 30 mg by mouth 2 (two) times daily.      fluticasone propionate (FLONASE) 50 mcg/actuation nasal spray 1 spray by Each Nostril route 2 (two) times daily.      fluticasone-salmeterol diskus inhaler 100-50 mcg Inhale 1 puff into the  lungs every 12 (twelve) hours.      HYDROcodone-acetaminophen 5-300 mg Tab Take 1 tablet by mouth every 8 (eight) hours as needed.      ibuprofen (ADVIL,MOTRIN) 800 MG tablet Take 800 mg by mouth every 6 (six) hours as needed.      loratadine (CLARITIN) 10 mg tablet Take 1 tablet (10 mg total) by mouth once daily. (Patient not taking: Reported on 10/25/2022) 90 tablet 0    metFORMIN (GLUCOPHAGE) 500 MG tablet Take 1 tablet (500 mg total) by mouth once daily. 90 tablet 3    metoprolol succinate (TOPROL-XL) 25 MG 24 hr tablet Take 0.5 tablets (12.5 mg total) by mouth once daily. 45 tablet 3    montelukast (SINGULAIR) 5 MG chewable tablet Take 1 tablet (5 mg total) by mouth every evening. 90 tablet 3    omeprazole (PRILOSEC) 40 MG capsule Take 40 mg by mouth once daily.      prednisoLONE acetate (PRED FORTE) 1 % DrpS Place into both eyes.      rivaroxaban (XARELTO) 10 mg Tab Take 2 tablets (20 mg total) by mouth daily with dinner or evening meal. 90 tablet 0     Facility-Administered Encounter Medications as of 12/22/2022   Medication Dose Route Frequency Provider Last Rate Last Admin    LIDOcaine (PF) 40 mg/mL (4 %) injection 2 mL  2 mL Other 1 time in Clinic/HOD Gregory Martins MD        phenylephrine HCL 1% nasal spray 1 spray  1 spray Each Nostril 1 time in Clinic/HOD Gregory Martins MD        silver nitrate applicators applicator 1 applicator  1 applicator Topical (Top) 1 time in Clinic/HOD Patricia Montes MD         Physical Exam:  Vitals:    12/22/22 1321   BP: 132/74   Pulse: 86   Temp: 97.6 °F (36.4 °C)   Weight: 64 kg (141 lb)     Physical Exam:  General/ Voice: NAD, AAO, no increased WOB, no stridor, clear secretions; aphonic   Head/Face: normal  Eyes: EOMI, PERRLA. Crusting noted at lower lids bilaterally without erythema or drainage. Conjunctiva normal  Ears: Hearing well at normal conversation volume  Nose: nares normal, septum midline, MMM. Erythema and dryness noted at opening of nares.  Oral  Cavity: MMM, no lesions or ulcerations, no leukoplakia, no edema; BOT and FOM are soft/NT and without lesions/ ulcerations/ leukoplakia  Oropharynx: No uvular deviation or deviation of the oropharyngeal wall noted  Neck:  Well-healed stoma without any signs of dehiscence. TEP in place without any leakage. Prior radiation burns, some superficial erythema/healing still, voicing well  Neuro: CN II - XII exam: grossly intact       Procedure: Flexible Fiberoptic Laryngoscopy  Aretha Palomo 12/22/22    The risks, benefits, and alternatives of the procedure were discussed and informed written consent was obtained. The nose was decongested and anesthetized with topical oxymetazoline and tetracaine spray. The flexible laryngoscope was passed through the right nasal passage. ?perforation of the posterior septum, no bleeding or crusting. No lesions of nasopharynx. Cricopharyngela bar present. Neopharynx clear without abnormalities or pooled secretions. Poor visualization of BOT due to coughing but no gross abnormaltiies. Trachea visualized to aleja, no mucus plugging, no lesions.     The patient tolerated the procedure well, without complications     PET 10/12/22:  IMPRESSION  1. Postoperative changes of the neck, overall similar CT appearance to the recent contrast enhanced evaluation obtained September 2022.  2. No definite sites of mass-like hypermetabolic activity to suggest residual disease or distant metastasis.  3. Suspected physiologic/reactive diffuse low-grade FDG activity through the surgical bed and through the left neck musculature; recommend close inspection on follow-up imaging in order to ensure stability/improvement.  4. Diffuse metabolic uptake through the right thyroid lobe, otherwise limited assessment.    Assessment/Plan:  Heaven Boston is a 57 y.o. female with T3N1M0 supraglottic SCCa s/p TL BSND CPM complicated by PCF s/p washout & primary closure x2 (2nd in [place]) now s/p adjuvant RT. New  post-radiation hypothyroidism     - Repeat PET scheduled by [facility] for 12/30/22  - Needs to get repeat TSH/T4 in 6 weeks  - Start synthroid in AM, counseled patient to take on empty stomach 30 min before meals  - continue with SLP as planned  - humdification especially at night  - OK to use water soluble lubricant for leonie tube     HEAD & NECK CANCER SURVEILLANCE   Primary Surgical Treatment     Head & Neck Staff: Dr. Patrick   Radiation Oncologist: Dr. Laguna    Primary tumor: T3N1M0  Squamous cell carcinoma     Date of Diagnosis: 2/18/2022  Surgery Date: 3/21/2022  Induction Chemotherapy:  No  Adjuvant Therapy:  Radiation  Completion Date: 6/16/2022    Pertinent Treatment History:  -- 3/21/22: TL, BSND, L hemithyroid, CPM, primary TEP  -- 4/6/22: post-op follow-up with PCF, admitted  -- 4/11/22: neck washout & primary closure, G tube placement  -- 4/15/22: [facility] for persistent PCF. Washout and closure with Dr. Resendez  -- 10/12/22: PET CT without evidence of persistence disease except diffuse update in the R thyroid lobe  -- 10/27/22: TSH 55    Place date if completed   3 6 12 18 24 36 48 60   CT Neck X X X X X X X X   PET/CT 10/12/22          CXR  X X X X X X X   TFT 10/27/22  X  X X X X     Current Surveillance Interval:  1 month       Aretha Palomo MD  Otolaryngology-Head & Neck Surgery  [employer] PGY5

## 2022-12-22 NOTE — PROGRESS NOTES
[facility]  Speech Therapy Progress Note  Laryngectomy-Lymphedema    Date: 2022     Name: Heaven Boston   MRN: [id number]    Therapy Diagnosis:   Encounter Diagnosis   Name Primary?    Lymphedema Yes          Physician: Dr. Aparicio    PATIENT IS A NECK-BREATHER - DO NOT ORALLY INTUBATE    Time In:  0900  Time Out:  0950    Procedure Min.   Speech Language Voice Therapy  50   Total Untimed Units: 50  Charges Billed/# of units: 50/1    Precautions: Standard  Subjective    Chief Complaint: Patient with no complaints this date. Overall reduced swelling noted this date.     AFFECT: normal affect    Pain:   0/10  Pain Location / Description: NA  Current Medical History: Heaven Boston is a 57 y.o. female referred by Dr. Aparicio for TEP management to maximize alaryngeal communication without respiratory compromise and lymphedema management.     Past Medical History:   Past Medical History:   Diagnosis Date    Cancer     Laryngeal    Diabetes mellitus     Hypertension       Heaven Boston  has a past surgical history that includes pr removal of larynx (2022) and  section.  Medical Hx and Allergies: Heaven has a current medication list which includes the following prescription(s): amlodipine, aspirin, atorvastatin, azelastine, carbamazepine, carbamazepine, cetirizine, duloxetine, fluticasone propionate, fluticasone-salmeterol 100-50 mcg/dose, hydrocodone-acetaminophen, ibuprofen, levothyroxine, loratadine, metformin, metoprolol succinate, montelukast, omeprazole, prednisolone acetate, and rivaroxaban, and the following Facility-Administered Medications: lidocaine (pf), phenylephrine hcl 1%, and silver nitrate applicators. Review of patient's allergies indicates:  No Known Allergies    Current Voice Function: currently using alaryngeal communication via voice prosthesis   Current Level of Swallow Function/Complaints:  Pt does not report dysphagia  Prior Therapy:   12/13/2022    OBJECTIVE   TEP Current Status:17FR, 12.5mm Aquino placed on 04/12/2022    TEP Patient Complaints: Intermittent strained voicing with periods of good voicing    TEP Accessories: 10/55 fenestrated leonie tube with push to talk HMEs.     Stoma Size:  adequate     Lymphedema:  yes    Manual therapy: MLD/CDP continues for head and neck.    Measurement taken of face and neck as follows.     Facial Lymphedema Measurement Form  PRE-TREATMENT  Neck Composite   Location Right Left   Superior Circumference (A):   (just below mandible)  6.5 6.5   Middle Circumference (B):  (midway between top & bottom 5 5.5   Inferior Circumference (C):  (lowest circumferential location)  5 5   TOTAL: 16.5 17       Facial Composite   Location Right Left   1.Tragus to mental protuberance  5 5   2.Tragus to mouth angle  4 4   3.Mandibular angle to nasal wing  3.5 3.5   4.Mandibular angle to internal eye corner  4 4   5.Mandibular angle to external eye corner  3 3   6.Mental Protuberance to internal eye corner to external eye corner  4 4   7.Mandibular angle to mental protuberance 4 4   TOTALS: 27.5 27.5     POST-TREATMENT  Neck Composite   Location Right Left   Superior Circumference (A):   (just below mandible)  6 6   Middle Circumference (B):   (midway between top & bottom 5 5   Inferior Circumference (C):   (lowest circumferential location)  4.5 4.5   TOTAL: 15.5 15.5       Facial Composite   Location Right Left   1.Tragus to mental protuberance  5 5   2.Tragus to mouth angle  4 4   3.Mandibular angle to nasal wing  3.5 3.5   4.Mandibular angle to internal eye corner  4 4   5.Mandibular angle to external eye corner  3 3   6.Mental Protuberance to internal eye corner to external eye corner  4 4   7.Mandibular angle to mental protuberance 4 4   TOTALS: 27.5 27.5   Greater than a 2% difference from pre-to post-treatment? no     Treatment   Heaven Borel noted to tolerated pneumatic compression with good results this date. Decreased  Neck composite total of 1.0 inch on right and 1.5 inches on left and Face composite total of .0 of an inch on right and .0 of an inch on left. SLP ended session with neck stretches, ROM and diaphragmatic breathing. Reduced fibrotic texture and increased neck ROM noted after intervention. Good compliance with all training. SLP to follow up on 12/29/2022.     Education: Plan of Care, role of SLP in care, and scheduling/ cancellation policy were discussed with pt. Patient expressed understanding.     Assessment       LongTerm Goals:  Current Progress:   1. Patient will demonstrate understanding and implementation of long-term home management interventions as noted by improvement in lymphatic function, conduction of oral care, completion of daily exercises/stretches, and use of home management tools  Progressing towards goal       Short Term Goals:  Current Progress:   1. Patient will participate in manual interventions to target improvement in alaryngeal communication function related to reduced lymphatic function Progressing towards goal   2.  Patient will completed diaphragmatic breathing exercises at an independent level.  Progressing towards goal   3.  Patient will improve alaryngeal communication to maintain voicing >90% of the time.  Progressing towards goal     Ms. 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  She is currently utilizing esophageal speech via a voice prosthesis for alaryngeal communication for functional communication with her family, friends, medical providers, and others to perform her daily life activities.  She requires the use of a laryngectomy tube at all times to keep the airway patent and prevent stomal stenosis. HMEs are required daily for mucus management and pulmonary optimization .     SLP to additionally provided MLD/CDP for head and neck lymphedema to improve functional communication.    Plan     SLP intervention is warranted 1-2 times a week or PRN for  "communication needs for 1-6 weeks due to the chronic nature of the impairment.     Additional Information   Ms. Boston entered with overall reduced swelling and good skin color this date. Good decongestion noted visually with tissue texture with manual techniques this date.  Pt requested second decompression sleeve this date. SLP demonstrated appropriate placement for maximal decongestion. SLP educated pt to continue use of decompression sleeve with "chip" bag for additional home management. Pt with reduced dysphagia symptoms and increased tolerance of "regular" consistencies. SLP will continue to address POC as stated.       Sunitha Jean MS, CCC-SLP   12/22/2022                                            "

## 2022-12-29 ENCOUNTER — CLINICAL SUPPORT (OUTPATIENT)
Dept: REHABILITATION | Facility: HOSPITAL | Age: 57
End: 2022-12-29
Payer: COMMERCIAL

## 2022-12-29 DIAGNOSIS — I89.0 LYMPHEDEMA: Primary | ICD-10-CM

## 2022-12-29 PROCEDURE — 92507 TX SP LANG VOICE COMM INDIV: CPT

## 2022-12-29 NOTE — PROGRESS NOTES
[facility]  Speech Therapy Progress Note  Laryngectomy-Lymphedema    Date: 2022     Name: Heaven Boston   MRN: [id number]    Therapy Diagnosis:   Encounter Diagnosis   Name Primary?    Lymphedema Yes          Physician: Dr. Aparicio    PATIENT IS A NECK-BREATHER - DO NOT ORALLY INTUBATE    Time In:  0900  Time Out:  0955    Procedure Min.   Speech Language Voice Therapy  55   Total Untimed Units: 55  Charges Billed/# of units: 55/1    Precautions: Standard  Subjective    Chief Complaint: Patient with no complaints this date. Continues to exhibit reduced swelling this date.     AFFECT: normal affect    Pain:   0/10  Pain Location / Description: NA  Current Medical History: Heaven Boston is a 57 y.o. female referred by Dr. Aparicio for TEP management to maximize alaryngeal communication without respiratory compromise and lymphedema management.     Past Medical History:   Past Medical History:   Diagnosis Date    Cancer     Laryngeal    Diabetes mellitus     Hypertension       Heaven Boston  has a past surgical history that includes pr removal of larynx (2022) and  section.  Medical Hx and Allergies: Heaven has a current medication list which includes the following prescription(s): amlodipine, aspirin, atorvastatin, azelastine, carbamazepine, carbamazepine, cetirizine, duloxetine, fluticasone propionate, fluticasone-salmeterol 100-50 mcg/dose, hydrocodone-acetaminophen, ibuprofen, levothyroxine, loratadine, metformin, metoprolol succinate, montelukast, omeprazole, prednisolone acetate, and rivaroxaban, and the following Facility-Administered Medications: lidocaine (pf), phenylephrine hcl 1%, and silver nitrate applicators. Review of patient's allergies indicates:  No Known Allergies    Current Voice Function: currently using alaryngeal communication via voice prosthesis   Current Level of Swallow Function/Complaints:  Pt does not report dysphagia  Prior Therapy:   12/22/2022    OBJECTIVE   TEP Current Status:17FR, 12.5mm Aquino placed on 04/12/2022    TEP Patient Complaints: Intermittent strained voicing with periods of good voicing    TEP Accessories: 10/55 fenestrated leonie tube with push to talk HMEs.     Stoma Size:  adequate     Lymphedema:  yes    Manual therapy: MLD/CDP continues for head and neck.    Measurement taken of face and neck as follows.     Facial Lymphedema Measurement Form  PRE-TREATMENT  Neck Composite   Location Right Left   Superior Circumference (A):   (just below mandible)  7 6   Middle Circumference (B):  (midway between top & bottom 575 5.25   Inferior Circumference (C):  (lowest circumferential location)  5 5   TOTAL: 17.75 16.25       Facial Composite   Location Right Left   1.Tragus to mental protuberance  5 5   2.Tragus to mouth angle  4 4   3.Mandibular angle to nasal wing  4 3.75   4.Mandibular angle to internal eye corner  4.5 4.25   5.Mandibular angle to external eye corner  3.25 3.5   6.Mental Protuberance to internal eye corner to external eye corner  4 4   7.Mandibular angle to mental protuberance 4.25 4.5   TOTALS: 29 29     POST-TREATMENT  Neck Composite   Location Right Left   Superior Circumference (A):   (just below mandible)  6.5 6   Middle Circumference (B):   (midway between top & bottom 5 4.75   Inferior Circumference (C):   (lowest circumferential location)  4.5 4.5   TOTAL: 16 15.25       Facial Composite   Location Right Left   1.Tragus to mental protuberance  5 5   2.Tragus to mouth angle  4 4   3.Mandibular angle to nasal wing  3.75 3.5   4.Mandibular angle to internal eye corner  4 4.25   5.Mandibular angle to external eye corner  3 3.25   6.Mental Protuberance to internal eye corner to external eye corner  4 4   7.Mandibular angle to mental protuberance 4 4.5   TOTALS: 27.75 28.5   Greater than a 2% difference from pre-to post-treatment? no     Treatment   Heaven Borel noted to tolerated pneumatic compression with good  results this date. Decreased Neck composite total of 1.75 inch on right and 1.0 inches on left and Face composite total of 1.25 inches on right and .5 of an inch on left. SLP ended session with neck stretches, ROM and diaphragmatic breathing. Reduced fibrotic texture and increased neck ROM noted after intervention. Good compliance with all training. SLP to follow up on 01/05/2023, pt requested to cancel session on 01/03/2023 due to family plans.     Education: Plan of Care, role of SLP in care, and scheduling/ cancellation policy were discussed with pt. Patient expressed understanding.     Assessment       LongTerm Goals:  Current Progress:   1. Patient will demonstrate understanding and implementation of long-term home management interventions as noted by improvement in lymphatic function, conduction of oral care, completion of daily exercises/stretches, and use of home management tools  Progressing towards goal       Short Term Goals:  Current Progress:   1. Patient will participate in manual interventions to target improvement in alaryngeal communication function related to reduced lymphatic function Progressing towards goal   2.  Patient will completed diaphragmatic breathing exercises at an independent level.  Progressing towards goal   3.  Patient will improve alaryngeal communication to maintain voicing >90% of the time.  Progressing towards goal     Ms. 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  She is currently utilizing esophageal speech via a voice prosthesis for alaryngeal communication for functional communication with her family, friends, medical providers, and others to perform her daily life activities.  She requires the use of a laryngectomy tube at all times to keep the airway patent and prevent stomal stenosis. HMEs are required daily for mucus management and pulmonary optimization .     SLP to additionally provided MLD/CDP for head and neck lymphedema to improve functional  "communication.    Plan     SLP intervention is warranted 1-2 times a week or PRN for communication needs for 1-6 weeks due to the chronic nature of the impairment.     Additional Information   Ms. Boston entered with overall reduced swelling and good skin color this date. Good decongestion noted visually with tissue texture with manual techniques this date.  SLP educated pt to continue use of decompression sleeve with "chip" bag for additional home management. Pt with reduced dysphagia symptoms and increased tolerance of "regular" consistencies. SLP will continue to address POC as stated. SLP completed taping technique this date; however, instructed pt to remove after after 36 hours to reduce skin irritation, she acknowledged. SLP to continue with POC on 01/05/2023.       Sunitha Jean MS, CCC-SLP   12/29/2022                                              "

## 2022-12-30 ENCOUNTER — HOSPITAL ENCOUNTER (OUTPATIENT)
Dept: RADIOLOGY | Facility: HOSPITAL | Age: 57
Discharge: HOME | End: 2022-12-30
Attending: RADIOLOGY
Payer: COMMERCIAL

## 2022-12-30 DIAGNOSIS — C32.9 CARCINOMA LARYNX: ICD-10-CM

## 2022-12-30 PROCEDURE — 78815 PET IMAGE W/CT SKULL-THIGH: CPT | Mod: TC

## 2023-01-04 LAB — POCT GLUCOSE: 128 MG/DL (ref 70–110)

## 2023-01-05 ENCOUNTER — CLINICAL SUPPORT (OUTPATIENT)
Dept: REHABILITATION | Facility: HOSPITAL | Age: 58
End: 2023-01-05
Payer: COMMERCIAL

## 2023-01-05 DIAGNOSIS — I89.0 LYMPHEDEMA: Primary | ICD-10-CM

## 2023-01-05 PROCEDURE — 92507 TX SP LANG VOICE COMM INDIV: CPT

## 2023-01-05 NOTE — PROGRESS NOTES
[facility]  Speech Therapy Progress Note  Laryngectomy-Lymphedema    Date: 2023     Name: Heaven Boston   MRN: [id number]    Therapy Diagnosis:   Encounter Diagnosis   Name Primary?    Lymphedema Yes        Physician: Dr. Aparicio    PATIENT IS A NECK-BREATHER - DO NOT ORALLY INTUBATE    Time In:  0900  Time Out:  0955    Procedure Min.   Speech Language Voice Therapy  55   Total Untimed Units: 55  Charges Billed/# of units: 55/1    Precautions: Standard  Subjective    Chief Complaint: Patient with no complaints this date. Continues to exhibit reduced swelling this date.     AFFECT: normal affect    Pain:   0/10  Pain Location / Description: NA  Current Medical History: Heaven Boston is a 57 y.o. female referred by Dr. Aparicio for TEP management to maximize alaryngeal communication without respiratory compromise and lymphedema management.     Past Medical History:   Past Medical History:   Diagnosis Date    Cancer     Laryngeal    Diabetes mellitus     Hypertension       Heaven Boston  has a past surgical history that includes pr removal of larynx (2022) and  section.  Medical Hx and Allergies: Heaven has a current medication list which includes the following prescription(s): amlodipine, aspirin, atorvastatin, azelastine, carbamazepine, carbamazepine, cetirizine, duloxetine, fluticasone propionate, fluticasone-salmeterol 100-50 mcg/dose, hydrocodone-acetaminophen, ibuprofen, levothyroxine, loratadine, metformin, metoprolol succinate, montelukast, omeprazole, prednisolone acetate, and rivaroxaban, and the following Facility-Administered Medications: lidocaine (pf), phenylephrine hcl 1%, and silver nitrate applicators. Review of patient's allergies indicates:  No Known Allergies    Current Voice Function: currently using alaryngeal communication via voice prosthesis   Current Level of Swallow Function/Complaints:  Pt does not report dysphagia  Prior Therapy:   12/29/2022    OBJECTIVE   TEP Current Status:17FR, 12.5mm Aquino placed on 04/12/2022    TEP Patient Complaints: Intermittent strained voicing with periods of good voicing    TEP Accessories: 10/55 fenestrated leonie tube with push to talk HMEs.     Stoma Size:  adequate     Lymphedema:  yes    Manual therapy: MLD/CDP continues for head and neck.    Measurement taken of face and neck as follows.     Facial Lymphedema Measurement Form  PRE-TREATMENT  Neck Composite Initial lymphedema measurements 10/3/2022   Location Right Left Right Left   Superior Circumference (A):   (just below mandible)  6.5 6.5 7.5 8.5   Middle Circumference (B):  (midway between top & bottom 5.5 5 6.5 7   Inferior Circumference (C):  (lowest circumferential location)  5 5 5 6.5   TOTAL: 17 16.5 19 22       Facial Composite Initial lymphedema measurements 10/3/2022   Location Right Left Right Left   1.Tragus to mental protuberance  5 5 5 5.75   2.Tragus to mouth angle  4 4 4.5 5   3.Mandibular angle to nasal wing  4 3.5 4 4   4.Mandibular angle to internal eye corner  4.5 4 3 4.5   5.Mandibular angle to external eye corner  3 3 3 4   6.Mental Protuberance to internal eye corner to external eye corner  4 4.5 6 4.25   7.Mandibular angle to mental protuberance 4 4.5 3.5 4   TOTALS: 28.5 28.5 29 31.5     POST-TREATMENT  Neck Composite Initial lymphedema measurements 10/3/2022   Location Right Left Right Left   Superior Circumference (A):   (just below mandible)  5.5 6 7.5 6.75   Middle Circumference (B):   (midway between top & bottom 5 4.5 6.5 5.25   Inferior Circumference (C):   (lowest circumferential location)  4.75 4.5 6.5 5   TOTAL: 14.75 14.5 20.5 17       Facial Composite Initial lymphedema measurements 10/3/2022   Location Right Left Right Left   1.Tragus to mental protuberance  5 4.75 5 5.75   2.Tragus to mouth angle  4 4 4 4   3.Mandibular angle to nasal wing  4 3.5 3.5 4   4.Mandibular angle to internal eye corner  4.5 4 4.5 4    5.Mandibular angle to external eye corner  3 3 4.5 3.5   6.Mental Protuberance to internal eye corner to external eye corner  4 4.5 3.5 3.5   7.Mandibular angle to mental protuberance 4 4.5 3.75 3.5   TOTALS: 28.5 28.25 27.25 28.5   Greater than a 2% difference from pre-to post-treatment? no           Treatment   Heaven Boston noted to tolerated pneumatic compression with good results this date. Decreased Neck composite total of 2.25 inch on right and 2.0 inches on left and Face composite total of 0 inches on right and .25 of an inch on left. SLP ended session with neck stretches, ROM and diaphragmatic breathing. Reduced fibrotic texture and increased neck ROM noted after intervention. Good compliance with all training. SLP to follow up on 01/10/2023.      Education: Plan of Care, role of SLP in care, and scheduling/ cancellation policy were discussed with pt. Patient expressed understanding.     Assessment       LongTerm Goals:  Current Progress:   1. Patient will demonstrate understanding and implementation of long-term home management interventions as noted by improvement in lymphatic function, conduction of oral care, completion of daily exercises/stretches, and use of home management tools  Progressing towards goal       Short Term Goals:  Current Progress:   1. Patient will participate in manual interventions to target improvement in alaryngeal communication function related to reduced lymphatic function Progressing towards goal   2.  Patient will completed diaphragmatic breathing exercises at an independent level.  Progressing towards goal   3.  Patient will improve alaryngeal communication to maintain voicing >90% of the time.  Progressing towards goal     Ms. 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  She is currently utilizing esophageal speech via a voice prosthesis for alaryngeal communication for functional communication with her family, friends, medical providers, and others to  "perform her daily life activities.  She requires the use of a laryngectomy tube at all times to keep the airway patent and prevent stomal stenosis. HMEs are required daily for mucus management and pulmonary optimization .     SLP to additionally provided MLD/CDP for head and neck lymphedema to improve functional communication.    Plan     SLP intervention is warranted 1-2 times a week or PRN for communication needs for 1-6 weeks due to the chronic nature of the impairment.     Additional Information   Ms. Boston entered with overall reduced swelling and good skin color this date. Pt reported on skin irritation noted upon removal of kinesio tape from last session. Minimal swelling noted upon entering  despite missed session this week. Good decongestion noted visually with tissue texture with manual techniques this date.  SLP educated pt to continue use of decompression sleeve with "chip" bag for additional home management. Pt with reduced dysphagia symptoms and increased tolerance of "regular" consistencies. SLP will continue to address POC as stated. SLP completed taping technique this date; however, instructed pt to remove after after 36 hours to reduce skin irritation, she acknowledged. SLP to continue with POC on 01/10/2023.       Sunitha Jean MS, CCC-SLP   1/5/2023                                                "

## 2023-01-10 ENCOUNTER — CLINICAL SUPPORT (OUTPATIENT)
Dept: REHABILITATION | Facility: HOSPITAL | Age: 58
End: 2023-01-10
Payer: COMMERCIAL

## 2023-01-10 ENCOUNTER — OFFICE VISIT (OUTPATIENT)
Dept: SURGERY | Facility: CLINIC | Age: 58
End: 2023-01-10
Payer: COMMERCIAL

## 2023-01-10 VITALS
RESPIRATION RATE: 18 BRPM | WEIGHT: 132 LBS | DIASTOLIC BLOOD PRESSURE: 77 MMHG | HEART RATE: 71 BPM | OXYGEN SATURATION: 99 % | SYSTOLIC BLOOD PRESSURE: 136 MMHG | BODY MASS INDEX: 24.29 KG/M2 | TEMPERATURE: 98 F | HEIGHT: 62 IN

## 2023-01-10 DIAGNOSIS — I89.0 LYMPHEDEMA: Primary | ICD-10-CM

## 2023-01-10 DIAGNOSIS — K31.6 GASTROCUTANEOUS FISTULA DUE TO GASTROSTOMY TUBE: Primary | ICD-10-CM

## 2023-01-10 PROCEDURE — 99215 OFFICE O/P EST HI 40 MIN: CPT | Mod: PBBFAC

## 2023-01-10 PROCEDURE — 92507 TX SP LANG VOICE COMM INDIV: CPT

## 2023-01-10 NOTE — PROGRESS NOTES
[facility] Note    S: Heaven Boston is a 58 y.o. 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 Patient reports no drainage. No n/v/f or pain. She has no complaints.     Review of Systems:  10 point review of systems denied unless otherwise indicated above HPI    Objective:    Vitals:  Vitals:    01/10/23 1228   BP: 136/77   Pulse: 71   Resp: 18   Temp: 98.3 °F (36.8 °C)        Physical Exam:  Gen: NAD, A&O  CV: RRR  Resp: non-labored breathing  Abd: soft, ND, NT. Well healed fistula w/ no drainage.     Assessment/Plan:  Heaven Boston is a 56 yo F w/ hx of T2DM, HTN presents for fu of PEG c/b gastrocutaneous fistula that has healed w/ no drainage for several months.    - RTC PRN    Alicia Arrieta MD PGY3  01/10/2023 1:07 PM     Note reviewed and edited as needed.    Megan Liang M.D.  [facility], PGY-1

## 2023-01-10 NOTE — PROGRESS NOTES
Pt seen by Dr. Ortiz; Pt instructed to return to clinic as needed; Discharge paperwork given w/pt verbalizing understanding

## 2023-01-10 NOTE — PROGRESS NOTES
[facility]  Speech Therapy Progress Note  Laryngectomy-Lymphedema    Date: 1/10/2023     Name: Heaven Boston   MRN: [id number]    Therapy Diagnosis:   Encounter Diagnosis   Name Primary?    Lymphedema Yes        Physician: Dr. Aparicio    PATIENT IS A NECK-BREATHER - DO NOT ORALLY INTUBATE    Time In:  0900  Time Out:  1000    Procedure Min.   Speech Language Voice Therapy  60   Total Untimed Units: 60  Charges Billed/# of units: 60/1    Precautions: Standard  Subjective    Chief Complaint: Patient with no complaints this date. Continues to exhibit reduced swelling this date.     AFFECT: normal affect    Pain:   0/10  Pain Location / Description: NA  Current Medical History: Heaven Boston is a 58 y.o. female referred by Dr. Aparicio for TEP management to maximize alaryngeal communication without respiratory compromise and lymphedema management.     Past Medical History:   Past Medical History:   Diagnosis Date    Cancer     Laryngeal    Diabetes mellitus     Hypertension       Heaven Boston  has a past surgical history that includes pr removal of larynx (2022) and  section.  Medical Hx and Allergies: Heaven has a current medication list which includes the following prescription(s): amlodipine, aspirin, atorvastatin, azelastine, carbamazepine, carbamazepine, cetirizine, duloxetine, fluticasone propionate, fluticasone-salmeterol 100-50 mcg/dose, hydrocodone-acetaminophen, ibuprofen, levothyroxine, loratadine, metformin, metoprolol succinate, montelukast, omeprazole, prednisolone acetate, and rivaroxaban, and the following Facility-Administered Medications: lidocaine (pf), phenylephrine hcl 1%, and silver nitrate applicators. Review of patient's allergies indicates:  No Known Allergies    Current Voice Function: currently using alaryngeal communication via voice prosthesis   Current Level of Swallow Function/Complaints:  Pt does not report dysphagia  Prior Therapy:   01/05/2023    OBJECTIVE   TEP Current Status:17FR, 12.5mm Aquino placed on 04/12/2022    TEP Patient Complaints: Intermittent strained voicing with periods of good voicing    TEP Accessories: 10/55 fenestrated leonie tube with push to talk HMEs.     Stoma Size:  adequate     Lymphedema:  yes    Manual therapy: MLD/CDP continues for head and neck.    Measurement taken of face and neck as follows.     Facial Lymphedema Measurement Form  PRE-TREATMENT  Neck Composite   Location Right Left   Superior Circumference (A):   (just below mandible)  6.5 6.5   Middle Circumference (B):  (midway between top & bottom 5.5 5.5   Inferior Circumference (C):  (lowest circumferential location)  5 5   TOTAL: 17 17       Facial Composite   Location Right Left   1.Tragus to mental protuberance  5 5   2.Tragus to mouth angle  4 4   3.Mandibular angle to nasal wing  3.75 3.5   4.Mandibular angle to internal eye corner  4 4   5.Mandibular angle to external eye corner  3 3   6.Mental Protuberance to internal eye corner to external eye corner  4 4   7.Mandibular angle to mental protuberance 4.5 4.5   TOTALS: 28.25 28     POST-TREATMENT  Neck Composite   Location Right Left   Superior Circumference (A):   (just below mandible)  5.5 5.5   Middle Circumference (B):   (midway between top & bottom 5 5   Inferior Circumference (C):   (lowest circumferential location)  4.75 5   TOTAL: 14.75 15.5       Facial Composite   Location Right Left   1.Tragus to mental protuberance  5 5   2.Tragus to mouth angle  4 4   3.Mandibular angle to nasal wing  3.5 3.5   4.Mandibular angle to internal eye corner  4 4   5.Mandibular angle to external eye corner  3 3   6.Mental Protuberance to internal eye corner to external eye corner  4 4   7.Mandibular angle to mental protuberance 4 4.5   TOTALS: 28 28   Greater than a 2% difference from pre-to post-treatment? no         Treatment   Heaven Borel noted to tolerated pneumatic compression with good results this date.  Decreased Neck composite total of 2.25 inch on right and 1.5 inches on left and Face composite total of .25 of an inch on right and 0 inches on left. SLP ended session with neck stretches, ROM and diaphragmatic breathing. Reduced fibrotic texture and increased neck ROM noted after intervention. Good compliance with all training. SLP to follow up on 01/10/2023.      Education: Plan of Care, role of SLP in care, and scheduling/ cancellation policy were discussed with pt. Patient expressed understanding.     Assessment       LongTerm Goals:  Current Progress:   1. Patient will demonstrate understanding and implementation of long-term home management interventions as noted by improvement in lymphatic function, conduction of oral care, completion of daily exercises/stretches, and use of home management tools  Progressing towards goal       Short Term Goals:  Current Progress:   1. Patient will participate in manual interventions to target improvement in alaryngeal communication function related to reduced lymphatic function Progressing towards goal   2.  Patient will completed diaphragmatic breathing exercises at an independent level.  Progressing towards goal   3.  Patient will improve alaryngeal communication to maintain voicing >90% of the time.  Progressing towards goal     Ms. 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  She is currently utilizing esophageal speech via a voice prosthesis for alaryngeal communication for functional communication with her family, friends, medical providers, and others to perform her daily life activities.  She requires the use of a laryngectomy tube at all times to keep the airway patent and prevent stomal stenosis. HMEs are required daily for mucus management and pulmonary optimization .     SLP to additionally provided MLD/CDP for head and neck lymphedema to improve functional communication.    Plan     SLP intervention is warranted 1-2 times a week or PRN for  "communication needs for 1-6 weeks due to the chronic nature of the impairment.     Additional Information   Ms. Boston entered with overall reduced swelling and good skin color this date. Pt reported mild skin irritation on L posterior neck upon removal of kinesio tape from last session. Minimal swelling noted upon entering  despite missed session this week. Good decongestion noted visually with tissue texture with manual techniques this date.  SLP educated pt to continue use of decompression sleeve with "chip" bag for additional home management. Pt with reduced dysphagia symptoms and increased tolerance of "regular" consistencies. SLP will continue to address POC as stated. SLP to continue with POC on 01/12/2023.       Sunitha Jean MS, CCC-SLP   1/10/2023                                                  "

## 2023-01-11 ENCOUNTER — OFFICE VISIT (OUTPATIENT)
Dept: CARDIOLOGY | Facility: CLINIC | Age: 58
End: 2023-01-11
Payer: COMMERCIAL

## 2023-01-11 VITALS
TEMPERATURE: 98 F | OXYGEN SATURATION: 99 % | HEART RATE: 76 BPM | DIASTOLIC BLOOD PRESSURE: 68 MMHG | HEIGHT: 62 IN | RESPIRATION RATE: 20 BRPM | BODY MASS INDEX: 24.75 KG/M2 | WEIGHT: 134.5 LBS | SYSTOLIC BLOOD PRESSURE: 138 MMHG

## 2023-01-11 DIAGNOSIS — R55 SYNCOPE, UNSPECIFIED SYNCOPE TYPE: ICD-10-CM

## 2023-01-11 DIAGNOSIS — Z90.02 H/O LARYNGECTOMY: ICD-10-CM

## 2023-01-11 DIAGNOSIS — E03.9 HYPOTHYROIDISM, UNSPECIFIED TYPE: ICD-10-CM

## 2023-01-11 DIAGNOSIS — E78.01 FAMILIAL HYPERCHOLESTEROLEMIA: ICD-10-CM

## 2023-01-11 DIAGNOSIS — Z92.3 HISTORY OF RADIATION TO HEAD AND NECK REGION: ICD-10-CM

## 2023-01-11 DIAGNOSIS — E11.9 TYPE 2 DIABETES MELLITUS WITHOUT COMPLICATION, WITHOUT LONG-TERM CURRENT USE OF INSULIN: ICD-10-CM

## 2023-01-11 DIAGNOSIS — C32.9 LARYNGEAL CANCER: ICD-10-CM

## 2023-01-11 DIAGNOSIS — Z93.0 TRACHEOSTOMY IN PLACE: ICD-10-CM

## 2023-01-11 DIAGNOSIS — I10 HYPERTENSION, UNSPECIFIED TYPE: Primary | ICD-10-CM

## 2023-01-11 DIAGNOSIS — I65.23 BILATERAL CAROTID ARTERY STENOSIS: ICD-10-CM

## 2023-01-11 DIAGNOSIS — I47.10 SVT (SUPRAVENTRICULAR TACHYCARDIA): ICD-10-CM

## 2023-01-11 PROCEDURE — 99215 OFFICE O/P EST HI 40 MIN: CPT | Mod: PBBFAC | Performed by: INTERNAL MEDICINE

## 2023-01-11 RX ORDER — NIFEDIPINE 60 MG/1
60 TABLET, EXTENDED RELEASE ORAL DAILY
Qty: 30 TABLET | Refills: 11 | Status: SHIPPED | OUTPATIENT
Start: 2023-01-11 | End: 2023-05-04 | Stop reason: SDUPTHER

## 2023-01-11 NOTE — PROGRESS NOTES
Chief Complaint   Patient presents with    f/u visit, denies chest pains or sob       This is a 58-year-old AAF with HTN, DM, HLP, occipital neuralgia of left side who presents for routine follow-up and ongoing care.  In Feb 2022, she was diagnosed with squamous cell carcinoma of larynx (T4N1M0) now s/p laryngectomy, trach and PEG and XRT. She was diagnosed with a DVT in April 2022 and was initially given eliquis for 30 days.  On follow-up with Cardiology, DOAC was resumed (Xarelto not Eliquis due to cost).  Patient is wondering how long she needs to be on anticoagulation.  Since last visit patient has been doing well with no cardiovascular complaints and denies chest pain, SOB, palpitations, lower ext edema, syncope, presyncope.  Patient brings in a BP log that shows blood pressure mostly in the high 130s over 70s with occasional 120s and 140s and rare 150s.  She reports a good appetite and has been eating well.    To note, pt was initially referred to cardiology for 2 episodes of syncope in 2020 that were thought to be neurocardiogenic. EKG, Echocardiogram, Carotid US were all benign. She completed a subsequent 30-day event monitor from July -August 2021 that revealed runs of SVT, also nonsustained VT versus SVT with aberrancy. She was started on metoprolol 12.5 mg at that time.         Review of Systems   Constitutional: negative for fever,chills, sweats, weakness, fatigue, decreased activity   Eye: negative for blurry vision, vision change  ENMT: negative for sore throat, nasal congestion  Respiratory: negative for shortness of breath, cough, wheezing  Cardiovascular: negative for chest pain, dyspnea on exertion, orthopnea, PND, lower extremity edema, palpitations, claudication  Gastrointestinal: negative for nausea, vomiting, abdominal pain, constipation, diarrhea, blood in stool  Genitourinary: negative for hematuria, nocturia, dysuria  Endocrine: negative for abnormal thirst, temperature  Musculoskeletal:  negative for back pain, joint pain  Integumentary: negative for abnormal mole  Neurologic: negative for weakness, numbness, headaches, shooting pain  Hematology: negative for easy bruising, easy bleeding  Allergy: negative for watery eyes, sneezing  Psychiatric: negative for loss of interest, anxiety, stress      Physical Exam Vitals & Measurements  Vitals:    01/11/23 0954   BP: 138/68   Pulse:    Resp:    Temp:          General: alert and oriented/no acute distress  Eye: EOMI/normal conjunctiva  HENT: normocephalic/moist oral mucosa  Neck: trach in place   Respiratory: lungs CTA/nonlabored respirations/BS equal/symmetrical expansion/no chest wall tenderness  Cardiovascular: normal rate/normal rhythm/no murmur/normal peripheral perfusion/no edema  Gastrointestinal: soft/nontender/nondistended, PEG in place  Musculoskeletal: normal ROM/normal strength  Integumentary: warm/dry/pink/intact  Neurologic: alert/oriented/normal sensory/no focal deficits  Psychiatric: cooperative/appropriate mood and affect/normal judgment       Current Outpatient Medications:     amLODIPine (NORVASC) 5 MG tablet, Take 1 tablet (5 mg total) by mouth once daily., Disp: 90 tablet, Rfl: 3    atorvastatin (LIPITOR) 80 MG tablet, Take 1 tablet (80 mg total) by mouth every evening., Disp: 90 tablet, Rfl: 3    carBAMazepine (CARBATROL) 200 MG CM12, Take 200 mg by mouth., Disp: , Rfl:     DULoxetine (CYMBALTA) 30 MG capsule, Take 30 mg by mouth 2 (two) times daily., Disp: , Rfl:     fluticasone propionate (FLONASE) 50 mcg/actuation nasal spray, 1 spray by Each Nostril route 2 (two) times daily., Disp: , Rfl:     levothyroxine (SYNTHROID) 88 MCG tablet, Take 1 tablet (88 mcg total) by mouth before breakfast., Disp: 30 tablet, Rfl: 11    loratadine (CLARITIN) 10 mg tablet, Take 1 tablet (10 mg total) by mouth once daily., Disp: 90 tablet, Rfl: 0    metFORMIN (GLUCOPHAGE) 500 MG tablet, Take 1 tablet (500 mg total) by mouth once daily., Disp: 90  tablet, Rfl: 3    metoprolol succinate (TOPROL-XL) 25 MG 24 hr tablet, Take 0.5 tablets (12.5 mg total) by mouth once daily., Disp: 45 tablet, Rfl: 3    montelukast (SINGULAIR) 5 MG chewable tablet, Take 1 tablet (5 mg total) by mouth every evening., Disp: 90 tablet, Rfl: 3    rivaroxaban (XARELTO) 10 mg Tab, Take 2 tablets (20 mg total) by mouth daily with dinner or evening meal., Disp: 90 tablet, Rfl: 0    aspirin (ECOTRIN) 81 MG EC tablet, Take 1 tablet (81 mg total) by mouth once daily., Disp: , Rfl: 3    azelastine (ASTELIN) 137 mcg (0.1 %) nasal spray, 1 spray by Nasal route., Disp: , Rfl:     carBAMazepine (TEGRETOL XR) 200 MG 12 hr tablet, Take 200 mg by mouth 2 (two) times daily., Disp: , Rfl:     cetirizine (ZYRTEC) 10 MG tablet, Take 10 mg by mouth once daily., Disp: , Rfl:     fluticasone-salmeterol diskus inhaler 100-50 mcg, Inhale 1 puff into the lungs every 12 (twelve) hours., Disp: , Rfl:     HYDROcodone-acetaminophen 5-300 mg Tab, Take 1 tablet by mouth every 8 (eight) hours as needed., Disp: , Rfl:     ibuprofen (ADVIL,MOTRIN) 800 MG tablet, Take 800 mg by mouth every 6 (six) hours as needed., Disp: , Rfl:     omeprazole (PRILOSEC) 40 MG capsule, Take 40 mg by mouth once daily., Disp: , Rfl:     prednisoLONE acetate (PRED FORTE) 1 % DrpS, Place into both eyes., Disp: , Rfl:         Assessment/Plan     DVT  Diagnosed in April 2022  Was placed on eliquis in [place] but only given 1 month supply however on last visit we continued DOAC (switched to xarelto due to cost)  DVT appears to be malignancy related  As pt is in remission per last PET scan it might be reasonable to stop xarelto although there are no adverse effects with DOAC.  Recommend decision be made by heme/onc. Advised pt to request heme/onc referral from her PCP (will send PCP a message)    Syncope - no further episodes  -2 episodes in 2020, thought to be neurocardiogenic in nature  - Previous EKGs, TTE, CXR, and carotid Doppler  reviewed  - 30 day event monitor- revealed runs of SVT, also nonsustained VT versus SVT with aberrancy  - continue metoprolol succinate 12.5 mg qd d/t Holter findings  - Carotid stenosis of less than 50% noted on right carotid ultrasound in 2020    Carotid stenosis  - Carotid stenosis of less than 50% noted on right carotid ultrasound in 2020  Given recent XRT to neck, routine monitoring of carotid ultrasound is necessary  Will repeat carotid US      HTN   Initial /64 with repeat 138/68  Pt's home log shows most values in high 130s/70s with occasional 120s and occasional 140s/150s  Will switch to nifedipine 60mg  Advised pt to keep a BP log and will schedule NV    HLD   patient's LDL was greater than 190 in the past consistent with familial hypercholesterolemia.  Goal 70 given DM   LDL in 9/2021 was 115, lipitor increased to 80mg on last visit but repeat lipid panel shows  despite compliance.  Pt reports recent diagnosis of hypothyroidism with TSH 55. This could be contributing to elevated cholesterol levels  She was started on levothyroxine a few days ago and recommend repeating lipid panel in 3 months     Laryngeal cancer  S/p laryngectomy and trach and XRT  Currently not followed by onc, but referral sent in Feb 2022  Rad oncologist Dr. Laguna has ordered 2 recent PET scans that reveal no concern for malignancy    Tobacco user   She quit smoking      DM  - A1C-5.9 in 9/2021  - Continue management per PCP      Carotid US  Switch to nifedipine 60, BP log and NV  FLP in 3 months  6 m F/U

## 2023-01-12 ENCOUNTER — CLINICAL SUPPORT (OUTPATIENT)
Dept: REHABILITATION | Facility: HOSPITAL | Age: 58
End: 2023-01-12
Payer: COMMERCIAL

## 2023-01-12 DIAGNOSIS — I89.0 LYMPHEDEMA: Primary | ICD-10-CM

## 2023-01-12 PROCEDURE — 92523 SPEECH SOUND LANG COMPREHEN: CPT

## 2023-01-12 PROCEDURE — 92507 TX SP LANG VOICE COMM INDIV: CPT

## 2023-01-12 NOTE — PROGRESS NOTES
[facility]  Speech Therapy Progress Note  Laryngectomy-Lymphedema    Date: 2023     Name: Heaven Boston   MRN: [id number]    Therapy Diagnosis:   Encounter Diagnosis   Name Primary?    Lymphedema Yes        Physician: Dr. Aparicio    PATIENT IS A NECK-BREATHER - DO NOT ORALLY INTUBATE    Time In:  0900  Time Out:  1000    Procedure Min.   Speech Language Voice Therapy  60   Total Untimed Units: 60  Charges Billed/# of units: 60/1    Precautions: Standard  Subjective    Chief Complaint: Patient with no complaints this date. Continues to exhibit reduced swelling this date.     AFFECT: normal affect    Pain:   0/10  Pain Location / Description: NA  Current Medical History: Heaven Boston is a 58 y.o. female referred by Dr. Aparicio for TEP management to maximize alaryngeal communication without respiratory compromise and lymphedema management.     Past Medical History:   Past Medical History:   Diagnosis Date    Cancer     Laryngeal    Diabetes mellitus     Hypertension       Heaven Boston  has a past surgical history that includes pr removal of larynx (2022) and  section.  Medical Hx and Allergies: Heaven has a current medication list which includes the following prescription(s): aspirin, atorvastatin, azelastine, carbamazepine, carbamazepine, cetirizine, duloxetine, fluticasone propionate, fluticasone-salmeterol 100-50 mcg/dose, hydrocodone-acetaminophen, ibuprofen, levothyroxine, loratadine, metformin, metoprolol succinate, montelukast, nifedipine, omeprazole, prednisolone acetate, and rivaroxaban, and the following Facility-Administered Medications: lidocaine (pf), phenylephrine hcl 1%, and silver nitrate applicators. Review of patient's allergies indicates:  No Known Allergies    Current Voice Function: currently using alaryngeal communication via voice prosthesis   Current Level of Swallow Function/Complaints:  Pt does not report dysphagia  Prior Therapy:   01/05/2023    OBJECTIVE   TEP Current Status:17FR, 12.5mm Aquino placed on 04/12/2022    TEP Patient Complaints: Intermittent strained voicing with periods of good voicing    TEP Accessories: 10/55 fenestrated leonie tube with push to talk HMEs.     Stoma Size:  adequate     Lymphedema:  yes    Manual therapy: MLD/CDP continues for head and neck.    Measurement taken of face and neck as follows.     Facial Lymphedema Measurement Form  PRE-TREATMENT  Neck Composite   Location Right Left   Superior Circumference (A):   (just below mandible)  6 5.5   Middle Circumference (B):  (midway between top & bottom 5.5 5   Inferior Circumference (C):  (lowest circumferential location)  5 5   TOTAL: 16.5 15.5       Facial Composite   Location Right Left   1.Tragus to mental protuberance  5 5   2.Tragus to mouth angle  4 4   3.Mandibular angle to nasal wing  4 3.75   4.Mandibular angle to internal eye corner  4 4   5.Mandibular angle to external eye corner  3 3   6.Mental Protuberance to internal eye corner to external eye corner  4 4   7.Mandibular angle to mental protuberance 4 4   TOTALS: 28 27.75     POST-TREATMENT  Neck Composite   Location Right Left   Superior Circumference (A):   (just below mandible)  5.25 5   Middle Circumference (B):   (midway between top & bottom 5 4.5   Inferior Circumference (C):   (lowest circumferential location)  5 4.5   TOTAL: 15.25 14       Facial Composite   Location Right Left   1.Tragus to mental protuberance  5 5   2.Tragus to mouth angle  4 4   3.Mandibular angle to nasal wing  3.75 3.75   4.Mandibular angle to internal eye corner  4 4   5.Mandibular angle to external eye corner  3 3   6.Mental Protuberance to internal eye corner to external eye corner  4 4   7.Mandibular angle to mental protuberance 4 4   TOTALS: 27.75 27.75   Greater than a 2% difference from pre-to post-treatment? no         Treatment   Heaven Borel noted to tolerated pneumatic compression with good results this date.  Decreased Neck composite total of 1.25 inch on right and 1.5 inches on left and Face composite total of .25 of an inch on right and 0 inches on left. SLP ended session with neck stretches, ROM and diaphragmatic breathing. Reduced fibrotic texture and increased neck ROM noted after intervention. Good compliance with all training. SLP to follow up on 01/17/2023.      Education: Plan of Care, role of SLP in care, and scheduling/ cancellation policy were discussed with pt. Patient expressed understanding.     Assessment       LongTerm Goals:  Current Progress:   1. Patient will demonstrate understanding and implementation of long-term home management interventions as noted by improvement in lymphatic function, conduction of oral care, completion of daily exercises/stretches, and use of home management tools  Progressing towards goal       Short Term Goals:  Current Progress:   1. Patient will participate in manual interventions to target improvement in alaryngeal communication function related to reduced lymphatic function Progressing towards goal   2.  Patient will completed diaphragmatic breathing exercises at an independent level.  Progressing towards goal   3.  Patient will improve alaryngeal communication to maintain voicing >90% of the time.  Progressing towards goal     Ms. 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  She is currently utilizing esophageal speech via a voice prosthesis for alaryngeal communication for functional communication with her family, friends, medical providers, and others to perform her daily life activities.  She requires the use of a laryngectomy tube at all times to keep the airway patent and prevent stomal stenosis. HMEs are required daily for mucus management and pulmonary optimization .     SLP to additionally provided MLD/CDP for head and neck lymphedema to improve functional communication.    Plan     SLP intervention is warranted 1-2 times a week or PRN for  "communication needs for 1-6 weeks due to the chronic nature of the impairment.     Additional Information   Ms. Boston entered with overall reduced swelling and good skin color this date. No irritation on skin noted this date. Reduced swelling noted upon entering. Good decongestion noted visually with tissue texture with manual techniques this date.  SLP educated pt to continue use of decompression sleeve with "chip" bag for additional home management. Pt with reduced dysphagia symptoms and increased tolerance of "regular" consistencies. SLP will continue to address POC as stated. SLP to continue with POC on 01/17/2023.       Sunitha Jean MS, CCC-SLP   1/12/2023                                                    "

## 2023-01-17 ENCOUNTER — CLINICAL SUPPORT (OUTPATIENT)
Dept: REHABILITATION | Facility: HOSPITAL | Age: 58
End: 2023-01-17
Payer: COMMERCIAL

## 2023-01-17 DIAGNOSIS — I89.0 LYMPHEDEMA: Primary | ICD-10-CM

## 2023-01-17 PROCEDURE — 92507 TX SP LANG VOICE COMM INDIV: CPT

## 2023-01-17 NOTE — PROGRESS NOTES
[facility]  Speech Therapy Progress Note  Laryngectomy-Lymphedema    Date: 2023     Name: Heaven Boston   MRN: [id number]    Therapy Diagnosis:   Encounter Diagnosis   Name Primary?    Lymphedema Yes        Physician: Dr. Aparicio    PATIENT IS A NECK-BREATHER - DO NOT ORALLY INTUBATE    Time In:  0900  Time Out:  1000    Procedure Min.   Speech Language Voice Therapy  60   Total Untimed Units: 60  Charges Billed/# of units: 60/1    Precautions: Standard  Subjective    Chief Complaint: Patient with no complaints this date. Continues to exhibit reduced swelling this date.     AFFECT: normal affect    Pain:   0/10  Pain Location / Description: NA  Current Medical History: Heaven Boston is a 58 y.o. female referred by Dr. Aparicio for TEP management to maximize alaryngeal communication without respiratory compromise and lymphedema management.     Past Medical History:   Past Medical History:   Diagnosis Date    Cancer     Laryngeal    Diabetes mellitus     Hypertension       Heaven Boston  has a past surgical history that includes pr removal of larynx (2022) and  section.  Medical Hx and Allergies: Heaven has a current medication list which includes the following prescription(s): aspirin, atorvastatin, azelastine, carbamazepine, carbamazepine, cetirizine, duloxetine, fluticasone propionate, fluticasone-salmeterol 100-50 mcg/dose, hydrocodone-acetaminophen, ibuprofen, levothyroxine, loratadine, metformin, metoprolol succinate, montelukast, nifedipine, omeprazole, prednisolone acetate, and rivaroxaban, and the following Facility-Administered Medications: lidocaine (pf), phenylephrine hcl 1%, and silver nitrate applicators. Review of patient's allergies indicates:  No Known Allergies    Current Voice Function: currently using alaryngeal communication via voice prosthesis   Current Level of Swallow Function/Complaints:  Pt does not report dysphagia  Prior Therapy:   01/10/2023    OBJECTIVE   TEP Current Status:17FR, 12.5mm Aquino placed on 04/12/2022    TEP Patient Complaints: Intermittent strained voicing with periods of good voicing    TEP Accessories: 10/55 fenestrated leonie tube with push to talk HMEs.     Stoma Size:  adequate     Lymphedema:  yes    Manual therapy: MLD/CDP continues for head and neck.    Measurement taken of face and neck as follows.     Facial Lymphedema Measurement Form  PRE-TREATMENT  Neck Composite   Location Right Left   Superior Circumference (A):   (just below mandible)  6 6   Middle Circumference (B):  (midway between top & bottom 5.5 5.5   Inferior Circumference (C):  (lowest circumferential location)  5 5   TOTAL: 16.5 16.5       Facial Composite   Location Right Left   1.Tragus to mental protuberance  5 5   2.Tragus to mouth angle  4 4.5   3.Mandibular angle to nasal wing  4 3.75   4.Mandibular angle to internal eye corner  4.5 4   5.Mandibular angle to external eye corner  3 3   6.Mental Protuberance to internal eye corner to external eye corner  4 4   7.Mandibular angle to mental protuberance 4 4.5   TOTALS: 28.5 28.75     POST-TREATMENT  Neck Composite   Location Right Left   Superior Circumference (A):   (just below mandible)  5.5 5.5   Middle Circumference (B):   (midway between top & bottom 5 5   Inferior Circumference (C):   (lowest circumferential location)  5 4.75   TOTAL: 15.5 15.25       Facial Composite   Location Right Left   1.Tragus to mental protuberance  5 5   2.Tragus to mouth angle  4 4   3.Mandibular angle to nasal wing  3.5 3.75   4.Mandibular angle to internal eye corner  4 4   5.Mandibular angle to external eye corner  3 3   6.Mental Protuberance to internal eye corner to external eye corner  4 4   7.Mandibular angle to mental protuberance 4 4.5   TOTALS: 28 28.75   Greater than a 2% difference from pre-to post-treatment? no         Treatment   Heaven Borel noted to tolerated pneumatic compression with good results this  date. Decreased Neck composite total of 1.0 inch on right and 1.25 inches on left and Face composite total of 0 inches on right and 0 inches on left. SLP ended session with neck stretches, ROM and diaphragmatic breathing. Reduced fibrotic texture and increased neck ROM noted after intervention. Good compliance with all training. SLP to follow up on 01/17/2023.      Education: Plan of Care, role of SLP in care, and scheduling/ cancellation policy were discussed with pt. Patient expressed understanding.     Assessment       LongTerm Goals:  Current Progress:   1. Patient will demonstrate understanding and implementation of long-term home management interventions as noted by improvement in lymphatic function, conduction of oral care, completion of daily exercises/stretches, and use of home management tools  Progressing towards goal       Short Term Goals:  Current Progress:   1. Patient will participate in manual interventions to target improvement in alaryngeal communication function related to reduced lymphatic function Progressing towards goal   2.  Patient will completed diaphragmatic breathing exercises at an independent level.  Progressing towards goal   3.  Patient will improve alaryngeal communication to maintain voicing >90% of the time.  Progressing towards goal     Ms. 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  She is currently utilizing esophageal speech via a voice prosthesis for alaryngeal communication for functional communication with her family, friends, medical providers, and others to perform her daily life activities.  She requires the use of a laryngectomy tube at all times to keep the airway patent and prevent stomal stenosis. HMEs are required daily for mucus management and pulmonary optimization .     SLP to additionally provided MLD/CDP for head and neck lymphedema to improve functional communication.    Plan     SLP intervention is warranted 1-2 times a week or PRN for  "communication needs for 1-6 weeks due to the chronic nature of the impairment.     Additional Information   Ms. Boston entered with overall reduced swelling and good skin color this date. Mild irritation noted bilaterally in the form of skin discoloration. Pt noted to have an area of broken skin under chin on Right medial aspect approximately 3cm long x 1.5 cm wide. Reduced swelling noted upon entering. Good decongestion noted visually with tissue texture with manual techniques this date.  SLP educated pt to continue use of decompression sleeve with "chip" bag for additional home management. Pt with reduced dysphagia symptoms and increased tolerance of "regular" consistencies. SLP will continue to address POC as stated. SLP to continue with POC on 01/19/2023.       Sunitha Jean MS, CCC-SLP   1/17/2023                                                      "

## 2023-01-19 ENCOUNTER — CLINICAL SUPPORT (OUTPATIENT)
Dept: REHABILITATION | Facility: HOSPITAL | Age: 58
End: 2023-01-19
Payer: COMMERCIAL

## 2023-01-19 DIAGNOSIS — I89.0 LYMPHEDEMA: Primary | ICD-10-CM

## 2023-01-19 PROCEDURE — 92507 TX SP LANG VOICE COMM INDIV: CPT

## 2023-01-19 NOTE — PROGRESS NOTES
I reviewed the history and physical exam with the resident.   I agree with findings and plan.    Connor Patrick M.D.

## 2023-01-19 NOTE — PROGRESS NOTES
[facility]  Speech Therapy Progress Note  Laryngectomy-Lymphedema    Date: 2023     Name: Heaven Boston   MRN: [id number]    Therapy Diagnosis:   Encounter Diagnosis   Name Primary?    Lymphedema Yes        Physician: Dr. Aparicio    PATIENT IS A NECK-BREATHER - DO NOT ORALLY INTUBATE    Time In:  0900  Time Out:  1000    Procedure Min.   Speech Language Voice Therapy  60   Total Untimed Units: 60  Charges Billed/# of units: 60/1    Precautions: Standard  Subjective    Chief Complaint: Patient with no complaints this date. Continues to exhibit reduced swelling this date.     AFFECT: normal affect    Pain:   0/10  Pain Location / Description: NA  Current Medical History: Heaven Boston is a 58 y.o. female referred by Dr. Aparicio for TEP management to maximize alaryngeal communication without respiratory compromise and lymphedema management.     Past Medical History:   Past Medical History:   Diagnosis Date    Cancer     Laryngeal    Diabetes mellitus     Hypertension       Heaven Boston  has a past surgical history that includes pr removal of larynx (2022) and  section.  Medical Hx and Allergies: Heaven has a current medication list which includes the following prescription(s): aspirin, atorvastatin, azelastine, carbamazepine, carbamazepine, cetirizine, duloxetine, fluticasone propionate, fluticasone-salmeterol 100-50 mcg/dose, hydrocodone-acetaminophen, ibuprofen, levothyroxine, loratadine, metformin, metoprolol succinate, montelukast, nifedipine, omeprazole, prednisolone acetate, and rivaroxaban, and the following Facility-Administered Medications: lidocaine (pf), phenylephrine hcl 1%, and silver nitrate applicators. Review of patient's allergies indicates:  No Known Allergies    Current Voice Function: currently using alaryngeal communication via voice prosthesis   Current Level of Swallow Function/Complaints:  Pt does not report dysphagia  Prior Therapy:   01/10/2023    OBJECTIVE   TEP Current Status:17FR, 12.5mm Aquino placed on 04/12/2022    TEP Patient Complaints: Intermittent strained voicing with periods of good voicing    TEP Accessories: 10/55 fenestrated leonie tube with push to talk HMEs.     Stoma Size:  adequate     Lymphedema:  yes    Manual therapy: MLD/CDP continues for head and neck.    Measurement taken of face and neck as follows.     Facial Lymphedema Measurement Form  PRE-TREATMENT  Neck Composite   Location Right Left   Superior Circumference (A):   (just below mandible)  6 5.5   Middle Circumference (B):  (midway between top & bottom 5.5 5   Inferior Circumference (C):  (lowest circumferential location)  5 5   TOTAL: 16.5 15.5       Facial Composite   Location Right Left   1.Tragus to mental protuberance  5 5   2.Tragus to mouth angle  4 4   3.Mandibular angle to nasal wing  3.5 3.5   4.Mandibular angle to internal eye corner  4.25 4   5.Mandibular angle to external eye corner  3 3   6.Mental Protuberance to internal eye corner to external eye corner  4 4   7.Mandibular angle to mental protuberance 4 4.5   TOTALS: 27.75 28     POST-TREATMENT  Neck Composite   Location Right Left   Superior Circumference (A):   (just below mandible)  5.5 5   Middle Circumference (B):   (midway between top & bottom 5 4.5   Inferior Circumference (C):   (lowest circumferential location)  4.5 4.5   TOTAL: 15 14       Facial Composite   Location Right Left   1.Tragus to mental protuberance  4.75 5   2.Tragus to mouth angle  3.75 4   3.Mandibular angle to nasal wing  3.5 3.5   4.Mandibular angle to internal eye corner  4.25 4   5.Mandibular angle to external eye corner  3 3   6.Mental Protuberance to internal eye corner to external eye corner  4 4   7.Mandibular angle to mental protuberance 4 4.5   TOTALS: 27.25 28   Greater than a 2% difference from pre-to post-treatment? yes         Treatment   Heaven Borel noted to tolerated pneumatic compression with good results this  date. Decreased Neck composite total of 1.5 inch on right and 1.5 inches on left and Face composite total of 0.5 inches on right and 0 inches on left. SLP ended session with neck stretches, ROM and diaphragmatic breathing. Reduced fibrotic texture and increased neck ROM noted after intervention. Good compliance with all training. SLP to follow up on 01/24/2023.      Education: Plan of Care, role of SLP in care, and scheduling/ cancellation policy were discussed with pt. Patient expressed understanding.     Assessment       LongTerm Goals:  Current Progress:   1. Patient will demonstrate understanding and implementation of long-term home management interventions as noted by improvement in lymphatic function, conduction of oral care, completion of daily exercises/stretches, and use of home management tools  Progressing towards goal       Short Term Goals:  Current Progress:   1. Patient will participate in manual interventions to target improvement in alaryngeal communication function related to reduced lymphatic function Progressing towards goal   2.  Patient will completed diaphragmatic breathing exercises at an independent level.  Progressing towards goal   3.  Patient will improve alaryngeal communication to maintain voicing >90% of the time.  Progressing towards goal     Ms. 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  She is currently utilizing esophageal speech via a voice prosthesis for alaryngeal communication for functional communication with her family, friends, medical providers, and others to perform her daily life activities.  She requires the use of a laryngectomy tube at all times to keep the airway patent and prevent stomal stenosis. HMEs are required daily for mucus management and pulmonary optimization .     SLP to additionally provided MLD/CDP for head and neck lymphedema to improve functional communication.    Plan     SLP intervention is warranted 1-2 times a week or PRN for  "communication needs for 1-6 weeks due to the chronic nature of the impairment.     Additional Information   Ms. Boston entered with overall reduced swelling this date. Mild irritation noted bilaterally in the form of pink skin discoloration. Pt noted to have an area of broken skin under chin on Right medial aspect approximately 1cm long x .7 cm wide. Reduced swelling noted upon entering. Good decongestion noted visually with tissue texture with manual techniques this date.  SLP educated pt to continue use of decompression sleeve with "chip" bag for additional home management. Pt with reduced dysphagia symptoms and increased tolerance of "regular" consistencies. SLP will continue to address POC as stated. SLP to continue with POC on 01/24/2023.       Sunitha Jean MS, CCC-SLP   1/19/2023                                                        "

## 2023-01-24 ENCOUNTER — CLINICAL SUPPORT (OUTPATIENT)
Dept: REHABILITATION | Facility: HOSPITAL | Age: 58
End: 2023-01-24
Payer: COMMERCIAL

## 2023-01-24 DIAGNOSIS — I89.0 LYMPHEDEMA: Primary | ICD-10-CM

## 2023-01-24 PROCEDURE — 92507 TX SP LANG VOICE COMM INDIV: CPT

## 2023-01-24 NOTE — PROGRESS NOTES
[facility]  Speech Therapy Progress Note  Laryngectomy-Lymphedema    Date: 2023     Name: Heaven Boston   MRN: [id number]    Therapy Diagnosis:   Encounter Diagnosis   Name Primary?    Lymphedema Yes        Physician: Dr. Aparicio    PATIENT IS A NECK-BREATHER - DO NOT ORALLY INTUBATE    Time In:  0900  Time Out:  1050    Procedure Min.   Speech Language Voice Therapy  0   Total Untimed Units: 50  Charges Billed/# of units: 0/1    Precautions: Standard  Subjective    Chief Complaint: Patient with no complaints this date. Continues to exhibit reduced swelling this date.     AFFECT: normal affect    Pain:   0/10  Pain Location / Description: NA  Current Medical History: Heaven Boston is a 58 y.o. female referred by Dr. Aparicio for TEP management to maximize alaryngeal communication without respiratory compromise and lymphedema management.     Past Medical History:   Past Medical History:   Diagnosis Date    Cancer     Laryngeal    Diabetes mellitus     Hypertension       Heaven Boston  has a past surgical history that includes pr removal of larynx (2022) and  section.  Medical Hx and Allergies: Heaven has a current medication list which includes the following prescription(s): aspirin, atorvastatin, azelastine, carbamazepine, carbamazepine, cetirizine, duloxetine, fluticasone propionate, fluticasone-salmeterol 100-50 mcg/dose, hydrocodone-acetaminophen, ibuprofen, levothyroxine, loratadine, metformin, metoprolol succinate, montelukast, nifedipine, omeprazole, prednisolone acetate, and rivaroxaban, and the following Facility-Administered Medications: lidocaine (pf), phenylephrine hcl 1%, and silver nitrate applicators. Review of patient's allergies indicates:  No Known Allergies    Current Voice Function: currently using alaryngeal communication via voice prosthesis   Current Level of Swallow Function/Complaints:  Pt does not report dysphagia  Prior Therapy:   01/19/2023    OBJECTIVE   TEP Current Status:17FR, 12.5mm Aquino placed on 04/12/2022    TEP Patient Complaints: Intermittent strained voicing with periods of good voicing    TEP Accessories: 10/55 fenestrated leonie tube with push to talk HMEs.     Stoma Size:  adequate     Lymphedema:  yes    Manual therapy: MLD/CDP continues for head and neck.    Measurement taken of face and neck as follows.     Facial Lymphedema Measurement Form  PRE-TREATMENT  Neck Composite   Location Right Left   Superior Circumference (A):   (just below mandible)  6 5.5   Middle Circumference (B):  (midway between top & bottom 5.25 4.5   Inferior Circumference (C):  (lowest circumferential location)  5 4.5   TOTAL: 16.25 14.5       Facial Composite   Location Right Left   1.Tragus to mental protuberance  5 5   2.Tragus to mouth angle  4.25 4   3.Mandibular angle to nasal wing  4 3.5   4.Mandibular angle to internal eye corner  4.5 4   5.Mandibular angle to external eye corner  3.25 3   6.Mental Protuberance to internal eye corner to external eye corner  4 4   7.Mandibular angle to mental protuberance 4 4.5   TOTALS: 29 28     POST-TREATMENT  Neck Composite   Location Right Left   Superior Circumference (A):   (just below mandible)  5.25 5   Middle Circumference (B):   (midway between top & bottom 5 4.5   Inferior Circumference (C):   (lowest circumferential location)  4.75 4.5   TOTAL: 15 14       Facial Composite   Location Right Left   1.Tragus to mental protuberance  4.75 5   2.Tragus to mouth angle  4 4   3.Mandibular angle to nasal wing  4 3.5   4.Mandibular angle to internal eye corner  4.5 4   5.Mandibular angle to external eye corner  3 3   6.Mental Protuberance to internal eye corner to external eye corner  4 4   7.Mandibular angle to mental protuberance 4 4.5   TOTALS: 28.25 28   Greater than a 2% difference from pre-to post-treatment? yes         Treatment   Heaven Borel noted to tolerated pneumatic compression with good results  this date. Decreased Neck composite total of 1.25 inch on right and .5 inches on left and Face composite total of 0.75 inches on right and 0 inches on left. SLP ended session with neck stretches, ROM and diaphragmatic breathing. Reduced fibrotic texture and increased neck ROM noted after intervention. Good compliance with all training. SLP to follow up on 01/24/2023.      Education: Plan of Care, role of SLP in care, and scheduling/ cancellation policy were discussed with pt. Patient expressed understanding.     Assessment       LongTerm Goals:  Current Progress:   1. Patient will demonstrate understanding and implementation of long-term home management interventions as noted by improvement in lymphatic function, conduction of oral care, completion of daily exercises/stretches, and use of home management tools  Progressing towards goal       Short Term Goals:  Current Progress:   1. Patient will participate in manual interventions to target improvement in alaryngeal communication function related to reduced lymphatic function Progressing towards goal   2.  Patient will completed diaphragmatic breathing exercises at an independent level.  Progressing towards goal   3.  Patient will improve alaryngeal communication to maintain voicing >90% of the time.  Progressing towards goal     Ms. 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  She is currently utilizing esophageal speech via a voice prosthesis for alaryngeal communication for functional communication with her family, friends, medical providers, and others to perform her daily life activities.  She requires the use of a laryngectomy tube at all times to keep the airway patent and prevent stomal stenosis. HMEs are required daily for mucus management and pulmonary optimization .     SLP to additionally provided MLD/CDP for head and neck lymphedema to improve functional communication.    Plan     SLP intervention is warranted 1-2 times a week or PRN  "for communication needs for 1-6 weeks due to the chronic nature of the impairment.     Additional Information   Ms. Boston entered with overall reduced swelling this date. Mild irritation noted bilaterally in the form of pink skin discoloration. All skin noted to be intact this date. Reduced swelling noted upon entering. Good decongestion noted visually with tissue texture with manual techniques this date.  SLP educated pt to continue use of decompression sleeve with "chip" bag for additional home management. Mrs. Boston reported she ordered compression device from [employer]. Pt stated she would bring to next session for SLP to assess. Pt with reduced dysphagia symptoms and increased tolerance of "regular" consistencies. SLP will continue to address POC as stated. SLP to continue with POC on 01/24/2023.       Sunitha Jean MS, CCC-SLP   1/24/2023                                                          "

## 2023-01-26 ENCOUNTER — CLINICAL SUPPORT (OUTPATIENT)
Dept: REHABILITATION | Facility: HOSPITAL | Age: 58
End: 2023-01-26
Payer: COMMERCIAL

## 2023-01-26 DIAGNOSIS — I89.0 LYMPHEDEMA: Primary | ICD-10-CM

## 2023-01-26 PROCEDURE — 92507 TX SP LANG VOICE COMM INDIV: CPT

## 2023-01-26 NOTE — PROGRESS NOTES
[facility] AND [facility]  Speech Therapy Progress Note  Laryngectomy-Lymphedema    Date: 2023     Name: Heaven Boston   MRN: [id number]    Therapy Diagnosis:   Encounter Diagnosis   Name Primary?    Lymphedema Yes        Physician: Dr. Aparicio    PATIENT IS A NECK-BREATHER - DO NOT ORALLY INTUBATE    Time In:  09  Time Out:  1000    Procedure Min.   Speech Language Voice Therapy  55   Total Untimed Units: 55  Charges Billed/# of units: 55/1    Precautions: Standard  Subjective    Chief Complaint: Patient with no complaints this date. Continues to exhibit reduced swelling this date.     AFFECT: normal affect    Pain:   0/10  Pain Location / Description: NA  Current Medical History: Heaven Boston is a 58 y.o. female referred by Dr. Aparicio for TEP management to maximize alaryngeal communication without respiratory compromise and lymphedema management.     Past Medical History:   Past Medical History:   Diagnosis Date    Cancer     Laryngeal    Diabetes mellitus     Hypertension       Heaven Boston  has a past surgical history that includes pr removal of larynx (2022) and  section.  Medical Hx and Allergies: Heaven has a current medication list which includes the following prescription(s): aspirin, atorvastatin, azelastine, carbamazepine, carbamazepine, cetirizine, duloxetine, fluticasone propionate, fluticasone-salmeterol 100-50 mcg/dose, hydrocodone-acetaminophen, ibuprofen, levothyroxine, loratadine, metformin, metoprolol succinate, montelukast, nifedipine, omeprazole, prednisolone acetate, and rivaroxaban, and the following Facility-Administered Medications: lidocaine (pf), phenylephrine hcl 1%, and silver nitrate applicators. Review of patient's allergies indicates:  No Known Allergies    Current Voice Function: currently using alaryngeal communication via voice prosthesis   Current Level of Swallow Function/Complaints:  Pt does not report dysphagia  Prior Therapy:   01/24/2023    OBJECTIVE   TEP Current Status:17FR, 12.5mm Aquino placed on 04/12/2022    TEP Patient Complaints: Intermittent strained voicing with periods of good voicing    TEP Accessories: 10/55 fenestrated leonie tube with push to talk HMEs.     Stoma Size:  adequate     Lymphedema:  yes    Manual therapy: MLD/CDP continues for head and neck.    Measurement taken of face and neck as follows.     Facial Lymphedema Measurement Form  PRE-TREATMENT  Neck Composite   Location Right Left   Superior Circumference (A):   (just below mandible)  5.75 5.5   Middle Circumference (B):  (midway between top & bottom 5 5   Inferior Circumference (C):  (lowest circumferential location)  5 5   TOTAL: 15.75 15.5       Facial Composite   Location Right Left   1.Tragus to mental protuberance  5 5   2.Tragus to mouth angle  4 4   3.Mandibular angle to nasal wing  3.75 3.5   4.Mandibular angle to internal eye corner  4.25 4   5.Mandibular angle to external eye corner  3 3.5   6.Mental Protuberance to internal eye corner to external eye corner  4 4   7.Mandibular angle to mental protuberance 4.5 4.5   TOTALS: 28.5 28.5     POST-TREATMENT  Neck Composite   Location Right Left   Superior Circumference (A):   (just below mandible)  5 5   Middle Circumference (B):   (midway between top & bottom 4.75 4.5   Inferior Circumference (C):   (lowest circumferential location)  4.5 4.5   TOTAL: 14.25 14       Facial Composite   Location Right Left   1.Tragus to mental protuberance  4.75 5   2.Tragus to mouth angle  4 4   3.Mandibular angle to nasal wing  4 3.5   4.Mandibular angle to internal eye corner  4.5 4   5.Mandibular angle to external eye corner  3 3   6.Mental Protuberance to internal eye corner to external eye corner  4 4   7.Mandibular angle to mental protuberance 4 4.5   TOTALS: 27.75 28   Greater than a 2% difference from pre-to post-treatment? yes         Treatment   Heaven Borel noted to tolerated pneumatic compression with good results  this date. Decreased Neck composite total of 1.5 inch on right and 1.5 inches on left and Face composite total of 0.75 inches on right and 0 inches on left. SLP ended session with neck stretches, ROM and diaphragmatic breathing. Reduced fibrotic texture and increased neck ROM noted after intervention. Good compliance with all training. SLP to follow up on 01/31/2023.      Education: Plan of Care, role of SLP in care, and scheduling/ cancellation policy were discussed with pt. Patient expressed understanding.     Assessment       LongTerm Goals:  Current Progress:   1. Patient will demonstrate understanding and implementation of long-term home management interventions as noted by improvement in lymphatic function, conduction of oral care, completion of daily exercises/stretches, and use of home management tools  Progressing towards goal       Short Term Goals:  Current Progress:   1. Patient will participate in manual interventions to target improvement in alaryngeal communication function related to reduced lymphatic function Progressing towards goal   2.  Patient will completed diaphragmatic breathing exercises at an independent level.  Progressing towards goal   3.  Patient will improve alaryngeal communication to maintain voicing >90% of the time.  Progressing towards goal     Ms. 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  She is currently utilizing esophageal speech via a voice prosthesis for alaryngeal communication for functional communication with her family, friends, medical providers, and others to perform her daily life activities.  She requires the use of a laryngectomy tube at all times to keep the airway patent and prevent stomal stenosis. HMEs are required daily for mucus management and pulmonary optimization .     SLP to additionally provided MLD/CDP for head and neck lymphedema to improve functional communication.    Plan     SLP intervention is warranted 1-2 times a week or PRN  "for communication needs for 1-6 weeks due to the chronic nature of the impairment.     Additional Information   Ms. Boston entered with overall reduced swelling this date. Reduced irritation noted bilaterally this date as compared to last session. All skin noted to be intact this date. Reduced swelling noted upon entering. Good decongestion noted visually with tissue texture with manual techniques this date.  SLP educated pt to continue use of decompression sleeve with "chip" bag for additional home management. Mrs. Boston entered with compression device from [employer]. Device noted to be ill fitting in neck area. Pt with reduced dysphagia symptoms and increased tolerance of "regular" consistencies. SLP will continue to address POC as stated. SLP to continue with POC on 01/31/2023.       Sunitha Jean MS, CCC-SLP   1/26/2023                                                            "

## 2023-01-27 DIAGNOSIS — Z86.718 HISTORY OF DVT (DEEP VEIN THROMBOSIS): Primary | ICD-10-CM

## 2023-01-31 ENCOUNTER — DOCUMENTATION ONLY (OUTPATIENT)
Dept: REHABILITATION | Facility: HOSPITAL | Age: 58
End: 2023-01-31
Payer: COMMERCIAL

## 2023-01-31 NOTE — PROGRESS NOTES
[facility]  Cancel Note    Patient: Heaven Boston  Date of Session: 1/31/2023  Diagnosis: s/p total laryngectomy   MRN: [id number]    Heaven Boston did not attend her scheduled OP SLP therapy session at 0900 on 01/31/2023. She contacted the office to cancel session without reason. SLP to resume sessions on 02/02/2023.    Sunitha Jean MS, CCC-SLP  1/31/2023

## 2023-02-02 ENCOUNTER — CLINICAL SUPPORT (OUTPATIENT)
Dept: REHABILITATION | Facility: HOSPITAL | Age: 58
End: 2023-02-02
Payer: COMMERCIAL

## 2023-02-02 DIAGNOSIS — Z90.02 H/O LARYNGECTOMY: Primary | ICD-10-CM

## 2023-02-02 PROCEDURE — 92507 TX SP LANG VOICE COMM INDIV: CPT

## 2023-02-02 NOTE — PROGRESS NOTES
[facility]  Speech Therapy Progress Note  Laryngectomy-Lymphedema    Date: 2023     Name: Heaven Boston   MRN: [id number]    Therapy Diagnosis:   Encounter Diagnosis   Name Primary?    H/O laryngectomy Yes        Physician: Dr. Aparicio    PATIENT IS A NECK-BREATHER - DO NOT ORALLY INTUBATE    Time In:  0910  Time Out:  1000    Procedure Min.   Speech Language Voice Therapy  50   Total Untimed Units: 50  Charges Billed/# of units: 50/1    Precautions: Standard  Subjective    Chief Complaint: Patient with no complaints this date. Continues to exhibit reduced swelling this date.     AFFECT: normal affect    Pain:   0/10  Pain Location / Description: NA  Current Medical History: Heaven Boston is a 58 y.o. female referred by Dr. Aparicio for TEP management to maximize alaryngeal communication without respiratory compromise and lymphedema management.     Past Medical History:   Past Medical History:   Diagnosis Date    Cancer     Laryngeal    Diabetes mellitus     Hypertension       Heaven Boston  has a past surgical history that includes pr removal of larynx (2022) and  section.  Medical Hx and Allergies: Heaven has a current medication list which includes the following prescription(s): aspirin, atorvastatin, azelastine, carbamazepine, carbamazepine, cetirizine, duloxetine, fluticasone propionate, fluticasone-salmeterol 100-50 mcg/dose, hydrocodone-acetaminophen, ibuprofen, levothyroxine, loratadine, metformin, metoprolol succinate, montelukast, nifedipine, omeprazole, prednisolone acetate, and rivaroxaban, and the following Facility-Administered Medications: lidocaine (pf), phenylephrine hcl 1%, and silver nitrate applicators. Review of patient's allergies indicates:  No Known Allergies    Current Voice Function: currently using alaryngeal communication via voice prosthesis   Current Level of Swallow Function/Complaints:  Pt does not report dysphagia  Prior Therapy:   01/24/2023    OBJECTIVE   TEP Current Status:17FR, 12.5mm Aquino placed on 04/12/2022    TEP Patient Complaints: Intermittent strained voicing with periods of good voicing    TEP Accessories: 10/55 fenestrated leonie tube with push to talk HMEs.     Stoma Size:  adequate     Lymphedema:  yes    Manual therapy: MLD/CDP continues for head and neck.    Measurement taken of face and neck as follows.     Facial Lymphedema Measurement Form  PRE-TREATMENT  Neck Composite   Location Right Left   Superior Circumference (A):   (just below mandible)  5.5 5   Middle Circumference (B):  (midway between top & bottom 5.25 5   Inferior Circumference (C):  (lowest circumferential location)  5 5   TOTAL: 15.75 15       Facial Composite   Location Right Left   1.Tragus to mental protuberance  5 5   2.Tragus to mouth angle  4 4   3.Mandibular angle to nasal wing  4 4   4.Mandibular angle to internal eye corner  4.25 4.25   5.Mandibular angle to external eye corner  3 3   6.Mental Protuberance to internal eye corner to external eye corner  4 4   7.Mandibular angle to mental protuberance 4 4   TOTALS: 28.25 28.25     POST-TREATMENT  Neck Composite   Location Right Left   Superior Circumference (A):   (just below mandible)  4.75 4.5   Middle Circumference (B):   (midway between top & bottom 4.5 4.5   Inferior Circumference (C):   (lowest circumferential location)  4.5 4.5   TOTAL: 13.75 13.5       Facial Composite   Location Right Left   1.Tragus to mental protuberance  5 5   2.Tragus to mouth angle  4 4   3.Mandibular angle to nasal wing  4 4   4.Mandibular angle to internal eye corner  4.25 4   5.Mandibular angle to external eye corner  3 3   6.Mental Protuberance to internal eye corner to external eye corner  4 4   7.Mandibular angle to mental protuberance 4 4   TOTALS: 28.25 28   Greater than a 2% difference from pre-to post-treatment? yes         Treatment   Heaven Borel noted to tolerated pneumatic compression with good results this  date. Decreased Neck composite total of 2 inch on right and 1.5 inches on left and Face composite total of 0 inches on right and .25 inches on left. SLP ended session with neck stretches, ROM and diaphragmatic breathing. Reduced fibrotic texture and increased neck ROM noted after intervention. Good compliance with all training. SLP to follow up on 02/07/2023.      Education: Plan of Care, role of SLP in care, and scheduling/ cancellation policy were discussed with pt. Patient expressed understanding.     Assessment       LongTerm Goals:  Current Progress:   1. Patient will demonstrate understanding and implementation of long-term home management interventions as noted by improvement in lymphatic function, conduction of oral care, completion of daily exercises/stretches, and use of home management tools  Progressing towards goal       Short Term Goals:  Current Progress:   1. Patient will participate in manual interventions to target improvement in alaryngeal communication function related to reduced lymphatic function Progressing towards goal   2.  Patient will completed diaphragmatic breathing exercises at an independent level.  Progressing towards goal   3.  Patient will improve alaryngeal communication to maintain voicing >90% of the time.  Progressing towards goal     Ms. 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  She is currently utilizing esophageal speech via a voice prosthesis for alaryngeal communication for functional communication with her family, friends, medical providers, and others to perform her daily life activities.  She requires the use of a laryngectomy tube at all times to keep the airway patent and prevent stomal stenosis. HMEs are required daily for mucus management and pulmonary optimization .     SLP to additionally provided MLD/CDP for head and neck lymphedema to improve functional communication.    Plan     SLP intervention is warranted 1-2 times a week or PRN for  "communication needs for 1-6 weeks due to the chronic nature of the impairment.     Additional Information   Ms. Boston entered with overall reduced swelling this date. Reduced irritation noted bilaterally this date as compared to last session. All skin noted to be intact this date. Reduced swelling noted upon entering. Good decongestion noted visually with tissue texture with manual techniques this date.  SLP educated pt to continue use of decompression sleeve with "chip" bag for additional home management. Pt with reduced dysphagia symptoms and increased tolerance of "regular" consistencies. SLP will continue to address POC as stated. SLP to continue with POC on 02/07/2023.       Sunitha Jean MS, CCC-SLP   2/2/2023                                                              "

## 2023-02-06 ENCOUNTER — LAB VISIT (OUTPATIENT)
Dept: LAB | Facility: HOSPITAL | Age: 58
End: 2023-02-06
Attending: STUDENT IN AN ORGANIZED HEALTH CARE EDUCATION/TRAINING PROGRAM
Payer: COMMERCIAL

## 2023-02-06 DIAGNOSIS — Z92.3 H/O HEAD AND NECK RADIATION: ICD-10-CM

## 2023-02-06 DIAGNOSIS — Z90.02 H/O LARYNGECTOMY: ICD-10-CM

## 2023-02-06 DIAGNOSIS — E03.9 HYPOTHYROIDISM, UNSPECIFIED TYPE: ICD-10-CM

## 2023-02-06 LAB
T4 FREE SERPL-MCNC: 1.48 NG/DL (ref 0.7–1.48)
TSH SERPL-ACNC: 0.12 UIU/ML (ref 0.35–4.94)

## 2023-02-06 PROCEDURE — 36415 COLL VENOUS BLD VENIPUNCTURE: CPT

## 2023-02-06 PROCEDURE — 84443 ASSAY THYROID STIM HORMONE: CPT

## 2023-02-06 PROCEDURE — 84439 ASSAY OF FREE THYROXINE: CPT

## 2023-02-07 ENCOUNTER — CLINICAL SUPPORT (OUTPATIENT)
Dept: REHABILITATION | Facility: HOSPITAL | Age: 58
End: 2023-02-07
Payer: MEDICAID

## 2023-02-07 DIAGNOSIS — I89.0 LYMPHEDEMA: Primary | ICD-10-CM

## 2023-02-07 PROCEDURE — 92507 TX SP LANG VOICE COMM INDIV: CPT

## 2023-02-07 NOTE — PROGRESS NOTES
[facility]  Speech Therapy Progress Note  Laryngectomy-Lymphedema    Date: 2023     Name: Heaven Boston   MRN: [id number]    Therapy Diagnosis:   Encounter Diagnosis   Name Primary?    Lymphedema Yes        Physician: Dr. Aparicio    PATIENT IS A NECK-BREATHER - DO NOT ORALLY INTUBATE    Time In:  0910  Time Out:  1000    Procedure Min.   Speech Language Voice Therapy  55   Total Untimed Units: 55  Charges Billed/# of units: 55/1    Precautions: Standard  Subjective    Chief Complaint: Patient with no complaints this date. Continues to exhibit reduced swelling this date.     AFFECT: normal affect    Pain:   0/10  Pain Location / Description: NA  Current Medical History: Heaven Boston is a 58 y.o. female referred by Dr. Aparicio for TEP management to maximize alaryngeal communication without respiratory compromise and lymphedema management.     Past Medical History:   Past Medical History:   Diagnosis Date    Cancer     Laryngeal    Diabetes mellitus     Hypertension       Heaven Boston  has a past surgical history that includes pr removal of larynx (2022) and  section.  Medical Hx and Allergies: Heaven has a current medication list which includes the following prescription(s): aspirin, atorvastatin, azelastine, carbamazepine, carbamazepine, cetirizine, duloxetine, fluticasone propionate, fluticasone-salmeterol 100-50 mcg/dose, hydrocodone-acetaminophen, ibuprofen, levothyroxine, loratadine, metformin, metoprolol succinate, montelukast, nifedipine, omeprazole, prednisolone acetate, and rivaroxaban, and the following Facility-Administered Medications: lidocaine (pf), phenylephrine hcl 1%, and silver nitrate applicators. Review of patient's allergies indicates:  No Known Allergies    Current Voice Function: currently using alaryngeal communication via voice prosthesis   Current Level of Swallow Function/Complaints:  Pt does not report dysphagia  Prior Therapy:   01/24/2023    OBJECTIVE   TEP Current Status:17FR, 12.5mm Aquino placed on 04/12/2022    TEP Patient Complaints: Intermittent strained voicing with periods of good voicing    TEP Accessories: 10/55 fenestrated leonie tube with push to talk HMEs.     Stoma Size:  adequate     Lymphedema:  yes    Manual therapy: MLD/CDP continues for head and neck.    Measurement taken of face and neck as follows.     Facial Lymphedema Measurement Form  PRE-TREATMENT  Neck Composite   Location Right Left   Superior Circumference (A):   (just below mandible)  5.5 5.5   Middle Circumference (B):  (midway between top & bottom 5 5   Inferior Circumference (C):  (lowest circumferential location)  5 5   TOTAL: 15.5 15.5       Facial Composite   Location Right Left   1.Tragus to mental protuberance  5 5   2.Tragus to mouth angle  4 4   3.Mandibular angle to nasal wing  3.5 3.5   4.Mandibular angle to internal eye corner  4 4   5.Mandibular angle to external eye corner  3 3   6.Mental Protuberance to internal eye corner to external eye corner  4 4   7.Mandibular angle to mental protuberance 4.5 4.5   TOTALS: 28 28     POST-TREATMENT  Neck Composite   Location Right Left   Superior Circumference (A):   (just below mandible)  5.5 5.5   Middle Circumference (B):   (midway between top & bottom 5 5   Inferior Circumference (C):   (lowest circumferential location)  5 5   TOTAL: 15.5 15.5       Facial Composite   Location Right Left   1.Tragus to mental protuberance  5 5   2.Tragus to mouth angle  4 4   3.Mandibular angle to nasal wing  3.5 3.5   4.Mandibular angle to internal eye corner  4 4   5.Mandibular angle to external eye corner  3 3   6.Mental Protuberance to internal eye corner to external eye corner  4 4   7.Mandibular angle to mental protuberance 4.5 4.5   TOTALS: 28 28   Greater than a 2% difference from pre-to post-treatment? yes         Treatment   Heaven Borel noted to tolerated pneumatic compression with good results this date.  Decreased Neck composite total of 0 inches on right and 0 inches on left and Face composite total of 0 inches on right and 0 inches on left. SLP ended session with neck stretches, ROM and diaphragmatic breathing. Despite no measurable changes this date, pt exhibited educed fibrotic texture and increased neck ROM noted after intervention. Good compliance with all training. SLP to follow up on 02/09/2023.  Pt to decrease frequency next week to 1 x week due to reduced lymphedema noted overall.     Education: Plan of Care, role of SLP in care, and scheduling/ cancellation policy were discussed with pt. Patient expressed understanding.     Assessment       LongTerm Goals:  Current Progress:   1. Patient will demonstrate understanding and implementation of long-term home management interventions as noted by improvement in lymphatic function, conduction of oral care, completion of daily exercises/stretches, and use of home management tools  Progressing towards goal       Short Term Goals:  Current Progress:   1. Patient will participate in manual interventions to target improvement in alaryngeal communication function related to reduced lymphatic function Progressing towards goal   2.  Patient will completed diaphragmatic breathing exercises at an independent level.  Progressing towards goal   3.  Patient will improve alaryngeal communication to maintain voicing >90% of the time.  Progressing towards goal     Ms. 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  She is currently utilizing esophageal speech via a voice prosthesis for alaryngeal communication for functional communication with her family, friends, medical providers, and others to perform her daily life activities.  She requires the use of a laryngectomy tube at all times to keep the airway patent and prevent stomal stenosis. HMEs are required daily for mucus management and pulmonary optimization .     SLP to additionally provided MLD/CDP for  "head and neck lymphedema to improve functional communication.    Plan     SLP intervention is warranted 1-2 times a week or PRN for communication needs for 1-6 weeks due to the chronic nature of the impairment.     Additional Information   Ms. Boston entered with overall reduced swelling this date. Reduced irritation noted bilaterally this date as compared to last session. All skin noted to be intact this date. Reduced swelling noted upon entering. Good decongestion noted visually with tissue texture with manual techniques this date.  SLP educated pt to continue use of decompression sleeve with "chip" bag for additional home management. Pt with reduced dysphagia symptoms and increased tolerance of "regular" consistencies. SLP will continue to address POC as stated. SLP to continue with POC on 02/09/2023.       Sunitha Jean MS, CCC-SLP   2/7/2023                                                                "

## 2023-02-09 ENCOUNTER — CLINICAL SUPPORT (OUTPATIENT)
Dept: REHABILITATION | Facility: HOSPITAL | Age: 58
End: 2023-02-09
Payer: COMMERCIAL

## 2023-02-09 DIAGNOSIS — I89.0 LYMPHEDEMA: Primary | ICD-10-CM

## 2023-02-09 PROCEDURE — 92507 TX SP LANG VOICE COMM INDIV: CPT

## 2023-02-09 NOTE — PROGRESS NOTES
[facility]  Speech Therapy Progress Note  Laryngectomy-Lymphedema    Date: 2023     Name: Heaven Boston   MRN: [id number]    Therapy Diagnosis:   Encounter Diagnosis   Name Primary?    Lymphedema Yes        Physician: Dr. Aparicio    PATIENT IS A NECK-BREATHER - DO NOT ORALLY INTUBATE    Time In:  0900  Time Out: 0955    Procedure Min.   Speech Language Voice Therapy  55   Total Untimed Units: 55  Charges Billed/# of units: 55/1    Precautions: Standard  Subjective    Chief Complaint: Patient with no complaints this date. Continues to exhibit reduced swelling this date.     AFFECT: normal affect    Pain:   0/10  Pain Location / Description: NA  Current Medical History: Heaven Boston is a 58 y.o. female referred by Dr. Aparicio for TEP management to maximize alaryngeal communication without respiratory compromise and lymphedema management.     Past Medical History:   Past Medical History:   Diagnosis Date    Cancer     Laryngeal    Diabetes mellitus     Hypertension       Heaven Boston  has a past surgical history that includes pr removal of larynx (2022) and  section.  Medical Hx and Allergies: Heaven has a current medication list which includes the following prescription(s): aspirin, atorvastatin, azelastine, carbamazepine, carbamazepine, cetirizine, duloxetine, fluticasone propionate, fluticasone-salmeterol 100-50 mcg/dose, hydrocodone-acetaminophen, ibuprofen, levothyroxine, loratadine, metformin, metoprolol succinate, montelukast, nifedipine, omeprazole, prednisolone acetate, and rivaroxaban, and the following Facility-Administered Medications: lidocaine (pf), phenylephrine hcl 1%, and silver nitrate applicators. Review of patient's allergies indicates:  No Known Allergies    Current Voice Function: currently using alaryngeal communication via voice prosthesis   Current Level of Swallow Function/Complaints:  Pt does not report dysphagia  Prior Therapy:   02/07/2023    OBJECTIVE   TEP Current Status:17FR, 12.5mm Aquino placed on 04/12/2022    TEP Patient Complaints: Intermittent strained voicing with periods of good voicing    TEP Accessories: 10/55 fenestrated leonie tube with push to talk HMEs.     Stoma Size:  adequate     Lymphedema:  yes    Manual therapy: MLD/CDP continues for head and neck.    Measurement taken of face and neck as follows.     Facial Lymphedema Measurement Form  PRE-TREATMENT  Neck Composite   Location Right Left   Superior Circumference (A):   (just below mandible)  5 5   Middle Circumference (B):  (midway between top & bottom 5 5   Inferior Circumference (C):  (lowest circumferential location)  5 5   TOTAL: 15 15       Facial Composite   Location Right Left   1.Tragus to mental protuberance  5 5   2.Tragus to mouth angle  4 4   3.Mandibular angle to nasal wing  4 4   4.Mandibular angle to internal eye corner  4.5 4.5   5.Mandibular angle to external eye corner  3 3   6.Mental Protuberance to internal eye corner to external eye corner  4 4   7.Mandibular angle to mental protuberance 4 4   TOTALS: 28.5 28.5     POST-TREATMENT  Neck Composite   Location Right Left   Superior Circumference (A):   (just below mandible)  5 5   Middle Circumference (B):   (midway between top & bottom 4.5 4.5   Inferior Circumference (C):   (lowest circumferential location)  4.25 4.25   TOTAL: 13.75 13.75       Facial Composite   Location Right Left   1.Tragus to mental protuberance  5 5   2.Tragus to mouth angle  4 4   3.Mandibular angle to nasal wing  4 4   4.Mandibular angle to internal eye corner  4.5 4.5   5.Mandibular angle to external eye corner  3 3   6.Mental Protuberance to internal eye corner to external eye corner  4 4   7.Mandibular angle to mental protuberance 4 4   TOTALS: 28.5 28.5   Greater than a 2% difference from pre-to post-treatment? yes         Treatment   Heaven Mohamudl noted to tolerated pneumatic compression with good results this date.  Decreased Neck composite total of 1.75 inches on right and 1.75 inches on left and Face composite total of 0 inches on right and 0 inches on left. SLP ended session with neck stretches, ROM and diaphragmatic breathing. Pt exhibited reduced fibrotic texture and increased neck ROM noted after intervention. Good compliance with all training. SLP to follow up on 02/16/2023.  Pt to decrease frequency next week to 1 x week due to reduced lymphedema noted overall.     Education: Plan of Care, role of SLP in care, and scheduling/ cancellation policy were discussed with pt. Patient expressed understanding.     Assessment       LongTerm Goals:  Current Progress:   1. Patient will demonstrate understanding and implementation of long-term home management interventions as noted by improvement in lymphatic function, conduction of oral care, completion of daily exercises/stretches, and use of home management tools  Progressing towards goal       Short Term Goals:  Current Progress:   1. Patient will participate in manual interventions to target improvement in alaryngeal communication function related to reduced lymphatic function Progressing towards goal   2.  Patient will completed diaphragmatic breathing exercises at an independent level.  Progressing towards goal   3.  Patient will improve alaryngeal communication to maintain voicing >90% of the time.  Progressing towards goal     Ms. 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  She is currently utilizing esophageal speech via a voice prosthesis for alaryngeal communication for functional communication with her family, friends, medical providers, and others to perform her daily life activities.  She requires the use of a laryngectomy tube at all times to keep the airway patent and prevent stomal stenosis. HMEs are required daily for mucus management and pulmonary optimization .     SLP to additionally provided MLD/CDP for head and neck lymphedema to improve  "functional communication.    Plan     SLP intervention is warranted 1-2 times a week or PRN for communication needs for 1-6 weeks due to the chronic nature of the impairment.     Additional Information   Ms. Boston entered with overall reduced swelling this date. No skin irritation noted this date. All skin noted to be intact this date. Reduced swelling noted upon entering. Good decongestion noted visually with tissue texture with manual techniques this date.  SLP educated pt to continue use of decompression sleeve with "chip" bag for additional home management. Pt with reduced dysphagia symptoms and increased tolerance of "regular" consistencies. SLP will continue to address POC as stated. SLP to decrease frequency to 1 time a week due to improved management of lymphedema. Continue with POC on 02/16/2023.       Sunitha Jean MS, CCC-SLP   2/9/2023                                                                  "

## 2023-02-14 ENCOUNTER — OFFICE VISIT (OUTPATIENT)
Dept: OTOLARYNGOLOGY | Facility: CLINIC | Age: 58
End: 2023-02-14
Payer: MEDICAID

## 2023-02-14 VITALS
DIASTOLIC BLOOD PRESSURE: 75 MMHG | SYSTOLIC BLOOD PRESSURE: 129 MMHG | RESPIRATION RATE: 16 BRPM | HEART RATE: 83 BPM | BODY MASS INDEX: 24.11 KG/M2 | TEMPERATURE: 99 F | WEIGHT: 131 LBS | HEIGHT: 62 IN

## 2023-02-14 DIAGNOSIS — Z90.02 H/O LARYNGECTOMY: ICD-10-CM

## 2023-02-14 DIAGNOSIS — Z92.3 H/O HEAD AND NECK RADIATION: ICD-10-CM

## 2023-02-14 DIAGNOSIS — E03.9 HYPOTHYROIDISM, UNSPECIFIED TYPE: ICD-10-CM

## 2023-02-14 DIAGNOSIS — C32.9 LARYNGEAL CANCER: Primary | ICD-10-CM

## 2023-02-14 PROCEDURE — 99215 OFFICE O/P EST HI 40 MIN: CPT | Mod: PBBFAC,25 | Performed by: OTOLARYNGOLOGY

## 2023-02-14 PROCEDURE — 31575 DIAGNOSTIC LARYNGOSCOPY: CPT | Mod: PBBFAC | Performed by: OTOLARYNGOLOGY

## 2023-02-14 RX ORDER — LIDOCAINE HYDROCHLORIDE 40 MG/ML
1 INJECTION, SOLUTION RETROBULBAR
Status: DISCONTINUED | OUTPATIENT
Start: 2023-02-14 | End: 2023-02-14

## 2023-02-14 NOTE — PROGRESS NOTES
Patient Name: Heaven Boston   YOB: 1965     Chief Complaint: No chief complaint on file.       History of Present Illness:  Heaven Boston is a 57 y.o. 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 Patient had an uneventful hospitalization course and was discharged on postoperative day 9 after an esophagram showed no pharyngeal leak.  However she re-presented to our facility on 4/6/22 with surgical site infection and a pharyngocutaneous fistula. Patient underwent a neck washout with primary repair on 4/11/2022, with placement of a gastrostomy tube the same day. She was maintained on NPO and transferred to [place] for further evaluation and management on 4/15/22 after evidence of persistent pharyngocutaneous fistula. There she underwent a 2nd neck washout with primary closure and placement of salivary bypass tube.      5/2/22: Patient had an uneventful hospitalization course and removal of salivary bypass tube before discharge last week and now presenting for postoperative evaluation.  Patient presents today without any complaints.  She is wondering about when he pointed that her x-rays for swallowing then.  She seen Dr. Laguna today after our visit to undergo-stimulation for radiation.     5/9/22: Pt. Did not answer. Let voicemail to advance diet to CLD. Also discussed this with son.  Plan to see in clinic in 2 weeks     5/19/22: In radiation therapy, 5x weekly until 6/16/22. Has TEP in place, brought another TEP requesting swap out. Will go to speech therapy for exchange of TEP. Went to ER for bleeding from G-tube site 1 week ago but declines visit to Gen Surg clinic today. On [facility]D taking Ensure by G-tube, water and soups by mouth. Had questions about advancing diet. No weight loss  or appetite change. No other issues.      6/21/22:  Returns today for follow up.  Completed radiation last week 6/16/22.  Is having difficulty voicing with TEP though it has been swapped out by SLP.  Able to eat what she wants.  Having some tightness of her throat and soreness, otherwise no issues.     7/21/22: Here today for follow up. Has overall been doing very well. She is tolerating her diet by mouth and gaining weight. No dysphagia. Has not used her PEG tube since prior to radiation. She is interested in having it removed. Has some fullness in the left neck which intermittently swells and then resolves again. Otherwise no new lumps/bumps of the head and neck.      8/23/22: Ms. Boston returns today for follow up. She complains of neck pain exacerbated by flexion and movement to the right that began 2 weeks ago. She also reports neck swelling and tightness which occasionally increases in size and is tender to palpation. She reports cough with clear mucus production as well as nasal congestion without drainage. She also notes dry, itchy eyes that have not improved with Visine. She also notes dysuria. She is tolerating her diet by mouth and gaining weight. She had her PEG tube removed on 8/16/22. She has been to SLP twice and is still having difficulty voicing with TEP.     9/27/22: Here for follow up. Reports she is doing okay. Still coughing a lot and not voicing well with TEP. No weight loss, tolerating diet. No otalgia/lumps bumps. Still having intermittent neck swelling/pain.      10/27/22:  Returns for surveillance.  Did not get TSH, T4.  Has been going to SLP, now can talk well.  Cough has improved.  No new H&N complaints     12/22/22: Doing very well. Voicing well. No pain. Doing well with speech and lymphedema therapy. Eating well, gaining weight constantly. Always tired. Sometimes sleeps all day.    February 14, 2023:   58-year-old female presents today for follow-up of her laryngeal squamous cell  carcinoma and postoperative hypothyroidism.  In regards to her squamous cell carcinoma of the larynx she is doing well.  She has no complaints of any pain.  He is not noticed any swelling in her neck.  She is not have any difficulty eating or swallowing in his indicated she is gaining weight.  She continues to use her TEP without problems and does not have any leakage around her TEP.  She did have a follow-up PET-CT scan done on 2022, in the report indicated that there was no evidence of new or progressive hypermetabolic metastatic disease.  On reviewing those images, however, there does appear to be an area of increased FDG activity in the upper cervical esophagus in the area above her trachea stoma and possibly at the site of the TEP.  This was also present on a PET-CT scan done on 2022 and there does not appear debris any change in this area between the 2 scans.  The report on the scan done on 2022, had indicated this area had an SUV max of 4.4 without any apparent lesions noted on the corresponding CT.  In regards to her hypothyroidism she continues to take levothyroxine 88 mcg daily.  Lab work from 2023, showed her TSH level be low at 0.115 with a normal free T4 of 1.48.  Her prior TSH from 2022, was elevated at 55.1672.       Past Medical History:  Past Medical History:   Diagnosis Date    Cancer     Laryngeal    Diabetes mellitus     Hypertension      Past Surgical History:   Procedure Laterality Date     SECTION      ND REMOVAL OF LARYNX  2022       Review of Systems:  Unremarkable except as mentioned above.    Current Medications:  Current Outpatient Medications   Medication Sig    atorvastatin (LIPITOR) 80 MG tablet Take 1 tablet (80 mg total) by mouth every evening.    azelastine (ASTELIN) 137 mcg (0.1 %) nasal spray 1 spray by Nasal route.    carBAMazepine (CARBATROL) 200 MG CM12 Take 200 mg by mouth.    carBAMazepine (TEGRETOL  XR) 200 MG 12 hr tablet Take 200 mg by mouth 2 (two) times daily.    cetirizine (ZYRTEC) 10 MG tablet Take 10 mg by mouth once daily.    DULoxetine (CYMBALTA) 30 MG capsule Take 30 mg by mouth 2 (two) times daily.    fluticasone propionate (FLONASE) 50 mcg/actuation nasal spray 1 spray by Each Nostril route 2 (two) times daily.    fluticasone-salmeterol diskus inhaler 100-50 mcg Inhale 1 puff into the lungs every 12 (twelve) hours.    levothyroxine (SYNTHROID) 88 MCG tablet Take 1 tablet (88 mcg total) by mouth before breakfast.    loratadine (CLARITIN) 10 mg tablet Take 1 tablet (10 mg total) by mouth once daily.    metFORMIN (GLUCOPHAGE) 500 MG tablet Take 1 tablet (500 mg total) by mouth once daily.    metoprolol succinate (TOPROL-XL) 25 MG 24 hr tablet Take 0.5 tablets (12.5 mg total) by mouth once daily.    montelukast (SINGULAIR) 5 MG chewable tablet Take 1 tablet (5 mg total) by mouth every evening.    NIFEdipine (PROCARDIA-XL) 60 MG (OSM) 24 hr tablet Take 1 tablet (60 mg total) by mouth once daily.    rivaroxaban (XARELTO) 20 mg Tab Take 1 tablet (20 mg total) by mouth daily with dinner or evening meal.    aspirin (ECOTRIN) 81 MG EC tablet Take 1 tablet (81 mg total) by mouth once daily.    HYDROcodone-acetaminophen 5-300 mg Tab Take 1 tablet by mouth every 8 (eight) hours as needed.    ibuprofen (ADVIL,MOTRIN) 800 MG tablet Take 800 mg by mouth every 6 (six) hours as needed.    omeprazole (PRILOSEC) 40 MG capsule Take 40 mg by mouth once daily.    prednisoLONE acetate (PRED FORTE) 1 % DrpS Place into both eyes.     Current Facility-Administered Medications   Medication    silver nitrate applicators applicator 1 applicator        Allergies:  Review of patient's allergies indicates:  No Known Allergies     Physical Exam:  Vital signs:   Vitals:    02/14/23 1102   BP: 129/75   BP Location: Right arm   Patient Position: Sitting   BP Method: Medium (Automatic)   Pulse: 83   Resp: 16   Temp: 99 °F (37.2 °C)  "  TempSrc: Oral   Weight: 59.4 kg (131 lb)   Height: 5' 2" (1.575 m)   General:  Well-developed well-nourished female in no acute distress.  Patient does communicate well with clear speech using her TEP.  Head and face:  Normocephalic.  No facial lesions.  No temporomandibular joint tenderness or click.  Ears:  Right ear-auricle is normally developed.  External auditory canal is clear.  Tympanic membrane is nonerythematous.  No middle ear effusion.  Left ear-auricle is normally developed.  External auditory canal is clear.  Tympanic membrane is nonerythematous.  No middle ear effusion.  Nose:  Nasal dorsum is unremarkable.  No significant septal deviation.  No significant intranasal congestion.  Secretions are clear.  Oral cavity and oropharynx:  Tongue and floor mouth are without lesions.  Mucosa is moist.  No pharyngeal erythema or exudates.  No oropharyngeal masses.  No tonsillar hypertrophy.  Neck:  Supple without palpable adenopathy.  She does have some induration related to her surgery and postradiation changes.  Trachea stoma is patent.  TEP is in place without any gross drainage from around the prosthesis.  No granulation is noted.  Eyes:  Extraocular muscles intact.  No nystagmus.  No exophthalmos or enophthalmos.  Neurologic:  Alert and oriented.  Cranial nerves 2-12 are grossly normal.    Procedure note: Flexible laryngoscopy.  The flexible fiberoptic laryngoscope was introduced into the right nostril and advanced. Examination of the nose showed the above mentioned findings. Examination of the nasopharynx showed no nasopharyngeal masses or eustachian tube obstruction. Examination of the base of tongue showed no masses or lesions. Examination of the hypopharynx showed masses or lesions in the neopharynx or hypopharynx.  The esophageal inlet is without apparent lesions.      Assessment/Plan:  Heaven Boston is a 57 y.o. female with T3N1M0 supraglottic SCCa s/p TL BSND CPM complicated by PCF s/p washout & " primary closure x2 (2nd in [place]) now s/p adjuvant RT. General:  Well-developed well-nourished female in no acute distress.  Voice is normal.  Has an area of persistent appearing hypermetabolic activity in the cervical esophagus the trachea stoma at the level of the TEP; on exam today the patient does not have any gross disease but the area noted on the PET-CT scan can not be visualized with the fiberoptic laryngoscope.    Post treatment hypothyroidism-improved; patient maybe over treated at this time.      Plan:  Schedule the patient for direct laryngoscopy and esophagoscopy for further evaluation of the cervical esophagus; scheduled for March 3, 2023.    Recheck TSH and free T4 at her follow-up from the endoscopy.  If her TSH remains low we will then adjust her medication.      Connor Patrick M.D.

## 2023-02-14 NOTE — PROGRESS NOTES
The scope used for the exam was:  Flexible scope ENF-P4  Serial Number:  1)    [id number]    []   2)    [id number]    []   3)    [id number]    []   4)    [id number]    []   5)    [id number]    []   6)    [id number]    [x]       The scope used for the exam was:  Rigid scope   Serial Number:  1)   [id number]    []   2)   [id number]    []   3)   7330    []   4)    3384   []   5)    0824   []   6)    5554   []     7)   7425    []   8)   2240    []   9)   1109    []

## 2023-02-16 ENCOUNTER — CLINICAL SUPPORT (OUTPATIENT)
Dept: REHABILITATION | Facility: HOSPITAL | Age: 58
End: 2023-02-16
Payer: MEDICAID

## 2023-02-16 DIAGNOSIS — I89.0 LYMPHEDEMA: Primary | ICD-10-CM

## 2023-02-16 PROCEDURE — 92507 TX SP LANG VOICE COMM INDIV: CPT

## 2023-02-16 NOTE — PROGRESS NOTES
[facility]  Speech Therapy Progress Note  Laryngectomy-Lymphedema    Date: 2023     Name: Heaven Boston   MRN: [id number]    Therapy Diagnosis:   Encounter Diagnosis   Name Primary?    Lymphedema Yes        Physician: Dr. Aparicio    PATIENT IS A NECK-BREATHER - DO NOT ORALLY INTUBATE    Time In:  0900  Time Out: 0955    Procedure Min.   Speech Language Voice Therapy  55   Total Untimed Units: 55  Charges Billed/# of units: 55/1    Precautions: Standard  Subjective    Chief Complaint: Patient with no complaints this date. Continues to exhibit reduced swelling this date.     AFFECT: normal affect    Pain:   0/10  Pain Location / Description: NA  Current Medical History: Heaven Boston is a 58 y.o. female referred by Dr. Aparicio for TEP management to maximize alaryngeal communication without respiratory compromise and lymphedema management.     Past Medical History:   Past Medical History:   Diagnosis Date    Cancer     Laryngeal    Diabetes mellitus     Hypertension       Heaven Boston  has a past surgical history that includes pr removal of larynx (2022) and  section.  Medical Hx and Allergies: Heaven has a current medication list which includes the following prescription(s): aspirin, atorvastatin, azelastine, carbamazepine, carbamazepine, cetirizine, duloxetine, fluticasone propionate, fluticasone-salmeterol 100-50 mcg/dose, hydrocodone-acetaminophen, ibuprofen, levothyroxine, loratadine, metformin, metoprolol succinate, montelukast, nifedipine, omeprazole, prednisolone acetate, and rivaroxaban, and the following Facility-Administered Medications: silver nitrate applicators. Review of patient's allergies indicates:  No Known Allergies    Current Voice Function: currently using alaryngeal communication via voice prosthesis   Current Level of Swallow Function/Complaints:  Pt does not report dysphagia  Prior Therapy:  2023    OBJECTIVE   TEP Current Status:17FR, 12.5mm  Aquino placed on 04/12/2022    TEP Patient Complaints: Intermittent strained voicing with periods of good voicing    TEP Accessories: 10/55 fenestrated leonie tube with push to talk HMEs.     Stoma Size:  adequate     Lymphedema:  yes    Manual therapy: MLD/CDP continues for head and neck.    Measurement taken of face and neck as follows.     Facial Lymphedema Measurement Form  PRE-TREATMENT  Neck Composite   Location Right Left   Superior Circumference (A):   (just below mandible)  6 5   Middle Circumference (B):  (midway between top & bottom 5 4.5   Inferior Circumference (C):  (lowest circumferential location)  4.75 4.5   TOTAL: 15.75 14       Facial Composite   Location Right Left   1.Tragus to mental protuberance  5 5   2.Tragus to mouth angle  4 4   3.Mandibular angle to nasal wing  4 4   4.Mandibular angle to internal eye corner  5 4.5   5.Mandibular angle to external eye corner  3.5 3.5   6.Mental Protuberance to internal eye corner to external eye corner  4 4   7.Mandibular angle to mental protuberance 4 4.5   TOTALS: 29.5 29.5     POST-TREATMENT  Neck Composite   Location Right Left   Superior Circumference (A):   (just below mandible)  5 4.75   Middle Circumference (B):   (midway between top & bottom 4.5 4   Inferior Circumference (C):   (lowest circumferential location)  4.25 4.25   TOTAL: 13.75 13       Facial Composite   Location Right Left   1.Tragus to mental protuberance  5 5   2.Tragus to mouth angle  4 4   3.Mandibular angle to nasal wing  4 3.75   4.Mandibular angle to internal eye corner  4.5 4.5   5.Mandibular angle to external eye corner  3.5 3   6.Mental Protuberance to internal eye corner to external eye corner  4 4   7.Mandibular angle to mental protuberance 4 4.5   TOTALS: 29 28.75   Greater than a 2% difference from pre-to post-treatment? yes         Treatment   Heavenondina Malloryl noted to tolerated pneumatic compression with good results this date. Decreased Neck composite total of 2.0 inches on  right and 1.0 inches on left and Face composite total of .5 of an inch on right and .75 of an inch on left. SLP ended session with neck stretches, ROM and diaphragmatic breathing. Pt exhibited reduced fibrotic texture and increased neck ROM noted after intervention. Good compliance with all training. SLP to follow up on 02/23/2023.  Pt to continue frequency 1 x week due to reduced lymphedema noted overall.     Education: Plan of Care, role of SLP in care, and scheduling/ cancellation policy were discussed with pt. Patient expressed understanding.     Assessment       LongTerm Goals:  Current Progress:   1. Patient will demonstrate understanding and implementation of long-term home management interventions as noted by improvement in lymphatic function, conduction of oral care, completion of daily exercises/stretches, and use of home management tools  Progressing towards goal       Short Term Goals:  Current Progress:   1. Patient will participate in manual interventions to target improvement in alaryngeal communication function related to reduced lymphatic function Progressing towards goal   2.  Patient will completed diaphragmatic breathing exercises at an independent level.  Progressing towards goal   3.  Patient will improve alaryngeal communication to maintain voicing >90% of the time.  Progressing towards goal     Ms. 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  She is currently utilizing esophageal speech via a voice prosthesis for alaryngeal communication for functional communication with her family, friends, medical providers, and others to perform her daily life activities.  She requires the use of a laryngectomy tube at all times to keep the airway patent and prevent stomal stenosis. HMEs are required daily for mucus management and pulmonary optimization .     SLP to additionally provided MLD/CDP for head and neck lymphedema to improve functional communication.    Plan     SLP  "intervention is warranted 1-2 times a week or PRN for communication needs for 1-6 weeks due to the chronic nature of the impairment.     Additional Information   Ms. Boston entered with overall reduced swelling this date. All skin noted to be intact this date. Reduced swelling noted upon entering. Good decongestion noted visually with tissue texture with manual techniques this date.  SLP educated pt to continue use of decompression sleeve with "chip" bag for additional home management. Pt with reduced dysphagia symptoms and increased tolerance of "regular" consistencies. SLP will continue to address POC as stated. SLP to continue frequency to 1 time a week due to improved management of lymphedema. Continue with POC on 02/23/2023.       Sunitha Jean MS, CCC-SLP   2/16/2023                                                                    "

## 2023-02-17 ENCOUNTER — HOSPITAL ENCOUNTER (OUTPATIENT)
Dept: RADIOLOGY | Facility: HOSPITAL | Age: 58
Discharge: HOME | End: 2023-02-17
Attending: INTERNAL MEDICINE
Payer: MEDICAID

## 2023-02-17 DIAGNOSIS — I65.23 BILATERAL CAROTID ARTERY STENOSIS: ICD-10-CM

## 2023-02-17 PROCEDURE — 93880 EXTRACRANIAL BILAT STUDY: CPT

## 2023-02-18 LAB
LEFT CBA DIAS: 20 CM/S
LEFT CBA SYS: 67 CM/S
LEFT CCA DIST DIAS: 21 CM/S
LEFT CCA DIST SYS: 90 CM/S
LEFT CCA MID DIAS: 27 CM/S
LEFT CCA MID SYS: 97 CM/S
LEFT CCA PROX DIAS: 23 CM/S
LEFT CCA PROX SYS: 90 CM/S
LEFT ECA DIAS: 17 CM/S
LEFT ECA SYS: 149 CM/S
LEFT ICA DIST DIAS: 36 CM/S
LEFT ICA DIST SYS: 122 CM/S
LEFT ICA MID DIAS: 31 CM/S
LEFT ICA MID SYS: 90 CM/S
LEFT ICA PROX DIAS: 25 CM/S
LEFT ICA PROX SYS: 90 CM/S
LEFT VERTEBRAL DIAS: 10 CM/S
LEFT VERTEBRAL SYS: 50 CM/S
OHS CV CAROTID RIGHT ICA EDV HIGHEST: 30
OHS CV CAROTID ULTRASOUND LEFT ICA/CCA RATIO: 1.36
OHS CV CAROTID ULTRASOUND RIGHT ICA/CCA RATIO: 1.54
OHS CV PV CAROTID LEFT HIGHEST CCA: 97
OHS CV PV CAROTID LEFT HIGHEST ICA: 122
OHS CV PV CAROTID RIGHT HIGHEST CCA: 81
OHS CV PV CAROTID RIGHT HIGHEST ICA: 109
OHS CV US CAROTID LEFT HIGHEST EDV: 36
RIGHT CBA DIAS: 14 CM/S
RIGHT CBA SYS: 55 CM/S
RIGHT CCA DIST DIAS: 18 CM/S
RIGHT CCA DIST SYS: 71 CM/S
RIGHT CCA MID DIAS: 20 CM/S
RIGHT CCA MID SYS: 81 CM/S
RIGHT CCA PROX DIAS: 17 CM/S
RIGHT CCA PROX SYS: 74 CM/S
RIGHT ECA DIAS: 15 CM/S
RIGHT ECA SYS: 108 CM/S
RIGHT ICA DIST DIAS: 30 CM/S
RIGHT ICA DIST SYS: 109 CM/S
RIGHT ICA MID DIAS: 21 CM/S
RIGHT ICA MID SYS: 98 CM/S
RIGHT ICA PROX DIAS: 18 CM/S
RIGHT ICA PROX SYS: 92 CM/S
RIGHT VERTEBRAL DIAS: 18 CM/S
RIGHT VERTEBRAL SYS: 72 CM/S

## 2023-02-23 ENCOUNTER — CLINICAL SUPPORT (OUTPATIENT)
Dept: REHABILITATION | Facility: HOSPITAL | Age: 58
End: 2023-02-23
Payer: MEDICAID

## 2023-02-23 DIAGNOSIS — I89.0 LYMPHEDEMA: Primary | ICD-10-CM

## 2023-02-23 PROCEDURE — 92507 TX SP LANG VOICE COMM INDIV: CPT

## 2023-02-23 NOTE — PROGRESS NOTES
[facility]  Speech Therapy Progress Note  Laryngectomy-Lymphedema    Date: 2023     Name: Heaven Bosotn   MRN: [id number]    Therapy Diagnosis:   Encounter Diagnosis   Name Primary?    Lymphedema Yes        Physician: Dr. Aparicio    PATIENT IS A NECK-BREATHER - DO NOT ORALLY INTUBATE    Time In:  0900  Time Out: 0955    Procedure Min.   Speech Language Voice Therapy  55   Total Untimed Units: 55  Charges Billed/# of units: 55/1    Precautions: Standard  Subjective    Chief Complaint: Patient with no complaints this date. Continues to exhibit reduced swelling this date.     AFFECT: normal affect    Pain:   0/10  Pain Location / Description: NA  Current Medical History: Heaven Boston is a 58 y.o. female referred by Dr. Aparicio for TEP management to maximize alaryngeal communication without respiratory compromise and lymphedema management.     Past Medical History:   Past Medical History:   Diagnosis Date    Cancer     Laryngeal    Diabetes mellitus     Hypertension       Heaven Boston  has a past surgical history that includes pr removal of larynx (2022) and  section.  Medical Hx and Allergies: Heaven has a current medication list which includes the following prescription(s): aspirin, atorvastatin, azelastine, carbamazepine, carbamazepine, cetirizine, duloxetine, fluticasone propionate, fluticasone-salmeterol 100-50 mcg/dose, hydrocodone-acetaminophen, ibuprofen, levothyroxine, loratadine, metformin, metoprolol succinate, montelukast, nifedipine, omeprazole, prednisolone acetate, and rivaroxaban, and the following Facility-Administered Medications: silver nitrate applicators. Review of patient's allergies indicates:  No Known Allergies    Current Voice Function: currently using alaryngeal communication via voice prosthesis   Current Level of Swallow Function/Complaints:  Pt does not report dysphagia  Prior Therapy:  2023    OBJECTIVE   TEP Current Status:17FR, 12.5mm  Aquino placed on 04/12/2022    TEP Patient Complaints: Intermittent strained voicing with periods of good voicing    TEP Accessories: 10/55 fenestrated leonie tube with push to talk HMEs.     Stoma Size:  adequate     Lymphedema:  yes    Manual therapy: MLD/CDP continues for head and neck.    Measurement taken of face and neck as follows.     Facial Lymphedema Measurement Form  PRE-TREATMENT  Neck Composite   Location Right Left   Superior Circumference (A):   (just below mandible)  6 5.5   Middle Circumference (B):  (midway between top & bottom 5.5 5   Inferior Circumference (C):  (lowest circumferential location)  5 5   TOTAL: 16.5 15.5       Facial Composite   Location Right Left   1.Tragus to mental protuberance  5 5   2.Tragus to mouth angle  4 4.5   3.Mandibular angle to nasal wing  4 4   4.Mandibular angle to internal eye corner  4.25 4.5   5.Mandibular angle to external eye corner  3.5 3   6.Mental Protuberance to internal eye corner to external eye corner  4 4   7.Mandibular angle to mental protuberance 4 4.5   TOTALS: 28.75 29.5     POST-TREATMENT  Neck Composite   Location Right Left   Superior Circumference (A):   (just below mandible)  5.25 5   Middle Circumference (B):   (midway between top & bottom 4.75 4.5   Inferior Circumference (C):   (lowest circumferential location)  4.25 4.5   TOTAL: 14.25 14       Facial Composite   Location Right Left   1.Tragus to mental protuberance  5 5   2.Tragus to mouth angle  3.75 4.5   3.Mandibular angle to nasal wing  4 3.75   4.Mandibular angle to internal eye corner  4 3.75   5.Mandibular angle to external eye corner  3 3   6.Mental Protuberance to internal eye corner to external eye corner  4 4   7.Mandibular angle to mental protuberance 4 4.5   TOTALS: 27.75 28.5   Greater than a 2% difference from pre-to post-treatment? yes         Treatment   Heaven Borel noted to tolerated pneumatic compression with good results this date. Decreased Neck composite total of 2.25  inches on right and 1.5 inches on left and Face composite total of 1.0 inches on right and .1.0 inches on left. SLP ended session with neck stretches, ROM and diaphragmatic breathing. Pt exhibited reduced fibrotic texture and increased neck ROM noted after intervention. Good compliance with all training. SLP to follow up on 03/02/2023.  Pt to continue frequency 1 x week due to reduced lymphedema noted overall. Pt continues to required manual decompression to maintain quality of life for swallowing and communication.     Education: Plan of Care, role of SLP in care, and scheduling/ cancellation policy were discussed with pt. Patient expressed understanding.     Goals       LongTerm Goals:  Current Progress:   1. Patient will demonstrate understanding and implementation of long-term home management interventions as noted by improvement in lymphatic function, conduction of oral care, completion of daily exercises/stretches, and use of home management tools  Progressing towards goal       Short Term Goals:  Current Progress:   1. Patient will participate in manual interventions to target improvement in alaryngeal communication function related to reduced lymphatic function Progressing towards goal   2.  Patient will completed diaphragmatic breathing exercises at an independent level.  Progressing towards goal   3.  Patient will improve alaryngeal communication to maintain voicing >90% of the time.  Progressing towards goal     Ms. 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  She is currently utilizing esophageal speech via a voice prosthesis for alaryngeal communication for functional communication with her family, friends, medical providers, and others to perform her daily life activities.  She requires the use of a laryngectomy tube at all times to keep the airway patent and prevent stomal stenosis. HMEs are required daily for mucus management and pulmonary optimization .     SLP to additionally  "provided MLD/CDP for head and neck lymphedema to improve functional communication.    Plan     SLP intervention is warranted 1-2 times a week or PRN for communication needs for 1-6 weeks due to the chronic nature of the impairment.     Additional Information   Ms. Boston entered with overall reduced swelling this date. All skin noted to be intact this date. Reduced swelling noted upon entering. Good decongestion noted visually with tissue texture with manual techniques this date.  SLP educated pt to continue use of decompression sleeve with "chip" bag for additional home management. Pt with reduced dysphagia symptoms and increased tolerance of "regular" consistencies. SLP will continue to address POC as stated. SLP to continue frequency to 1 time a week due to improved management of lymphedema. Continue with POC on 03/02/2023. SLP continues to recommend home pneumatic compression device for lymphedema management despite insurance denial as lymphedema is a life long complication from head and neck surgical dissection s/p total laryngectomy and post radiation intervention.       Sunitha Jean MS, CCC-SLP   2/23/2023                                                                      "

## 2023-02-24 ENCOUNTER — TELEPHONE (OUTPATIENT)
Dept: HEMATOLOGY/ONCOLOGY | Facility: CLINIC | Age: 58
End: 2023-02-24
Payer: MEDICAID

## 2023-02-27 ENCOUNTER — ANESTHESIA EVENT (OUTPATIENT)
Dept: SURGERY | Facility: HOSPITAL | Age: 58
End: 2023-02-27
Payer: MEDICAID

## 2023-02-27 NOTE — ANESTHESIA PREPROCEDURE EVALUATION
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    COVID STATUS: MODERNA X 2 (LASTLY 6/22/21)  BETA-BLOCKER: METOPROLOL    PAT NURSE IN-OFFICE INTERVIEW 3/1/23    PROBLEM LIST:  -  area of persistent appearing hypermetabolic activity in the cervical esophagus the trachea stoma at the level of the TEP; Hx LARYNGEAL SCC (Dx 2/18/22) - XRT COMPLETED 6/16/22      - S/P 2/18/22 PANENDO, Bx      - S/P 3/21/22 AWAKE NASAL FOB INTUBATION for PANENDO, LARYNGECTOMY, NECK DISSECTION, LEFT HEMITHYROIDECTOMY, CRICOPHARYNGEAL MYOTOMY, TRACH      - S/P 4/11/22 EUA, PHARYNGOCUTANEOUS FISTULA EXPLORATION/WASHOUT, PEG PLACEMENT - SINCE REMOVED      - S/P 4/15/22 NECK EXPLORATION, PHARYNGOPLASTY, PLACEMENT of SALIVARY BYPASS TUBE in [place]         - S/P 5/2/22 REMOVAL SALIVARY BYPASS TUBE  -  Per CARDs: CAROTID ARTERY DISEASE (RIGHT <50%)  -  SVT; Hx 2020 SYNCOPE -  on ASA 81mg  -  HTN  -  HLD  -  T2DM (ORAL X 1) - 9/27/21 A1c 5.9% / (PREV. 6.7%); NO HOME GLUCOSE MONITORING  -  LEFT LE DVT (Dx 4/13/22) - on XARELTO  -  SURGICAL HYPOTHYROIDISM - 2/6/23 TSH = 0.115  -  GERD (PREV. On OMEPRAZOLE) - ASYMPTOMATIC  -  2/11/22 CT HEAD = Chronic microvascular ischemic changes.  -  LUMBAR DISC DISEASE  -  LEFT OCCIPITAL NEURALGIA  -  PAST SMOKER - QUIT 6/2022, 20 PPY (PREV. on ADVAIR)  -  PAST ETOH     AM Rx DOS:  CARBAMAZEPINE, CYMBALTA, FLONASE, LEVOTHYROXINE, METOPROLOL, NIFEDIPINE    ORDERS -   SURGEON: 9/27/21 A1c; 5/10/22 CMP; 9/19/22 RENAL FUNC.; 2/6/23 TFT's; 3/1/23 CBC, EKG, CXR)  ANESTHESIA:  DM PROTOCOL  [facility] CARDs 1/11/23 OV (SEE BELOW)  2/14/23 Procedure note: Flexible laryngoscopy.  The flexible fiberoptic laryngoscope was introduced into the right  nostril and advanced. Examination of the nose showed the above mentioned findings. Examination of the nasopharynx showed no nasopharyngeal masses or eustachian tube obstruction. Examination of the base of tongue showed no masses or lesions. Examination of the hypopharynx showed masses or lesions in the neopharynx or hypopharynx.  The esophageal inlet is without apparent lesions.    Pre-op Assessment    I have reviewed the Patient Summary Reports.     I have reviewed the Nursing Notes. I have reviewed the NPO Status.   I have reviewed the Medications.     Review of Systems  Anesthesia Hx:  No problems with previous Anesthesia  Denies Family Hx of Anesthesia complications.   Denies Personal Hx of Anesthesia complications.   Hematology/Oncology:  Hematology Normal   Oncology Normal     EENT/Dental:  EENT/Dental Normal  Ears General/Symptom(s) Ear Disease: Tympanic Membrane Problem   Cardiovascular:  Cardiovascular Normal     Pulmonary:  Pulmonary Normal    Renal/:  Renal/ Normal     Hepatic/GI:  Hepatic/GI Normal    Musculoskeletal:  Musculoskeletal Normal    Neurological:  Neurology Normal    Endocrine:  Endocrine Normal    Dermatological:  Skin Normal    Psych:  Psychiatric Normal           Physical Exam  General: Well nourished    Airway:  Pre-Existing Airway: Tracheal Stoma    Dental:  Intact    Chest/Lungs:  Clear to auscultation, Normal Respiratory Rate    Heart:  Rate: Normal  Rhythm: Regular Rhythm        Anesthesia Plan  Type of Anesthesia, risks & benefits discussed:    Anesthesia Type: Gen ETT  Intra-op Monitoring Plan: Standard ASA Monitors  Post Op Pain Control Plan: multimodal analgesia and IV/PO Opioids PRN  Induction:  IV  Airway Plan: Direct and Via Tracheostomy  Informed Consent: Informed consent signed with the Patient and all parties understand the risks and agree with anesthesia plan.  All questions answered.   ASA Score: 3  Day of Surgery Review of History & Physical: H&P Update referred to  the surgeon/provider.I have interviewed and examined the patient. I have reviewed the patient's H&P dated: There are no significant changes. H&P completed by Anesthesiologist.    Ready For Surgery From Anesthesia Perspective.     .       CMP  Sodium Level   Date Value Ref Range Status   05/10/2022 140 136 - 145 mmol/L Final     Potassium Level   Date Value Ref Range Status   05/10/2022 4.7 3.5 - 5.1 mmol/L Final     Carbon Dioxide   Date Value Ref Range Status   05/10/2022 28 22 - 29 mmol/L Final     Blood Urea Nitrogen   Date Value Ref Range Status   05/10/2022 15.1 9.8 - 20.1 mg/dL Final     Creatinine   Date Value Ref Range Status   09/19/2022 1.06 (H) 0.55 - 1.02 mg/dL Final     Calcium Level Total   Date Value Ref Range Status   05/10/2022 10.0 8.4 - 10.2 mg/dL Final     Albumin Level   Date Value Ref Range Status   05/10/2022 3.8 3.5 - 5.0 gm/dL Final     Bilirubin Total   Date Value Ref Range Status   05/10/2022 0.3 <=1.5 mg/dL Final     Alkaline Phosphatase   Date Value Ref Range Status   05/10/2022 85 40 - 150 unit/L Final     Aspartate Aminotransferase   Date Value Ref Range Status   05/10/2022 22 5 - 34 unit/L Final     Alanine Aminotransferase   Date Value Ref Range Status   05/10/2022 17 0 - 55 unit/L Final     eGFR   Date Value Ref Range Status   09/19/2022 >60 mls/min/1.73/m2 Final     Lab Results   Component Value Date    HGBA1C 5.9 09/27/2021     Lab Results   Component Value Date    TSH 0.115 (L) 02/06/2023 2/17/23 CAROTID US  The right internal carotid artery demonstrated no hemodynamically significant stenosis.  There is minimal amount of plaque in the right CCA.  The right vertebral artery was patent with antegrade flow.     The left internal carotid artery demonstrated less than a 50% stenosis.  There is a minimal amount of plaque in the left CCA, carotid bulb, and proximal ICA.  The left ECA has a short segment dilatation measuring up to 0.65 cm in diameter. The proximal and distal  vessel diameter measure between 0.27 and 0.24 cm.  The left vertebral artery was patent with antegrade flow.      JULY/AUGUST 2021 30 EVENT RECORDER    3/1/23 CXR  FINDINGS:  Examination reveals mediastinal and cardiac silhouette to be within normal limits left lung field is clear and free of gross infiltrates atelectasis or effusions.     Some ill-defined opacities identified in the right upper lobe exact nature of these confluent opacities cannot be ascertained by this exam and therefore if clinically indicated other imaging modalities might prove helpful for further assessment.     Otherwise no focal consolidative changes atelectases effusions or pneumothoraces     Impression:     Slightly more confluent opacities in the right upper lobe questionable etiology other imaging modalities might prove helpful for further assessment if clinically indicated.     Otherwise no active pulmonary disease       1/11/23 CARDs OV  Assessment/Plan      DVT  Diagnosed in April 2022  Was placed on eliquis in [place] but only given 1 month supply however on last visit we continued DOAC (switched to xarelto due to cost)  DVT appears to be malignancy related  As pt is in remission per last PET scan it might be reasonable to stop xarelto although there are no adverse effects with DOAC.  Recommend decision be made by heme/onc. Advised pt to request heme/onc referral from her PCP (will send PCP a message)     Syncope - no further episodes  -2 episodes in 2020, thought to be neurocardiogenic in nature  - Previous EKGs, TTE, CXR, and carotid Doppler reviewed  - 30 day event monitor- revealed runs of SVT, also nonsustained VT versus SVT with aberrancy  - continue metoprolol succinate 12.5 mg qd d/t Holter findings  - Carotid stenosis of less than 50% noted on right carotid ultrasound in 2020     Carotid stenosis  - Carotid stenosis of less than 50% noted on right carotid ultrasound in 2020  Given recent XRT to neck, routine monitoring  of carotid ultrasound is necessary  Will repeat carotid US       HTN   Initial /64 with repeat 138/68  Pt's home log shows most values in high 130s/70s with occasional 120s and occasional 140s/150s  Will switch to nifedipine 60mg  Advised pt to keep a BP log and will schedule NV     HLD   patient's LDL was greater than 190 in the past consistent with familial hypercholesterolemia.  Goal 70 given DM   LDL in 9/2021 was 115, lipitor increased to 80mg on last visit but repeat lipid panel shows  despite compliance.  Pt reports recent diagnosis of hypothyroidism with TSH 55. This could be contributing to elevated cholesterol levels  She was started on levothyroxine a few days ago and recommend repeating lipid panel in 3 months      Laryngeal cancer  S/p laryngectomy and trach and XRT  Currently not followed by onc, but referral sent in Feb 2022  Rad oncologist Dr. Laguna has ordered 2 recent PET scans that reveal no concern for malignancy    Tobacco user   She quit smoking        DM  - A1C-5.9 in 9/2021  - Continue management per PCP        Carotid US  Switch to nifedipine 60, BP log and NV  FLP in 3 months  6 m F/U    4/15/22 ANESTHESIA

## 2023-03-01 ENCOUNTER — HOSPITAL ENCOUNTER (OUTPATIENT)
Dept: CARDIOLOGY | Facility: HOSPITAL | Age: 58
Discharge: HOME | End: 2023-03-01
Attending: OTOLARYNGOLOGY
Payer: MEDICAID

## 2023-03-01 ENCOUNTER — HOSPITAL ENCOUNTER (OUTPATIENT)
Dept: RADIOLOGY | Facility: HOSPITAL | Age: 58
Discharge: HOME | End: 2023-03-01
Attending: OTOLARYNGOLOGY
Payer: MEDICAID

## 2023-03-01 DIAGNOSIS — Z01.818 OTHER SPECIFIED PRE-OPERATIVE EXAMINATION: ICD-10-CM

## 2023-03-01 PROCEDURE — 93005 ELECTROCARDIOGRAM TRACING: CPT

## 2023-03-01 PROCEDURE — 71046 X-RAY EXAM CHEST 2 VIEWS: CPT | Mod: TC

## 2023-03-01 NOTE — PROGRESS NOTES
PAT visit completed with patient    Trach remains in place.   PEG removed    Smoke  quit 06/2022    0.5 ppd x 39 years   19.5 ppy    ETOH  no longer drinks    Drugs never    Lab, EKG, CXR will be done 3/1/2023    Meds AM DOS correct.

## 2023-03-02 ENCOUNTER — DOCUMENTATION ONLY (OUTPATIENT)
Dept: REHABILITATION | Facility: HOSPITAL | Age: 58
End: 2023-03-02

## 2023-03-02 NOTE — PROGRESS NOTES
[facility]  Cancel Note    Patient: Heaven Boston  Date of Session: 3/2/2023  Diagnosis: s/p total laryngectomy   MRN: [id number]    Heaven Boston did not attend her scheduled OP SLP therapy session at 09:00 on 03/02/2023. She contacted the office to cancel session due to not feeling well. She will resume sessions on 03/09/2023.    Sunitha Jean MS, CCC-SLP    3/2/2023

## 2023-03-03 ENCOUNTER — ANESTHESIA (OUTPATIENT)
Dept: SURGERY | Facility: HOSPITAL | Age: 58
End: 2023-03-03
Payer: MEDICAID

## 2023-03-03 ENCOUNTER — HOSPITAL ENCOUNTER (OUTPATIENT)
Facility: HOSPITAL | Age: 58
Discharge: HOME | End: 2023-03-03
Attending: OTOLARYNGOLOGY | Admitting: OTOLARYNGOLOGY
Payer: MEDICAID

## 2023-03-03 DIAGNOSIS — Z92.3 H/O HEAD AND NECK RADIATION: ICD-10-CM

## 2023-03-03 DIAGNOSIS — Z01.818 PRE-OP EVALUATION: ICD-10-CM

## 2023-03-03 DIAGNOSIS — C32.9 LARYNGEAL CANCER: ICD-10-CM

## 2023-03-03 DIAGNOSIS — Z01.818 OTHER SPECIFIED PRE-OPERATIVE EXAMINATION: Primary | ICD-10-CM

## 2023-03-03 DIAGNOSIS — Z90.02 H/O LARYNGECTOMY: ICD-10-CM

## 2023-03-03 LAB
BASOPHILS # BLD AUTO: 0.01 X10(3)/MCL (ref 0–0.2)
BASOPHILS NFR BLD AUTO: 0.1 %
EOSINOPHIL # BLD AUTO: 0.08 X10(3)/MCL (ref 0–0.9)
EOSINOPHIL NFR BLD AUTO: 1.1 %
ERYTHROCYTE [DISTWIDTH] IN BLOOD BY AUTOMATED COUNT: 11.6 % (ref 11.5–17)
HCT VFR BLD AUTO: 33.3 % (ref 37–47)
HGB BLD-MCNC: 10.4 G/DL (ref 12–16)
IMM GRANULOCYTES # BLD AUTO: 0.02 X10(3)/MCL (ref 0–0.04)
IMM GRANULOCYTES NFR BLD AUTO: 0.3 %
LYMPHOCYTES # BLD AUTO: 0.69 X10(3)/MCL (ref 0.6–4.6)
LYMPHOCYTES NFR BLD AUTO: 9.4 %
MCH RBC QN AUTO: 28.5 PG
MCHC RBC AUTO-ENTMCNC: 31.2 G/DL (ref 33–36)
MCV RBC AUTO: 91.2 FL (ref 80–94)
MONOCYTES # BLD AUTO: 0.5 X10(3)/MCL (ref 0.1–1.3)
MONOCYTES NFR BLD AUTO: 6.8 %
NEUTROPHILS # BLD AUTO: 6.01 X10(3)/MCL (ref 2.1–9.2)
NEUTROPHILS NFR BLD AUTO: 82.3 %
NRBC BLD AUTO-RTO: 0 %
PLATELET # BLD AUTO: 285 X10(3)/MCL (ref 130–400)
PMV BLD AUTO: 9.8 FL (ref 7.4–10.4)
POCT GLUCOSE: 109 MG/DL (ref 70–110)
POCT GLUCOSE: 125 MG/DL (ref 70–110)
POCT GLUCOSE: 140 MG/DL (ref 70–110)
RBC # BLD AUTO: 3.65 X10(6)/MCL (ref 4.2–5.4)
WBC # SPEC AUTO: 7.3 X10(3)/MCL (ref 4.5–11.5)

## 2023-03-03 PROCEDURE — 37000008 HC ANESTHESIA 1ST 15 MINUTES: Performed by: OTOLARYNGOLOGY

## 2023-03-03 PROCEDURE — 36000709 HC OR TIME LEV III EA ADD 15 MIN: Performed by: OTOLARYNGOLOGY

## 2023-03-03 PROCEDURE — 27201423 OPTIME MED/SURG SUP & DEVICES STERILE SUPPLY: Performed by: OTOLARYNGOLOGY

## 2023-03-03 PROCEDURE — 71000015 HC POSTOP RECOV 1ST HR: Performed by: OTOLARYNGOLOGY

## 2023-03-03 PROCEDURE — 25000003 PHARM REV CODE 250: Performed by: NURSE ANESTHETIST, CERTIFIED REGISTERED

## 2023-03-03 PROCEDURE — 63600175 PHARM REV CODE 636 W HCPCS: Performed by: ANESTHESIOLOGY

## 2023-03-03 PROCEDURE — 00320 ANES ALL PX NECK NOS 1YR/>: CPT | Performed by: OTOLARYNGOLOGY

## 2023-03-03 PROCEDURE — 37000009 HC ANESTHESIA EA ADD 15 MINS: Performed by: OTOLARYNGOLOGY

## 2023-03-03 PROCEDURE — 85025 COMPLETE CBC W/AUTO DIFF WBC: CPT | Performed by: OTOLARYNGOLOGY

## 2023-03-03 PROCEDURE — 63600175 PHARM REV CODE 636 W HCPCS: Performed by: NURSE ANESTHETIST, CERTIFIED REGISTERED

## 2023-03-03 PROCEDURE — 71000033 HC RECOVERY, INTIAL HOUR: Performed by: OTOLARYNGOLOGY

## 2023-03-03 PROCEDURE — 25000003 PHARM REV CODE 250: Performed by: OTOLARYNGOLOGY

## 2023-03-03 PROCEDURE — 71000016 HC POSTOP RECOV ADDL HR: Performed by: OTOLARYNGOLOGY

## 2023-03-03 PROCEDURE — 36000708 HC OR TIME LEV III 1ST 15 MIN: Performed by: OTOLARYNGOLOGY

## 2023-03-03 RX ORDER — ONDANSETRON 2 MG/ML
INJECTION INTRAMUSCULAR; INTRAVENOUS
Status: DISCONTINUED | OUTPATIENT
Start: 2023-03-03 | End: 2023-03-03

## 2023-03-03 RX ORDER — INSULIN ASPART 100 [IU]/ML
2-9 INJECTION, SOLUTION INTRAVENOUS; SUBCUTANEOUS EVERY 6 HOURS PRN
Status: ACTIVE | OUTPATIENT
Start: 2023-03-03

## 2023-03-03 RX ORDER — LIDOCAINE HYDROCHLORIDE 40 MG/ML
SOLUTION TOPICAL
Status: DISCONTINUED
Start: 2023-03-03 | End: 2023-03-03 | Stop reason: HOSPADM

## 2023-03-03 RX ORDER — HYDROMORPHONE HYDROCHLORIDE 1 MG/ML
0.2 INJECTION, SOLUTION INTRAMUSCULAR; INTRAVENOUS; SUBCUTANEOUS EVERY 5 MIN PRN
Status: ACTIVE | OUTPATIENT
Start: 2023-03-03

## 2023-03-03 RX ORDER — LIDOCAINE HYDROCHLORIDE 10 MG/ML
1 INJECTION, SOLUTION EPIDURAL; INFILTRATION; INTRACAUDAL; PERINEURAL ONCE
Status: ACTIVE | OUTPATIENT
Start: 2023-03-03

## 2023-03-03 RX ORDER — LEVOFLOXACIN 750 MG/1
750 TABLET ORAL DAILY
Qty: 14 TABLET | Refills: 0 | Status: SHIPPED | OUTPATIENT
Start: 2023-03-03 | End: 2023-05-04

## 2023-03-03 RX ORDER — OXYMETAZOLINE HCL 0.05 %
SPRAY, NON-AEROSOL (ML) NASAL
Status: DISCONTINUED | OUTPATIENT
Start: 2023-03-03 | End: 2023-03-03 | Stop reason: HOSPADM

## 2023-03-03 RX ORDER — MIDAZOLAM HYDROCHLORIDE 1 MG/ML
1 INJECTION INTRAMUSCULAR; INTRAVENOUS ONCE AS NEEDED
Status: ACTIVE | OUTPATIENT
Start: 2023-03-03 | End: 2034-07-29

## 2023-03-03 RX ORDER — LIDOCAINE HYDROCHLORIDE 20 MG/ML
INJECTION INTRAVENOUS
Status: DISCONTINUED | OUTPATIENT
Start: 2023-03-03 | End: 2023-03-03

## 2023-03-03 RX ORDER — KETOROLAC TROMETHAMINE 30 MG/ML
INJECTION, SOLUTION INTRAMUSCULAR; INTRAVENOUS
Status: DISCONTINUED | OUTPATIENT
Start: 2023-03-03 | End: 2023-03-03

## 2023-03-03 RX ORDER — DROPERIDOL 2.5 MG/ML
0.25 INJECTION, SOLUTION INTRAMUSCULAR; INTRAVENOUS ONCE AS NEEDED
Status: ACTIVE | OUTPATIENT
Start: 2023-03-03 | End: 2034-07-29

## 2023-03-03 RX ORDER — GLYCOPYRROLATE 0.2 MG/ML
INJECTION INTRAMUSCULAR; INTRAVENOUS
Status: DISCONTINUED | OUTPATIENT
Start: 2023-03-03 | End: 2023-03-03

## 2023-03-03 RX ORDER — METOCLOPRAMIDE HYDROCHLORIDE 5 MG/ML
10 INJECTION INTRAMUSCULAR; INTRAVENOUS ONCE AS NEEDED
Status: ACTIVE | OUTPATIENT
Start: 2023-03-03 | End: 2034-07-29

## 2023-03-03 RX ORDER — INSULIN ASPART 100 [IU]/ML
4-12 INJECTION, SOLUTION INTRAVENOUS; SUBCUTANEOUS
Status: ACTIVE | OUTPATIENT
Start: 2023-03-03

## 2023-03-03 RX ORDER — SODIUM CHLORIDE, SODIUM LACTATE, POTASSIUM CHLORIDE, CALCIUM CHLORIDE 600; 310; 30; 20 MG/100ML; MG/100ML; MG/100ML; MG/100ML
INJECTION, SOLUTION INTRAVENOUS CONTINUOUS
Status: ACTIVE | OUTPATIENT
Start: 2023-03-03

## 2023-03-03 RX ORDER — CLOTRIMAZOLE 10 MG/1
10 LOZENGE ORAL; TOPICAL
Qty: 70 TABLET | Refills: 0 | Status: SHIPPED | OUTPATIENT
Start: 2023-03-03 | End: 2023-03-17

## 2023-03-03 RX ORDER — NEOSTIGMINE METHYLSULFATE 1 MG/ML
INJECTION, SOLUTION INTRAVENOUS
Status: DISCONTINUED | OUTPATIENT
Start: 2023-03-03 | End: 2023-03-03

## 2023-03-03 RX ORDER — OXYCODONE HYDROCHLORIDE 5 MG/1
5 TABLET ORAL
Status: ACTIVE | OUTPATIENT
Start: 2023-03-03

## 2023-03-03 RX ORDER — FENTANYL CITRATE 50 UG/ML
INJECTION, SOLUTION INTRAMUSCULAR; INTRAVENOUS
Status: DISCONTINUED | OUTPATIENT
Start: 2023-03-03 | End: 2023-03-03

## 2023-03-03 RX ORDER — PROPOFOL 10 MG/ML
VIAL (ML) INTRAVENOUS
Status: DISCONTINUED | OUTPATIENT
Start: 2023-03-03 | End: 2023-03-03

## 2023-03-03 RX ORDER — DIPHENHYDRAMINE HYDROCHLORIDE 50 MG/ML
6.25 INJECTION INTRAMUSCULAR; INTRAVENOUS ONCE AS NEEDED
Status: ACTIVE | OUTPATIENT
Start: 2023-03-03 | End: 2034-07-29

## 2023-03-03 RX ORDER — DEXAMETHASONE SODIUM PHOSPHATE 4 MG/ML
INJECTION, SOLUTION INTRA-ARTICULAR; INTRALESIONAL; INTRAMUSCULAR; INTRAVENOUS; SOFT TISSUE
Status: DISCONTINUED | OUTPATIENT
Start: 2023-03-03 | End: 2023-03-03

## 2023-03-03 RX ORDER — ROCURONIUM BROMIDE 10 MG/ML
INJECTION, SOLUTION INTRAVENOUS
Status: DISCONTINUED | OUTPATIENT
Start: 2023-03-03 | End: 2023-03-03

## 2023-03-03 RX ADMIN — NEOSTIGMINE METHYLSULFATE 3.5 MG: 1 INJECTION INTRAVENOUS at 07:03

## 2023-03-03 RX ADMIN — FENTANYL CITRATE 50 MCG: 50 INJECTION, SOLUTION INTRAMUSCULAR; INTRAVENOUS at 07:03

## 2023-03-03 RX ADMIN — PROPOFOL 150 MG: 10 INJECTION, EMULSION INTRAVENOUS at 06:03

## 2023-03-03 RX ADMIN — ONDANSETRON 4 MG: 2 INJECTION INTRAMUSCULAR; INTRAVENOUS at 07:03

## 2023-03-03 RX ADMIN — FENTANYL CITRATE 50 MCG: 50 INJECTION, SOLUTION INTRAMUSCULAR; INTRAVENOUS at 06:03

## 2023-03-03 RX ADMIN — SODIUM CHLORIDE, POTASSIUM CHLORIDE, SODIUM LACTATE AND CALCIUM CHLORIDE: 600; 310; 30; 20 INJECTION, SOLUTION INTRAVENOUS at 06:03

## 2023-03-03 RX ADMIN — ROCURONIUM BROMIDE 30 MG: 10 INJECTION, SOLUTION INTRAVENOUS at 06:03

## 2023-03-03 RX ADMIN — KETOROLAC TROMETHAMINE 30 MG: 30 INJECTION, SOLUTION INTRAMUSCULAR; INTRAVENOUS at 07:03

## 2023-03-03 RX ADMIN — LIDOCAINE HYDROCHLORIDE 50 MG: 20 INJECTION, SOLUTION INTRAVENOUS at 06:03

## 2023-03-03 RX ADMIN — GLYCOPYRROLATE 0.6 MG: 0.2 INJECTION INTRAMUSCULAR; INTRAVENOUS at 07:03

## 2023-03-03 RX ADMIN — DEXAMETHASONE SODIUM PHOSPHATE 8 MG: 4 INJECTION, SOLUTION INTRA-ARTICULAR; INTRALESIONAL; INTRAMUSCULAR; INTRAVENOUS; SOFT TISSUE at 07:03

## 2023-03-03 NOTE — DISCHARGE INSTRUCTIONS
Keep follow up appointment at the [facility] on March 9th at 8:15am.     Hydration is important in your recovery.  Start with cold clear liquids and advance as tolerated to cold or lukewarm soft foods/liquids for the first couple of days.  Then progress to solid food as tolerated.  (no sharp or hard foods such as chips, nuts, cookies until md says its ok).  Cut food into small, easy to swallow bits until you are healed.     AVOID CARBONATED BEVERAGES, COFFEE, AND NSAIDS (Ibuprofen, Aspirin, Aleve).    Go to ER if you are experiencing any difficulty breathing.     Do not drink alcohol or drive today, or as long as you are on pain medication.       Notify MD of any moderate to severe pain or for any signs of infection including fever above 100.4, excessive redness or swelling, yellow/green foul- smelling drainage, nausea or vomiting.  Clinics number is [phone]. If it is after business hours or emergency call [phone] and state Im having post op complications and need to speak to the ENT surgeon on call.

## 2023-03-03 NOTE — ANESTHESIA POSTPROCEDURE EVALUATION
Anesthesia Post Evaluation    Patient: Heaven Boston    Procedure(s) Performed: Procedure(s) (LRB):  LARYNGOSCOPY, DIRECT, DIAGNOSTIC, WITH BRONCHOSCOPY AND ESOPHAGOSCOPY (N/A)    Final Anesthesia Type: general      Patient location during evaluation: PACU  Patient participation: Yes- Able to Participate  Level of consciousness: awake and alert  Post-procedure vital signs: reviewed and stable  Pain management: adequate  Airway patency: patent    PONV status at discharge: No PONV  Anesthetic complications: no      Cardiovascular status: hemodynamically stable  Respiratory status: unassisted  Hydration status: euvolemic  Follow-up not needed.          Vitals Value Taken Time   /56 03/03/23 0804   Temp 37.1 °C (98.7 °F) 03/03/23 0804   Pulse 70 03/03/23 0804   Resp 19 03/03/23 0804   SpO2 100 % 03/03/23 0804         Event Time   Out of Recovery 08:13:00         Pain/Iraida Score: Iraida Score: 10 (3/3/2023  8:01 AM)

## 2023-03-03 NOTE — H&P
Patient Name: Heaven Boston   YOB: 1965     Chief Complaint: No chief complaint on file.       History of Present Illness:  Heaven Boston is a 57 y.o. 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 Patient had an uneventful hospitalization course and was discharged on postoperative day 9 after an esophagram showed no pharyngeal leak.  However she re-presented to our facility on 4/6/22 with surgical site infection and a pharyngocutaneous fistula. Patient underwent a neck washout with primary repair on 4/11/2022, with placement of a gastrostomy tube the same day. She was maintained on NPO and transferred to [place] for further evaluation and management on 4/15/22 after evidence of persistent pharyngocutaneous fistula. There she underwent a 2nd neck washout with primary closure and placement of salivary bypass tube.      5/2/22: Patient had an uneventful hospitalization course and removal of salivary bypass tube before discharge last week and now presenting for postoperative evaluation.  Patient presents today without any complaints.  She is wondering about when he pointed that her x-rays for swallowing then.  She seen Dr. Laguna today after our visit to undergo-stimulation for radiation.     5/9/22: Pt. Did not answer. Let voicemail to advance diet to CLD. Also discussed this with son.  Plan to see in clinic in 2 weeks     5/19/22: In radiation therapy, 5x weekly until 6/16/22. Has TEP in place, brought another TEP requesting swap out. Will go to speech therapy for exchange of TEP. Went to ER for bleeding from G-tube site 1 week ago but declines visit to Gen Surg clinic today. On CLD taking Ensure by G-tube, water and soups by mouth. Had questions about advancing diet. No weight loss  or appetite change. No other issues.      6/21/22:  Returns today for follow up.  Completed radiation last week 6/16/22.  Is having difficulty voicing with TEP though it has been swapped out by SLP.  Able to eat what she wants.  Having some tightness of her throat and soreness, otherwise no issues.     7/21/22: Here today for follow up. Has overall been doing very well. She is tolerating her diet by mouth and gaining weight. No dysphagia. Has not used her PEG tube since prior to radiation. She is interested in having it removed. Has some fullness in the left neck which intermittently swells and then resolves again. Otherwise no new lumps/bumps of the head and neck.      8/23/22: Ms. Boston returns today for follow up. She complains of neck pain exacerbated by flexion and movement to the right that began 2 weeks ago. She also reports neck swelling and tightness which occasionally increases in size and is tender to palpation. She reports cough with clear mucus production as well as nasal congestion without drainage. She also notes dry, itchy eyes that have not improved with Visine. She also notes dysuria. She is tolerating her diet by mouth and gaining weight. She had her PEG tube removed on 8/16/22. She has been to SLP twice and is still having difficulty voicing with TEP.     9/27/22: Here for follow up. Reports she is doing okay. Still coughing a lot and not voicing well with TEP. No weight loss, tolerating diet. No otalgia/lumps bumps. Still having intermittent neck swelling/pain.      10/27/22:  Returns for surveillance.  Did not get TSH, T4.  Has been going to SLP, now can talk well.  Cough has improved.  No new H&N complaints     12/22/22: Doing very well. Voicing well. No pain. Doing well with speech and lymphedema therapy. Eating well, gaining weight constantly. Always tired. Sometimes sleeps all day.    February 14, 2023:   58-year-old female presents today for follow-up of her laryngeal squamous cell  carcinoma and postoperative hypothyroidism.  In regards to her squamous cell carcinoma of the larynx she is doing well.  She has no complaints of any pain.  He is not noticed any swelling in her neck.  She is not have any difficulty eating or swallowing in his indicated she is gaining weight.  She continues to use her TEP without problems and does not have any leakage around her TEP.  She did have a follow-up PET-CT scan done on 2022, in the report indicated that there was no evidence of new or progressive hypermetabolic metastatic disease.  On reviewing those images, however, there does appear to be an area of increased FDG activity in the upper cervical esophagus in the area above her trachea stoma and possibly at the site of the TEP.  This was also present on a PET-CT scan done on 2022 and there does not appear debris any change in this area between the 2 scans.  The report on the scan done on 2022, had indicated this area had an SUV max of 4.4 without any apparent lesions noted on the corresponding CT.  In regards to her hypothyroidism she continues to take levothyroxine 88 mcg daily.  Lab work from 2023, showed her TSH level be low at 0.115 with a normal free T4 of 1.48.  Her prior TSH from 2022, was elevated at 55.1672.    3/3/23:  Here today for planned surgery with ENT.   She was seen in clinic 2 weeks.  She reports no changes since that time.  No fever, chills, chest pain, SOB, new or worsening odynophagia, otalgia, hematemesis,  nausea or vomiting.  She is NPO today for surgery.       Past Medical History:  Past Medical History:   Diagnosis Date    Cancer     Laryngeal    Diabetes mellitus     Hypertension      Past Surgical History:   Procedure Laterality Date     SECTION      HI REMOVAL OF LARYNX  2022       Review of Systems:  Unremarkable except as mentioned above.    Current Medications:  Current Facility-Administered  "Medications   Medication    dextrose 10% bolus 125 mL 125 mL    dextrose 10% bolus 125 mL 125 mL    insulin aspart U-100 injection 2-9 Units    insulin aspart U-100 injection 4-12 Units    lactated ringers infusion    LIDOcaine (PF) 10 mg/ml (1%) injection 10 mg    LIDOcaine (PF) 10 mg/ml (1%) injection 10 mg    midazolam (VERSED) 1 mg/mL injection 1 mg        Allergies:  Review of patient's allergies indicates:  No Known Allergies     Physical Exam:  Vital signs:   Vitals:    03/03/23 0506 03/03/23 0522   BP:  118/71   BP Location:  Left arm   Patient Position:  Sitting   Pulse:  85   Resp:  18   SpO2:  98%   Weight: 58 kg (127 lb 13.9 oz)    Height: 5' 2.01" (1.575 m)    General:  Well-developed well-nourished female in no acute distress.  Patient does communicate well with clear speech using her TEP.  Head and face:  Normocephalic.  No facial lesions.  No temporomandibular joint tenderness or click.  Ears:  Right ear-auricle is normally developed.  External auditory canal is clear.  Tympanic membrane is nonerythematous.  No middle ear effusion.  Left ear-auricle is normally developed.  External auditory canal is clear.  Tympanic membrane is nonerythematous.  No middle ear effusion.  Nose:  Nasal dorsum is unremarkable.  No significant septal deviation.  No significant intranasal congestion.  Secretions are clear.  Oral cavity and oropharynx:  Tongue and floor mouth are without lesions.  Mucosa is moist.  No pharyngeal erythema or exudates.  No oropharyngeal masses.  No tonsillar hypertrophy.  Neck:  Supple without palpable adenopathy.  She does have some induration related to her surgery and postradiation changes.  Trachea stoma is patent.  TEP is in place without any gross drainage from around the prosthesis.  No granulation is noted.  Eyes:  Extraocular muscles intact.  No nystagmus.  No exophthalmos or enophthalmos.  Neurologic:  Alert and oriented.  Cranial nerves 2-12 are grossly normal.    Procedure note: " Flexible laryngoscopy.  The flexible fiberoptic laryngoscope was introduced into the right nostril and advanced. Examination of the nose showed the above mentioned findings. Examination of the nasopharynx showed no nasopharyngeal masses or eustachian tube obstruction. Examination of the base of tongue showed no masses or lesions. Examination of the hypopharynx showed masses or lesions in the neopharynx or hypopharynx.  The esophageal inlet is without apparent lesions.      Assessment/Plan:  Heaven Boston is a 57 y.o. female with T3N1M0 supraglottic SCCa s/p TL BSND CPM complicated by PCF s/p washout & primary closure x2 (2nd in [place]) now s/p adjuvant RT. General:  Well-developed well-nourished female in no acute distress.  Voice is normal.  Has an area of persistent appearing hypermetabolic activity in the cervical esophagus the trachea stoma at the level of the TEP; on exam today the patient does not have any gross disease but the area noted on the PET-CT scan can not be visualized with the fiberoptic laryngoscope.    Post treatment hypothyroidism-improved; patient maybe over treated at this time.      Plan:  Proceed to OR for direct laryngoscopy and esophagoscopy for further evaluation of the cervical esophagus.  Biopsy if indicated.      August Garsia MD  [facility]

## 2023-03-03 NOTE — ANESTHESIA PROCEDURE NOTES
Intubation    Date/Time: 3/3/2023 7:01 AM  Performed by: Isaiah Suarez CRNA  Authorized by: Lolis Sood MD     Intubation:     Induction:  Intravenous    Intubated:  Postinduction    Mask Ventilation:  N/a    Attempts:  1    Attempted By:  CRNA    Method of Intubation:  Direct    Difficult Airway Encountered?: No      Complications:  None    Airway Device:  Coil wire tube (placed to laryngeal stoma w/o diff)    Airway Device Size:  8.0    Style/Cuff Inflation:  Cuffed (inflated to minimal occlusive pressure)    Inflation Amount (mL):  6    Tube secured:  5    Endotracheal tube placement: skin level of stoma.    Placement Verified By:  Capnometry    Complicating Factors:  None    Findings Post-Intubation:  BS equal bilateral and atraumatic/condition of teeth unchanged

## 2023-03-03 NOTE — BRIEF OP NOTE
[facility] - Periop Services  Brief Operative Note    Surgery Date: 3/3/2023     Surgeon(s) and Role:     * Connor Patrick MD - Primary     * Emler Alonso MD - Resident - Assisting        Pre-op Diagnosis:  History of laryngeal cancer  History of laryngectomy  History of head and neck radiation    Post-op Diagnosis:  Post-Op Diagnosis Codes:     * Laryngeal cancer [C32.9]     * H/O laryngectomy [Z90.02]     * H/O head and neck radiation [Z92.3]    Procedure(s) (LRB):  LARYNGOSCOPY, DIRECT, DIAGNOSTIC, WITH BRONCHOSCOPY AND ESOPHAGOSCOPY (N/A)    Anesthesia: General    Operative Findings: Partially edentulous, dentition remaining is very poor with loose and cracked teeth.  No lesions or masses involving the oral cavity, base of tongue, neopharynx, or cervical esophagus.  The TEP prosthesis with copious debris, appears to be colonized with fungus.  Trachea and bronchi examine significant for secretions that were suctioned.  No evidence of malignancy.  No biopsies taken.    Estimated Blood Loss: * No values recorded between 3/3/2023  7:12 AM and 3/3/2023  7:41 AM *         Specimens:   Specimen (24h ago, onward)      None              Discharge Note    OUTCOME: Patient tolerated treatment/procedure well without complication and is now ready for discharge.    DISPOSITION: Home or Self Care    FINAL DIAGNOSIS:  History of laryngeal cancer    FOLLOWUP: In clinic in 4-6 weeks.    DISCHARGE INSTRUCTIONS:    Discharge Procedure Orders   X-Ray Chest PA And Lateral   Standing Status: Future Number of Occurrences: 1 Standing Exp. Date: 03/01/24     Order Specific Question Answer Comments   May the Radiologist modify the order per protocol to meet the clinical needs of the patient? Yes    Release to patient Immediate      CBC Auto Differential   Standing Status: Future Standing Exp. Date: 09/01/23     EKG 12-lead   Standing Status: Future Number of Occurrences: 1 Standing Exp. Date: 03/01/24     Activity as tolerated

## 2023-03-03 NOTE — OP NOTE
LARYNGOSCOPY, DIRECT, DIAGNOSTIC, WITH BRONCHOSCOPY AND ESOPHAGOSCOPY  [facility]aryng[facility]-Head & Neck Surgery  Operative Report    Patient: Heaven Boston  : 1965 58 y.o.  MRN: [id number]  Date: 3/3/2023    Pre-op Diagnosis:   Post-Op Diagnosis Codes:     * Laryngeal cancer [C32.9]     * H/O laryngectomy [Z90.02]     * H/O head and neck radiation [Z92.3]       Post-op Diagnosis:   Post-Op Diagnosis Codes:     * Laryngeal cancer [C32.9]     * H/O laryngectomy [Z90.02]     * H/O head and neck radiation [Z92.3]    Procedures:  Procedure(s):  LARYNGOSCOPY, DIRECT, DIAGNOSTIC, WITH BRONCHOSCOPY AND ESOPHAGOSCOPY    Surgeon(s):  MD Elmer Kam MD    Resident Surgeon: August Garsia MD    Attending Surgeon: Connor Patrick MD    Anesthesia: General    Staff:   Circulator: Randy Funk RN; Wm Gomez RN  Relief Scrub: ST Gonsalo  Scrub Person: ST Urszula    Estimated Blood Loss:  0 cc                 Specimens:   Specimens (From admission, onward)      None                   Drains: none       Specimens: None           Condition: Good    Disposition: PACU - hemodynamically stable.    Findings:  Partially edentulous, dentition remaining is very poor with loose and cracked teeth.  No lesions or masses involving the oral cavity, base of tongue, neopharynx, or cervical esophagus.  The TEP prosthesis with copious debris, appears to be colonized with fungus.  Trachea and bronchi examine significant for secretions that were suctioned.  No evidence of malignancy.  No biopsies taken.      Procedure Narrative:  The risks, benefits, and alternatives of the procedure were discussed and informed written consent was obtained. The patient was brought to the operating room and placed on the table in the supine position. General anesthesia was induced. A proper time-out was performed and confirmed to be correct.     Bimanual palpation of the base of tongue and pre-epiglottic space with no  abnormalities.  A tooth guard was placed over the maxillary teeth.  A dedo laryngoscope was used to examine the upper aerodigestive tract.  The laryngoscope was removed.  Rigid esophagoscope was then inserted atraumatically.  There was narrowing of the esophageal inlet proximal to the TEP site that would not accommodate the rigid endoscopy.  Flexible bronchoscope was inserted through the rigid esophascope and were able to visualize the cervical esophagus in its entirety.  No lesions or masses or mucosal abnormalities noted.  The TEP prosthesis had copious debris and appeared to be colonized with fungal elements.  The bronchoscope and esophagoscope were then removed.  The bronchoscope was then inserted through the stoma to examine the lower respiratory tract.  No evidence of malignancy.  There were copious secretions that were suctioned.  Bronchoscope was then removed.    The procedure was then completed. Patient was returned to the care of anesthesia for reversal and awakening, which occurred without issue. She was taken to the PACU in the stable and awake condition.     Dr. Patrick was present and scrubbed for all key portions of the procedure.     August Garsia MD  Otolaryngology PGY-3

## 2023-03-03 NOTE — TRANSFER OF CARE
"Anesthesia Transfer of Care Note    Patient: Heaven Boston    Procedure(s) Performed: Procedure(s) (LRB):  LARYNGOSCOPY, DIRECT, DIAGNOSTIC, WITH BRONCHOSCOPY AND ESOPHAGOSCOPY (N/A)    Patient location: PACU    Anesthesia Type: general    Transport from OR: Transported from OR on room air with adequate spontaneous ventilation    Post pain: adequate analgesia    Post assessment: no apparent anesthetic complications and tolerated procedure well    Post vital signs: stable    Level of consciousness: sedated    Nausea/Vomiting: no nausea/vomiting    Complications: none    Transfer of care protocol was followedComments: Report to Ronnie FRITZ      Last vitals:   Visit Vitals  /60   Pulse 64   Temp 36.7 °C (98 °F) (Temporal)   Resp 14   Ht 5' 2.01" (1.575 m)   Wt 58 kg (127 lb 13.9 oz)   SpO2 100%   Breastfeeding No   BMI 23.38 kg/m²     "

## 2023-03-06 VITALS
HEART RATE: 82 BPM | RESPIRATION RATE: 20 BRPM | SYSTOLIC BLOOD PRESSURE: 118 MMHG | WEIGHT: 127.88 LBS | BODY MASS INDEX: 23.53 KG/M2 | HEIGHT: 62 IN | OXYGEN SATURATION: 99 % | DIASTOLIC BLOOD PRESSURE: 75 MMHG | TEMPERATURE: 98 F

## 2023-03-08 ENCOUNTER — DOCUMENTATION ONLY (OUTPATIENT)
Dept: REHABILITATION | Facility: HOSPITAL | Age: 58
End: 2023-03-08
Payer: MEDICAID

## 2023-03-08 NOTE — PROGRESS NOTES
[facility]  Cancel Note    Patient: Heaven Boston  Date of Session: 3/8/2023  Diagnosis: s/p total laryngectomy  MRN: [id number]    Heaven Boston did not attend her  scheduled OP SLP therapy session at 0900 on 03/09/2023.  She contacted the office to cancel session due to having a follow up appointment in another clinic. She will be be resume sessions on 03/16/2023.    Sunitha Jean MS, CCC-SLP  3/8/2023

## 2023-03-09 ENCOUNTER — OFFICE VISIT (OUTPATIENT)
Dept: OTOLARYNGOLOGY | Facility: CLINIC | Age: 58
End: 2023-03-09
Payer: MEDICAID

## 2023-03-09 VITALS
HEART RATE: 76 BPM | WEIGHT: 130 LBS | BODY MASS INDEX: 23.77 KG/M2 | DIASTOLIC BLOOD PRESSURE: 69 MMHG | SYSTOLIC BLOOD PRESSURE: 133 MMHG

## 2023-03-09 DIAGNOSIS — Z92.3 HISTORY OF RADIATION TO HEAD AND NECK REGION: Primary | ICD-10-CM

## 2023-03-09 DIAGNOSIS — B37.81 ESOPHAGEAL CANDIDIASIS: ICD-10-CM

## 2023-03-09 DIAGNOSIS — Z90.02 H/O LARYNGECTOMY: ICD-10-CM

## 2023-03-09 DIAGNOSIS — C32.9 LARYNGEAL CANCER: ICD-10-CM

## 2023-03-09 PROCEDURE — 99214 OFFICE O/P EST MOD 30 MIN: CPT | Mod: PBBFAC | Performed by: STUDENT IN AN ORGANIZED HEALTH CARE EDUCATION/TRAINING PROGRAM

## 2023-03-09 NOTE — PROGRESS NOTES
Patient Name: Heaven Boston   YOB: 1965     Chief Complaint:   Chief Complaint   Patient presents with    Follow-up     Post op        History of Present Illness:  Heaven Boston is a 57 y.o. 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 Patient had an uneventful hospitalization course and was discharged on postoperative day 9 after an esophagram showed no pharyngeal leak.  However she re-presented to our facility on 4/6/22 with surgical site infection and a pharyngocutaneous fistula. Patient underwent a neck washout with primary repair on 4/11/2022, with placement of a gastrostomy tube the same day. She was maintained on NPO and transferred to [place] for further evaluation and management on 4/15/22 after evidence of persistent pharyngocutaneous fistula. There she underwent a 2nd neck washout with primary closure and placement of salivary bypass tube.      5/2/22: Patient had an uneventful hospitalization course and removal of salivary bypass tube before discharge last week and now presenting for postoperative evaluation.  Patient presents today without any complaints.  She is wondering about when he pointed that her x-rays for swallowing then.  She seen Dr. Laguna today after our visit to undergo-stimulation for radiation.     5/9/22: Pt. Did not answer. Let voicemail to advance diet to [facility]. Also discussed this with son.  Plan to see in clinic in 2 weeks     5/19/22: In radiation therapy, 5x weekly until 6/16/22. Has TEP in place, brought another TEP requesting swap out. Will go to speech therapy for exchange of TEP. Went to ER for bleeding from G-tube site 1 week ago but declines visit to Gen Surg clinic today. On [facility] taking Ensure by G-tube, water and soups by mouth. Had  questions about advancing diet. No weight loss or appetite change. No other issues.      6/21/22:  Returns today for follow up.  Completed radiation last week 6/16/22.  Is having difficulty voicing with TEP though it has been swapped out by [facility].  Able to eat what she wants.  Having some tightness of her throat and soreness, otherwise no issues.     7/21/22: Here today for follow up. Has overall been doing very well. She is tolerating her diet by mouth and gaining weight. No dysphagia. Has not used her PEG tube since prior to radiation. She is interested in having it removed. Has some fullness in the left neck which intermittently swells and then resolves again. Otherwise no new lumps/bumps of the head and neck.      8/23/22: Ms. Boston returns today for follow up. She complains of neck pain exacerbated by flexion and movement to the right that began 2 weeks ago. She also reports neck swelling and tightness which occasionally increases in size and is tender to palpation. She reports cough with clear mucus production as well as nasal congestion without drainage. She also notes dry, itchy eyes that have not improved with Visine. She also notes dysuria. She is tolerating her diet by mouth and gaining weight. She had her PEG tube removed on 8/16/22. She has been to [facility] twice and is still having difficulty voicing with TEP.     9/27/22: Here for follow up. Reports she is doing okay. Still coughing a lot and not voicing well with TEP. No weight loss, tolerating diet. No otalgia/lumps bumps. Still having intermittent neck swelling/pain.      10/27/22:  Returns for surveillance.  Did not get TSH, T4.  Has been going to [facility], now can talk well.  Cough has improved.  No new H&N complaints     12/22/22: Doing very well. Voicing well. No pain. Doing well with speech and lymphedema therapy. Eating well, gaining weight constantly. Always tired. Sometimes sleeps all day.    February 14, 2023:   58-year-old female presents today for  follow-up of her laryngeal squamous cell carcinoma and postoperative hypothyroidism.  In regards to her squamous cell carcinoma of the larynx she is doing well.  She has no complaints of any pain.  He is not noticed any swelling in her neck.  She is not have any difficulty eating or swallowing in his indicated she is gaining weight.  She continues to use her TEP without problems and does not have any leakage around her TEP.  She did have a follow-up PET-CT scan done on 2022, in the report indicated that there was no evidence of new or progressive hypermetabolic metastatic disease.  On reviewing those images, however, there does appear to be an area of increased FDG activity in the upper cervical esophagus in the area above her trachea stoma and possibly at the site of the TEP.  This was also present on a PET-CT scan done on 2022 and there does not appear debris any change in this area between the 2 scans.  The report on the scan done on 2022, had indicated this area had an SUV max of 4.4 without any apparent lesions noted on the corresponding CT.  In regards to her hypothyroidism she continues to take levothyroxine 88 mcg daily.  Lab work from 2023, showed her TSH level be low at 0.115 with a normal free T4 of 1.48.  Her prior TSH from 2022, was elevated at 55.1672.       Past Medical History:  Past Medical History:   Diagnosis Date    Cancer     Laryngeal    Diabetes mellitus     Hypertension      Past Surgical History:   Procedure Laterality Date     SECTION      DIRECT DIAGNOSTIC LARYNGOSCOPY WITH BRONCHOSCOPY AND ESOPHAGOSCOPY N/A 3/3/2023    Procedure: LARYNGOSCOPY, DIRECT, DIAGNOSTIC, WITH BRONCHOSCOPY AND ESOPHAGOSCOPY;  Surgeon: Connor Patrick MD;  Location: [facility];  Service: ENT;  Laterality: N/A;  Will not need to do bronchoscopy for this procedure    UT REMOVAL OF LARYNX  2022       Review of Systems:  Unremarkable except as  mentioned above.    Current Medications:  Current Outpatient Medications   Medication Sig    atorvastatin (LIPITOR) 80 MG tablet Take 1 tablet (80 mg total) by mouth every evening.    carBAMazepine (TEGRETOL XR) 200 MG 12 hr tablet Take 200 mg by mouth 2 (two) times daily.    cetirizine (ZYRTEC) 10 MG tablet Take 10 mg by mouth once daily.    clotrimazole (MYCELEX) 10 mg chuy Take 1 tablet (10 mg total) by mouth 5 (five) times daily. for 14 days    DULoxetine (CYMBALTA) 30 MG capsule Take 30 mg by mouth 2 (two) times daily.    fluticasone propionate (FLONASE) 50 mcg/actuation nasal spray 1 spray by Each Nostril route 2 (two) times daily.    levoFLOXacin (LEVAQUIN) 750 MG tablet Take 1 tablet (750 mg total) by mouth once daily.    levothyroxine (SYNTHROID) 88 MCG tablet Take 1 tablet (88 mcg total) by mouth before breakfast.    metFORMIN (GLUCOPHAGE) 500 MG tablet Take 1 tablet (500 mg total) by mouth once daily.    metoprolol succinate (TOPROL-XL) 25 MG 24 hr tablet Take 0.5 tablets (12.5 mg total) by mouth once daily.    NIFEdipine (PROCARDIA-XL) 60 MG (OSM) 24 hr tablet Take 1 tablet (60 mg total) by mouth once daily.    rivaroxaban (XARELTO) 20 mg Tab Take 1 tablet (20 mg total) by mouth daily with dinner or evening meal.     Current Facility-Administered Medications   Medication    silver nitrate applicators applicator 1 applicator     Facility-Administered Medications Ordered in Other Visits   Medication    dextrose 10% bolus 125 mL 125 mL    dextrose 10% bolus 125 mL 125 mL    diphenhydrAMINE injection 6.25 mg    droperidoL injection 0.25 mg    HYDROmorphone injection 0.2 mg    insulin aspart U-100 injection 2-9 Units    insulin aspart U-100 injection 4-12 Units    lactated ringers infusion    LIDOcaine (PF) 10 mg/ml (1%) injection 10 mg    LIDOcaine (PF) 10 mg/ml (1%) injection 10 mg    metoclopramide HCl injection 10 mg    midazolam (VERSED) 1 mg/mL injection 1 mg    oxyCODONE immediate release tablet 5 mg         Allergies:  Review of patient's allergies indicates:  No Known Allergies     Physical Exam:  Vital signs:   Vitals:    03/09/23 1011   BP: 133/69   Pulse: 76   Weight: 59 kg (130 lb)   General:  Well-developed well-nourished female in no acute distress.  Patient does communicate well with clear speech using her TEP.  Head and face:  Normocephalic.  No facial lesions.  No temporomandibular joint tenderness or click.  Ears:  Right ear-auricle is normally developed.  External auditory canal is clear.  Tympanic membrane is nonerythematous.  No middle ear effusion.  Left ear-auricle is normally developed.  External auditory canal is clear.  Tympanic membrane is nonerythematous.  No middle ear effusion.  Nose:  Nasal dorsum is unremarkable.  No significant septal deviation.  No significant intranasal congestion.  Secretions are clear.  Oral cavity and oropharynx:  Tongue and floor mouth are without lesions.  Mucosa is moist.  No pharyngeal erythema or exudates.  No oropharyngeal masses.  No tonsillar hypertrophy.  Neck:  Supple without palpable adenopathy.  She does have some induration related to her surgery and postradiation changes.  Trachea stoma is patent.  TEP is in place without any gross drainage from around the prosthesis.  No granulation is noted.  Eyes:  Extraocular muscles intact.  No nystagmus.  No exophthalmos or enophthalmos.  Neurologic:  Alert and oriented.  Cranial nerves 2-12 are grossly normal.    Procedure note: Flexible laryngoscopy.  The flexible fiberoptic laryngoscope was introduced into the right nostril and advanced. Examination of the nose showed the above mentioned findings. Examination of the nasopharynx showed no nasopharyngeal masses or eustachian tube obstruction. Examination of the base of tongue showed no masses or lesions. Examination of the hypopharynx showed masses or lesions in the neopharynx or hypopharynx.  The esophageal inlet is without apparent lesions.       Assessment/Plan:  Heaven Boston is a 57 y.o. female with T3N1M0 supraglottic SCCa s/p TL BSND CPM complicated by PCF s/p washout & primary closure x2 (2nd in [place]) now s/p adjuvant RT. PET scan with persistent uptake near the TEP site so was taken for direct laryngoscopy with possible biopsy on 3/3/23.  On rigid endoscopy just appeared to have fungal elements of the TEP site likely causing the uptake on the PET scan but no evidence of malignancy so no biopsies were taken.  She was started on dual antifungal therapy which she was just able to  yesterday due to the pharmacy not having the medicine available.  We will continue full course of antifungal therapy and reassess in 3 weeks    Plan:  -Return to clinic in 3 weeks  -will need routine surveillance at follow up visit  -Continue antifungal therapy   -Continue routine therapy with [facility]    Beckie Vaca MD  [facility] Department of Otolaryngology  -IV

## 2023-03-10 ENCOUNTER — OFFICE VISIT (OUTPATIENT)
Dept: FAMILY MEDICINE | Facility: CLINIC | Age: 58
End: 2023-03-10
Payer: MEDICAID

## 2023-03-10 VITALS
OXYGEN SATURATION: 100 % | DIASTOLIC BLOOD PRESSURE: 80 MMHG | WEIGHT: 130 LBS | BODY MASS INDEX: 23.92 KG/M2 | HEART RATE: 92 BPM | RESPIRATION RATE: 18 BRPM | HEIGHT: 62 IN | TEMPERATURE: 98 F | SYSTOLIC BLOOD PRESSURE: 132 MMHG

## 2023-03-10 DIAGNOSIS — E03.9 HYPOTHYROIDISM, UNSPECIFIED TYPE: ICD-10-CM

## 2023-03-10 DIAGNOSIS — E11.9 TYPE 2 DIABETES MELLITUS WITHOUT COMPLICATION, WITHOUT LONG-TERM CURRENT USE OF INSULIN: Primary | ICD-10-CM

## 2023-03-10 DIAGNOSIS — I10 HYPERTENSION, UNSPECIFIED TYPE: ICD-10-CM

## 2023-03-10 DIAGNOSIS — Z12.11 SCREEN FOR COLON CANCER: ICD-10-CM

## 2023-03-10 DIAGNOSIS — D64.9 ANEMIA, UNSPECIFIED TYPE: ICD-10-CM

## 2023-03-10 DIAGNOSIS — Z11.59 ENCOUNTER FOR HEPATITIS C SCREENING TEST FOR LOW RISK PATIENT: ICD-10-CM

## 2023-03-10 DIAGNOSIS — Z11.4 ENCOUNTER FOR SCREENING FOR HIV: ICD-10-CM

## 2023-03-10 DIAGNOSIS — R79.89 ELEVATED SERUM CREATININE: ICD-10-CM

## 2023-03-10 DIAGNOSIS — E78.5 HYPERLIPIDEMIA, UNSPECIFIED HYPERLIPIDEMIA TYPE: ICD-10-CM

## 2023-03-10 PROCEDURE — 3079F DIAST BP 80-89 MM HG: CPT | Mod: CPTII,,, | Performed by: FAMILY MEDICINE

## 2023-03-10 PROCEDURE — 3008F BODY MASS INDEX DOCD: CPT | Mod: CPTII,,, | Performed by: FAMILY MEDICINE

## 2023-03-10 PROCEDURE — 1159F PR MEDICATION LIST DOCUMENTED IN MEDICAL RECORD: ICD-10-PCS | Mod: CPTII,,, | Performed by: FAMILY MEDICINE

## 2023-03-10 PROCEDURE — 3075F SYST BP GE 130 - 139MM HG: CPT | Mod: CPTII,,, | Performed by: FAMILY MEDICINE

## 2023-03-10 PROCEDURE — 1160F RVW MEDS BY RX/DR IN RCRD: CPT | Mod: CPTII,,, | Performed by: FAMILY MEDICINE

## 2023-03-10 PROCEDURE — 3079F PR MOST RECENT DIASTOLIC BLOOD PRESSURE 80-89 MM HG: ICD-10-PCS | Mod: CPTII,,, | Performed by: FAMILY MEDICINE

## 2023-03-10 PROCEDURE — 99215 OFFICE O/P EST HI 40 MIN: CPT | Mod: PBBFAC | Performed by: FAMILY MEDICINE

## 2023-03-10 PROCEDURE — 99214 OFFICE O/P EST MOD 30 MIN: CPT | Mod: S$PBB,,, | Performed by: FAMILY MEDICINE

## 2023-03-10 PROCEDURE — 1159F MED LIST DOCD IN RCRD: CPT | Mod: CPTII,,, | Performed by: FAMILY MEDICINE

## 2023-03-10 PROCEDURE — 3075F PR MOST RECENT SYSTOLIC BLOOD PRESS GE 130-139MM HG: ICD-10-PCS | Mod: CPTII,,, | Performed by: FAMILY MEDICINE

## 2023-03-10 PROCEDURE — 1160F PR REVIEW ALL MEDS BY PRESCRIBER/CLIN PHARMACIST DOCUMENTED: ICD-10-PCS | Mod: CPTII,,, | Performed by: FAMILY MEDICINE

## 2023-03-10 PROCEDURE — 3008F PR BODY MASS INDEX (BMI) DOCUMENTED: ICD-10-PCS | Mod: CPTII,,, | Performed by: FAMILY MEDICINE

## 2023-03-10 PROCEDURE — 99214 PR OFFICE/OUTPT VISIT, EST, LEVL IV, 30-39 MIN: ICD-10-PCS | Mod: S$PBB,,, | Performed by: FAMILY MEDICINE

## 2023-03-10 NOTE — PROGRESS NOTES
"Patient Name: Heaven Boston   : 1965  MRN: [id number]     SUBJECTIVE:  Heaven Boston is a 58 y.o. female here for No chief complaint on file.  .    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 On rigid endoscopy just appeared to have fungal elements of the TEP site likely causing the uptake on the PET scan but no evidence of malignancy so no biopsies were taken.  She was started on dual antifungal therapy".  Here for routine follow-up.  Patient states she feels well and is very optimistic and grateful for her life.  Denies any shortness of breath.  Compliant with her medications.  Diabetes well-controlled on metformin only  Hemoglobin A1c   Date Value Ref Range Status   2021 5.9 <<=7.0 % Final   2021 6.0 <<=7.0 % Final   2020 6.0 <<=7.0 % Final   Needs referral for eye check.    Pt referred for recurrent DVT to hem/onc but not accepted as she already has ENT there was confusion about requesting their service for her history of malignancy.  Will reach out to them.        ALLERGIES: Review of patient's allergies indicates:  No Known Allergies      ROS:  Review of Systems   Constitutional:  Negative for chills, fever and weight loss.   HENT:  Negative for congestion, ear pain and sore throat.    Eyes:  Negative for blurred vision and pain.   Respiratory:  Negative for cough, shortness of breath and wheezing.    Cardiovascular:  Negative for chest pain, palpitations, leg swelling and PND.   Gastrointestinal:  Negative for abdominal pain, blood in stool, constipation, diarrhea, nausea and vomiting.   Genitourinary:  Negative for dysuria, flank pain and hematuria.   Musculoskeletal:  Negative for falls and myalgias.   Skin:  Negative for rash.   Neurological:  Negative " "for dizziness, focal weakness and headaches.   Psychiatric/Behavioral:  Negative for depression and substance abuse. The patient is not nervous/anxious.        OBJECTIVE:  Vital signs  Vitals:    03/10/23 0900   BP: 132/80   Pulse: 92   Resp: 18   Temp: 97.9 °F (36.6 °C)   TempSrc: Oral   SpO2: 100%   Weight: 59 kg (130 lb)   Height: 5' 2" (1.575 m)      Body mass index is 23.78 kg/m².    PHYSICAL EXAM:   Physical Exam  Vitals reviewed.   Constitutional:       General: She is not in acute distress.     Appearance: Normal appearance. She is not ill-appearing.   HENT:      Head: Normocephalic and atraumatic.      Right Ear: External ear normal.      Left Ear: External ear normal.      Nose: Nose normal. No rhinorrhea.      Mouth/Throat:      Mouth: Mucous membranes are moist.   Eyes:      General: No scleral icterus.        Right eye: No discharge.         Left eye: No discharge.      Conjunctiva/sclera: Conjunctivae normal.      Pupils: Pupils are equal, round, and reactive to light.   Neck:      Comments: Trach in place  Cardiovascular:      Rate and Rhythm: Normal rate and regular rhythm.      Pulses:           Dorsalis pedis pulses are 2+ on the right side and 2+ on the left side.      Heart sounds: No murmur heard.  Pulmonary:      Effort: Pulmonary effort is normal. No respiratory distress.      Breath sounds: No wheezing, rhonchi or rales.   Abdominal:      General: Bowel sounds are normal. There is no distension.      Palpations: Abdomen is soft.      Tenderness: There is no abdominal tenderness.   Musculoskeletal:         General: No swelling. Normal range of motion.      Cervical back: Normal range of motion. No rigidity or tenderness.      Right lower leg: No edema.      Left lower leg: No edema.      Right foot: Normal range of motion.      Left foot: Normal range of motion.   Feet:      Right foot:      Protective Sensation: 10 sites tested.  10 sites sensed.      Skin integrity: No ulcer, erythema, " warmth or callus.      Toenail Condition: Right toenails are normal.      Left foot:      Protective Sensation: 10 sites tested.  10 sites sensed.      Skin integrity: No ulcer, erythema, warmth or callus.      Toenail Condition: Left toenails are normal.   Skin:     General: Skin is warm.      Coloration: Skin is not pale.      Findings: No rash.   Neurological:      General: No focal deficit present.      Mental Status: She is alert and oriented to person, place, and time.      Sensory: No sensory deficit.      Motor: No weakness.   Psychiatric:         Mood and Affect: Mood normal.         Behavior: Behavior normal.        ASSESSMENT/PLAN:  1. Type 2 diabetes mellitus without complication, without long-term current use of insulin  -     Ambulatory referral/consult to Ophthalmology; Future; Expected date: 03/17/2023  -     Foot Exam Performed  -     Comprehensive Metabolic Panel; Future; Expected date: 03/10/2023    2. Hypertension, unspecified type  -     Comprehensive Metabolic Panel; Future; Expected date: 03/10/2023    3. Hypothyroidism, unspecified type  -     TSH; Future; Expected date: 03/24/2023  -     T4, Free; Future; Expected date: 03/24/2023    4. Hyperlipidemia, unspecified hyperlipidemia type  -     Lipid Panel; Future; Expected date: 03/10/2023    5. Anemia, unspecified type  -     Iron and TIBC; Future; Expected date: 03/10/2023  -     Ferritin; Future; Expected date: 03/10/2023  -     Vitamin B12; Future; Expected date: 03/10/2023  -     Folate; Future; Expected date: 03/10/2023    6. Elevated serum creatinine  -     Comprehensive Metabolic Panel; Future; Expected date: 03/10/2023    7. Encounter for hepatitis C screening test for low risk patient  -     Hepatitis C Antibody; Future; Expected date: 03/10/2023    8. Encounter for screening for HIV  -     HIV 1/2 Ag/Ab (4th Gen); Future; Expected date: 03/10/2023    9. Screen for colon cancer  -     Cologuard Screening (Multitarget Stool DNA);  Future; Expected date: 03/10/2023       Plan  - well-controlled diabetes.  Continue same.  Recheck A1c.  Referred for eye exam.  -well-controlled hypertension.  Continue same.  Check labs  -recheck thyroid levels.  Adjust dosage if needed  -regarding anemia, check iron studies, vitamin B12 and folate.  Patient denies any blood loss  -elevated serum creatinine.  Recheck CMP.  Avoid NSAIDs.  -update preventative healthcare.      Previous medical history/lab work/radiology reviewed and considered during medical management decisions.   Medication list reviewed and medication reconciliation performed.  Patient was provided  and care about his/her current diagnosis (es) and medications including risk/benefit and side effects/adverse events, over the counter medication uses/doses, home self-care and contact precautions,  and red flags and indications for when to seek immediate medical attention.   Patient was advised to continue compliance with current medication list and medical recommendations.  Recommended/ Advised continued compliance with recommended eating habits/ diets for medical conditions and exercise 150 minutes/ week (if possible) for medical condition (s).        RESULTS:  Recent Results (from the past 1008 hour(s))   TSH    Collection Time: 02/06/23  8:42 AM   Result Value Ref Range    Thyroid Stimulating Hormone 0.115 (L) 0.350 - 4.940 uIU/mL   T4, Free    Collection Time: 02/06/23  8:42 AM   Result Value Ref Range    Thyroxine Free 1.48 0.70 - 1.48 ng/dL   CV Ultrasound Bilateral Doppler Carotid    Collection Time: 02/17/23 11:18 AM   Result Value Ref Range    Left ICA/CCA ratio 1.36     Right ICA/CCA ratio 1.54     Left Highest .00     Left Highest CCA 97     Right Highest .00     Right Highest CCA 81     Right Highest EDV 30.00     LT Highest EDV 36.00     Right CCA prox sys 74 cm/s    Right CCA prox molina 17 cm/s    Right CCA dist sys 71 cm/s    Right CCA dist molina 18 cm/s    Right  ICA prox sys 92 cm/s    Right ICA prox molina 18 cm/s    Right ICA mid sys 98 cm/s    Right ICA mid molina 21 cm/s    Right ICA dist sys 109 cm/s    Right ICA dist molina 30 cm/s    Right ECA sys 108 cm/s    Right vertebral sys 72 cm/s    Left CCA prox sys 90 cm/s    Left CCA prox molina 23 cm/s    Left CCA dist sys 90 cm/s    Left CCA dist molina 21 cm/s    Left ICA prox sys 90 cm/s    Left ICA prox molina 25 cm/s    Left ICA mid sys 90 cm/s    Left ICA mid molina 31 cm/s    Left ICA dist sys 122 cm/s    Left ICA dist molina 36 cm/s    Left ECA sys 149 cm/s    Left vertebral sys 50 cm/s    Left CCA mid sys 97 cm/s    Left CCA mid molina 27 cm/s    Right CCA mid sys 81 cm/s    Right CCA mid molina 20 cm/s    Right vertebral molina 18 cm/s    Right ECA molina 15 cm/s    Left vertebral molina 10 cm/s    Left ECA molina 17 cm/s    Left CBA sys 67 cm/s    Left CBA molina 20 cm/s    Rigth CBA sys 55 cm/s    Right CBA molina 14 cm/s   T4, Free    Collection Time: 03/01/23  7:32 AM   Result Value Ref Range    Thyroxine Free 1.13 0.70 - 1.48 ng/dL   TSH    Collection Time: 03/01/23  7:32 AM   Result Value Ref Range    Thyroid Stimulating Hormone 0.126 (L) 0.350 - 4.940 uIU/mL   POCT glucose    Collection Time: 03/03/23  5:26 AM   Result Value Ref Range    POCT Glucose 125 (H) 70 - 110 mg/dL   CBC with Differential    Collection Time: 03/03/23  5:35 AM   Result Value Ref Range    WBC 7.3 4.5 - 11.5 x10(3)/mcL    RBC 3.65 (L) 4.20 - 5.40 x10(6)/mcL    Hgb 10.4 (L) 12.0 - 16.0 g/dL    Hct 33.3 (L) 37.0 - 47.0 %    MCV 91.2 80.0 - 94.0 fL    MCH 28.5 pg    MCHC 31.2 (L) 33.0 - 36.0 g/dL    RDW 11.6 11.5 - 17.0 %    Platelet 285 130 - 400 x10(3)/mcL    MPV 9.8 7.4 - 10.4 fL    Neut % 82.3 %    Lymph % 9.4 %    Mono % 6.8 %    Eos % 1.1 %    Basophil % 0.1 %    Lymph # 0.69 0.6 - 4.6 x10(3)/mcL    Neut # 6.01 2.1 - 9.2 x10(3)/mcL    Mono # 0.50 0.1 - 1.3 x10(3)/mcL    Eos # 0.08 0 - 0.9 x10(3)/mcL    Baso # 0.01 0 - 0.2 x10(3)/mcL    IG# 0.02 0 - 0.04  x10(3)/mcL    IG% 0.3 %    NRBC% 0.0 %   POCT glucose    Collection Time: 03/03/23  6:44 AM   Result Value Ref Range    POCT Glucose 109 70 - 110 mg/dL   POCT glucose    Collection Time: 03/03/23  7:54 AM   Result Value Ref Range    POCT Glucose 140 (H) 70 - 110 mg/dL         Follow Up:  Follow up in about 6 months (around 9/10/2023).     [unfilled]    This note was created with the assistance of a voice recognition software or phone dictation. There may be transcription errors as a result of using this technology however minimal. Effort has been made to assure accuracy of transcription but any obvious errors or omissions should be clarified with the author of the document

## 2023-03-16 ENCOUNTER — CLINICAL SUPPORT (OUTPATIENT)
Dept: REHABILITATION | Facility: HOSPITAL | Age: 58
End: 2023-03-16
Payer: MEDICAID

## 2023-03-16 DIAGNOSIS — I89.0 LYMPHEDEMA: Primary | ICD-10-CM

## 2023-03-16 PROCEDURE — 92507 TX SP LANG VOICE COMM INDIV: CPT

## 2023-03-16 NOTE — PROGRESS NOTES
[facility] AND [facility]  Speech Therapy Progress Note  Laryngectomy-Lymphedema    Date: 3/16/2023     Name: Heaven Boston   MRN: [id number]    Therapy Diagnosis:   Encounter Diagnosis   Name Primary?    Lymphedema Yes        Physician: Dr. Aparicio    PATIENT IS A NECK-BREATHER - DO NOT ORALLY INTUBATE    Time In:  0900  Time Out: 0955    Procedure Min.   Speech Language Voice Therapy  60   Total Untimed Units: 60  Charges Billed/# of units: 60/1    Precautions: Standard  Subjective    Chief Complaint: Patient with complaints  neck tightness after biopsy last week. Continues to exhibit reduced swelling this date.     AFFECT: normal affect    Pain:   1/10  Pain Location / Description: tightness  Current Medical History: Heaven Boston is a 58 y.o. female referred by Dr. Aparicio for TEP management to maximize alaryngeal communication without respiratory compromise and lymphedema management.     Past Medical History:   Past Medical History:   Diagnosis Date    Cancer     Laryngeal    Diabetes mellitus     Hypertension       Heaven Boston  has a past surgical history that includes pr removal of larynx (2022);  section; and Direct diagnostic laryngoscopy with bronchoscopy and esophagoscopy (N/A, 3/3/2023).  Medical Hx and Allergies: Heaven has a current medication list which includes the following prescription(s): atorvastatin, carbamazepine, cetirizine, clotrimazole, duloxetine, fluticasone propionate, levofloxacin, levothyroxine, metformin, metoprolol succinate, nifedipine, and rivaroxaban, and the following Facility-Administered Medications: dextrose 10%, dextrose 10%, diphenhydramine, droperidol, hydromorphone, insulin aspart u-100, insulin aspart u-100, lactated ringers, lidocaine (pf) 10 mg/ml (1%), lidocaine (pf) 10 mg/ml (1%), metoclopramide hcl, midazolam, oxycodone, and silver nitrate applicators. Review of patient's allergies indicates:  No Known Allergies    Current Voice Function:  currently using alaryngeal communication via voice prosthesis   Current Level of Swallow Function/Complaints:  Pt does not report dysphagia  Prior Therapy:  02/23/2023    OBJECTIVE   TEP Current Status:17FR, 12.5mm Aquino placed on 04/12/2022    TEP Patient Complaints: Intermittent strained voicing with periods of good voicing    TEP Accessories: 10/55 fenestrated leonie tube with push to talk HMEs.     Stoma Size:  adequate     Lymphedema:  yes    Manual therapy: MLD/CDP continues for head and neck.    Measurement taken of face and neck as follows.     Facial Lymphedema Measurement Form  PRE-TREATMENT  Neck Composite   Location Right Left   Superior Circumference (A):   (just below mandible)  5.5 5.5   Middle Circumference (B):  (midway between top & bottom 5 5   Inferior Circumference (C):  (lowest circumferential location)  5 5   TOTAL: 15.5 15.5       Facial Composite   Location Right Left   1.Tragus to mental protuberance  5 5   2.Tragus to mouth angle  4 4   3.Mandibular angle to nasal wing  4 4   4.Mandibular angle to internal eye corner  5 5   5.Mandibular angle to external eye corner  3.5 3   6.Mental Protuberance to internal eye corner to external eye corner  4 4   7.Mandibular angle to mental protuberance 4.5 4.5   TOTALS: 30 30     POST-TREATMENT  Neck Composite   Location Right Left   Superior Circumference (A):   (just below mandible)  5.5 5.5   Middle Circumference (B):   (midway between top & bottom 5 5   Inferior Circumference (C):   (lowest circumferential location)  5 5   TOTAL: 14.25 14       Facial Composite   Location Right Left   1.Tragus to mental protuberance  5 5   2.Tragus to mouth angle  4 4   3.Mandibular angle to nasal wing  4 4   4.Mandibular angle to internal eye corner  5 5   5.Mandibular angle to external eye corner  3 3   6.Mental Protuberance to internal eye corner to external eye corner  4 4   7.Mandibular angle to mental protuberance 4.5 4.5   TOTALS: 30 30   Greater than a 2%  difference from pre-to post-treatment? yes         Treatment   Heaven Boston noted to tolerated pneumatic compression with good results this date. Decreased Neck composite total of 0 inches on right and 0 inches on left and Face composite total of 0 inches on right and 0 inches on left. SLP ended session with neck stretches, ROM and diaphragmatic breathing. Despite no measurable change, pt exhibited reduced fibrotic texture and increased neck ROM noted after intervention. Good compliance with all training. SLP to follow up on 03/21/2023.  Pt to continue frequency 1 x week due to reduced lymphedema noted overall. Pt continues to require manual decompression to maintain quality of life for swallowing and alaryngeal communication as insurance continues to deny home pneumatic compression device for lymphedema management.     Education: Plan of Care, role of SLP in care, and scheduling/ cancellation policy were discussed with pt. Patient expressed understanding.     Goals       LongTerm Goals:  Current Progress:   1. Patient will demonstrate understanding and implementation of long-term home management interventions as noted by improvement in lymphatic function, conduction of oral care, completion of daily exercises/stretches, and use of home management tools  Progressing towards goal       Short Term Goals:  Current Progress:   1. Patient will participate in manual interventions to target improvement in alaryngeal communication function related to reduced lymphatic function Progressing towards goal   2.  Patient will completed diaphragmatic breathing exercises at an independent level.  Progressing towards goal   3.  Patient will improve alaryngeal communication to maintain voicing >90% of the time.  Progressing towards goal     Ms. 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  She is currently utilizing esophageal speech via a voice prosthesis for alaryngeal communication for functional communication  with her family, friends, medical providers, and others to perform her daily life activities.  She requires the use of a laryngectomy tube at all times to keep the airway patent and prevent stomal stenosis. HMEs are required daily for mucus management and pulmonary optimization .     SLP to additionally provided MLD/CDP for head and neck lymphedema to improve functional communication.    Plan     SLP intervention is warranted 1-2 times a week or PRN for communication needs for 1-6 weeks due to the chronic nature of the impairment.     Additional Information   Ms. Boston entered with overall reduced swelling this date. All skin noted to be intact this date. Reduced swelling noted upon entering. Good decongestion noted visually with tissue texture with manual techniques this date.  Pt reported increased neck tightness since throat biopsy last week. SLP will continue to address POC as stated. SLP to continue frequency to 1 time a week due to improved management of lymphedema. Continue with POC on 03/21/2023. Pt requested TEP change versus lymphedema management at next session. SLP to provide home exercise program for neck and shoulder stretches. SLP continues to recommend home pneumatic compression device for lymphedema management despite insurance denial as lymphedema is a life long complication from head and neck surgical dissection s/p total laryngectomy and post radiation intervention.       Sunitha Jean MS, CCC-SLP   3/16/2023

## 2023-03-21 ENCOUNTER — CLINICAL SUPPORT (OUTPATIENT)
Dept: REHABILITATION | Facility: HOSPITAL | Age: 58
End: 2023-03-21
Payer: MEDICAID

## 2023-03-21 DIAGNOSIS — Z90.02 H/O LARYNGECTOMY: Primary | ICD-10-CM

## 2023-03-21 PROCEDURE — L8509 TRACH-ESOPH VOICE PROS MD IN: HCPCS

## 2023-03-21 PROCEDURE — 92597 ORAL SPEECH DEVICE EVAL: CPT

## 2023-03-21 NOTE — PROGRESS NOTES
[facility]  Speech Therapy Evaluation   Laryngectomy  Date: 3/21/2023     Name: Heaven Boston   MRN: [id number]    Therapy Diagnosis:   Encounter Diagnosis   Name Primary?    H/O laryngectomy Yes      Physician: Shelby Marcos MD    PATIENT IS A NECK-BREATHER - DO NOT ORALLY INTUBATE    Time In:  0900   Time Out:  0940     Procedure Min.   Prosthesis Evaluation  40   Total Untimed Units: 40  Charges Billed/# of units: 40/1    Precautions: Standard  Subjective    Chief Complaint: strained voice, time to change prosthesis    AFFECTnormal affect    Pain:   0/10  Pain Location / Description: NA  Current Medical History: Heaven Boston is a 58 y.o. female referred by Dr. Marcos for TEP management to maximize alaryngeal communication without respiratory compromise.    Past Medical History:   Past Medical History:   Diagnosis Date    Cancer     Laryngeal    Diabetes mellitus     Hypertension     Heaven Boston  has a past surgical history that includes pr removal of larynx (2022);  section; and Direct diagnostic laryngoscopy with bronchoscopy and esophagoscopy (N/A, 3/3/2023).  Medical Hx and Allergies: Heaven has a current medication list which includes the following prescription(s): atorvastatin, carbamazepine, cetirizine, duloxetine, fluticasone propionate, levofloxacin, levothyroxine, metformin, metoprolol succinate, nifedipine, and rivaroxaban, and the following Facility-Administered Medications: dextrose 10%, dextrose 10%, diphenhydramine, droperidol, hydromorphone, insulin aspart u-100, insulin aspart u-100, lactated ringers, lidocaine (pf) 10 mg/ml (1%), lidocaine (pf) 10 mg/ml (1%), metoclopramide hcl, midazolam, oxycodone, and silver nitrate applicators. Review of patient's allergies indicates:  No Known Allergies    Current Voice Function: alaryngeal communication via voice prosthesis    Current Level of Swallow Function/Complaints:  no complaints of dysphagia  Prior  Therapy:  03/08/2023 for lymphedema management    TEP ASSESSMENT   Initial Fitting: no    Re-fitting:yes    TEP Initial Fitting Date: 03/21/2022    TEP Current Status:Pt is currently wearing a 17FR, 8mm [employer] Aquino unsure date when placed. Ms. Boston additionally wears a 10/55 fenestrated leonie tube with push to talk HMEs.     TEP Patient Complaints:strained voice    TEP Accessories: 10/55 fenestrated leonie tube with push to talk HMEs.     Stoma Size:  adequate     Lymphedema:  yes, currently receiving intervention    TEP Fitting/Re-sizing:     Removed current TEP: yes moderate biofilm noted on esophageal phalange    TEP Removed catheter : no     TEP Sizing:yes 8mm    Puncture Dilation: yes 18FR RRC    TEP Prosthesis Placed:yes 17FR 8mm Provox Aquino    TEP Voicing with Open Tract:no     TEP Voicing with Prosthesis:yes     TEP Leaking through Prothesis:no     TEP Leaking around Prosthesis:yes       Treatment   Total Treatment Time Separate from Evaluation: n/a   no treatment performed 2/2 time to complete evaluation.    Education: Plan of Care, role of SLP in care, and TEP and stomal care  were discussed with pt. Patient expressed understanding.     Assessment       LongTerm Goals:  Current Progress:   1.  Patient will utilize alaryngeal communication via TEP to express needs and desires without respiratory compromise >90% of the time.  Progressing towards goal       Short Term Goals:  Current Progress:   1. Patient will demonstrate care and use of HME system for maximum pulmonary benefit >90% of the time. Progressing towards goal   2.  Patient will demonstrate adequate care and use of TEP to maintain TEP voicing >90% of the time.  Progressing towards goal   3.  Patient will demonstrate adequate occlusion and cleaning of TEP to maintain voicing >90% of the time.  Progressing towards goal   4. Patient will increase laryngectomy tube usage daily or PRN to maintain and increase stomal patency.  Progressing towards goal      Ms. 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  She is currently utilizing esophageal speech via a voice prosthesis for alaryngeal communication for functional communication with her family, friends, medical providers, and others to perform her daily life activities.  Ms. Boston requires the use of a laryngectomy tube at all times to keep the airway patent and prevent stomal stenosis. HMEs are required daily for mucus management and pulmonary optimization .     Plan     SLP intervention is warranted 1-2 times a month or PRN for communication needs for a minimum of 1 year due to the chronic nature of the impairment.     Additional Information     Ms. Boston entered this date and requested prosthesis change. She reported intermittent strained voicing, no leaking through or around TEP. SLP assessed TEP this date. Size remains adequate, however, SLP unsure of current size due to extended time since last change. SLP removed current prosthesis in order to observe size on tracheal phalange.  18FR RRC inserted into puncture to maintain patency. Pt currently wearing 8mm. SLP replaced with 17FR, 8mm without incident this date. SLP to follow up for prosthesis management as warranted. Lymphedema management to continue 1x a week.     Sunitha Jean CCC-SLP   3/21/2023

## 2023-03-30 ENCOUNTER — OFFICE VISIT (OUTPATIENT)
Dept: OTOLARYNGOLOGY | Facility: CLINIC | Age: 58
End: 2023-03-30
Payer: MEDICAID

## 2023-03-30 ENCOUNTER — CLINICAL SUPPORT (OUTPATIENT)
Dept: REHABILITATION | Facility: HOSPITAL | Age: 58
End: 2023-03-30
Payer: MEDICAID

## 2023-03-30 VITALS
WEIGHT: 127.19 LBS | DIASTOLIC BLOOD PRESSURE: 74 MMHG | BODY MASS INDEX: 23.27 KG/M2 | SYSTOLIC BLOOD PRESSURE: 130 MMHG | HEART RATE: 81 BPM

## 2023-03-30 DIAGNOSIS — Z85.21 HISTORY OF LARYNGEAL CANCER: Primary | ICD-10-CM

## 2023-03-30 DIAGNOSIS — B37.9 YEAST INFECTION: ICD-10-CM

## 2023-03-30 DIAGNOSIS — Z92.3 HISTORY OF RADIATION TO HEAD AND NECK REGION: ICD-10-CM

## 2023-03-30 DIAGNOSIS — E03.9 HYPOTHYROIDISM, UNSPECIFIED TYPE: ICD-10-CM

## 2023-03-30 DIAGNOSIS — Z90.02 H/O LARYNGECTOMY: ICD-10-CM

## 2023-03-30 DIAGNOSIS — R19.5 POSITIVE COLORECTAL CANCER SCREENING USING COLOGUARD TEST: Primary | ICD-10-CM

## 2023-03-30 DIAGNOSIS — I89.0 LYMPHEDEMA: Primary | ICD-10-CM

## 2023-03-30 DIAGNOSIS — B37.81 ESOPHAGEAL CANDIDIASIS: ICD-10-CM

## 2023-03-30 LAB — NONINV COLON CA DNA+OCC BLD SCRN STL QL: POSITIVE

## 2023-03-30 PROCEDURE — 99214 OFFICE O/P EST MOD 30 MIN: CPT | Mod: PBBFAC,25 | Performed by: STUDENT IN AN ORGANIZED HEALTH CARE EDUCATION/TRAINING PROGRAM

## 2023-03-30 PROCEDURE — 92507 TX SP LANG VOICE COMM INDIV: CPT

## 2023-03-30 PROCEDURE — 31575 DIAGNOSTIC LARYNGOSCOPY: CPT | Mod: PBBFAC | Performed by: OTOLARYNGOLOGY

## 2023-03-30 RX ORDER — DULOXETIN HYDROCHLORIDE 30 MG/1
30 CAPSULE, DELAYED RELEASE ORAL 2 TIMES DAILY
Qty: 60 CAPSULE | Refills: 1 | Status: SHIPPED | OUTPATIENT
Start: 2023-03-30 | End: 2023-09-15 | Stop reason: SDUPTHER

## 2023-03-30 NOTE — PROGRESS NOTES
[facility]  Speech Therapy Progress Note  Laryngectomy-Lymphedema    Date: 3/30/2023     Name: Heaven Boston   MRN: [id number]    Therapy Diagnosis:   No diagnosis found.       Physician: Dr. Aparicio    PATIENT IS A NECK-BREATHER - DO NOT ORALLY INTUBATE    Time In:  0900  Time Out: 0955    Procedure Min.   Speech Language Voice Therapy  60   Total Untimed Units: 60  Charges Billed/# of units: 60/1    Precautions: Standard  Subjective    Chief Complaint: Patient with complaints  neck tightness after biopsy last week. Continues to exhibit reduced swelling this date.     AFFECT: normal affect    Pain:   1/10  Pain Location / Description: tightness  Current Medical History: Heaven Boston is a 58 y.o. female referred by Dr. Aparicio for TEP management to maximize alaryngeal communication without respiratory compromise and lymphedema management.     Past Medical History:   Past Medical History:   Diagnosis Date    Cancer     Laryngeal    Diabetes mellitus     Hypertension       Heaven Boston  has a past surgical history that includes pr removal of larynx (2022);  section; and Direct diagnostic laryngoscopy with bronchoscopy and esophagoscopy (N/A, 3/3/2023).  Medical Hx and Allergies: Heaven has a current medication list which includes the following prescription(s): atorvastatin, carbamazepine, cetirizine, duloxetine, fluticasone propionate, levofloxacin, levothyroxine, metformin, metoprolol succinate, nifedipine, and rivaroxaban, and the following Facility-Administered Medications: dextrose 10%, dextrose 10%, diphenhydramine, droperidol, hydromorphone, insulin aspart u-100, insulin aspart u-100, lactated ringers, lidocaine (pf) 10 mg/ml (1%), lidocaine (pf) 10 mg/ml (1%), metoclopramide hcl, midazolam, oxycodone, and silver nitrate applicators. Review of patient's allergies indicates:  No Known Allergies    Current Voice Function: currently using alaryngeal communication via voice  prosthesis   Current Level of Swallow Function/Complaints:  Pt does not report dysphagia  Prior Therapy:  02/23/2023    OBJECTIVE   TEP Current Status:17FR, 12.5mm Aquino placed on 04/12/2022    TEP Patient Complaints: Intermittent strained voicing with periods of good voicing    TEP Accessories: 10/55 fenestrated leonie tube with push to talk HMEs.     Stoma Size:  adequate     Lymphedema:  yes    Manual therapy: MLD/CDP continues for head and neck.    Measurement taken of face and neck as follows.     Facial Lymphedema Measurement Form  PRE-TREATMENT  Neck Composite   Location Right Left   Superior Circumference (A):   (just below mandible)  5.5 5.5   Middle Circumference (B):  (midway between top & bottom 5 5   Inferior Circumference (C):  (lowest circumferential location)  5 5   TOTAL: 15.5 15.5       Facial Composite   Location Right Left   1.Tragus to mental protuberance  5 5   2.Tragus to mouth angle  4 4   3.Mandibular angle to nasal wing  4 4   4.Mandibular angle to internal eye corner  5 5   5.Mandibular angle to external eye corner  3.5 3   6.Mental Protuberance to internal eye corner to external eye corner  4 4   7.Mandibular angle to mental protuberance 4.5 4.5   TOTALS: 30 30     POST-TREATMENT  Neck Composite   Location Right Left   Superior Circumference (A):   (just below mandible)  5.5 5.5   Middle Circumference (B):   (midway between top & bottom 5 5   Inferior Circumference (C):   (lowest circumferential location)  5 5   TOTAL: 14.25 14       Facial Composite   Location Right Left   1.Tragus to mental protuberance  5 5   2.Tragus to mouth angle  4 4   3.Mandibular angle to nasal wing  4 4   4.Mandibular angle to internal eye corner  5 5   5.Mandibular angle to external eye corner  3 3   6.Mental Protuberance to internal eye corner to external eye corner  4 4   7.Mandibular angle to mental protuberance 4.5 4.5   TOTALS: 30 30   Greater than a 2% difference from pre-to post-treatment? yes          Treatment   Heaven Boston noted to tolerated pneumatic compression with good results this date. Decreased Neck composite total of 0 inches on right and 0 inches on left and Face composite total of 0 inches on right and 0 inches on left. SLP ended session with neck stretches, ROM and diaphragmatic breathing. Despite no measurable change, pt exhibited reduced fibrotic texture and increased neck ROM noted after intervention. Good compliance with all training. SLP to follow up on 03/21/2023.  Pt to continue frequency 1 x week due to reduced lymphedema noted overall. Pt continues to require manual decompression to maintain quality of life for swallowing and alaryngeal communication as insurance continues to deny home pneumatic compression device for lymphedema management.     Education: Plan of Care, role of SLP in care, and scheduling/ cancellation policy were discussed with pt. Patient expressed understanding.     Goals       LongTerm Goals:  Current Progress:   1. Patient will demonstrate understanding and implementation of long-term home management interventions as noted by improvement in lymphatic function, conduction of oral care, completion of daily exercises/stretches, and use of home management tools  Progressing towards goal       Short Term Goals:  Current Progress:   1. Patient will participate in manual interventions to target improvement in alaryngeal communication function related to reduced lymphatic function Progressing towards goal   2.  Patient will completed diaphragmatic breathing exercises at an independent level.  Progressing towards goal   3.  Patient will improve alaryngeal communication to maintain voicing >90% of the time.  Progressing towards goal     Ms. 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  She is currently utilizing esophageal speech via a voice prosthesis for alaryngeal communication for functional communication with her family, friends, medical providers, and  others to perform her daily life activities.  She requires the use of a laryngectomy tube at all times to keep the airway patent and prevent stomal stenosis. HMEs are required daily for mucus management and pulmonary optimization .     SLP to additionally provided MLD/CDP for head and neck lymphedema to improve functional communication.    Plan     SLP intervention is warranted 1-2 times a week or PRN for communication needs for 1-6 weeks due to the chronic nature of the impairment.     Additional Information   Ms. Boston entered with overall reduced swelling this date. All skin noted to be intact this date. Reduced swelling noted upon entering. Good decongestion noted visually with tissue texture with manual techniques this date.  Pt reported increased neck tightness since throat biopsy last week. SLP will continue to address POC as stated. SLP to continue frequency to 1 time a week due to improved management of lymphedema. Continue with POC on 03/21/2023. Pt requested TEP change versus lymphedema management at next session. SLP to provide home exercise program for neck and shoulder stretches. SLP continues to recommend home pneumatic compression device for lymphedema management despite insurance denial as lymphedema is a life long complication from head and neck surgical dissection s/p total laryngectomy and post radiation intervention.       Sunitha Jean MS, CCC-SLP   3/30/2023                                                                      [facility]  Speech Therapy Progress Note  Laryngectomy-Lymphedema    Date: 3/30/2023     Name: Heaven Boston   MRN: [id number]    Therapy Diagnosis:   No diagnosis found.       Physician: Dr. Aparicio    PATIENT IS A NECK-BREATHER - DO NOT ORALLY INTUBATE    Time In:  0915  Time Out: 1000    Procedure Min.   Speech Language Voice Therapy  45   Total Untimed Units: 45  Charges Billed/# of units: 45/1    Precautions: Standard  Subjective    Chief  "Complaint: Patient entered after ENT appointment this date and reported "everything looks good".     AFFECT: normal affect    Pain:   0/10  Pain Location / Description: NA  Current Medical History: Heaven Boston is a 58 y.o. female referred by Dr. Aparicio for TEP and lymphedema management to maximize alaryngeal communication without respiratory compromise.     Past Medical History:   Past Medical History:   Diagnosis Date    Cancer     Laryngeal    Diabetes mellitus     Hypertension       Heaven Boston  has a past surgical history that includes pr removal of larynx (2022);  section; and Direct diagnostic laryngoscopy with bronchoscopy and esophagoscopy (N/A, 3/3/2023).  Medical Hx and Allergies: Heaven has a current medication list which includes the following prescription(s): atorvastatin, carbamazepine, cetirizine, duloxetine, fluticasone propionate, levofloxacin, levothyroxine, metformin, metoprolol succinate, nifedipine, and rivaroxaban, and the following Facility-Administered Medications: dextrose 10%, dextrose 10%, diphenhydramine, droperidol, hydromorphone, insulin aspart u-100, insulin aspart u-100, lactated ringers, lidocaine (pf) 10 mg/ml (1%), lidocaine (pf) 10 mg/ml (1%), metoclopramide hcl, midazolam, oxycodone, and silver nitrate applicators. Review of patient's allergies indicates:  No Known Allergies    Current Voice Function: currently using alaryngeal communication via voice prosthesis   Current Level of Swallow Function/Complaints:  Pt does not report dysphagia  Prior Therapy:  2023 for TEP replacement. Last lymphedema treatment on 2023    OBJECTIVE   TEP Current Status:17FR, 8mm Aquino placed on 2023    TEP Patient Complaints: No complaints    TEP Accessories: 10/55 fenestrated leonie tube with push to talk HMEs.     Stoma Size:  adequate     Lymphedema:  yes    Manual therapy: MLD/CDP continues for head and neck.    Measurement taken of face and neck as follows. "     Facial Lymphedema Measurement Form  PRE-TREATMENT  Neck Composite   Location Right Left   Superior Circumference (A):   (just below mandible)  6 6   Middle Circumference (B):  (midway between top & bottom 5 5   Inferior Circumference (C):  (lowest circumferential location)  4.75 5   TOTAL: 15.75 16       Facial Composite   Location Right Left   1.Tragus to mental protuberance  5 5   2.Tragus to mouth angle  4 4   3.Mandibular angle to nasal wing  4 4   4.Mandibular angle to internal eye corner  5 5   5.Mandibular angle to external eye corner  3.5 3   6.Mental Protuberance to internal eye corner to external eye corner  4 4   7.Mandibular angle to mental protuberance 4.5 4.5   TOTALS: 30 29.5     POST-TREATMENT  Neck Composite   Location Right Left   Superior Circumference (A):   (just below mandible)  6 6   Middle Circumference (B):   (midway between top & bottom 5 5   Inferior Circumference (C):   (lowest circumferential location)  4.75 5   TOTAL: 15.75 16       Facial Composite   Location Right Left   1.Tragus to mental protuberance  5 5   2.Tragus to mouth angle  4 4   3.Mandibular angle to nasal wing  4 4   4.Mandibular angle to internal eye corner  5 5   5.Mandibular angle to external eye corner  3.5 3   6.Mental Protuberance to internal eye corner to external eye corner  4 4   7.Mandibular angle to mental protuberance 4.5 4.5   TOTALS: 30 29.5   Greater than a 2% difference from pre-to post-treatment? yes         Treatment   Heaven Borel noted to tolerated pneumatic compression with good results this date. Decreased Neck composite total of 0 inches on right and 0 inches on left and Face composite total of 0 inches on right and 0 inches on left. SLP ended session with neck stretches, ROM and diaphragmatic breathing. Despite no measurable change, pt exhibited reduced fibrotic texture and increased neck ROM noted after intervention. Good compliance with all training. SLP to reduce frequency to every other  week due to reduced facial and neck lymphedema. SLP to follow up on 04/13/2023.  Pt continues to require manual decompression to maintain quality of life for swallowing and alaryngeal communication as insurance continues to deny home pneumatic compression device for lymphedema management.     Education: Plan of Care, role of SLP in care, and scheduling/ cancellation policy were discussed with pt. Patient expressed understanding.     Goals       LongTerm Goals:  Current Progress:   1. Patient will demonstrate understanding and implementation of long-term home management interventions as noted by improvement in lymphatic function, conduction of oral care, completion of daily exercises/stretches, and use of home management tools  Progressing towards goal       Short Term Goals:  Current Progress:   1. Patient will participate in manual interventions to target improvement in alaryngeal communication function related to reduced lymphatic function Progressing towards goal   2.  Patient will completed diaphragmatic breathing exercises at an independent level.  Progressing towards goal   3.  Patient will improve alaryngeal communication to maintain voicing >90% of the time.  Progressing towards goal     Ms. 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  She is currently utilizing esophageal speech via a voice prosthesis for alaryngeal communication for functional communication with her family, friends, medical providers, and others to perform her daily life activities.  She requires the use of a laryngectomy tube at all times to keep the airway patent and prevent stomal stenosis. HMEs are required daily for mucus management and pulmonary optimization .     SLP to additionally provided MLD/CDP for head and neck lymphedema to improve functional communication.    Plan     SLP intervention is warranted 1-2 times a week or PRN for communication needs for 1-6 weeks due to the chronic nature of the impairment.      Additional Information   Ms. Boston entered with overall reduced swelling this date. All skin noted to be intact this date. SLP observed visual changes with tissue texture after manual techniques this date.  Pt reported increased ROM and reduced tightness after intervention. SLP will continue to address POC as stated. SLP to reduce frequency to 1 time every other week due to improved management of lymphedema. Continue with POC on 04/13/2023. SLP educated to continued daily home exercise program for neck and shoulder stretches. SLP continues to recommend home pneumatic compression device for lymphedema management despite insurance denial as lymphedema is a life long complication from head and neck surgical dissection s/p total laryngectomy and post radiation intervention.       Sunitha Jean MS, CCC-SLP   3/30/2023

## 2023-03-30 NOTE — PROGRESS NOTES
Patient Name: Heaven Boston   YOB: 1965     Chief Complaint: No chief complaint on file.       History of Present Illness:  Heaven Boston is a 57 y.o. 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 Patient had an uneventful hospitalization course and was discharged on postoperative day 9 after an esophagram showed no pharyngeal leak.  However she re-presented to our facility on 4/6/22 with surgical site infection and a pharyngocutaneous fistula. Patient underwent a neck washout with primary repair on 4/11/2022, with placement of a gastrostomy tube the same day. She was maintained on NPO and transferred to [place] for further evaluation and management on 4/15/22 after evidence of persistent pharyngocutaneous fistula. There she underwent a 2nd neck washout with primary closure and placement of salivary bypass tube.      5/2/22: Patient had an uneventful hospitalization course and removal of salivary bypass tube before discharge last week and now presenting for postoperative evaluation.  Patient presents today without any complaints.  She is wondering about when he pointed that her x-rays for swallowing then.  She seen Dr. Laguna today after our visit to undergo-stimulation for radiation.     5/9/22: Pt. Did not answer. Let voicemail to advance diet to [facility]. Also discussed this with son.  Plan to see in clinic in 2 weeks     5/19/22: In radiation therapy, 5x weekly until 6/16/22. Has TEP in place, brought another TEP requesting swap out. Will go to speech therapy for exchange of TEP. Went to ER for bleeding from G-tube site 1 week ago but declines visit to Gen Surg clinic today. On [facility] taking Ensure by G-tube, water and soups by mouth. Had questions about advancing diet. No weight loss  or appetite change. No other issues.      6/21/22:  Returns today for follow up.  Completed radiation last week 6/16/22.  Is having difficulty voicing with TEP though it has been swapped out by SLP.  Able to eat what she wants.  Having some tightness of her throat and soreness, otherwise no issues.     7/21/22: Here today for follow up. Has overall been doing very well. She is tolerating her diet by mouth and gaining weight. No dysphagia. Has not used her PEG tube since prior to radiation. She is interested in having it removed. Has some fullness in the left neck which intermittently swells and then resolves again. Otherwise no new lumps/bumps of the head and neck.      8/23/22: Ms. Boston returns today for follow up. She complains of neck pain exacerbated by flexion and movement to the right that began 2 weeks ago. She also reports neck swelling and tightness which occasionally increases in size and is tender to palpation. She reports cough with clear mucus production as well as nasal congestion without drainage. She also notes dry, itchy eyes that have not improved with Visine. She also notes dysuria. She is tolerating her diet by mouth and gaining weight. She had her PEG tube removed on 8/16/22. She has been to SLP twice and is still having difficulty voicing with TEP.     9/27/22: Here for follow up. Reports she is doing okay. Still coughing a lot and not voicing well with TEP. No weight loss, tolerating diet. No otalgia/lumps bumps. Still having intermittent neck swelling/pain.      10/27/22:  Returns for surveillance.  Did not get TSH, T4.  Has been going to SLP, now can talk well.  Cough has improved.  No new H&N complaints     12/22/22: Doing very well. Voicing well. No pain. Doing well with speech and lymphedema therapy. Eating well, gaining weight constantly. Always tired. Sometimes sleeps all day.    February 14, 2023:   58-year-old female presents today for follow-up of her laryngeal squamous cell  carcinoma and postoperative hypothyroidism.  In regards to her squamous cell carcinoma of the larynx she is doing well.  She has no complaints of any pain.  He is not noticed any swelling in her neck.  She is not have any difficulty eating or swallowing in his indicated she is gaining weight.  She continues to use her TEP without problems and does not have any leakage around her TEP.  She did have a follow-up PET-CT scan done on December 30, 2022, in the report indicated that there was no evidence of new or progressive hypermetabolic metastatic disease.  On reviewing those images, however, there does appear to be an area of increased FDG activity in the upper cervical esophagus in the area above her trachea stoma and possibly at the site of the TEP.  This was also present on a PET-CT scan done on October 12, 2022 and there does not appear debris any change in this area between the 2 scans.  The report on the scan done on October 12, 2022, had indicated this area had an SUV max of 4.4 without any apparent lesions noted on the corresponding CT.  In regards to her hypothyroidism she continues to take levothyroxine 88 mcg daily.  Lab work from February 6, 2023, showed her TSH level be low at 0.115 with a normal free T4 of 1.48.  Her prior TSH from October 27, 2022, was elevated at 55.1672.     3/30/23:  Patient routine follow-up for squamous cell carcinoma and postop hypothyroidism.  She denies any new lumps, bumps hemoptysis or weight loss.  She is able to swallow without difficulty, TEP functioning well with excellent speech.  She has postop hypothyroidism, does complain of some weakness.  She did have a change in her blood pressure medicine in that it was increased and she felt some of her fatigue might have been related to it.  She currently has blood work ordered by her PCP including thyroid function and she is going to get it next week, we will schedule a TeleMed follow-up.  Duloxetine 30 mg helps her sleep and  helps her neck pain.       Past Medical History:  Past Medical History:   Diagnosis Date    Cancer     Laryngeal    Diabetes mellitus     Hypertension      Past Surgical History:   Procedure Laterality Date     SECTION      TN REMOVAL OF LARYNX  2022       Review of Systems:  Unremarkable except as mentioned above.    Current Medications:  Current Outpatient Medications   Medication Sig    atorvastatin (LIPITOR) 80 MG tablet Take 1 tablet (80 mg total) by mouth every evening.    azelastine (ASTELIN) 137 mcg (0.1 %) nasal spray 1 spray by Nasal route.    carBAMazepine (CARBATROL) 200 MG CM12 Take 200 mg by mouth.    carBAMazepine (TEGRETOL XR) 200 MG 12 hr tablet Take 200 mg by mouth 2 (two) times daily.    cetirizine (ZYRTEC) 10 MG tablet Take 10 mg by mouth once daily.    DULoxetine (CYMBALTA) 30 MG capsule Take 30 mg by mouth 2 (two) times daily.    fluticasone propionate (FLONASE) 50 mcg/actuation nasal spray 1 spray by Each Nostril route 2 (two) times daily.    fluticasone-salmeterol diskus inhaler 100-50 mcg Inhale 1 puff into the lungs every 12 (twelve) hours.    levothyroxine (SYNTHROID) 88 MCG tablet Take 1 tablet (88 mcg total) by mouth before breakfast.    loratadine (CLARITIN) 10 mg tablet Take 1 tablet (10 mg total) by mouth once daily.    metFORMIN (GLUCOPHAGE) 500 MG tablet Take 1 tablet (500 mg total) by mouth once daily.    metoprolol succinate (TOPROL-XL) 25 MG 24 hr tablet Take 0.5 tablets (12.5 mg total) by mouth once daily.    montelukast (SINGULAIR) 5 MG chewable tablet Take 1 tablet (5 mg total) by mouth every evening.    NIFEdipine (PROCARDIA-XL) 60 MG (OSM) 24 hr tablet Take 1 tablet (60 mg total) by mouth once daily.    rivaroxaban (XARELTO) 20 mg Tab Take 1 tablet (20 mg total) by mouth daily with dinner or evening meal.    aspirin (ECOTRIN) 81 MG EC tablet Take 1 tablet (81 mg total) by mouth once daily.    HYDROcodone-acetaminophen 5-300 mg Tab Take 1 tablet by mouth every 8  "(eight) hours as needed.    ibuprofen (ADVIL,MOTRIN) 800 MG tablet Take 800 mg by mouth every 6 (six) hours as needed.    omeprazole (PRILOSEC) 40 MG capsule Take 40 mg by mouth once daily.    prednisoLONE acetate (PRED FORTE) 1 % DrpS Place into both eyes.     Current Facility-Administered Medications   Medication    silver nitrate applicators applicator 1 applicator        Allergies:  Review of patient's allergies indicates:  No Known Allergies     Physical Exam:  Vital signs:   Vitals:    02/14/23 1102   BP: 129/75   BP Location: Right arm   Patient Position: Sitting   BP Method: Medium (Automatic)   Pulse: 83   Resp: 16   Temp: 99 °F (37.2 °C)   TempSrc: Oral   Weight: 59.4 kg (131 lb)   Height: 5' 2" (1.575 m)   General:  Well-developed well-nourished female in no acute distress.  Patient does communicate well with clear speech using her TEP.  Head and face:  Normocephalic.  No facial lesions.  No temporomandibular joint tenderness or click.  Ears:  Right ear-auricle is normally developed.  External auditory canal is clear.  Tympanic membrane is nonerythematous.  No middle ear effusion.  Left ear-auricle is normally developed.  External auditory canal is clear.  Tympanic membrane is nonerythematous.  No middle ear effusion.  Nose:  Nasal dorsum is unremarkable.  No significant septal deviation.  No significant intranasal congestion.  Secretions are clear.  Oral cavity and oropharynx:  Tongue and floor mouth are without lesions.  Mucosa is moist.  No pharyngeal erythema or exudates.  No oropharyngeal masses.  No tonsillar hypertrophy.  Neck:  Supple without palpable adenopathy.  She does have some induration related to her surgery and postradiation changes.  Trachea stoma is patent.  TEP is in place without any gross drainage from around the prosthesis.  No granulation is noted.  Eyes:  Extraocular muscles intact.  No nystagmus.  No exophthalmos or enophthalmos.  Neurologic:  Alert and oriented.  Cranial nerves " 2-12 are grossly normal.    Procedure note: Flexible laryngoscopy.  The flexible fiberoptic laryngoscope was introduced into the right nostril and advanced. Examination of the nose showed the above mentioned findings. Examination of the nasopharynx showed no nasopharyngeal masses or eustachian tube obstruction. Examination of the base of tongue showed no masses or lesions. Examination of the hypopharynx showed masses or lesions in the neopharynx or hypopharynx.  The esophageal inlet is without apparent lesions.  No evidence of fungal disease.    Assessment/Plan:  Heaven Boston is a 57 y.o. female with T3N1M0 supraglottic SCCa s/p TL BSND CPM complicated by PCF s/p washout & primary closure x2 (2nd in [place]) now s/p adjuvant RT. General:  Well-developed well-nourished female in no acute distress.  Voice is normal.  Has an area of persistent appearing hypermetabolic activity in the cervical esophagus the trachea stoma at the level of the TEP; on exam today the patient does not have any gross disease but the area noted on the PET-CT scan can not be visualized with the fiberoptic laryngoscope.    Post treatment hypothyroidism-improved last visit, possibly remains symptomatic, ? BP increase may have contributed to her fatigue    Plan:  Recheck TSH and free T4 next week,  If her TSH remains low we will then adjust her medication.  Routine fu in 2 months  Telemed in 2 weeks     Travis FOX M.D.

## 2023-03-30 NOTE — PROGRESS NOTES
The scope used for the exam was:  Flexible scope ENF-P4  Serial Number:  1)    [id number]    []   2)    [id number]    []   3)    [id number]    []   4)    [id number]    [x]   5)    [id number]    []   6)    [id number]    []       The scope used for the exam was:  Rigid scope   Serial Number:  1)   [id number]    []   2)   [id number]    []   3)   [id number]    []   4)    3384   []   5)    0824   []   6)    5554   []     7)   7425    []   8)   2240    []   9)   1109    []

## 2023-03-31 ENCOUNTER — TELEPHONE (OUTPATIENT)
Dept: FAMILY MEDICINE | Facility: CLINIC | Age: 58
End: 2023-03-31
Payer: MEDICAID

## 2023-03-31 NOTE — TELEPHONE ENCOUNTER
LVM    ----- Message from Shannan Brown MD sent at 3/30/2023  3:51 PM CDT -----  Positive Cologuard test.  Referred for colonoscopy to better visualize colon.  Thanks

## 2023-04-03 ENCOUNTER — TELEPHONE (OUTPATIENT)
Dept: FAMILY MEDICINE | Facility: CLINIC | Age: 58
End: 2023-04-03
Payer: MEDICAID

## 2023-04-03 ENCOUNTER — LAB VISIT (OUTPATIENT)
Dept: LAB | Facility: HOSPITAL | Age: 58
End: 2023-04-03
Attending: FAMILY MEDICINE
Payer: MEDICAID

## 2023-04-03 DIAGNOSIS — E78.5 HYPERLIPIDEMIA, UNSPECIFIED HYPERLIPIDEMIA TYPE: ICD-10-CM

## 2023-04-03 DIAGNOSIS — D64.9 ANEMIA, UNSPECIFIED TYPE: ICD-10-CM

## 2023-04-03 DIAGNOSIS — Z11.59 ENCOUNTER FOR HEPATITIS C SCREENING TEST FOR LOW RISK PATIENT: ICD-10-CM

## 2023-04-03 DIAGNOSIS — Z01.818 OTHER SPECIFIED PRE-OPERATIVE EXAMINATION: ICD-10-CM

## 2023-04-03 DIAGNOSIS — Z11.4 ENCOUNTER FOR SCREENING FOR HIV: ICD-10-CM

## 2023-04-03 DIAGNOSIS — E03.9 HYPOTHYROIDISM, UNSPECIFIED TYPE: ICD-10-CM

## 2023-04-03 DIAGNOSIS — E11.9 TYPE 2 DIABETES MELLITUS WITHOUT COMPLICATION, WITHOUT LONG-TERM CURRENT USE OF INSULIN: ICD-10-CM

## 2023-04-03 DIAGNOSIS — I10 HYPERTENSION, UNSPECIFIED TYPE: ICD-10-CM

## 2023-04-03 DIAGNOSIS — R79.89 ELEVATED SERUM CREATININE: ICD-10-CM

## 2023-04-03 LAB
ALBUMIN SERPL-MCNC: 3.6 G/DL (ref 3.5–5)
ALBUMIN/GLOB SERPL: 1 RATIO (ref 1.1–2)
ALP SERPL-CCNC: 114 UNIT/L (ref 40–150)
ALT SERPL-CCNC: 12 UNIT/L (ref 0–55)
AST SERPL-CCNC: 14 UNIT/L (ref 5–34)
BASOPHILS # BLD AUTO: 0.01 X10(3)/MCL (ref 0–0.2)
BASOPHILS NFR BLD AUTO: 0.2 %
BILIRUBIN DIRECT+TOT PNL SERPL-MCNC: 0.3 MG/DL
BUN SERPL-MCNC: 8.1 MG/DL (ref 9.8–20.1)
CALCIUM SERPL-MCNC: 9.7 MG/DL (ref 8.4–10.2)
CHLORIDE SERPL-SCNC: 107 MMOL/L (ref 98–107)
CHOLEST SERPL-MCNC: 181 MG/DL
CHOLEST/HDLC SERPL: 4 {RATIO} (ref 0–5)
CO2 SERPL-SCNC: 27 MMOL/L (ref 22–29)
CREAT SERPL-MCNC: 0.83 MG/DL (ref 0.55–1.02)
EOSINOPHIL # BLD AUTO: 0.11 X10(3)/MCL (ref 0–0.9)
EOSINOPHIL NFR BLD AUTO: 2.4 %
ERYTHROCYTE [DISTWIDTH] IN BLOOD BY AUTOMATED COUNT: 12.7 % (ref 11.5–17)
FERRITIN SERPL-MCNC: 176.26 NG/ML (ref 4.63–204)
FOLATE SERPL-MCNC: 4.8 NG/ML (ref 7–31.4)
GFR SERPLBLD CREATININE-BSD FMLA CKD-EPI: >60 MLS/MIN/1.73/M2
GLOBULIN SER-MCNC: 3.7 GM/DL (ref 2.4–3.5)
GLUCOSE SERPL-MCNC: 119 MG/DL (ref 74–100)
HCT VFR BLD AUTO: 35.2 % (ref 37–47)
HCV AB SERPL QL IA: NONREACTIVE
HDLC SERPL-MCNC: 42 MG/DL (ref 35–60)
HGB BLD-MCNC: 10.6 G/DL (ref 12–16)
HIV 1+2 AB+HIV1 P24 AG SERPL QL IA: NONREACTIVE
IMM GRANULOCYTES # BLD AUTO: 0.01 X10(3)/MCL (ref 0–0.04)
IMM GRANULOCYTES NFR BLD AUTO: 0.2 %
IRON SATN MFR SERPL: 30 % (ref 20–50)
IRON SERPL-MCNC: 50 UG/DL (ref 50–170)
LDLC SERPL CALC-MCNC: 118 MG/DL (ref 50–140)
LYMPHOCYTES # BLD AUTO: 0.91 X10(3)/MCL (ref 0.6–4.6)
LYMPHOCYTES NFR BLD AUTO: 19.7 %
MCH RBC QN AUTO: 27.9 PG (ref 27–31)
MCHC RBC AUTO-ENTMCNC: 30.1 G/DL (ref 33–36)
MCV RBC AUTO: 92.6 FL (ref 80–94)
MONOCYTES # BLD AUTO: 0.27 X10(3)/MCL (ref 0.1–1.3)
MONOCYTES NFR BLD AUTO: 5.8 %
NEUTROPHILS # BLD AUTO: 3.31 X10(3)/MCL (ref 2.1–9.2)
NEUTROPHILS NFR BLD AUTO: 71.7 %
NRBC BLD AUTO-RTO: 0 %
PLATELET # BLD AUTO: 272 X10(3)/MCL (ref 130–400)
PMV BLD AUTO: 9.3 FL (ref 7.4–10.4)
POTASSIUM SERPL-SCNC: 4.5 MMOL/L (ref 3.5–5.1)
PROT SERPL-MCNC: 7.3 GM/DL (ref 6.4–8.3)
RBC # BLD AUTO: 3.8 X10(6)/MCL (ref 4.2–5.4)
SODIUM SERPL-SCNC: 141 MMOL/L (ref 136–145)
T4 FREE SERPL-MCNC: 1.29 NG/DL (ref 0.7–1.48)
TIBC SERPL-MCNC: 118 UG/DL (ref 70–310)
TIBC SERPL-MCNC: 168 UG/DL (ref 250–450)
TRANSFERRIN SERPL-MCNC: 137 MG/DL (ref 180–382)
TRIGL SERPL-MCNC: 107 MG/DL (ref 37–140)
TSH SERPL-ACNC: 0.1 UIU/ML (ref 0.35–4.94)
VIT B12 SERPL-MCNC: 637 PG/ML (ref 213–816)
VLDLC SERPL CALC-MCNC: 21 MG/DL
WBC # SPEC AUTO: 4.6 X10(3)/MCL (ref 4.5–11.5)

## 2023-04-03 PROCEDURE — 85025 COMPLETE CBC W/AUTO DIFF WBC: CPT

## 2023-04-03 PROCEDURE — 80053 COMPREHEN METABOLIC PANEL: CPT

## 2023-04-03 PROCEDURE — 84443 ASSAY THYROID STIM HORMONE: CPT

## 2023-04-03 PROCEDURE — 87389 HIV-1 AG W/HIV-1&-2 AB AG IA: CPT

## 2023-04-03 PROCEDURE — 82607 VITAMIN B-12: CPT

## 2023-04-03 PROCEDURE — 80061 LIPID PANEL: CPT

## 2023-04-03 PROCEDURE — 36415 COLL VENOUS BLD VENIPUNCTURE: CPT

## 2023-04-03 PROCEDURE — 84439 ASSAY OF FREE THYROXINE: CPT

## 2023-04-03 PROCEDURE — 82746 ASSAY OF FOLIC ACID SERUM: CPT

## 2023-04-03 PROCEDURE — 82728 ASSAY OF FERRITIN: CPT

## 2023-04-03 PROCEDURE — 83550 IRON BINDING TEST: CPT

## 2023-04-03 PROCEDURE — 86803 HEPATITIS C AB TEST: CPT

## 2023-04-04 DIAGNOSIS — Z12.11 SCREENING FOR COLON CANCER: Primary | ICD-10-CM

## 2023-04-04 RX ORDER — POLYETHYLENE GLYCOL 3350, SODIUM SULFATE, SODIUM CHLORIDE, POTASSIUM CHLORIDE, SODIUM ASCORBATE, AND ASCORBIC ACID 7.5-2.691G
KIT ORAL
Qty: 1 KIT | Refills: 0 | Status: SHIPPED | OUTPATIENT
Start: 2023-04-04 | End: 2023-05-04

## 2023-04-06 ENCOUNTER — DOCUMENTATION ONLY (OUTPATIENT)
Dept: REHABILITATION | Facility: HOSPITAL | Age: 58
End: 2023-04-06

## 2023-04-06 NOTE — PROGRESS NOTES
"  [facility]  No Show Note    Patient: Heaven Boston  Date of Session: 4/6/2023  Diagnosis: S/P total laryngectomy   MRN: [id number]    Heaven Boston did not attend her scheduled OP SLP therapy session at 0900 on 04/06/2023. She did not call to cancel nor reschedule. SLP to resume sessions on 04/13/2023.    Sunitha Jean MS, CCC-SLP  4/6/2023        Visit NOT a "no show"    SLP and pt discussed moving appointments to every other week during session on 03/30/2023. SLP with plan to resume session on 04/13/2023.   "

## 2023-04-13 ENCOUNTER — CLINICAL SUPPORT (OUTPATIENT)
Dept: REHABILITATION | Facility: HOSPITAL | Age: 58
End: 2023-04-13
Payer: MEDICAID

## 2023-04-13 DIAGNOSIS — I89.0 LYMPHEDEMA: Primary | ICD-10-CM

## 2023-04-13 PROCEDURE — 92507 TX SP LANG VOICE COMM INDIV: CPT

## 2023-04-13 NOTE — PROGRESS NOTES
[facility]  Speech Therapy Progress Note  Laryngectomy-Lymphedema    Date: 2023     Name: Heaven Boston   MRN: [id number]    Therapy Diagnosis:   Encounter Diagnosis   Name Primary?    Lymphedema Yes        Physician: Dr. Aparicio    PATIENT IS A NECK-BREATHER - DO NOT ORALLY INTUBATE    Time In:  0900  Time Out: 1000    Procedure Min.   Speech Language Voice Therapy  60   Total Untimed Units: 60  Charges Billed/# of units: 60/1    Precautions: Standard  Subjective    Chief Complaint: Patient with reduced complaints neck tightness and swelling.     AFFECT: normal affect    Pain:   0/10  Pain Location / Description: tightness  Current Medical History: Heaven Boston is a 58 y.o. female referred by Dr. Aparicio for TEP management to maximize alaryngeal communication without respiratory compromise and lymphedema management.     Past Medical History:   Past Medical History:   Diagnosis Date    Cancer     Laryngeal    Diabetes mellitus     Hypertension       Heaven Boston  has a past surgical history that includes pr removal of larynx (2022);  section; and Direct diagnostic laryngoscopy with bronchoscopy and esophagoscopy (N/A, 3/3/2023).  Medical Hx and Allergies: Heaven has a current medication list which includes the following prescription(s): atorvastatin, carbamazepine, cetirizine, duloxetine, fluticasone propionate, levofloxacin, levothyroxine, metformin, metoprolol succinate, nifedipine, polyethylene glycol, and rivaroxaban, and the following Facility-Administered Medications: dextrose 10%, dextrose 10%, diphenhydramine, droperidol, hydromorphone, insulin aspart u-100, insulin aspart u-100, lactated ringers, lidocaine (pf) 10 mg/ml (1%), lidocaine (pf) 10 mg/ml (1%), metoclopramide hcl, midazolam, oxycodone, and silver nitrate applicators. Review of patient's allergies indicates:  No Known Allergies    Current Voice Function: currently using alaryngeal communication via  voice prosthesis   Current Level of Swallow Function/Complaints:  Pt does not report dysphagia  Prior Therapy:  03/30/2023    OBJECTIVE   TEP Current Status:17FR, 8mm Aquino placed on 03/21/2023    TEP Patient Complaints: Good voicing noted throughout session    TEP Accessories: 10/55 fenestrated leonie tube with push to talk HMEs.     Stoma Size:  adequate     Lymphedema:  yes    Manual therapy: MLD/CDP continues for head and neck.    Measurement taken of face and neck as follows.     Facial Lymphedema Measurement Form  PRE-TREATMENT  Neck Composite   Location Right Left   Superior Circumference (A):   (just below mandible)  5.5 5.5   Middle Circumference (B):  (midway between top & bottom 5 5   Inferior Circumference (C):  (lowest circumferential location)  5 4.5   TOTAL: 15.5 15       Facial Composite   Location Right Left   1.Tragus to mental protuberance  5 5   2.Tragus to mouth angle  4 4   3.Mandibular angle to nasal wing  4 4   4.Mandibular angle to internal eye corner  5 5   5.Mandibular angle to external eye corner  3.5 3   6.Mental Protuberance to internal eye corner to external eye corner  4 4   7.Mandibular angle to mental protuberance 4 4.5   TOTALS: 29.5 29.5     POST-TREATMENT  Neck Composite   Location Right Left   Superior Circumference (A):   (just below mandible)  5 5   Middle Circumference (B):   (midway between top & bottom 4.5 4.5   Inferior Circumference (C):   (lowest circumferential location)  4.5 4.5   TOTAL: 14 14       Facial Composite   Location Right Left   1.Tragus to mental protuberance  5 5   2.Tragus to mouth angle  4 4   3.Mandibular angle to nasal wing  4 4   4.Mandibular angle to internal eye corner  5 5   5.Mandibular angle to external eye corner  3.5 3   6.Mental Protuberance to internal eye corner to external eye corner  4 4   7.Mandibular angle to mental protuberance 4 4.5   TOTALS: 29.5 29.5   Greater than a 2% difference from pre-to post-treatment? yes         Treatment    Heaven Boston noted to tolerated pneumatic compression with good results this date. Decreased Neck composite total of 1.5 inches on right and 1.0 inches on left and Face composite total of 0 inches on right and 0 inches on left. SLP ended session with neck stretches, ROM and diaphragmatic breathing. Good measurable change noted this date. Pt exhibited reduced fibrotic texture and increased neck ROM noted after intervention. Good compliance with all training. SLP to follow up on 04/27/2023. SLP to reduced frequency to 1 x week every other other week due to reduced lymphedema noted overall. Pt continues to require manual decompression to maintain quality of life for swallowing and alaryngeal communication as insurance continues to deny home pneumatic compression device for lymphedema management.     Education: Plan of Care, role of SLP in care, and scheduling/ cancellation policy were discussed with pt. Patient expressed understanding.     Goals       LongTerm Goals:  Current Progress:   1. Patient will demonstrate understanding and implementation of long-term home management interventions as noted by improvement in lymphatic function, conduction of oral care, completion of daily exercises/stretches, and use of home management tools  Progressing towards goal       Short Term Goals:  Current Progress:   1. Patient will participate in manual interventions to target improvement in alaryngeal communication function related to reduced lymphatic function Progressing towards goal   2.  Patient will completed diaphragmatic breathing exercises at an independent level.  Progressing towards goal   3.  Patient will improve alaryngeal communication to maintain voicing >90% of the time.  Progressing towards goal     Ms. 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  She is currently utilizing esophageal speech via a voice prosthesis for alaryngeal communication for functional communication with her family,  friends, medical providers, and others to perform her daily life activities.  She requires the use of a laryngectomy tube at all times to keep the airway patent and prevent stomal stenosis. HMEs are required daily for mucus management and pulmonary optimization .     SLP to additionally provided MLD/CDP for head and neck lymphedema to improve functional communication.    Plan     SLP intervention is warranted 1-2 times a week or PRN for communication needs for 1-6 weeks due to the chronic nature of the impairment.     Additional Information   Ms. Boston entered with overall reduced swelling this date. All skin noted to be intact this date. Reduced swelling noted upon entering. Good decongestion noted visually with tissue texture with manual techniques this date.  Pt intermittent neck tightness. SLP will continue to address POC as stated with reduced frequency to 1 time a week every other week due to improved management of lymphedema. Continue with POC on 04/27/2023. SLP to educated to continued with home exercise program for neck and shoulder stretches. SLP continues to recommend home pneumatic compression device for lymphedema management despite insurance denial as lymphedema is a life long complication from head and neck surgical dissection s/p total laryngectomy and post radiation intervention.       Sunitha Jean MS, CCC-SLP   4/13/2023                                                                      [facility]  Speech Therapy Progress Note  Laryngectomy-Lymphedema    Date: 4/13/2023     Name: Heaven Boston   MRN: [id number]    Therapy Diagnosis:   Encounter Diagnosis   Name Primary?    Lymphedema Yes          Physician: Dr. Aparicio    PATIENT IS A NECK-BREATHER - DO NOT ORALLY INTUBATE    Time In:  0915  Time Out: 1000    Procedure Min.   Speech Language Voice Therapy  45   Total Untimed Units: 45  Charges Billed/# of units: 45/1    Precautions: Standard  Subjective    Chief  "Complaint: Patient entered after ENT appointment this date and reported "everything looks good".     AFFECT: normal affect    Pain:   0/10  Pain Location / Description: NA  Current Medical History: Heaven Boston is a 58 y.o. female referred by Dr. Aparicio for TEP and lymphedema management to maximize alaryngeal communication without respiratory compromise.     Past Medical History:   Past Medical History:   Diagnosis Date    Cancer     Laryngeal    Diabetes mellitus     Hypertension       Heaven Boston  has a past surgical history that includes pr removal of larynx (2022);  section; and Direct diagnostic laryngoscopy with bronchoscopy and esophagoscopy (N/A, 3/3/2023).  Medical Hx and Allergies: Heaven has a current medication list which includes the following prescription(s): atorvastatin, carbamazepine, cetirizine, duloxetine, fluticasone propionate, levofloxacin, levothyroxine, metformin, metoprolol succinate, nifedipine, polyethylene glycol, and rivaroxaban, and the following Facility-Administered Medications: dextrose 10%, dextrose 10%, diphenhydramine, droperidol, hydromorphone, insulin aspart u-100, insulin aspart u-100, lactated ringers, lidocaine (pf) 10 mg/ml (1%), lidocaine (pf) 10 mg/ml (1%), metoclopramide hcl, midazolam, oxycodone, and silver nitrate applicators. Review of patient's allergies indicates:  No Known Allergies    Current Voice Function: currently using alaryngeal communication via voice prosthesis   Current Level of Swallow Function/Complaints:  Pt does not report dysphagia  Prior Therapy:  2023 for TEP replacement. Last lymphedema treatment on 2023    OBJECTIVE   TEP Current Status:17FR, 8mm Aquino placed on 2023    TEP Patient Complaints: No complaints    TEP Accessories: 10/55 fenestrated leonie tube with push to talk HMEs.     Stoma Size:  adequate     Lymphedema:  yes    Manual therapy: MLD/CDP continues for head and neck.    Measurement taken of face and " neck as follows.     Facial Lymphedema Measurement Form  PRE-TREATMENT  Neck Composite   Location Right Left   Superior Circumference (A):   (just below mandible)  6 6   Middle Circumference (B):  (midway between top & bottom 5 5   Inferior Circumference (C):  (lowest circumferential location)  4.75 5   TOTAL: 15.75 16       Facial Composite   Location Right Left   1.Tragus to mental protuberance  5 5   2.Tragus to mouth angle  4 4   3.Mandibular angle to nasal wing  4 4   4.Mandibular angle to internal eye corner  5 5   5.Mandibular angle to external eye corner  3.5 3   6.Mental Protuberance to internal eye corner to external eye corner  4 4   7.Mandibular angle to mental protuberance 4.5 4.5   TOTALS: 30 29.5     POST-TREATMENT  Neck Composite   Location Right Left   Superior Circumference (A):   (just below mandible)  6 6   Middle Circumference (B):   (midway between top & bottom 5 5   Inferior Circumference (C):   (lowest circumferential location)  4.75 5   TOTAL: 15.75 16       Facial Composite   Location Right Left   1.Tragus to mental protuberance  5 5   2.Tragus to mouth angle  4 4   3.Mandibular angle to nasal wing  4 4   4.Mandibular angle to internal eye corner  5 5   5.Mandibular angle to external eye corner  3.5 3   6.Mental Protuberance to internal eye corner to external eye corner  4 4   7.Mandibular angle to mental protuberance 4.5 4.5   TOTALS: 30 29.5   Greater than a 2% difference from pre-to post-treatment? yes         Treatment   Heaven Borel noted to tolerated pneumatic compression with good results this date. Decreased Neck composite total of 0 inches on right and 0 inches on left and Face composite total of 0 inches on right and 0 inches on left. SLP ended session with neck stretches, ROM and diaphragmatic breathing. Despite no measurable change, pt exhibited reduced fibrotic texture and increased neck ROM noted after intervention. Good compliance with all training. SLP to reduce frequency  to every other week due to reduced facial and neck lymphedema. SLP to follow up on 04/13/2023.  Pt continues to require manual decompression to maintain quality of life for swallowing and alaryngeal communication as insurance continues to deny home pneumatic compression device for lymphedema management.     Education: Plan of Care, role of SLP in care, and scheduling/ cancellation policy were discussed with pt. Patient expressed understanding.     Goals       LongTerm Goals:  Current Progress:   1. Patient will demonstrate understanding and implementation of long-term home management interventions as noted by improvement in lymphatic function, conduction of oral care, completion of daily exercises/stretches, and use of home management tools  Progressing towards goal       Short Term Goals:  Current Progress:   1. Patient will participate in manual interventions to target improvement in alaryngeal communication function related to reduced lymphatic function Progressing towards goal   2.  Patient will completed diaphragmatic breathing exercises at an independent level.  Progressing towards goal   3.  Patient will improve alaryngeal communication to maintain voicing >90% of the time.  Progressing towards goal     Ms. 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  She is currently utilizing esophageal speech via a voice prosthesis for alaryngeal communication for functional communication with her family, friends, medical providers, and others to perform her daily life activities.  She requires the use of a laryngectomy tube at all times to keep the airway patent and prevent stomal stenosis. HMEs are required daily for mucus management and pulmonary optimization .     SLP to additionally provided MLD/CDP for head and neck lymphedema to improve functional communication.    Plan     SLP intervention is warranted 1-2 times a week or PRN for communication needs for 1-6 weeks due to the chronic nature of the  impairment.     Additional Information   Ms. Boston entered with overall reduced swelling this date. All skin noted to be intact this date. SLP observed visual changes with tissue texture after manual techniques this date.  Pt reported increased ROM and reduced tightness after intervention. SLP will continue to address POC as stated. SLP to reduce frequency to 1 time every other week due to improved management of lymphedema. Continue with POC on 04/27/2023. SLP educated to continued daily home exercise program for neck and shoulder stretches. SLP continues to recommend home pneumatic compression device for lymphedema management despite insurance denial as lymphedema is a life long complication from head and neck surgical dissection s/p total laryngectomy and post radiation intervention.       Sunitha Jean MS, CCC-SLP   4/13/2023

## 2023-04-27 ENCOUNTER — CLINICAL SUPPORT (OUTPATIENT)
Dept: REHABILITATION | Facility: HOSPITAL | Age: 58
End: 2023-04-27
Payer: MEDICAID

## 2023-04-27 DIAGNOSIS — I89.0 LYMPHEDEMA: Primary | ICD-10-CM

## 2023-04-27 PROCEDURE — 92507 TX SP LANG VOICE COMM INDIV: CPT

## 2023-04-27 NOTE — PROGRESS NOTES
[facility]  Speech Therapy Progress Note  Laryngectomy-Lymphedema    Date: 2023     Name: Heaven Boston   MRN: [id number]    Therapy Diagnosis:   Encounter Diagnosis   Name Primary?    Lymphedema Yes        Physician: Dr. Aparicio    PATIENT IS A NECK-BREATHER - DO NOT ORALLY INTUBATE    Time In:  0900  Time Out: 1000    Procedure Min.   Speech Language Voice Therapy  60   Total Untimed Units: 60  Charges Billed/# of units: 60/1    Precautions: Standard  Subjective    Chief Complaint: Patient with reduced complaints neck tightness and swelling.     AFFECT: normal affect    Pain:   0/10  Pain Location / Description: tightness  Current Medical History: Heaven Boston is a 58 y.o. female referred by Dr. Aparicio for TEP management to maximize alaryngeal communication without respiratory compromise and lymphedema management.     Past Medical History:   Past Medical History:   Diagnosis Date    Cancer     Laryngeal    Diabetes mellitus     Hypertension       Heaven Boston  has a past surgical history that includes pr removal of larynx (2022);  section; and Direct diagnostic laryngoscopy with bronchoscopy and esophagoscopy (N/A, 3/3/2023).  Medical Hx and Allergies: Heaven has a current medication list which includes the following prescription(s): atorvastatin, carbamazepine, cetirizine, duloxetine, fluticasone propionate, levofloxacin, levothyroxine, metformin, metoprolol succinate, nifedipine, polyethylene glycol, and rivaroxaban, and the following Facility-Administered Medications: dextrose 10%, dextrose 10%, diphenhydramine, droperidol, hydromorphone, insulin aspart u-100, insulin aspart u-100, lactated ringers, lidocaine (pf) 10 mg/ml (1%), lidocaine (pf) 10 mg/ml (1%), metoclopramide hcl, midazolam, oxycodone, and silver nitrate applicators. Review of patient's allergies indicates:  No Known Allergies    Current Voice Function: currently using alaryngeal communication via  voice prosthesis   Current Level of Swallow Function/Complaints:  Pt does not report dysphagia  Prior Therapy:  03/30/2023    OBJECTIVE   TEP Current Status:17FR, 8mm Aquino placed on 03/21/2023    TEP Patient Complaints: Good voicing noted throughout session    TEP Accessories: 10/55 fenestrated leonie tube with push to talk HMEs.     Stoma Size:  adequate     Lymphedema:  yes    Manual therapy: MLD/CDP continues for head and neck.    Measurement taken of face and neck as follows.     Facial Lymphedema Measurement Form  PRE-TREATMENT  Neck Composite   Location Right Left   Superior Circumference (A):   (just below mandible)  5.5 5.5   Middle Circumference (B):  (midway between top & bottom 5 5   Inferior Circumference (C):  (lowest circumferential location)  5 4.5   TOTAL: 15.5 15       Facial Composite   Location Right Left   1.Tragus to mental protuberance  5 5   2.Tragus to mouth angle  4 4   3.Mandibular angle to nasal wing  4 4   4.Mandibular angle to internal eye corner  5 5   5.Mandibular angle to external eye corner  3.5 3   6.Mental Protuberance to internal eye corner to external eye corner  4 4   7.Mandibular angle to mental protuberance 4 4.5   TOTALS: 29.5 29.5     POST-TREATMENT  Neck Composite   Location Right Left   Superior Circumference (A):   (just below mandible)  5 5   Middle Circumference (B):   (midway between top & bottom 4.5 4.5   Inferior Circumference (C):   (lowest circumferential location)  4.5 4.5   TOTAL: 14 14       Facial Composite   Location Right Left   1.Tragus to mental protuberance  5 5   2.Tragus to mouth angle  4 4   3.Mandibular angle to nasal wing  4 4   4.Mandibular angle to internal eye corner  5 5   5.Mandibular angle to external eye corner  3.5 3   6.Mental Protuberance to internal eye corner to external eye corner  4 4   7.Mandibular angle to mental protuberance 4 4.5   TOTALS: 29.5 29.5   Greater than a 2% difference from pre-to post-treatment? yes         Treatment    Heaven Boston noted to tolerated pneumatic compression with good results this date. Decreased Neck composite total of 1.5 inches on right and 1.0 inches on left and Face composite total of 0 inches on right and 0 inches on left. SLP ended session with neck stretches, ROM and diaphragmatic breathing. Good measurable change noted this date. Pt exhibited reduced fibrotic texture and increased neck ROM noted after intervention. Good compliance with all training. SLP to follow up on 04/27/2023. SLP to reduced frequency to 1 x week every other other week due to reduced lymphedema noted overall. Pt continues to require manual decompression to maintain quality of life for swallowing and alaryngeal communication as insurance continues to deny home pneumatic compression device for lymphedema management.     Education: Plan of Care, role of SLP in care, and scheduling/ cancellation policy were discussed with pt. Patient expressed understanding.     Goals       LongTerm Goals:  Current Progress:   1. Patient will demonstrate understanding and implementation of long-term home management interventions as noted by improvement in lymphatic function, conduction of oral care, completion of daily exercises/stretches, and use of home management tools  Progressing towards goal       Short Term Goals:  Current Progress:   1. Patient will participate in manual interventions to target improvement in alaryngeal communication function related to reduced lymphatic function Progressing towards goal   2.  Patient will completed diaphragmatic breathing exercises at an independent level.  Progressing towards goal   3.  Patient will improve alaryngeal communication to maintain voicing >90% of the time.  Progressing towards goal     Ms. 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  She is currently utilizing esophageal speech via a voice prosthesis for alaryngeal communication for functional communication with her family,  friends, medical providers, and others to perform her daily life activities.  She requires the use of a laryngectomy tube at all times to keep the airway patent and prevent stomal stenosis. HMEs are required daily for mucus management and pulmonary optimization .     SLP to additionally provided MLD/CDP for head and neck lymphedema to improve functional communication.    Plan     SLP intervention is warranted 1-2 times a week or PRN for communication needs for 1-6 weeks due to the chronic nature of the impairment.     Additional Information   Ms. Boston entered with overall reduced swelling this date. All skin noted to be intact this date. Reduced swelling noted upon entering. Good decongestion noted visually with tissue texture with manual techniques this date.  Pt intermittent neck tightness. SLP will continue to address POC as stated with reduced frequency to 1 time a week every other week due to improved management of lymphedema. Continue with POC on 04/27/2023. SLP to educated to continued with home exercise program for neck and shoulder stretches. SLP continues to recommend home pneumatic compression device for lymphedema management despite insurance denial as lymphedema is a life long complication from head and neck surgical dissection s/p total laryngectomy and post radiation intervention.       Sunitha Jean MS, CCC-SLP   4/27/2023                                                                      [facility]  Speech Therapy Progress Note  Laryngectomy-Lymphedema    Date: 4/27/2023     Name: Heaven Boston   MRN: [id number]    Therapy Diagnosis:   Encounter Diagnosis   Name Primary?    Lymphedema Yes       Physician: Dr. Aparicio    PATIENT IS A NECK-BREATHER - DO NOT ORALLY INTUBATE    Time In:  0900  Time Out: 1000    Procedure Min.   Speech Language Voice Therapy  60   Total Untimed Units: 60  Charges Billed/# of units: 60/1    Precautions: Standard  Subjective    Chief  Complaint: Patient entered with no complaints this date.     AFFECT: normal affect    Pain:   0/10  Pain Location / Description: NA  Current Medical History: Heaven Boston is a 58 y.o. female referred by Dr. Aparicio for TEP and lymphedema management to maximize alaryngeal communication without respiratory compromise.     Past Medical History:   Past Medical History:   Diagnosis Date    Cancer     Laryngeal    Diabetes mellitus     Hypertension       Heaven Boston  has a past surgical history that includes pr removal of larynx (2022);  section; and Direct diagnostic laryngoscopy with bronchoscopy and esophagoscopy (N/A, 3/3/2023).  Medical Hx and Allergies: Heaven has a current medication list which includes the following prescription(s): atorvastatin, carbamazepine, cetirizine, duloxetine, fluticasone propionate, levofloxacin, levothyroxine, metformin, metoprolol succinate, nifedipine, polyethylene glycol, and rivaroxaban, and the following Facility-Administered Medications: dextrose 10%, dextrose 10%, diphenhydramine, droperidol, hydromorphone, insulin aspart u-100, insulin aspart u-100, lactated ringers, lidocaine (pf) 10 mg/ml (1%), lidocaine (pf) 10 mg/ml (1%), metoclopramide hcl, midazolam, oxycodone, and silver nitrate applicators. Review of patient's allergies indicates:  No Known Allergies    Current Voice Function: currently using alaryngeal communication via voice prosthesis   Current Level of Swallow Function/Complaints:  Pt does not report dysphagia  Prior Therapy:  2023    OBJECTIVE   TEP Current Status:17FR, 8mm Aquino placed on 2023    TEP Patient Complaints: No complaints    TEP Accessories: 10/55 fenestrated leonie tube with push to talk HMEs.     Stoma Size:  adequate     Lymphedema:  yes    Manual therapy: MLD/CDP continues for head and neck.    Measurement taken of face and neck as follows.     Facial Lymphedema Measurement Form  PRE-TREATMENT  Neck Composite   Location  Right Left   Superior Circumference (A):   (just below mandible)  5.5 5.5   Middle Circumference (B):  (midway between top & bottom 5 4.5   Inferior Circumference (C):  (lowest circumferential location)  5 4.5   TOTAL: 15.5 14.5       Facial Composite   Location Right Left   1.Tragus to mental protuberance  5 5   2.Tragus to mouth angle  4 4   3.Mandibular angle to nasal wing  4 4   4.Mandibular angle to internal eye corner  4.5 4.5   5.Mandibular angle to external eye corner  3.5 3   6.Mental Protuberance to internal eye corner to external eye corner  4 4   7.Mandibular angle to mental protuberance 4.5 4.5   TOTALS: 29.5 29     POST-TREATMENT  Neck Composite   Location Right Left   Superior Circumference (A):   (just below mandible)  5 5   Middle Circumference (B):   (midway between top & bottom 4.75 4.5   Inferior Circumference (C):   (lowest circumferential location)  4.75 4.25   TOTAL: 14.5 13.75       Facial Composite   Location Right Left   1.Tragus to mental protuberance  5 5   2.Tragus to mouth angle  4 4   3.Mandibular angle to nasal wing  4 4   4.Mandibular angle to internal eye corner  4.5 4.5   5.Mandibular angle to external eye corner  3.5 3   6.Mental Protuberance to internal eye corner to external eye corner  4 4   7.Mandibular angle to mental protuberance 4.25 4.5   TOTALS: 29.75 29   Greater than a 2% difference from pre-to post-treatment? yes         Treatment   Heaven Borel noted to tolerated pneumatic compression with good results this date. Decreased Neck composite total of 1.0 inches on right and .70 inches on left and Face composite total of .25 inches on right and 0 inches on left. SLP ended session with neck stretches, ROM and diaphragmatic breathing. Despite no measurable change, pt exhibited reduced fibrotic texture and increased neck ROM noted after intervention. Good compliance with all training. SLP to reduce frequency to every other week due to reduced facial and neck lymphedema. SLP  to follow up on 04/13/2023.  Pt continues to require manual decompression to maintain quality of life for swallowing and alaryngeal communication as insurance continues to deny home pneumatic compression device for lymphedema management.     Education: Plan of Care, role of SLP in care, and scheduling/ cancellation policy were discussed with pt. Patient expressed understanding.     Goals       LongTerm Goals:  Current Progress:   1. Patient will demonstrate understanding and implementation of long-term home management interventions as noted by improvement in lymphatic function, conduction of oral care, completion of daily exercises/stretches, and use of home management tools  Progressing towards goal       Short Term Goals:  Current Progress:   1. Patient will participate in manual interventions to target improvement in alaryngeal communication function related to reduced lymphatic function Progressing towards goal   2.  Patient will completed diaphragmatic breathing exercises at an independent level.  Progressing towards goal   3.  Patient will improve alaryngeal communication to maintain voicing >90% of the time.  Progressing towards goal     Ms. 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  She is currently utilizing esophageal speech via a voice prosthesis for alaryngeal communication for functional communication with her family, friends, medical providers, and others to perform her daily life activities.  She requires the use of a laryngectomy tube at all times to keep the airway patent and prevent stomal stenosis. HMEs are required daily for mucus management and pulmonary optimization .     SLP to additionally provided MLD/CDP for head and neck lymphedema to improve functional communication.    Plan     SLP intervention is warranted 1-2 times a week or PRN for communication needs for 1-6 weeks due to the chronic nature of the impairment.     Additional Information   Ms. Boston entered with  overall reduced swelling this date. All skin noted to be intact this date. SLP observed visual changes with tissue texture after manual techniques this date.  Pt reported increased ROM and reduced tightness after intervention. SLP will continue to address POC as stated. SLP to continue with reduced frequency of 1 time every other week due to improved management of lymphedema. Continue with POC on 05/11/2023. SLP educated to continued daily home exercise program for neck and shoulder stretches. SLP continues to recommend home pneumatic compression device for lymphedema management despite insurance denial as lymphedema is a life long complication from head and neck surgical dissection s/p total laryngectomy and post radiation intervention.       Sunitha Jean MS, CCC-SLP   4/27/2023

## 2023-05-03 DIAGNOSIS — Z85.21 HISTORY OF LARYNGEAL CANCER: Primary | ICD-10-CM

## 2023-05-03 DIAGNOSIS — Z90.02 H/O LARYNGECTOMY: ICD-10-CM

## 2023-05-04 ENCOUNTER — OFFICE VISIT (OUTPATIENT)
Dept: CARDIOLOGY | Facility: CLINIC | Age: 58
End: 2023-05-04
Payer: MEDICAID

## 2023-05-04 VITALS
RESPIRATION RATE: 20 BRPM | WEIGHT: 127 LBS | TEMPERATURE: 98 F | DIASTOLIC BLOOD PRESSURE: 78 MMHG | OXYGEN SATURATION: 100 % | SYSTOLIC BLOOD PRESSURE: 143 MMHG | BODY MASS INDEX: 23.37 KG/M2 | HEIGHT: 62 IN | HEART RATE: 95 BPM

## 2023-05-04 DIAGNOSIS — I82.4Z2 DEEP VEIN THROMBOSIS (DVT) OF DISTAL VEIN OF LEFT LOWER EXTREMITY, UNSPECIFIED CHRONICITY: ICD-10-CM

## 2023-05-04 DIAGNOSIS — I10 HYPERTENSION, UNSPECIFIED TYPE: ICD-10-CM

## 2023-05-04 DIAGNOSIS — E78.01 FAMILIAL HYPERCHOLESTEROLEMIA: Primary | ICD-10-CM

## 2023-05-04 DIAGNOSIS — I47.10 SVT (SUPRAVENTRICULAR TACHYCARDIA): ICD-10-CM

## 2023-05-04 PROCEDURE — 99214 OFFICE O/P EST MOD 30 MIN: CPT | Mod: PBBFAC | Performed by: INTERNAL MEDICINE

## 2023-05-04 RX ORDER — METOPROLOL SUCCINATE 25 MG/1
12.5 TABLET, EXTENDED RELEASE ORAL DAILY
Qty: 45 TABLET | Refills: 3 | Status: SHIPPED | OUTPATIENT
Start: 2023-05-04

## 2023-05-04 RX ORDER — MONTELUKAST SODIUM 5 MG/1
5 TABLET, CHEWABLE ORAL EVERY OTHER DAY
COMMUNITY
Start: 2023-04-26

## 2023-05-04 RX ORDER — ATORVASTATIN CALCIUM 80 MG/1
80 TABLET, FILM COATED ORAL NIGHTLY
Qty: 90 TABLET | Refills: 3 | Status: SHIPPED | OUTPATIENT
Start: 2023-05-04

## 2023-05-04 RX ORDER — NIFEDIPINE 60 MG/1
60 TABLET, EXTENDED RELEASE ORAL DAILY
Qty: 30 TABLET | Refills: 11 | Status: SHIPPED | OUTPATIENT
Start: 2023-05-04 | End: 2024-05-03

## 2023-05-04 NOTE — PROGRESS NOTES
[facility]  Cardiology Outpatient Clinic  Follow-up Visit     Date of Visit: 5/4/2023    History: HTN, provoked LLE DVT (3/2022), HLD, SVT    History of Present Illness:      Patient is doing quite well. She reports no difficulty with exertion and no chest pain or SOB. She has been checking home Bps and says they are in the 130s. She is compliant with her meds. She is taking xarelto for a provoked LLE DVT after her laryngectomy in 2022. She has no leg swelling or pain. Today she reports a painful right neck lymph node. Denies palpitations. States she can walk well over > METs at baseline and can go up stairs.     Medications:     Current Outpatient Medications   Medication Sig Dispense Refill    carBAMazepine (TEGRETOL XR) 200 MG 12 hr tablet Take 200 mg by mouth 2 (two) times daily.      cetirizine (ZYRTEC) 10 MG tablet Take 10 mg by mouth once daily.      DULoxetine (CYMBALTA) 30 MG capsule Take 1 capsule (30 mg total) by mouth 2 (two) times daily. 60 capsule 1    fluticasone propionate (FLONASE) 50 mcg/actuation nasal spray 1 spray by Each Nostril route 2 (two) times daily.      levothyroxine (SYNTHROID) 88 MCG tablet Take 1 tablet (88 mcg total) by mouth before breakfast. 30 tablet 11    metFORMIN (GLUCOPHAGE) 500 MG tablet Take 1 tablet (500 mg total) by mouth once daily. 90 tablet 3    montelukast (SINGULAIR) 5 MG chewable tablet Take 5 mg by mouth every other day.      atorvastatin (LIPITOR) 80 MG tablet Take 1 tablet (80 mg total) by mouth every evening. 90 tablet 3    metoprolol succinate (TOPROL-XL) 25 MG 24 hr tablet Take 0.5 tablets (12.5 mg total) by mouth once daily. 45 tablet 3    NIFEdipine (PROCARDIA-XL) 60 MG (OSM) 24 hr tablet Take 1 tablet (60 mg total) by mouth once daily. 30 tablet 11     Current Facility-Administered Medications   Medication Dose Route Frequency Provider Last Rate Last Admin    silver nitrate applicators applicator 1 applicator  1 applicator Topical (Top) 1 time in  Clinic/HOD Patricia Montes MD         Facility-Administered Medications Ordered in Other Visits   Medication Dose Route Frequency Provider Last Rate Last Admin    dextrose 10% bolus 125 mL 125 mL  12.5 g Intravenous PRN Tamie Woody, FNP        dextrose 10% bolus 125 mL 125 mL  12.5 g Intravenous PRN Tamie Woody, HAVEN        diphenhydrAMINE injection 6.25 mg  6.25 mg Intravenous Once PRN Lolis Sood MD        droperidoL injection 0.25 mg  0.25 mg Intravenous Once PRN Lolis Sood MD        HYDROmorphone injection 0.2 mg  0.2 mg Intravenous Q5 Min PRN Lolis Sood MD        insulin aspart U-100 injection 2-9 Units  2-9 Units Subcutaneous Q6H PRN Tamie Woody, GERALDP        insulin aspart U-100 injection 4-12 Units  4-12 Units Subcutaneous PRN Tamie Woody, HAVEN        lactated ringers infusion   Intravenous Continuous Lolis Sood  mL/hr at 03/03/23 0656 Rate Change at 03/03/23 0656    LIDOcaine (PF) 10 mg/ml (1%) injection 10 mg  1 mL Intradermal Once HAVEN Chris        LIDOcaine (PF) 10 mg/ml (1%) injection 10 mg  1 mL Intradermal Once Lolis Sood MD        metoclopramide HCl injection 10 mg  10 mg Intravenous Once PRN Loils Sood MD        midazolam (VERSED) 1 mg/mL injection 1 mg  1 mg Intravenous Once PRN Lolis Sood MD        oxyCODONE immediate release tablet 5 mg  5 mg Oral Q3H PRN Lolis Sood MD         I have reviewed and updated the patient's medications, allergies, past medical history, surgical history, social history and family history as needed.    Review of Systems:   Up to 10 point review of systems is negative unless noted in HPI or overall note.     Objective:     Wt Readings from Last 3 Encounters:   05/04/23 57.6 kg (127 lb)   03/30/23 57.7 kg (127 lb 3.2 oz)   03/10/23 59 kg (130 lb)     Temp Readings from Last 3 Encounters:   05/04/23 98.1 °F (36.7 °C) (Oral)   03/10/23 97.9 °F (36.6 °C) (Oral)   03/03/23  "97.7 °F (36.5 °C) (Axillary)     BP Readings from Last 3 Encounters:   05/04/23 (!) 143/78   03/30/23 130/74   03/10/23 132/80     Pulse Readings from Last 3 Encounters:   05/04/23 95   03/30/23 81   03/10/23 92       BP (!) 143/78 (BP Location: Left arm, Patient Position: Sitting, BP Method: Medium (Automatic))   Pulse 95   Temp 98.1 °F (36.7 °C) (Oral)   Resp 20   Ht 5' 2" (1.575 m)   Wt 57.6 kg (127 lb)   SpO2 100%   BMI 23.23 kg/m²   General: Alert and oriented. No acute distress. Speaking in full sentences.   HEENT: Normocephalic. Atraumatic  Neck: Pessary valve. No JVD. No carotid bruits.   Heart: RRR. S1 and S2 heard. No murmurs. Pulses palpable in radials + DP/PT. No pitting edema BLE.  Lungs: CTABL. Nonlabored respirations. No supplemental O2  GI: No tenderness or distention  Skin: No venous stasis changes in BLE.   MSK: No deformities  Neuro: Alert    Labs:   Most recently available and pertinent labs including CBC, CMP, A1C, thyroid labs, coagulation studies, and cardiac labs have been reviewed.       Cardiac Studies/Imaging:   Pertinent cardiovascular studies including chest imaging, CT scans, MRI, echocardiogram, cardiac cath, peripheral vascular imaging, electrical monitoring were independently reviewed.     Assessment/Plan:   Heaven Boston is a 58 y.o. 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 She is doing quite well without new cardiac complaints. She has been on xarelto for >1 yr for her provoked DVT without bleeding issues. She needs clearance for colonoscopy.     - Stop xarelto for hx provoked LLE DVT.   - Patient's RCRI is 1. She is a low cardiovascular risk patient for a low risk procedure (colonoscopy). She will be stopping xarelto completely moving forward.  - Continue nifedipine 60mg daily. Patient will bring BP log and have nurse visit in 1 week for BP check.  - Continue " atorvastatin 80mg daily - recheck lipids in 6 mo. She will make diet and exercise changes.   - Continue toprol 12.5mg daily for hx SVT  - 6 month followup    Rufus Patricio MD   \[facility] [place] [facility]\"" Cardiology Fellow  [facility] - \[facility] [place] [facility]\"" Cardiology     Future Appointments   Date Time Provider Department Center   5/11/2023  9:00 AM Sunitha Jean CCC-SLP [facility] [place]    5/25/2023  9:00 AM RENETTA Ardon [facility] [place]    5/29/2023  1:00 PM PROVIDERS, [place]   5/30/2023  9:00 AM RESIDENT 3, [facility]INOL[facility]OLOGY [facility]   9/15/2023  8:45 AM Shannan Brown MD [facility] [place] [facility]

## 2023-05-11 ENCOUNTER — CLINICAL SUPPORT (OUTPATIENT)
Dept: REHABILITATION | Facility: HOSPITAL | Age: 58
End: 2023-05-11
Payer: MEDICAID

## 2023-05-11 ENCOUNTER — OFFICE VISIT (OUTPATIENT)
Dept: OTOLARYNGOLOGY | Facility: CLINIC | Age: 58
End: 2023-05-11
Payer: MEDICAID

## 2023-05-11 DIAGNOSIS — R22.1 NECK MASS: ICD-10-CM

## 2023-05-11 DIAGNOSIS — I89.0 LYMPHEDEMA: Primary | ICD-10-CM

## 2023-05-11 DIAGNOSIS — Z85.21 HISTORY OF LARYNGEAL CANCER: ICD-10-CM

## 2023-05-11 DIAGNOSIS — Z90.02 H/O LARYNGECTOMY: ICD-10-CM

## 2023-05-11 DIAGNOSIS — R59.0 LOCALIZED ENLARGED LYMPH NODES: Primary | ICD-10-CM

## 2023-05-11 PROCEDURE — 99214 OFFICE O/P EST MOD 30 MIN: CPT | Mod: PBBFAC | Performed by: STUDENT IN AN ORGANIZED HEALTH CARE EDUCATION/TRAINING PROGRAM

## 2023-05-11 PROCEDURE — 92507 TX SP LANG VOICE COMM INDIV: CPT

## 2023-05-11 PROCEDURE — 11900 INJECT SKIN LESIONS </W 7: CPT | Mod: PBBFAC | Performed by: STUDENT IN AN ORGANIZED HEALTH CARE EDUCATION/TRAINING PROGRAM

## 2023-05-11 RX ORDER — LIDOCAINE HYDROCHLORIDE 20 MG/ML
1 INJECTION, SOLUTION INFILTRATION; PERINEURAL
Status: SHIPPED | OUTPATIENT
Start: 2023-05-11

## 2023-05-11 RX ORDER — TRIAMCINOLONE ACETONIDE 40 MG/ML
40 INJECTION, SUSPENSION INTRA-ARTICULAR; INTRAMUSCULAR
Status: DISPENSED | OUTPATIENT
Start: 2023-05-11

## 2023-05-11 NOTE — PROGRESS NOTES
Patient Name: Heaven Boston   YOB: 1965     Chief Complaint: No chief complaint on file.       History of Present Illness:  Heaven Boston is a 57 y.o. 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 Patient had an uneventful hospitalization course and was discharged on postoperative day 9 after an esophagram showed no pharyngeal leak.  However she re-presented to our facility on 4/6/22 with surgical site infection and a pharyngocutaneous fistula. Patient underwent a neck washout with primary repair on 4/11/2022, with placement of a gastrostomy tube the same day. She was maintained on NPO and transferred to [place] for further evaluation and management on 4/15/22 after evidence of persistent pharyngocutaneous fistula. There she underwent a 2nd neck washout with primary closure and placement of salivary bypass tube.      5/2/22: Patient had an uneventful hospitalization course and removal of salivary bypass tube before discharge last week and now presenting for postoperative evaluation.  Patient presents today without any complaints.  She is wondering about when he pointed that her x-rays for swallowing then.  She seen Dr. Laguna today after our visit to undergo-stimulation for radiation.     5/9/22: Pt. Did not answer. Let voicemail to advance diet to CLD. Also discussed this with son.  Plan to see in clinic in 2 weeks     5/19/22: In radiation therapy, 5x weekly until 6/16/22. Has TEP in place, brought another TEP requesting swap out. Will go to speech therapy for exchange of TEP. Went to ER for bleeding from G-tube site 1 week ago but declines visit to Gen Surg clinic today. On [facility]D taking Ensure by G-tube, water and soups by mouth. Had questions about advancing diet. No weight loss  or appetite change. No other issues.      6/21/22:  Returns today for follow up.  Completed radiation last week 6/16/22.  Is having difficulty voicing with TEP though it has been swapped out by SLP.  Able to eat what she wants.  Having some tightness of her throat and soreness, otherwise no issues.     7/21/22: Here today for follow up. Has overall been doing very well. She is tolerating her diet by mouth and gaining weight. No dysphagia. Has not used her PEG tube since prior to radiation. She is interested in having it removed. Has some fullness in the left neck which intermittently swells and then resolves again. Otherwise no new lumps/bumps of the head and neck.      8/23/22: Ms. Boston returns today for follow up. She complains of neck pain exacerbated by flexion and movement to the right that began 2 weeks ago. She also reports neck swelling and tightness which occasionally increases in size and is tender to palpation. She reports cough with clear mucus production as well as nasal congestion without drainage. She also notes dry, itchy eyes that have not improved with Visine. She also notes dysuria. She is tolerating her diet by mouth and gaining weight. She had her PEG tube removed on 8/16/22. She has been to SLP twice and is still having difficulty voicing with TEP.     9/27/22: Here for follow up. Reports she is doing okay. Still coughing a lot and not voicing well with TEP. No weight loss, tolerating diet. No otalgia/lumps bumps. Still having intermittent neck swelling/pain.      10/27/22:  Returns for surveillance.  Did not get TSH, T4.  Has been going to SLP, now can talk well.  Cough has improved.  No new H&N complaints     12/22/22: Doing very well. Voicing well. No pain. Doing well with speech and lymphedema therapy. Eating well, gaining weight constantly. Always tired. Sometimes sleeps all day.    February 14, 2023:   58-year-old female presents today for follow-up of her laryngeal squamous cell  carcinoma and postoperative hypothyroidism.  In regards to her squamous cell carcinoma of the larynx she is doing well.  She has no complaints of any pain.  He is not noticed any swelling in her neck.  She is not have any difficulty eating or swallowing in his indicated she is gaining weight.  She continues to use her TEP without problems and does not have any leakage around her TEP.  She did have a follow-up PET-CT scan done on December 30, 2022, in the report indicated that there was no evidence of new or progressive hypermetabolic metastatic disease.  On reviewing those images, however, there does appear to be an area of increased FDG activity in the upper cervical esophagus in the area above her trachea stoma and possibly at the site of the TEP.  This was also present on a PET-CT scan done on October 12, 2022 and there does not appear debris any change in this area between the 2 scans.  The report on the scan done on October 12, 2022, had indicated this area had an SUV max of 4.4 without any apparent lesions noted on the corresponding CT.  In regards to her hypothyroidism she continues to take levothyroxine 88 mcg daily.  Lab work from February 6, 2023, showed her TSH level be low at 0.115 with a normal free T4 of 1.48.  Her prior TSH from October 27, 2022, was elevated at 55.1672.     3/30/23:  Patient routine follow-up for squamous cell carcinoma and postop hypothyroidism.  She denies any new lumps, bumps hemoptysis or weight loss.  She is able to swallow without difficulty, TEP functioning well with excellent speech.  She has postop hypothyroidism, does complain of some weakness.  She did have a change in her blood pressure medicine in that it was increased and she felt some of her fatigue might have been related to it.  She currently has blood work ordered by her PCP including thyroid function and she is going to get it next week, we will schedule a TeleMed follow-up.  Duloxetine 30 mg helps her sleep and  helps her neck pain.     05/11/2023:  Patient was added onto the schedule today because of a firm hard painful nodule in the right mid neck.  Onset recent weeks, referred back from family Medicine clinic.  She states it was bigger but now smaller, she is had no intervention.      Exam:  Awake and alert no acute distress -no evidence of disease  Neck:  1-1/2 cm from hard, firm, non-pulsatilve subcutaneous nodule, just posterior to the surgical incision-palpation. Palpation triggers shooting type pain, radiation dermatofibrosis present no palpable adenopathy  Tracheostomy site w/o gross dz to impection    1 cc of 1% lidocaine injected around the nodule, this is followed by 2 cc of a mixture of one-to-one lidocaine and Kenalog 40 intralesionally, tolerated well, instant pain relief.  Patient grateful    Impression:  New onset painful neck mass, possibly suggestive of a neuroma    Plan:  Try a Kenalog injection  Schedule CT before her next follow-up visit in 2-1/2 weeks

## 2023-05-11 NOTE — PROGRESS NOTES
[facility]  Speech Therapy Progress Note  Laryngectomy-Lymphedema    Date: 2023     Name: Heaven Boston   MRN: [id number]    Therapy Diagnosis:   Encounter Diagnoses   Name Primary?    History of laryngeal cancer     H/O laryngectomy     Lymphedema Yes        Physician: Dr. Aparicio    PATIENT IS A NECK-BREATHER - DO NOT ORALLY INTUBATE    Time In:  0900  Time Out: 1000    Procedure Min.   Speech Language Voice Therapy  60   Total Untimed Units: 60  Charges Billed/# of units: 60/1    Precautions: Standard  Subjective    Chief Complaint: Patient with reduced complaints neck tightness and swelling.     AFFECT: normal affect    Pain:   0/10  Pain Location / Description: tightness  Current Medical History: Heaven Boston is a 58 y.o. female referred by Dr. Aparicio for TEP management to maximize alaryngeal communication without respiratory compromise and lymphedema management.     Past Medical History:   Past Medical History:   Diagnosis Date    Cancer     Laryngeal    Diabetes mellitus     Hypertension       Heaven Boston  has a past surgical history that includes pr removal of larynx (2022);  section; and Direct diagnostic laryngoscopy with bronchoscopy and esophagoscopy (N/A, 3/3/2023).  Medical Hx and Allergies: Heaven has a current medication list which includes the following prescription(s): atorvastatin, carbamazepine, cetirizine, duloxetine, fluticasone propionate, levothyroxine, metformin, metoprolol succinate, montelukast, and nifedipine, and the following Facility-Administered Medications: dextrose 10%, dextrose 10%, diphenhydramine, droperidol, hydromorphone, insulin aspart u-100, insulin aspart u-100, lactated ringers, lidocaine (pf) 10 mg/ml (1%), lidocaine (pf) 10 mg/ml (1%), lidocaine hcl 20 mg/ml (2%), metoclopramide hcl, midazolam, oxycodone, silver nitrate applicators, and triamcinolone acetonide. Review of patient's allergies indicates:  No Known  Allergies    Current Voice Function: currently using alaryngeal communication via voice prosthesis   Current Level of Swallow Function/Complaints:  Pt does not report dysphagia  Prior Therapy:  03/30/2023    OBJECTIVE   TEP Current Status:17FR, 8mm Aquino placed on 03/21/2023    TEP Patient Complaints: Good voicing noted throughout session    TEP Accessories: 10/55 fenestrated leonie tube with push to talk HMEs.     Stoma Size:  adequate     Lymphedema:  yes    Manual therapy: MLD/CDP continues for head and neck.    Measurement taken of face and neck as follows.     Facial Lymphedema Measurement Form  PRE-TREATMENT  Neck Composite   Location Right Left   Superior Circumference (A):   (just below mandible)  5.5 5.5   Middle Circumference (B):  (midway between top & bottom 5 5   Inferior Circumference (C):  (lowest circumferential location)  5 4.5   TOTAL: 15.5 15       Facial Composite   Location Right Left   1.Tragus to mental protuberance  5 5   2.Tragus to mouth angle  4 4   3.Mandibular angle to nasal wing  4 4   4.Mandibular angle to internal eye corner  5 5   5.Mandibular angle to external eye corner  3.5 3   6.Mental Protuberance to internal eye corner to external eye corner  4 4   7.Mandibular angle to mental protuberance 4 4.5   TOTALS: 29.5 29.5     POST-TREATMENT  Neck Composite   Location Right Left   Superior Circumference (A):   (just below mandible)  5 5   Middle Circumference (B):   (midway between top & bottom 4.5 4.5   Inferior Circumference (C):   (lowest circumferential location)  4.5 4.5   TOTAL: 14 14       Facial Composite   Location Right Left   1.Tragus to mental protuberance  5 5   2.Tragus to mouth angle  4 4   3.Mandibular angle to nasal wing  4 4   4.Mandibular angle to internal eye corner  5 5   5.Mandibular angle to external eye corner  3.5 3   6.Mental Protuberance to internal eye corner to external eye corner  4 4   7.Mandibular angle to mental protuberance 4 4.5   TOTALS: 29.5 29.5    Greater than a 2% difference from pre-to post-treatment? yes         Treatment   Heaven Boston noted to tolerated pneumatic compression with good results this date. Decreased Neck composite total of 1.5 inches on right and 1.0 inches on left and Face composite total of 0 inches on right and 0 inches on left. SLP ended session with neck stretches, ROM and diaphragmatic breathing. Good measurable change noted this date. Pt exhibited reduced fibrotic texture and increased neck ROM noted after intervention. Good compliance with all training. SLP to follow up on 04/27/2023. SLP to reduced frequency to 1 x week every other other week due to reduced lymphedema noted overall. Pt continues to require manual decompression to maintain quality of life for swallowing and alaryngeal communication as insurance continues to deny home pneumatic compression device for lymphedema management.     Education: Plan of Care, role of SLP in care, and scheduling/ cancellation policy were discussed with pt. Patient expressed understanding.     Goals       LongTerm Goals:  Current Progress:   1. Patient will demonstrate understanding and implementation of long-term home management interventions as noted by improvement in lymphatic function, conduction of oral care, completion of daily exercises/stretches, and use of home management tools  Progressing towards goal       Short Term Goals:  Current Progress:   1. Patient will participate in manual interventions to target improvement in alaryngeal communication function related to reduced lymphatic function Progressing towards goal   2.  Patient will completed diaphragmatic breathing exercises at an independent level.  Progressing towards goal   3.  Patient will improve alaryngeal communication to maintain voicing >90% of the time.  Progressing towards goal     Ms. 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  She is currently utilizing esophageal speech via a voice prosthesis  for alaryngeal communication for functional communication with her family, friends, medical providers, and others to perform her daily life activities.  She requires the use of a laryngectomy tube at all times to keep the airway patent and prevent stomal stenosis. HMEs are required daily for mucus management and pulmonary optimization .     SLP to additionally provided MLD/CDP for head and neck lymphedema to improve functional communication.    Plan     SLP intervention is warranted 1-2 times a week or PRN for communication needs for 1-6 weeks due to the chronic nature of the impairment.     Additional Information   Ms. Boston entered with overall reduced swelling this date. All skin noted to be intact this date. Reduced swelling noted upon entering. Good decongestion noted visually with tissue texture with manual techniques this date.  Pt intermittent neck tightness. SLP will continue to address POC as stated with reduced frequency to 1 time a week every other week due to improved management of lymphedema. Continue with POC on 04/27/2023. SLP to educated to continued with home exercise program for neck and shoulder stretches. SLP continues to recommend home pneumatic compression device for lymphedema management despite insurance denial as lymphedema is a life long complication from head and neck surgical dissection s/p total laryngectomy and post radiation intervention.       Sunitha Jean MS, CCC-SLP   5/11/2023                                                                      [facility]  Speech Therapy Progress Note  Laryngectomy-Lymphedema    Date: 5/11/2023     Name: Heaven Boston   MRN: [id number]    Therapy Diagnosis:   Encounter Diagnoses   Name Primary?    History of laryngeal cancer     H/O laryngectomy     Lymphedema Yes       Physician: Dr. Aparicio    PATIENT IS A NECK-BREATHER - DO NOT ORALLY INTUBATE    Time In:  0900  Time Out: 1000    Procedure Min.   Speech Language  "Voice Therapy  60   Total Untimed Units: 60  Charges Billed/# of units: 60/1    Precautions: Standard  Subjective    Chief Complaint: Patient entered with complaints of new area concern on R lateral neck. She reported spot "popped up" on 2023.      AFFECT: normal affect    Pain:   2/10  Pain Location / Description: area of concern on R lateral neck, lateral to neck dissection scar, measured 1cm x 1cm  Current Medical History: Heaven Boston is a 58 y.o. female referred by Dr. Aparicio for TEP and lymphedema management to maximize alaryngeal communication without respiratory compromise.     Past Medical History:   Past Medical History:   Diagnosis Date    Cancer     Laryngeal    Diabetes mellitus     Hypertension       Heaven Boston  has a past surgical history that includes pr removal of larynx (2022);  section; and Direct diagnostic laryngoscopy with bronchoscopy and esophagoscopy (N/A, 3/3/2023).  Medical Hx and Allergies: Heaven has a current medication list which includes the following prescription(s): atorvastatin, carbamazepine, cetirizine, duloxetine, fluticasone propionate, levothyroxine, metformin, metoprolol succinate, montelukast, and nifedipine, and the following Facility-Administered Medications: dextrose 10%, dextrose 10%, diphenhydramine, droperidol, hydromorphone, insulin aspart u-100, insulin aspart u-100, lactated ringers, lidocaine (pf) 10 mg/ml (1%), lidocaine (pf) 10 mg/ml (1%), lidocaine hcl 20 mg/ml (2%), metoclopramide hcl, midazolam, oxycodone, silver nitrate applicators, and triamcinolone acetonide. Review of patient's allergies indicates:  No Known Allergies    Current Voice Function: currently using alaryngeal communication via voice prosthesis   Current Level of Swallow Function/Complaints:  Pt does not report dysphagia  Prior Therapy:  2023    OBJECTIVE   TEP Current Status:17FR, 8mm Aquino placed on 2023    TEP Patient Complaints: No complaints    TEP " Accessories: 10/55 fenestrated leonie tube with push to talk HMEs.     Stoma Size:  adequate     Lymphedema:  yes    Manual therapy: MLD/CDP continues for head and neck.    Measurement taken of face and neck as follows.     Facial Lymphedema Measurement Form  PRE-TREATMENT  Neck Composite   Location Right Left   Superior Circumference (A):   (just below mandible)  5.5 5.5   Middle Circumference (B):  (midway between top & bottom 4.5 4.5   Inferior Circumference (C):  (lowest circumferential location)  4.5 4.5   TOTAL: 14.5 14.5       Facial Composite   Location Right Left   1.Tragus to mental protuberance  5 5   2.Tragus to mouth angle  4 4   3.Mandibular angle to nasal wing  4 4   4.Mandibular angle to internal eye corner  4.5 4.5   5.Mandibular angle to external eye corner  3.5 3   6.Mental Protuberance to internal eye corner to external eye corner  4 4   7.Mandibular angle to mental protuberance 4.5 4.5   TOTALS: 29.5 29     POST-TREATMENT  Neck Composite   Location Right Left   Superior Circumference (A):   (just below mandible)  5 5   Middle Circumference (B):   (midway between top & bottom 4.5 4.5   Inferior Circumference (C):   (lowest circumferential location)  4.5 4.25   TOTAL: 14 14.25       Facial Composite   Location Right Left   1.Tragus to mental protuberance  5 5   2.Tragus to mouth angle  4 4   3.Mandibular angle to nasal wing  4 4   4.Mandibular angle to internal eye corner  4.5 4.5   5.Mandibular angle to external eye corner  3.5 3   6.Mental Protuberance to internal eye corner to external eye corner  4 4   7.Mandibular angle to mental protuberance 4.25 4.5   TOTALS: 29.75 29   Greater than a 2% difference from pre-to post-treatment? yes         Treatment   Heavenondina Malloryl noted to tolerated pneumatic compression with good results this date. Decreased Neck composite total of .5 inches on right and .25 inches on left and Face composite total of 0 inches on right and 0 inches on left. SLP ended session  with neck stretches, ROM and diaphragmatic breathing. Despite minimal measurable change, pt exhibited reduced fibrotic texture and increased neck ROM noted after intervention. SLP did not manually manipulate R neck due to new area of concern and complaints of pain. Good compliance with all training. SLP to continue with reduced frequency to every other week due to reduced facial and neck lymphedema. SLP to follow up on 05/25/2023.  Pt continues to require manual decompression to maintain quality of life for swallowing and alaryngeal communication as insurance continues to deny home pneumatic compression device for lymphedema management.     Education: Plan of Care, role of SLP in care, and scheduling/ cancellation policy were discussed with pt. Patient expressed understanding.     Goals       LongTerm Goals:  Current Progress:   1. Patient will demonstrate understanding and implementation of long-term home management interventions as noted by improvement in lymphatic function, conduction of oral care, completion of daily exercises/stretches, and use of home management tools  Progressing towards goal       Short Term Goals:  Current Progress:   1. Patient will participate in manual interventions to target improvement in alaryngeal communication function related to reduced lymphatic function Progressing towards goal   2.  Patient will completed diaphragmatic breathing exercises at an independent level.  Progressing towards goal   3.  Patient will improve alaryngeal communication to maintain voicing >90% of the time.  Progressing towards goal     Ms. 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  She is currently utilizing esophageal speech via a voice prosthesis for alaryngeal communication for functional communication with her family, friends, medical providers, and others to perform her daily life activities.  She requires the use of a laryngectomy tube at all times to keep the airway patent and  prevent stomal stenosis. HMEs are required daily for mucus management and pulmonary optimization .     SLP to additionally provided MLD/CDP for head and neck lymphedema to improve functional communication.    Plan     SLP intervention is warranted 1 times a week or PRN for communication needs for 6 weeks due to the chronic nature of the impairment.     Additional Information   Ms. Boston entered with overall reduced swelling this date. Despite complaints of 2/10 pain and new area of concern on R lateral neck, lateral to neck dissection scar which measured 1cm x 1cm. All skin noted to be intact this date. SLP did not complete manual manipulation of R neck this date. Pt educated to contact ENT for medical management of area. SLP observed visual changes with tissue texture after manual techniques this date.  Pt reported increased ROM and reduced tightness after intervention. SLP will continue to address POC as stated. SLP to continue with reduced frequency of 1 time every other week due to improved management of lymphedema. Continue with POC on 05/25/2023. SLP educated to continued daily home exercise program for neck and shoulder stretches. SLP continues to recommend home pneumatic compression device for lymphedema management despite insurance denial as lymphedema is a life long complication from head and neck surgical dissection s/p total laryngectomy and post radiation intervention.       Sunitha Jean, MS, CCC-SLP   5/11/2023      delivered spontaneously/intact

## 2023-05-12 ENCOUNTER — ANESTHESIA EVENT (OUTPATIENT)
Dept: ENDOSCOPY | Facility: HOSPITAL | Age: 58
End: 2023-05-12
Payer: MEDICAID

## 2023-05-12 RX ORDER — LIDOCAINE HYDROCHLORIDE 10 MG/ML
1 INJECTION, SOLUTION EPIDURAL; INFILTRATION; INTRACAUDAL; PERINEURAL ONCE
Status: CANCELLED | OUTPATIENT
Start: 2023-05-12 | End: 2023-05-12

## 2023-05-12 NOTE — ANESTHESIA PREPROCEDURE EVALUATION
05/12/2023  Heaven Boston is a 58 y.o., female with PMHx of HTN, HLD, h/o SVT, smoking, h/o DVT, h/o laryngeal CA (s/p sx/trach, xrt), GERD, DM, hypothyroidism presents for colonoscopy secondary to +FOBT.    Metoprolol--last dose 5/14/23 @ 2000    Active Ambulatory Problems     Diagnosis Date Noted    H/O laryngectomy 06/13/2022    Laryngeal cancer 06/21/2022    Hypertension 07/11/2022    Familial hypercholesterolemia 07/11/2022    Syncope 07/11/2022    SVT (supraventricular tachycardia) 07/11/2022    Type 2 diabetes mellitus without complication, without long-term current use of insulin 07/11/2022    Tracheostomy in place 07/11/2022    PEG (percutaneous endoscopic gastrostomy) status 07/11/2022    History of radiation to head and neck region 12/22/2022    Hypothyroidism 12/22/2022    Bilateral carotid artery stenosis 01/11/2023    Lymphedema 03/16/2023    Deep vein thrombosis (DVT) of distal vein of left lower extremity 05/04/2023     Resolved Ambulatory Problems     Diagnosis Date Noted    No Resolved Ambulatory Problems     Past Medical History:   Diagnosis Date    Cancer     Diabetes mellitus        Pre-op Assessment    I have reviewed the NPO Status.      Review of Systems  Anesthesia Hx:  No problems with previous Anesthesia    Social:  Former Smoker    Hematology/Oncology:         -- Cancer in past history: Other (see Oncology comments) surgery and radiation    Cardiovascular:   Hypertension, poorly controlled Dysrhythmias hyperlipidemia    Pulmonary:  Pulmonary Normal    Renal/:  Renal/ Normal     Hepatic/GI:   GERD, well controlled    Neurological:  Neurology Normal    Endocrine:   Diabetes, well controlled, type 2 Hypothyroidism      Vitals:    05/15/23 0817   BP: (!) 158/107   Pulse: 96   Resp: 18   Temp: 36.8 °C (98.2 °F)   TempSrc: Oral   SpO2: 99%   Weight: 56.8 kg (125 lb  "3.2 oz)   Height: 5' 2" (1.575 m)         Physical Exam  General: Alert, Cooperative and Well nourished    Airway:  Pre-Existing Airway: Tracheostomy tube    Chest/Lungs:  Clear to auscultation, Normal Respiratory Rate    Heart:  Rate: Normal  Rhythm: Regular Rhythm  Sounds: Normal       Latest Reference Range & Units 05/15/23 08:02   POC Glucose 70 - 110 MG/ !   !: Data is abnormal  Lab Results   Component Value Date    WBC 4.6 04/03/2023    HGB 10.6 (L) 04/03/2023    HCT 35.2 (L) 04/03/2023    MCV 92.6 04/03/2023     04/03/2023       CMP  Sodium Level   Date Value Ref Range Status   04/03/2023 141 136 - 145 mmol/L Final     Potassium Level   Date Value Ref Range Status   04/03/2023 4.5 3.5 - 5.1 mmol/L Final     Carbon Dioxide   Date Value Ref Range Status   04/03/2023 27 22 - 29 mmol/L Final     Blood Urea Nitrogen   Date Value Ref Range Status   04/03/2023 8.1 (L) 9.8 - 20.1 mg/dL Final     Creatinine   Date Value Ref Range Status   04/03/2023 0.83 0.55 - 1.02 mg/dL Final     Calcium Level Total   Date Value Ref Range Status   04/03/2023 9.7 8.4 - 10.2 mg/dL Final     Albumin Level   Date Value Ref Range Status   04/03/2023 3.6 3.5 - 5.0 g/dL Final     Bilirubin Total   Date Value Ref Range Status   04/03/2023 0.3 <=1.5 mg/dL Final     Alkaline Phosphatase   Date Value Ref Range Status   04/03/2023 114 40 - 150 unit/L Final     Aspartate Aminotransferase   Date Value Ref Range Status   04/03/2023 14 5 - 34 unit/L Final     Alanine Aminotransferase   Date Value Ref Range Status   04/03/2023 12 0 - 55 unit/L Final     eGFR   Date Value Ref Range Status   04/03/2023 >60 mls/min/1.73/m2 Final     Lab Results   Component Value Date    HGBA1C 5.9 09/27/2021                     Anesthesia Plan  Type of Anesthesia, risks & benefits discussed:    Anesthesia Type: Gen Natural Airway  Intra-op Monitoring Plan: Standard ASA Monitors  Post Op Pain Control Plan: IV/PO Opioids PRN  Induction:  IV  Informed " Consent: Informed consent signed with the Patient and all parties understand the risks and agree with anesthesia plan.  All questions answered.   ASA Score: 3  Day of Surgery Review of History & Physical: H&P Update referred to the surgeon/provider.    Ready For Surgery From Anesthesia Perspective.     .

## 2023-05-15 ENCOUNTER — HOSPITAL ENCOUNTER (OUTPATIENT)
Facility: HOSPITAL | Age: 58
Discharge: HOME | End: 2023-05-15
Attending: INTERNAL MEDICINE | Admitting: INTERNAL MEDICINE
Payer: MEDICAID

## 2023-05-15 ENCOUNTER — ANESTHESIA (OUTPATIENT)
Dept: ENDOSCOPY | Facility: HOSPITAL | Age: 58
End: 2023-05-15
Payer: MEDICAID

## 2023-05-15 ENCOUNTER — TELEPHONE (OUTPATIENT)
Dept: FAMILY MEDICINE | Facility: CLINIC | Age: 58
End: 2023-05-15
Payer: MEDICAID

## 2023-05-15 DIAGNOSIS — R19.5 POSITIVE COLORECTAL CANCER SCREENING USING COLOGUARD TEST: Primary | ICD-10-CM

## 2023-05-15 DIAGNOSIS — E53.8 FOLIC ACID DEFICIENCY: ICD-10-CM

## 2023-05-15 DIAGNOSIS — K57.30 DIVERTICULOSIS LARGE INTESTINE W/O PERFORATION OR ABSCESS W/O BLEEDING: ICD-10-CM

## 2023-05-15 DIAGNOSIS — E03.9 HYPOTHYROIDISM, UNSPECIFIED TYPE: Primary | ICD-10-CM

## 2023-05-15 LAB
GLUCOSE SERPL-MCNC: 130 MG/DL (ref 70–110)
POCT GLUCOSE: 130 MG/DL (ref 70–110)

## 2023-05-15 PROCEDURE — 63600175 PHARM REV CODE 636 W HCPCS: Performed by: ANESTHESIOLOGY

## 2023-05-15 PROCEDURE — G0121 COLON CA SCRN NOT HI RSK IND: HCPCS | Mod: KX | Performed by: INTERNAL MEDICINE

## 2023-05-15 PROCEDURE — 00812 ANES LWR INTST SCR COLSC: CPT | Performed by: INTERNAL MEDICINE

## 2023-05-15 PROCEDURE — 82962 GLUCOSE BLOOD TEST: CPT | Performed by: INTERNAL MEDICINE

## 2023-05-15 PROCEDURE — D9220A PRA ANESTHESIA: Mod: CRNA,,, | Performed by: NURSE ANESTHETIST, CERTIFIED REGISTERED

## 2023-05-15 PROCEDURE — D9220A PRA ANESTHESIA: Mod: ANES,,, | Performed by: ANESTHESIOLOGY

## 2023-05-15 PROCEDURE — 45378 DIAGNOSTIC COLONOSCOPY: CPT | Mod: ,,, | Performed by: INTERNAL MEDICINE

## 2023-05-15 PROCEDURE — 63600175 PHARM REV CODE 636 W HCPCS: Performed by: NURSE ANESTHETIST, CERTIFIED REGISTERED

## 2023-05-15 PROCEDURE — 25000003 PHARM REV CODE 250: Performed by: NURSE ANESTHETIST, CERTIFIED REGISTERED

## 2023-05-15 PROCEDURE — D9220A PRA ANESTHESIA: ICD-10-PCS | Mod: CRNA,,, | Performed by: NURSE ANESTHETIST, CERTIFIED REGISTERED

## 2023-05-15 PROCEDURE — D9220A PRA ANESTHESIA: ICD-10-PCS | Mod: ANES,,, | Performed by: ANESTHESIOLOGY

## 2023-05-15 PROCEDURE — 37000009 HC ANESTHESIA EA ADD 15 MINS: Performed by: INTERNAL MEDICINE

## 2023-05-15 PROCEDURE — 37000008 HC ANESTHESIA 1ST 15 MINUTES: Performed by: INTERNAL MEDICINE

## 2023-05-15 PROCEDURE — 45378 PR COLONOSCOPY,DIAGNOSTIC: ICD-10-PCS | Mod: ,,, | Performed by: INTERNAL MEDICINE

## 2023-05-15 RX ORDER — LEVOTHYROXINE SODIUM 75 UG/1
75 TABLET ORAL
Qty: 30 TABLET | Refills: 0 | Status: SHIPPED | OUTPATIENT
Start: 2023-05-15 | End: 2023-05-30 | Stop reason: DRUGHIGH

## 2023-05-15 RX ORDER — SODIUM CHLORIDE, SODIUM LACTATE, POTASSIUM CHLORIDE, CALCIUM CHLORIDE 600; 310; 30; 20 MG/100ML; MG/100ML; MG/100ML; MG/100ML
INJECTION, SOLUTION INTRAVENOUS CONTINUOUS
Status: DISCONTINUED | OUTPATIENT
Start: 2023-05-15 | End: 2023-05-15 | Stop reason: HOSPADM

## 2023-05-15 RX ORDER — PROPOFOL 10 MG/ML
VIAL (ML) INTRAVENOUS
Status: DISCONTINUED | OUTPATIENT
Start: 2023-05-15 | End: 2023-05-15

## 2023-05-15 RX ORDER — SODIUM CHLORIDE 9 MG/ML
INJECTION, SOLUTION INTRAVENOUS CONTINUOUS
Status: DISCONTINUED | OUTPATIENT
Start: 2023-05-15 | End: 2023-05-15 | Stop reason: HOSPADM

## 2023-05-15 RX ORDER — FOLIC ACID 1 MG/1
1 TABLET ORAL DAILY
Qty: 30 TABLET | Refills: 1 | Status: SHIPPED | OUTPATIENT
Start: 2023-05-15 | End: 2023-07-03

## 2023-05-15 RX ORDER — LIDOCAINE HYDROCHLORIDE 20 MG/ML
INJECTION, SOLUTION EPIDURAL; INFILTRATION; INTRACAUDAL; PERINEURAL
Status: DISCONTINUED | OUTPATIENT
Start: 2023-05-15 | End: 2023-05-15

## 2023-05-15 RX ADMIN — PROPOFOL 20 MG: 10 INJECTION, EMULSION INTRAVENOUS at 10:05

## 2023-05-15 RX ADMIN — PROPOFOL 50 MG: 10 INJECTION, EMULSION INTRAVENOUS at 10:05

## 2023-05-15 RX ADMIN — SODIUM CHLORIDE, POTASSIUM CHLORIDE, SODIUM LACTATE AND CALCIUM CHLORIDE: 600; 310; 30; 20 INJECTION, SOLUTION INTRAVENOUS at 08:05

## 2023-05-15 RX ADMIN — LIDOCAINE HYDROCHLORIDE 50 MG: 20 INJECTION, SOLUTION EPIDURAL; INFILTRATION; INTRACAUDAL; PERINEURAL at 10:05

## 2023-05-15 NOTE — H&P
Colonoscopy History and Physical    Patient Name: Heaven Boston  MRN: [id number]  : 1965  Date of Procedure:  5/15/2023  Referring Physician: Francisco Zavala MD  Primary Physician: Shannan Brown MD  Procedure Physician: Francisco Zavala MD    Procedure - Colonoscopy  ASA - per anesthesia  Mallampati - per anesthesia  History of Anesthesia problems - no  Family history Anesthesia problems -  no   Plan of anesthesia - per anesthesia    Diagnosis: +FOBT  Chief Complaint: Same as above    HPI: Patient is an 58 y.o. female with history of larygneal SCC, HTN, HLD is here for the above.     Last colonoscopy: none   Family history: none  Anticoagulation: previously on Xarelto for DVT stopped on  per Cardiology    ROS:  Constitutional: No fevers, chills, No weight loss  CV: No chest pain  Pulm: No cough, No shortness of breath  GI: see HPI    Medical History:   Past Medical History:   Diagnosis Date    Cancer     Laryngeal    Diabetes mellitus     Hypertension        Surgical History:   Past Surgical History:   Procedure Laterality Date     SECTION      DIRECT DIAGNOSTIC LARYNGOSCOPY WITH BRONCHOSCOPY AND ESOPHAGOSCOPY N/A 3/3/2023    Procedure: LARYNGOSCOPY, DIRECT, DIAGNOSTIC, WITH BRONCHOSCOPY AND ESOPHAGOSCOPY;  Surgeon: Connor Patrick MD;  Location: [facility];  Service: ENT;  Laterality: N/A;  Will not need to do bronchoscopy for this procedure    NE REMOVAL OF LARYNX  2022       Family History:   History reviewed. No pertinent family history..    Social History:   Social History     Socioeconomic History    Marital status: Single   Tobacco Use    Smoking status: Former     Packs/day: 0.50     Years: 39.00     Pack years: 19.50     Types: Cigarettes     Quit date: 2022     Years since quittin.1    Smokeless tobacco: Former     Quit date:    Substance and Sexual Activity    Alcohol use: Never    Drug use: Never    Sexual activity: Not Currently       Review of patient's  "allergies indicates:  No Known Allergies    Medications:   Facility-Administered Medications Prior to Admission   Medication Dose Route Frequency Provider Last Rate Last Admin    LIDOcaine HCL 20 mg/ml (2%) injection 1 mL  1 mL Intradermal 1 time in Clinic/HOD Travis Aparicio MD        silver nitrate applicators applicator 1 applicator  1 applicator Topical (Top) 1 time in Clinic/HOD Patricia Montes MD        triamcinolone acetonide injection 40 mg  40 mg Intramuscular 1 time in Clinic/HOD Travis Aparicio MD         Medications Prior to Admission   Medication Sig Dispense Refill Last Dose    atorvastatin (LIPITOR) 80 MG tablet Take 1 tablet (80 mg total) by mouth every evening. 90 tablet 3 5/14/2023    carBAMazepine (TEGRETOL XR) 200 MG 12 hr tablet Take 200 mg by mouth 2 (two) times daily.   5/14/2023    DULoxetine (CYMBALTA) 30 MG capsule Take 1 capsule (30 mg total) by mouth 2 (two) times daily. 60 capsule 1 5/14/2023    fluticasone propionate (FLONASE) 50 mcg/actuation nasal spray 1 spray by Each Nostril route 2 (two) times daily.   5/14/2023    levothyroxine (SYNTHROID) 88 MCG tablet Take 1 tablet (88 mcg total) by mouth before breakfast. 30 tablet 11 5/14/2023    metFORMIN (GLUCOPHAGE) 500 MG tablet Take 1 tablet (500 mg total) by mouth once daily. 90 tablet 3 5/14/2023    metoprolol succinate (TOPROL-XL) 25 MG 24 hr tablet Take 0.5 tablets (12.5 mg total) by mouth once daily. 45 tablet 3 5/14/2023    montelukast (SINGULAIR) 5 MG chewable tablet Take 5 mg by mouth every other day.   5/14/2023    NIFEdipine (PROCARDIA-XL) 60 MG (OSM) 24 hr tablet Take 1 tablet (60 mg total) by mouth once daily. 30 tablet 11 5/14/2023    cetirizine (ZYRTEC) 10 MG tablet Take 10 mg by mouth once daily.          Physical Exam:    Vital Signs:   Vitals:    05/15/23 0817   BP: (!) 158/107   Pulse: 96   Resp: 18   Temp: 98.2 °F (36.8 °C)     BP (!) 158/107   Pulse 96   Temp 98.2 °F (36.8 °C) (Oral)   Resp 18   Ht 5' 2" " (1.575 m)   Wt 56.8 kg (125 lb 3.2 oz)   SpO2 99%   BMI 22.90 kg/m²     General: Well appearing in no acute distress  Lungs: Normal work of breathing on room air  Heart: Regular rate and rhythm  Abdomen: Soft, non-tender, no masses, no organomegaly    Labs:  Lab Results   Component Value Date    WBC 4.6 04/03/2023    HGB 10.6 (L) 04/03/2023    HCT 35.2 (L) 04/03/2023    MCV 92.6 04/03/2023     04/03/2023     Lab Results   Component Value Date    INR 1.04 04/14/2022     Lab Results   Component Value Date     04/03/2023    K 4.5 04/03/2023    CO2 27 04/03/2023    BUN 8.1 (L) 04/03/2023    CREATININE 0.83 04/03/2023    LABPROT 7.3 04/03/2023    ALBUMIN 3.6 04/03/2023    BILITOT 0.3 04/03/2023    ALKPHOS 114 04/03/2023    ALT 12 04/03/2023    AST 14 04/03/2023       Assessment and Plan:  Heaven Boston is a 58 y.o. year old female with history of laryngeal SCC, HTN, DM, here for colonoscopy following +FOBT.     History reviewed, vital signs satisfactory, cardiopulmonary status satisfactory.  I have explained the sedation options, risks, benefits, and alternatives of this endoscopic procedure to the patient including but not limited to bleeding, inflammation, infection, perforation, and death.  All questions were answered and the patient consented to proceed with procedure as planned. The patient is deemed an appropriate candidate for the sedation as planned.      Mariposa Mesa MD  [employer] General Surgery, PGY-2  5/15/2023  8:40 AM

## 2023-05-15 NOTE — PROGRESS NOTES
Please let the patient know that repeat TSH continues to be low lower than previous one, so I sent a new prescription for levothyroxine adjusted dosage until she sees her ENT doctor and can further discuss.  Also, folic acid low, sent new prescription for supplementation.  Thanks

## 2023-05-15 NOTE — DISCHARGE SUMMARY
[facility] - Endoscopy  Discharge Note  Short Stay    Procedure(s) (LRB):  COLONOSCOPY (N/A)  The procedure of colonoscopy was explained to the patient and consent obtained.  The patient was transferred to the endoscopy suite, general IV anesthesia was provided by anesthesia Services.  Rectal exam was unremarkable.  The Olympus videocolonoscope was introduced per rectum and advanced around the colon to the cecum.  There were areas where there was thick greenish mucoid material present but with a much irrigation and suctioning an adequate exam was carried out.  The appendiceal orifice and the ileocecal valve were identified and normal.  Examination of the ascending, transverse and proximal descending colon revealed no abnormalities.  There were a few scattered varying size diverticula in the distal descending and sigmoid colon with no evidence of diverticulitis.  The rectum was normal.  The endoscope was withdrawn.  Withdrawal time cecum to rectum 20 minutes.  Procedure was well tolerated and the patient returned to the recovery area for observation.      Discharge plan-patient will resume her regular diet today and normal activities tomorrow.  A follow-up colonoscopy in 5 years is recommended.    OUTCOME: Patient tolerated treatment/procedure well without complication and is now ready for discharge.    DISPOSITION: Home or Self Care    FINAL DIAGNOSIS:  <principal problem not specified>    FOLLOWUP: In clinic    DISCHARGE INSTRUCTIONS:    Discharge Procedure Orders   Diet general     Call MD for:  temperature >100.4     Call MD for:  persistent nausea and vomiting     Call MD for:  severe uncontrolled pain     Call MD for:  difficulty breathing, headache or visual disturbances     Activity as tolerated         Clinical Reference Documents Added to Patient Instructions         Document    COLONOSCOPY DISCHARGE INSTRUCTIONS (ENGLISH)            TIME SPENT ON DISCHARGE: 10 minutes

## 2023-05-15 NOTE — PROVATION PATIENT INSTRUCTIONS
Discharge Summary/Instructions after an Endoscopic Procedure  Patient Name: Heaven Boston  Patient MRN: [id number]  Patient YOB: 1965  Monday, May 15, 2023  Francisco Zavala MD  Dear patient,  As a result of recent federal legislation (The Federal Cures Act), you may   receive lab or pathology results from your procedure in your [facility]   account before your physician is able to contact you. Your physician or   their representative will relay the results to you with their   recommendations at their soonest availability.  Thank you,  RESTRICTIONS:  During your procedure today, you received medications for sedation.  These   medications may affect your judgment, balance and coordination.  Therefore,   for 24 hours, you have the following restrictions:   - DO NOT drive a car, operate machinery, make legal/financial decisions,   sign important papers or drink alcohol.    ACTIVITY:  Today: no heavy lifting, straining or running due to procedural   sedation/anesthesia.  The following day: return to full activity including work.  DIET:  Eat and drink normally unless instructed otherwise.     TREATMENT FOR COMMON SIDE EFFECTS:  - Mild abdominal pain, nausea, belching, bloating or excessive gas:  rest,   eat lightly and use a heating pad.  - Sore Throat: treat with throat lozenges and/or gargle with warm salt   water.  - Because air was used during the procedure, expelling large amounts of air   from your rectum or belching is normal.  - If a bowel prep was taken, you may not have a bowel movement for 1-3 days.    This is normal.  SYMPTOMS TO WATCH FOR AND REPORT TO YOUR PHYSICIAN:  1. Abdominal pain or bloating, other than gas cramps.  2. Chest pain.  3. Back pain.  4. Signs of infection such as: chills or fever occurring within 24 hours   after the procedure.  5. Rectal bleeding, which would show as bright red, maroon, or black stools.   (A tablespoon of blood from the rectum is not serious, especially if    hemorrhoids are present.)  6. Vomiting.  7. Weakness or dizziness.  GO DIRECTLY TO THE NEAREST EMERGENCY ROOM IF YOU HAVE ANY OF THE FOLLOWING:      Difficulty breathing              Chills and/or fever over 101 F   Persistent vomiting and/or vomiting blood   Severe abdominal pain   Severe chest pain   Black, tarry stools   Bleeding- more than one tablespoon   Any other symptom or condition that you feel may need urgent attention  Your doctor recommends these additional instructions:  If any biopsies were taken, your doctors clinic will contact you in 1 to 2   weeks with any results.  - Discharge patient to home (ambulatory).   - Resume previous diet today.   - Repeat colonoscopy in 5 years for screening purposes.  For questions, problems or results please call your physician - Francisco Zavala MD at Work:  [phone].  [facility] , EMERGENCY ROOM PHONE NUMBER: [phone]  IF A COMPLICATION OR EMERGENCY SITUATION ARISES AND YOU ARE UNABLE TO REACH   YOUR PHYSICIAN - GO DIRECTLY TO THE EMERGENCY ROOM.  MD Francisco Verma MD  5/15/2023 10:58:20 AM  This report has been verified and signed electronically.  Dear patient,  As a result of recent federal legislation (The Federal Cures Act), you may   receive lab or pathology results from your procedure in your [facility]   account before your physician is able to contact you. Your physician or   their representative will relay the results to you with their   recommendations at their soonest availability.  Thank you,  PROVATION

## 2023-05-15 NOTE — ANESTHESIA POSTPROCEDURE EVALUATION
Anesthesia Post Evaluation    Patient: Heaven Boston    Procedure(s) Performed: Procedure(s) (LRB):  COLONOSCOPY (N/A)    Final Anesthesia Type: general      Patient location during evaluation: GI PACU  Patient participation: Yes- Able to Participate  Level of consciousness: awake and alert  Post-procedure vital signs: reviewed and stable  Pain management: adequate  Airway patency: patent    PONV status at discharge: No PONV  Anesthetic complications: no      Cardiovascular status: hemodynamically stable  Respiratory status: unassisted and room air  Follow-up not needed.          Pain/Iraida Score: Iraida Score: 9 (5/15/2023 11:01 AM)

## 2023-05-15 NOTE — TRANSFER OF CARE
Anesthesia Transfer of Care Note    Patient: Heaven Boston    Procedure(s) Performed: Procedure(s) (LRB):  COLONOSCOPY (N/A)    Patient location: GI    Anesthesia Type: general    Post pain: adequate analgesia    Post assessment: no apparent anesthetic complications and tolerated procedure well    Post vital signs: stable    Level of consciousness: awake    Nausea/Vomiting: no nausea/vomiting    Complications: none    Transfer of care protocol was followed       written material/individual instruction/verbal instruction/Pre op teaching surgical scrub pain management instructions given to pt by NP

## 2023-05-15 NOTE — TELEPHONE ENCOUNTER
LVM    ----- Message from Shannan Brown MD sent at 5/15/2023 12:15 PM CDT -----  Please let the patient know that repeat TSH continues to be low lower than previous one, so I sent a new prescription for levothyroxine adjusted dosage until she sees her ENT doctor and can further discuss.  Also, folic acid low, sent new prescription for supplementation.  Thanks

## 2023-05-16 VITALS
WEIGHT: 125.19 LBS | TEMPERATURE: 98 F | RESPIRATION RATE: 22 BRPM | HEIGHT: 62 IN | DIASTOLIC BLOOD PRESSURE: 71 MMHG | HEART RATE: 94 BPM | SYSTOLIC BLOOD PRESSURE: 145 MMHG | BODY MASS INDEX: 23.04 KG/M2 | OXYGEN SATURATION: 97 %

## 2023-05-19 ENCOUNTER — TELEPHONE (OUTPATIENT)
Dept: FAMILY MEDICINE | Facility: CLINIC | Age: 58
End: 2023-05-19
Payer: MEDICAID

## 2023-05-22 ENCOUNTER — TELEPHONE (OUTPATIENT)
Dept: FAMILY MEDICINE | Facility: CLINIC | Age: 58
End: 2023-05-22
Payer: MEDICAID

## 2023-05-22 NOTE — TELEPHONE ENCOUNTER
Spoke with patient's son who handles her care and he understood the results given. No further questions at this time.     ----- Message from Shannan Brown MD sent at 5/15/2023 12:15 PM CDT -----  Please let the patient know that repeat TSH continues to be low lower than previous one, so I sent a new prescription for levothyroxine adjusted dosage until she sees her ENT doctor and can further discuss.  Also, folic acid low, sent new prescription for supplementation.  Thanks

## 2023-05-23 ENCOUNTER — HOSPITAL ENCOUNTER (OUTPATIENT)
Dept: RADIOLOGY | Facility: HOSPITAL | Age: 58
Discharge: HOME | End: 2023-05-23
Attending: OTOLARYNGOLOGY
Payer: MEDICAID

## 2023-05-23 DIAGNOSIS — R59.0 LOCALIZED ENLARGED LYMPH NODES: ICD-10-CM

## 2023-05-23 LAB
CREAT SERPL-MCNC: 1.08 MG/DL (ref 0.55–1.02)
GFR SERPLBLD CREATININE-BSD FMLA CKD-EPI: 60 MLS/MIN/1.73/M2

## 2023-05-23 PROCEDURE — 82565 ASSAY OF CREATININE: CPT | Performed by: OTOLARYNGOLOGY

## 2023-05-23 PROCEDURE — 70491 CT SOFT TISSUE NECK W/DYE: CPT | Mod: TC

## 2023-05-23 PROCEDURE — 25500020 PHARM REV CODE 255: Performed by: OTOLARYNGOLOGY

## 2023-05-23 RX ADMIN — IOHEXOL 100 ML: 350 INJECTION, SOLUTION INTRAVENOUS at 12:05

## 2023-05-25 ENCOUNTER — CLINICAL SUPPORT (OUTPATIENT)
Dept: REHABILITATION | Facility: HOSPITAL | Age: 58
End: 2023-05-25
Payer: MEDICAID

## 2023-05-25 DIAGNOSIS — I89.0 LYMPHEDEMA: Primary | ICD-10-CM

## 2023-05-25 PROCEDURE — 92507 TX SP LANG VOICE COMM INDIV: CPT

## 2023-05-25 NOTE — PROGRESS NOTES
[facility] AND [facility]  Speech Therapy Progress Note  Laryngectomy-Lymphedema    Date: 2023     Name: Heaven Boston   MRN: [id number]    Therapy Diagnosis:   No diagnosis found.       Physician: Dr. Aparicio    PATIENT IS A NECK-BREATHER - DO NOT ORALLY INTUBATE    Time In:  0900  Time Out: 1000    Procedure Min.   Speech Language Voice Therapy  60   Total Untimed Units: 60  Charges Billed/# of units: 60/1    Precautions: Standard  Subjective    Chief Complaint: Patient with reduced complaints neck tightness and swelling.     AFFECT: normal affect    Pain:   0/10  Pain Location / Description: tightness  Current Medical History: Heaven Boston is a 58 y.o. female referred by Dr. Aparicio for TEP management to maximize alaryngeal communication without respiratory compromise and lymphedema management.     Past Medical History:   Past Medical History:   Diagnosis Date    Cancer     Laryngeal    Diabetes mellitus     Hypertension       Heaven Boston  has a past surgical history that includes pr removal of larynx (2022);  section; Direct diagnostic laryngoscopy with bronchoscopy and esophagoscopy (N/A, 3/3/2023); and Colonoscopy (N/A, 5/15/2023).  Medical Hx and Allergies: Heaven has a current medication list which includes the following prescription(s): atorvastatin, carbamazepine, cetirizine, duloxetine, fluticasone propionate, folic acid, levothyroxine, metformin, metoprolol succinate, montelukast, and nifedipine, and the following Facility-Administered Medications: dextrose 10%, dextrose 10%, diphenhydramine, droperidol, hydromorphone, insulin aspart u-100, insulin aspart u-100, lactated ringers, lidocaine (pf) 10 mg/ml (1%), lidocaine (pf) 10 mg/ml (1%), lidocaine hcl 20 mg/ml (2%), metoclopramide hcl, midazolam, oxycodone, silver nitrate applicators, and triamcinolone acetonide. Review of patient's allergies indicates:  No Known Allergies    Current Voice Function: currently using  alaryngeal communication via voice prosthesis   Current Level of Swallow Function/Complaints:  Pt does not report dysphagia  Prior Therapy:  03/30/2023    OBJECTIVE   TEP Current Status:17FR, 8mm Aquino placed on 03/21/2023    TEP Patient Complaints: Good voicing noted throughout session    TEP Accessories: 10/55 fenestrated leonie tube with push to talk HMEs.     Stoma Size:  adequate     Lymphedema:  yes    Manual therapy: MLD/CDP continues for head and neck.    Measurement taken of face and neck as follows.     Facial Lymphedema Measurement Form  PRE-TREATMENT  Neck Composite   Location Right Left   Superior Circumference (A):   (just below mandible)  5.5 5.5   Middle Circumference (B):  (midway between top & bottom 5 5   Inferior Circumference (C):  (lowest circumferential location)  5 4.5   TOTAL: 15.5 15       Facial Composite   Location Right Left   1.Tragus to mental protuberance  5 5   2.Tragus to mouth angle  4 4   3.Mandibular angle to nasal wing  4 4   4.Mandibular angle to internal eye corner  5 5   5.Mandibular angle to external eye corner  3.5 3   6.Mental Protuberance to internal eye corner to external eye corner  4 4   7.Mandibular angle to mental protuberance 4 4.5   TOTALS: 29.5 29.5     POST-TREATMENT  Neck Composite   Location Right Left   Superior Circumference (A):   (just below mandible)  5 5   Middle Circumference (B):   (midway between top & bottom 4.5 4.5   Inferior Circumference (C):   (lowest circumferential location)  4.5 4.5   TOTAL: 14 14       Facial Composite   Location Right Left   1.Tragus to mental protuberance  5 5   2.Tragus to mouth angle  4 4   3.Mandibular angle to nasal wing  4 4   4.Mandibular angle to internal eye corner  5 5   5.Mandibular angle to external eye corner  3.5 3   6.Mental Protuberance to internal eye corner to external eye corner  4 4   7.Mandibular angle to mental protuberance 4 4.5   TOTALS: 29.5 29.5   Greater than a 2% difference from pre-to post-treatment?  yes         Treatment   Heaven Boston noted to tolerated pneumatic compression with good results this date. Decreased Neck composite total of 1.5 inches on right and 1.0 inches on left and Face composite total of 0 inches on right and 0 inches on left. SLP ended session with neck stretches, ROM and diaphragmatic breathing. Pt exhibited reduced fibrotic texture and increased neck ROM noted after intervention. Good compliance with all training. Pt requested reduced frequency to 1 time a month due to management of lymphedema. SLP to follow up on 06/22/2023. SLP continues to recommend home compression device to maintain quality of life for swallowing and alaryngeal communication despite insurance denial as lymphedema is a lifelong complication of total laryngectomy and radiation intervention.      Education: Plan of Care, role of SLP in care, and scheduling/ cancellation policy were discussed with pt. Patient expressed understanding.     Goals       LongTerm Goals:  Current Progress:   1. Patient will demonstrate understanding and implementation of long-term home management interventions as noted by improvement in lymphatic function, conduction of oral care, completion of daily exercises/stretches, and use of home management tools  Progressing towards goal       Short Term Goals:  Current Progress:   1. Patient will participate in manual interventions to target improvement in alaryngeal communication function related to reduced lymphatic function Progressing towards goal   2.  Patient will completed diaphragmatic breathing exercises at an independent level.  Progressing towards goal   3.  Patient will improve alaryngeal communication to maintain voicing >90% of the time.  Progressing towards goal     Ms. 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  She is currently utilizing esophageal speech via a voice prosthesis for alaryngeal communication for functional communication with her family, friends,  medical providers, and others to perform her daily life activities.  She requires the use of a laryngectomy tube at all times to keep the airway patent and prevent stomal stenosis. HMEs are required daily for mucus management and pulmonary optimization .     SLP to additionally provided MLD/CDP for head and neck lymphedema to improve functional communication.    Plan     SLP intervention is warranted 1 times a week or PRN for communication needs for 6 weeks due to the chronic nature of the impairment.     Additional Information   Ms. Boston entered with overall reduced swelling this date. All skin noted to be intact this date. Reduced swelling noted upon entering. No measurable change noted with decongestion intervention this date. Visually, patient's tissue texture noted to change after manual techniques this date.  Pt intermittent neck tightness. SLP will continue to address POC as stated with reduced frequency 1 time a month to improved management of lymphedema. Continue with POC on 05/22/2023. SLP to educated to continued with home exercise program for neck and shoulder stretches. SLP continues to recommend home pneumatic compression device for lymphedema management despite insurance denial as lymphedema is a life long complication from head and neck surgical dissection s/p total laryngectomy and post radiation intervention.       Sunitha Jean MS, CCC-SLP   5/25/2023                                                                      [facility]  Speech Therapy Progress Note  Laryngectomy-Lymphedema    Date: 5/25/2023     Name: Heaven Boston   MRN: [id number]    Therapy Diagnosis:   No diagnosis found.      Physician: Dr. Aparicio    PATIENT IS A NECK-BREATHER - DO NOT ORALLY INTUBATE    Time In:  0900  Time Out: 1000    Procedure Min.   Speech Language Voice Therapy  60   Total Untimed Units: 60  Charges Billed/# of units: 60/1    Precautions: Standard  Subjective    Chief Complaint:  Patient entered with no new complaints.   Pt reported ENT after last session on 2023 for medical assessment of new area concern on R lateral neck.    AFFECT: normal affect    Pain:   0/10  Pain Location / Description: NA  Current Medical History: Heaven Bosotn is a 58 y.o. female referred by Dr. Aparicio for TEP and lymphedema management to maximize alaryngeal communication without respiratory compromise.     Past Medical History:   Past Medical History:   Diagnosis Date    Cancer     Laryngeal    Diabetes mellitus     Hypertension       Heaven Boston  has a past surgical history that includes pr removal of larynx (2022);  section; Direct diagnostic laryngoscopy with bronchoscopy and esophagoscopy (N/A, 3/3/2023); and Colonoscopy (N/A, 5/15/2023).  Medical Hx and Allergies: Heaven has a current medication list which includes the following prescription(s): atorvastatin, carbamazepine, cetirizine, duloxetine, fluticasone propionate, folic acid, levothyroxine, metformin, metoprolol succinate, montelukast, and nifedipine, and the following Facility-Administered Medications: dextrose 10%, dextrose 10%, diphenhydramine, droperidol, hydromorphone, insulin aspart u-100, insulin aspart u-100, lactated ringers, lidocaine (pf) 10 mg/ml (1%), lidocaine (pf) 10 mg/ml (1%), lidocaine hcl 20 mg/ml (2%), metoclopramide hcl, midazolam, oxycodone, silver nitrate applicators, and triamcinolone acetonide. Review of patient's allergies indicates:  No Known Allergies    Current Voice Function: currently using alaryngeal communication via voice prosthesis   Current Level of Swallow Function/Complaints:  Pt does not report dysphagia  Prior Therapy:  2023    OBJECTIVE   TEP Current Status:17FR, 8mm Aquino placed on 2023    TEP Patient Complaints: No complaints    TEP Accessories: 10/55 fenestrated leonie tube with push to talk HMEs.     Stoma Size:  adequate     Lymphedema:  yes    Manual therapy: MLD/CDP  continues for head and neck.    Measurement taken of face and neck as follows.     Facial Lymphedema Measurement Form  PRE-TREATMENT  Neck Composite   Location Right Left   Superior Circumference (A):   (just below mandible)  5.5 5   Middle Circumference (B):  (midway between top & bottom 5 4.5   Inferior Circumference (C):  (lowest circumferential location)  5 4.5   TOTAL: 15.5 14       Facial Composite   Location Right Left   1.Tragus to mental protuberance  5 5   2.Tragus to mouth angle  4 4   3.Mandibular angle to nasal wing  4 4   4.Mandibular angle to internal eye corner  5 4.5   5.Mandibular angle to external eye corner  3.5 3   6.Mental Protuberance to internal eye corner to external eye corner  4 4.5   7.Mandibular angle to mental protuberance 4.5 4.5   TOTALS: 30 30.5     POST-TREATMENT  Neck Composite   Location Right Left   Superior Circumference (A):   (just below mandible)  5.5 5   Middle Circumference (B):   (midway between top & bottom 5 4.5   Inferior Circumference (C):   (lowest circumferential location)  5 4.25   TOTAL: 15.5 14       Facial Composite   Location Right Left   1.Tragus to mental protuberance  5 5   2.Tragus to mouth angle  4 4   3.Mandibular angle to nasal wing  4 4   4.Mandibular angle to internal eye corner  5 4.5   5.Mandibular angle to external eye corner  3.5 3   6.Mental Protuberance to internal eye corner to external eye corner  4 4.5   7.Mandibular angle to mental protuberance 4.5 4.5   TOTALS: 30 30.5   Greater than a 2% difference from pre-to post-treatment? yes         Treatment   Heaven Borel noted to tolerated pneumatic compression with no measurable this date. Neck and facial composite totals noted to remain the same pre- and post-treatment.  SLP ended session with neck stretches, ROM and diaphragmatic breathing. Despite no measurable change, pt exhibited reduced fibrotic texture overall. Pt with no complaints of neck ROM  as she remains diligent with HEP for head and  neck stretches. Good compliance with all training. Patient and SLP discussed reduced frequency to 1 time a month due to management of lymphedema at this time. SLP to follow up on 06/22/2023.  Pt continues to require manual decompression to maintain quality of life for swallowing and alaryngeal communication as insurance continues to deny home pneumatic compression device for lymphedema management.     Education: Plan of Care, role of SLP in care, and scheduling/ cancellation policy were discussed with pt. Patient expressed understanding.     Goals       LongTerm Goals:  Current Progress:   1. Patient will demonstrate understanding and implementation of long-term home management interventions as noted by improvement in lymphatic function, conduction of oral care, completion of daily exercises/stretches, and use of home management tools  Progressing towards goal       Short Term Goals:  Current Progress:   1. Patient will participate in manual interventions to target improvement in alaryngeal communication function related to reduced lymphatic function Progressing towards goal   2.  Patient will completed diaphragmatic breathing exercises at an independent level.  Progressing towards goal   3.  Patient will improve alaryngeal communication to maintain voicing >90% of the time.  Progressing towards goal     Ms. 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  She is currently utilizing esophageal speech via a voice prosthesis for alaryngeal communication for functional communication with her family, friends, medical providers, and others to perform her daily life activities.  She requires the use of a laryngectomy tube at all times to keep the airway patent and prevent stomal stenosis. HMEs are required daily for mucus management and pulmonary optimization .     SLP to additionally provided MLD/CDP for head and neck lymphedema to improve functional communication.    Plan     SLP intervention is warranted  1 times a week or PRN for communication needs for 6 weeks due to the chronic nature of the impairment.     Additional Information   Ms. Boston entered with overall reduced swelling this date. Despite complaints of 2/10 pain and new area of concern on R lateral neck, lateral to neck dissection scar which measured 1cm x 1cm. All skin noted to be intact this date. SLP did not complete manual manipulation of R neck this date. Pt educated to contact ENT for medical management of area. SLP observed visual changes with tissue texture after manual techniques this date.  Pt reported increased ROM and reduced tightness after intervention. SLP will continue to address POC as stated. SLP to continue with reduced frequency of 1 time every other week due to improved management of lymphedema. Continue with POC on 05/25/2023. SLP educated to continued daily home exercise program for neck and shoulder stretches. SLP continues to recommend home pneumatic compression device for lymphedema management despite insurance denial as lymphedema is a life long complication from head and neck surgical dissection s/p total laryngectomy and post radiation intervention.       Sunitha Jean MS, CCC-SLP   5/25/2023

## 2023-05-29 ENCOUNTER — OFFICE VISIT (OUTPATIENT)
Dept: OPHTHALMOLOGY | Facility: CLINIC | Age: 58
End: 2023-05-29
Payer: MEDICAID

## 2023-05-29 ENCOUNTER — CLINICAL SUPPORT (OUTPATIENT)
Dept: CARDIOLOGY | Facility: CLINIC | Age: 58
End: 2023-05-29
Payer: MEDICAID

## 2023-05-29 VITALS
HEART RATE: 88 BPM | BODY MASS INDEX: 23.59 KG/M2 | DIASTOLIC BLOOD PRESSURE: 71 MMHG | HEIGHT: 62 IN | SYSTOLIC BLOOD PRESSURE: 124 MMHG | BODY MASS INDEX: 23.55 KG/M2 | TEMPERATURE: 98 F | RESPIRATION RATE: 20 BRPM | OXYGEN SATURATION: 99 % | HEIGHT: 62 IN | WEIGHT: 128 LBS | WEIGHT: 128.19 LBS

## 2023-05-29 DIAGNOSIS — E11.9 TYPE 2 DIABETES MELLITUS WITHOUT COMPLICATION, WITHOUT LONG-TERM CURRENT USE OF INSULIN: ICD-10-CM

## 2023-05-29 DIAGNOSIS — I10 HYPERTENSION, UNSPECIFIED TYPE: Primary | ICD-10-CM

## 2023-05-29 PROCEDURE — 99212 OFFICE O/P EST SF 10 MIN: CPT | Mod: S$PBB,,, | Performed by: NURSE PRACTITIONER

## 2023-05-29 PROCEDURE — 99212 PR OFFICE/OUTPT VISIT, EST, LEVL II, 10-19 MIN: ICD-10-PCS | Mod: S$PBB,,, | Performed by: NURSE PRACTITIONER

## 2023-05-29 PROCEDURE — 99214 OFFICE O/P EST MOD 30 MIN: CPT | Mod: PBBFAC

## 2023-05-29 PROCEDURE — 99212 OFFICE O/P EST SF 10 MIN: CPT | Mod: PBBFAC,27,PO | Performed by: STUDENT IN AN ORGANIZED HEALTH CARE EDUCATION/TRAINING PROGRAM

## 2023-05-29 RX ORDER — PROPARACAINE HYDROCHLORIDE 5 MG/ML
1 SOLUTION/ DROPS OPHTHALMIC
Status: COMPLETED | OUTPATIENT
Start: 2023-05-29 | End: 2023-05-29

## 2023-05-29 RX ADMIN — PROPARACAINE HYDROCHLORIDE 1 DROP: 5 SOLUTION/ DROPS OPHTHALMIC at 12:05

## 2023-05-29 NOTE — PROGRESS NOTES
Patient here today for B/P check. Reports compliance with medication regimen. LCV 5/4/23 with no medication changes. Pt brought B/P log it will be scanned into chart.  No cardiac complaints. Today's /71, HR-88.  No new orders. Instructions given to follow low salt heart healthy diet, continue medications as prescribed, monitor and log B/P, keep all appointments, call clinic with any questions or concerns, and report to ED if symptoms warrant. Pt. verbalized understanding and agreeable with POC    
normal...

## 2023-05-29 NOTE — PROGRESS NOTES
I have reviewed the notes, assessments, and/or procedures performed by Dr Black, I concur with her/his documentation of Heaven Boston.

## 2023-05-30 ENCOUNTER — OFFICE VISIT (OUTPATIENT)
Dept: OTOLARYNGOLOGY | Facility: CLINIC | Age: 58
End: 2023-05-30
Payer: MEDICAID

## 2023-05-30 VITALS
SYSTOLIC BLOOD PRESSURE: 133 MMHG | HEART RATE: 82 BPM | BODY MASS INDEX: 23.78 KG/M2 | WEIGHT: 130 LBS | DIASTOLIC BLOOD PRESSURE: 70 MMHG

## 2023-05-30 DIAGNOSIS — Z90.02 H/O LARYNGECTOMY: Primary | ICD-10-CM

## 2023-05-30 DIAGNOSIS — Z92.3 HISTORY OF RADIATION TO HEAD AND NECK REGION: ICD-10-CM

## 2023-05-30 DIAGNOSIS — C32.9 LARYNGEAL CANCER: ICD-10-CM

## 2023-05-30 PROCEDURE — 99214 OFFICE O/P EST MOD 30 MIN: CPT | Mod: PBBFAC | Performed by: STUDENT IN AN ORGANIZED HEALTH CARE EDUCATION/TRAINING PROGRAM

## 2023-05-30 PROCEDURE — 31231 NASAL ENDOSCOPY DX: CPT | Mod: PBBFAC | Performed by: STUDENT IN AN ORGANIZED HEALTH CARE EDUCATION/TRAINING PROGRAM

## 2023-05-30 RX ORDER — LEVOTHYROXINE SODIUM 50 UG/1
50 TABLET ORAL
Qty: 30 TABLET | Refills: 11 | Status: SHIPPED | OUTPATIENT
Start: 2023-05-30 | End: 2023-11-16

## 2023-05-30 NOTE — PROGRESS NOTES
Patient Name: Heaven Boston   YOB: 1965     Chief Complaint: No chief complaint on file.       History of Present Illness:  Heaven Boston is a 57 y.o. 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 Patient had an uneventful hospitalization course and was discharged on postoperative day 9 after an esophagram showed no pharyngeal leak.  However she re-presented to our facility on 4/6/22 with surgical site infection and a pharyngocutaneous fistula. Patient underwent a neck washout with primary repair on 4/11/2022, with placement of a gastrostomy tube the same day. She was maintained on NPO and transferred to [facility] for further evaluation and management on 4/15/22 after evidence of persistent pharyngocutaneous fistula. There she underwent a 2nd neck washout with primary closure and placement of salivary bypass tube.      5/2/22: Patient had an uneventful hospitalization course and removal of salivary bypass tube before discharge last week and now presenting for postoperative evaluation.  Patient presents today without any complaints.  She is wondering about when he pointed that her x-rays for swallowing then.  She seen Dr. Laguna today after our visit to undergo-stimulation for radiation.     5/9/22: Pt. Did not answer. Let voicemail to advance diet to CLD. Also discussed this with son.  Plan to see in clinic in 2 weeks     5/19/22: In radiation therapy, 5x weekly until 6/16/22. Has TEP in place, brought another TEP requesting swap out. Will go to speech therapy for exchange of TEP. Went to ER for bleeding from G-tube site 1 week ago but declines visit to Gen Surg clinic today. On CLD taking Ensure by G-tube, water and soups by mouth. Had questions about advancing diet. No weight loss  or appetite change. No other issues.      6/21/22:  Returns today for follow up.  Completed radiation last week 6/16/22.  Is having difficulty voicing with TEP though it has been swapped out by SLP.  Able to eat what she wants.  Having some tightness of her throat and soreness, otherwise no issues.     7/21/22: Here today for follow up. Has overall been doing very well. She is tolerating her diet by mouth and gaining weight. No dysphagia. Has not used her PEG tube since prior to radiation. She is interested in having it removed. Has some fullness in the left neck which intermittently swells and then resolves again. Otherwise no new lumps/bumps of the head and neck.      8/23/22: Ms. Boston returns today for follow up. She complains of neck pain exacerbated by flexion and movement to the right that began 2 weeks ago. She also reports neck swelling and tightness which occasionally increases in size and is tender to palpation. She reports cough with clear mucus production as well as nasal congestion without drainage. She also notes dry, itchy eyes that have not improved with Visine. She also notes dysuria. She is tolerating her diet by mouth and gaining weight. She had her PEG tube removed on 8/16/22. She has been to SLP twice and is still having difficulty voicing with TEP.     9/27/22: Here for follow up. Reports she is doing okay. Still coughing a lot and not voicing well with TEP. No weight loss, tolerating diet. No otalgia/lumps bumps. Still having intermittent neck swelling/pain.      10/27/22:  Returns for surveillance.  Did not get TSH, T4.  Has been going to SLP, now can talk well.  Cough has improved.  No new H&N complaints     12/22/22: Doing very well. Voicing well. No pain. Doing well with speech and lymphedema therapy. Eating well, gaining weight constantly. Always tired. Sometimes sleeps all day.    February 14, 2023:   58-year-old female presents today for follow-up of her laryngeal squamous cell  carcinoma and postoperative hypothyroidism.  In regards to her squamous cell carcinoma of the larynx she is doing well.  She has no complaints of any pain.  He is not noticed any swelling in her neck.  She is not have any difficulty eating or swallowing in his indicated she is gaining weight.  She continues to use her TEP without problems and does not have any leakage around her TEP.  She did have a follow-up PET-CT scan done on December 30, 2022, in the report indicated that there was no evidence of new or progressive hypermetabolic metastatic disease.  On reviewing those images, however, there does appear to be an area of increased FDG activity in the upper cervical esophagus in the area above her trachea stoma and possibly at the site of the TEP.  This was also present on a PET-CT scan done on October 12, 2022 and there does not appear debris any change in this area between the 2 scans.  The report on the scan done on October 12, 2022, had indicated this area had an SUV max of 4.4 without any apparent lesions noted on the corresponding CT.  In regards to her hypothyroidism she continues to take levothyroxine 88 mcg daily.  Lab work from February 6, 2023, showed her TSH level be low at 0.115 with a normal free T4 of 1.48.  Her prior TSH from October 27, 2022, was elevated at 55.1672.     3/30/23:  Patient routine follow-up for squamous cell carcinoma and postop hypothyroidism.  She denies any new lumps, bumps hemoptysis or weight loss.  She is able to swallow without difficulty, TEP functioning well with excellent speech.  She has postop hypothyroidism, does complain of some weakness.  She did have a change in her blood pressure medicine in that it was increased and she felt some of her fatigue might have been related to it.  She currently has blood work ordered by her PCP including thyroid function and she is going to get it next week, we will schedule a TeleMed follow-up.  Duloxetine 30 mg helps her sleep and  helps her neck pain.     23: Here today for cancer surveillance. Recently had an add on appt with Dr. Aparicio for a new bump on her neck that appeared to be a neuroma. He injected it with lidocaine and she reports that the bump has decreased in size and is no longer painful. She also recently got her CT neck. She denies any new issues. She's eating and drinking well and denies any dysphagia, odynophagia, ear pain or new lumps/bumps.       Past Medical History:  Past Medical History:   Diagnosis Date    Cancer     Laryngeal    Diabetes mellitus     Hypertension      Past Surgical History:   Procedure Laterality Date     SECTION      MD REMOVAL OF LARYNX  2022       Review of Systems:  Unremarkable except as mentioned above.    Current Medications:  Current Outpatient Medications   Medication Sig    atorvastatin (LIPITOR) 80 MG tablet Take 1 tablet (80 mg total) by mouth every evening.    azelastine (ASTELIN) 137 mcg (0.1 %) nasal spray 1 spray by Nasal route.    carBAMazepine (CARBATROL) 200 MG CM12 Take 200 mg by mouth.    carBAMazepine (TEGRETOL XR) 200 MG 12 hr tablet Take 200 mg by mouth 2 (two) times daily.    cetirizine (ZYRTEC) 10 MG tablet Take 10 mg by mouth once daily.    DULoxetine (CYMBALTA) 30 MG capsule Take 30 mg by mouth 2 (two) times daily.    fluticasone propionate (FLONASE) 50 mcg/actuation nasal spray 1 spray by Each Nostril route 2 (two) times daily.    fluticasone-salmeterol diskus inhaler 100-50 mcg Inhale 1 puff into the lungs every 12 (twelve) hours.    levothyroxine (SYNTHROID) 88 MCG tablet Take 1 tablet (88 mcg total) by mouth before breakfast.    loratadine (CLARITIN) 10 mg tablet Take 1 tablet (10 mg total) by mouth once daily.    metFORMIN (GLUCOPHAGE) 500 MG tablet Take 1 tablet (500 mg total) by mouth once daily.    metoprolol succinate (TOPROL-XL) 25 MG 24 hr tablet Take 0.5 tablets (12.5 mg total) by mouth once daily.    montelukast (SINGULAIR) 5 MG chewable tablet  "Take 1 tablet (5 mg total) by mouth every evening.    NIFEdipine (PROCARDIA-XL) 60 MG (OSM) 24 hr tablet Take 1 tablet (60 mg total) by mouth once daily.    rivaroxaban (XARELTO) 20 mg Tab Take 1 tablet (20 mg total) by mouth daily with dinner or evening meal.    aspirin (ECOTRIN) 81 MG EC tablet Take 1 tablet (81 mg total) by mouth once daily.    HYDROcodone-acetaminophen 5-300 mg Tab Take 1 tablet by mouth every 8 (eight) hours as needed.    ibuprofen (ADVIL,MOTRIN) 800 MG tablet Take 800 mg by mouth every 6 (six) hours as needed.    omeprazole (PRILOSEC) 40 MG capsule Take 40 mg by mouth once daily.    prednisoLONE acetate (PRED FORTE) 1 % DrpS Place into both eyes.     Current Facility-Administered Medications   Medication    silver nitrate applicators applicator 1 applicator        Allergies:  Review of patient's allergies indicates:  No Known Allergies     Physical Exam:  Vital signs:   Vitals:    02/14/23 1102   BP: 129/75   BP Location: Right arm   Patient Position: Sitting   BP Method: Medium (Automatic)   Pulse: 83   Resp: 16   Temp: 99 °F (37.2 °C)   TempSrc: Oral   Weight: 59.4 kg (131 lb)   Height: 5' 2" (1.575 m)   General:  Well-developed well-nourished female in no acute distress.  Patient does communicate well with clear speech using her TEP.  Head and face:  Normocephalic.  No facial lesions.  No temporomandibular joint tenderness or click.  Ears:  Right ear-auricle is normally developed.  External auditory canal is clear.  Tympanic membrane is nonerythematous.  No middle ear effusion.  Left ear-auricle is normally developed.  External auditory canal is clear.  Tympanic membrane is nonerythematous.  No middle ear effusion.  Nose:  Nasal dorsum is unremarkable.  No significant septal deviation.  No significant intranasal congestion.  Secretions are clear.  Oral cavity and oropharynx:  Tongue and floor mouth are without lesions.  Mucosa is moist.  No pharyngeal erythema or exudates.  No oropharyngeal " masses.  No tonsillar hypertrophy.  Neck:  Supple without palpable adenopathy.  She does have some induration related to her surgery and postradiation changes.  Trachea stoma is patent.  TEP is in place without any gross drainage from around the prosthesis.  No granulation is noted. Small 3mm firm nodule at right apron incision that is nontender  Eyes:  Extraocular muscles intact.  No nystagmus.  No exophthalmos or enophthalmos.  Neurologic:  Alert and oriented.  Cranial nerves 2-12 are grossly normal.    Procedure note: Flexible tracheoscopy, nasopharyngoscopy  The flexible fiberoptic laryngoscope was introduced into the right nostril and advanced. Examination of the nose showed the above mentioned findings. Examination of the nasopharynx showed no nasopharyngeal masses or eustachian tube obstruction. Examination of the base of tongue showed no masses or lesions. Examination of the hypopharynx showed no masses or lesions in the neopharynx or hypopharynx.  The esophageal inlet is without apparent lesions.  No evidence of fungal disease. The scope was passed in the stoma and the trachea was clear down to the aleja.     Assessment/Plan:  Heaven Boston is a 57 y.o. female with T3N1M0 supraglottic SCCa s/p TL BSND CPM complicated by PCF s/p washout & primary closure x2 (2nd in [place]) now s/p adjuvant RT. One of her post-treatment PETS noted hypermetabolism at the site of her TEP. She was taken for a DL on 3/3/23 which was unremarkable. Doing well today. Has a small neuroma at her right incision line that has decreased in size and is no longer tender. Recent CT Neck unremarkable. No evidence of disease today.     Plan:  - TSH low in April. Will decrease her synthroid from 75 to 50mcg.   - Recent CT Neck in May unremarkable.   - Can start spacing out her surveillance visits to 2-3 month intervals as she's now in her second year post-treatment.   - CXR from March had some opacities of the RUL. Will plan to repeat  prior to next visit. Can order a CT chest if still with any abnormalities.     Audelia Austin, PGY-4  [facility]     HEAD & NECK CANCER SURVEILLANCE   Primary Surgical Treatment     Head & Neck Staff: Celina  Medical Oncologist: Drew   Radiation Oncologist: Jase     Primary tumor: T3N1M0 SCCa  of the supraglottis       Surgery Date: 3/2022  Induction Chemotherapy: no  Adjuvant Therapy: Radiation  Completion Date: completed 6/2022        Place date if completed   3 6 12 18 24 36 48 60   CT Neck X X 5/2023 X X X X X   PET/CT 9/2022 12/2022         CXR  X X X X X X X   TFT X  4/2023  X X X X     Current Surveillance Interval: post-tx year 1-2, q2-3 mo     Suggested imaging surveillance. Patient and tumor specific factors should always be individually considered.

## 2023-05-30 NOTE — PROGRESS NOTES
The scope used for the exam was:  Flexible scope ENF-P4  Serial Number:  1)    [id number]    []   2)    [id number]    []   3)    [id number]    []   4)    [id number]    []   5)    [id number]    []   6)    [id number]    [x]       The scope used for the exam was:  Rigid scope   Serial Number:  1)   [id number]    []   2)   [id number]    []   3)   [id number]    []   4)    3384   []   5)    0824   []   6)    5554   []     7)   7425    []   8)   2240    []   9)   1109    []

## 2023-06-22 ENCOUNTER — CLINICAL SUPPORT (OUTPATIENT)
Dept: REHABILITATION | Facility: HOSPITAL | Age: 58
End: 2023-06-22
Payer: MEDICAID

## 2023-06-22 DIAGNOSIS — I89.0 LYMPHEDEMA: Primary | ICD-10-CM

## 2023-06-22 PROCEDURE — 92507 TX SP LANG VOICE COMM INDIV: CPT

## 2023-06-22 NOTE — PROGRESS NOTES
[facility]  Speech Therapy Progress Note  Laryngectomy-Lymphedema    Date: 2023     Name: Heaven Boston   MRN: [id number]    Therapy Diagnosis:   Encounter Diagnosis   Name Primary?    Lymphedema Yes          Physician: Dr. Aparicio    PATIENT IS A NECK-BREATHER - DO NOT ORALLY INTUBATE    Time In:  0900  Time Out: 1000    Procedure Min.   Speech Language Voice Therapy  60   Total Untimed Units: 60  Charges Billed/# of units: 60/1    Precautions: Standard  Subjective    Chief Complaint: Patient with no complaints of neck tightness and swelling.     AFFECT: normal affect    Pain:   0/10  Pain Location / Description: tightness  Current Medical History: Heaven Boston is a 58 y.o. female referred by Dr. Aparicio for TEP management to maximize alaryngeal communication without respiratory compromise and lymphedema management.     Past Medical History:   Past Medical History:   Diagnosis Date    Cancer     Laryngeal    Diabetes mellitus     Hypertension       Heaven Boston  has a past surgical history that includes pr removal of larynx (2022);  section; Direct diagnostic laryngoscopy with bronchoscopy and esophagoscopy (N/A, 3/3/2023); and Colonoscopy (N/A, 5/15/2023).  Medical Hx and Allergies: Heaven has a current medication list which includes the following prescription(s): atorvastatin, carbamazepine, cetirizine, duloxetine, fluticasone propionate, folic acid, levothyroxine, metformin, metoprolol succinate, montelukast, and nifedipine, and the following Facility-Administered Medications: dextrose 10%, dextrose 10%, diphenhydramine, droperidol, hydromorphone, insulin aspart u-100, insulin aspart u-100, lactated ringers, lidocaine (pf) 10 mg/ml (1%), lidocaine (pf) 10 mg/ml (1%), lidocaine hcl 20 mg/ml (2%), metoclopramide hcl, midazolam, oxycodone, silver nitrate applicators, and triamcinolone acetonide. Review of patient's allergies indicates:  No Known Allergies    Current  Voice Function: currently using alaryngeal communication via voice prosthesis   Current Level of Swallow Function/Complaints:  Pt does not report dysphagia  Prior Therapy:  05/25/2023    OBJECTIVE   TEP Current Status:17FR, 8mm Aquino placed on 03/21/2023    TEP Patient Complaints: Good voicing noted throughout session    TEP Accessories: 10/55 fenestrated leonie tube with push to talk HMEs.     Stoma Size:  adequate     Lymphedema:  yes    Manual therapy: MLD/CDP continues for head and neck.    Measurement taken of face and neck as follows.     Facial Lymphedema Measurement Form  PRE-TREATMENT  Neck Composite   Location Right Left   Superior Circumference (A):   (just below mandible)  6 6   Middle Circumference (B):  (midway between top & bottom 5 5   Inferior Circumference (C):  (lowest circumferential location)  5 5   TOTAL: 16 16       Facial Composite   Location Right Left   1.Tragus to mental protuberance  5 5   2.Tragus to mouth angle  4 4   3.Mandibular angle to nasal wing  4 3.5   4.Mandibular angle to internal eye corner  4.5 4.5   5.Mandibular angle to external eye corner  3.5 3   6.Mental Protuberance to internal eye corner to external eye corner  4.5 4.5   7.Mandibular angle to mental protuberance 4.5 4.5   TOTALS: 30 30     POST-TREATMENT  Neck Composite   Location Right Left   Superior Circumference (A):   (just below mandible)  6 6   Middle Circumference (B):   (midway between top & bottom 5 5   Inferior Circumference (C):   (lowest circumferential location)  5 5   TOTAL: 16 16       Facial Composite   Location Right Left   1.Tragus to mental protuberance  5 5   2.Tragus to mouth angle  4 4   3.Mandibular angle to nasal wing  4 4   4.Mandibular angle to internal eye corner  4.5 4.5   5.Mandibular angle to external eye corner  3.5 3.5   6.Mental Protuberance to internal eye corner to external eye corner  4.5 4.5   7.Mandibular angle to mental protuberance 4.5 4.5   TOTALS: 30 30   Greater than a 2%  difference from pre-to post-treatment? yes         Treatment   Heaven Boston noted to tolerate pneumatic compression well this date. No measurable change noted with Neck and facial composite totals pre-and post treatment this date. SLP ended session with neck stretches, ROM and diaphragmatic breathing. Pt exhibited reduced fibrotic texture and increased neck ROM noted after intervention. Good compliance with all training. Pt requested reduced frequency to 1 time a month due to management of lymphedema. SLP to follow up on 06/27/2023. SLP continues to recommend home compression device to maintain quality of life for swallowing and alaryngeal communication despite insurance denial as lymphedema is a lifelong complication of total laryngectomy and radiation intervention.     SLP intervention is      Education: Plan of Care, role of SLP in care, and scheduling/ cancellation policy were discussed with pt. Patient expressed understanding.     Goals       LongTerm Goals:  Current Progress:   1. Patient will demonstrate understanding and implementation of long-term home management interventions as noted by improvement in lymphatic function, conduction of oral care, completion of daily exercises/stretches, and use of home management tools  Progressing towards goal     LongTerm Goals:  Current Progress:   1.  Patient will utilize alaryngeal communication via TEP to express needs and desires without respiratory compromise >90% of the time.  Progressing towards goal        Short Term Goals:  Current Progress:   1. Patient will participate in manual interventions to target improvement in alaryngeal communication function related to reduced lymphatic function Progressing towards goal   2.  Patient will completed diaphragmatic breathing exercises at an independent level.  Progressing towards goal   3.  Patient will improve alaryngeal communication to maintain voicing >90% of the time.  Progressing towards goal      Short Term  Goals:  Current Progress:   1. Patient will demonstrate care and use of HME system for maximum pulmonary benefit >90% of the time. Progressing towards goal   2.  Patient will demonstrate adequate care and use of TEP to maintain TEP voicing >90% of the time.  Progressing towards goal   3.  Patient will demonstrate adequate occlusion and cleaning of TEP to maintain voicing >90% of the time.  Progressing towards goal   4. Patient will increase laryngectomy tube usage daily or PRN to maintain and increase stomal patency.  Progressing towards goal     Ms. 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  She is currently utilizing esophageal speech via a voice prosthesis for alaryngeal communication for functional communication with her family, friends, medical providers, and others to perform her daily life activities.  She requires the use of a laryngectomy tube at all times to keep the airway patent and prevent stomal stenosis. HMEs are required daily for mucus management and pulmonary optimization .     SLP to additionally provided MLD/CDP for head and neck lymphedema to improve functional communication.    Plan      SLP intervention is warranted 1 time a month or PRN to manage lymphedema, alaryngeal communication and TEP management for communication needs for a minimum of 1 year due to the chronic nature of the impairment.     Additional Information   Ms. Boston entered with overall reduced swelling this date. All skin noted to be intact this date. Reduced swelling noted upon entering. No measurable change noted with decongestion intervention this date. Visually, patient's tissue texture noted to change after manual techniques this date.  No complaints of neck tightness this date. SLP will continue to address POC as stated with reduced frequency 1 time a month to manage lymphedema.  SLP to follow up on 7/27/2023. SLP to follow up for alaryngeal communication and TEP management as warranted for voice  prosthesis change.  SLP to educated to continued with home exercise program for neck and shoulder stretches. SLP continues to recommend home pneumatic compression device for lymphedema management despite insurance denial as lymphedema is a life long complication from head and neck surgical dissection s/p total laryngectomy and post radiation intervention.       Sunitha eJan MS, CCC-SLP   6/22/2023

## 2023-07-27 ENCOUNTER — CLINICAL SUPPORT (OUTPATIENT)
Dept: REHABILITATION | Facility: HOSPITAL | Age: 58
End: 2023-07-27
Payer: MEDICAID

## 2023-07-27 DIAGNOSIS — I89.0 LYMPHEDEMA: Primary | ICD-10-CM

## 2023-07-27 PROCEDURE — 92507 TX SP LANG VOICE COMM INDIV: CPT

## 2023-07-27 NOTE — PROGRESS NOTES
[facility]  Speech Therapy   Laryngectomy-Lymphedema    DISCHARGE SUMMARY    Date: 2023     Name: Heaven Boston   MRN: [id number]    Therapy Diagnosis:   Encounter Diagnosis   Name Primary?    Lymphedema Yes          Physician: Dr. Aparicio    PATIENT IS A NECK-BREATHER - DO NOT ORALLY INTUBATE    Time In:  0900  Time Out: 1000    Procedure Min.   Speech Language Voice Therapy  60   Total Untimed Units: 60  Charges Billed/# of units: 60/1    Precautions: Standard  Subjective    Chief Complaint: Patient with no complaints of neck tightness and swelling.     AFFECT: normal affect    Pain:   0/10  Pain Location / Description: tightness  Current Medical History: Heaven Boston is a 58 y.o. female referred by Dr. Aparicio for TEP management to maximize alaryngeal communication without respiratory compromise and lymphedema management.     Past Medical History:   Past Medical History:   Diagnosis Date    Cancer     Laryngeal    Diabetes mellitus     Hypertension       Heaven Boston  has a past surgical history that includes pr removal of larynx (2022);  section; Direct diagnostic laryngoscopy with bronchoscopy and esophagoscopy (N/A, 3/3/2023); and Colonoscopy (N/A, 5/15/2023).  Medical Hx and Allergies: Heaven has a current medication list which includes the following prescription(s): atorvastatin, carbamazepine, cetirizine, duloxetine, fluticasone propionate, folic acid, levothyroxine, metformin, metoprolol succinate, montelukast, and nifedipine, and the following Facility-Administered Medications: dextrose 10%, dextrose 10%, diphenhydramine, droperidol, hydromorphone, insulin aspart u-100, insulin aspart u-100, lactated ringers, lidocaine (pf) 10 mg/ml (1%), lidocaine (pf) 10 mg/ml (1%), lidocaine hcl 20 mg/ml (2%), metoclopramide hcl, midazolam, oxycodone, silver nitrate applicators, and triamcinolone acetonide. Review of patient's allergies indicates:  No Known  Allergies    Current Voice Function: currently using alaryngeal communication via voice prosthesis   Current Level of Swallow Function/Complaints:  Pt does not report dysphagia  Prior Therapy:  06/22/2023    OBJECTIVE   TEP Current Status:17FR, 8mm Aquino placed on 03/21/2023    TEP Patient Complaints: Good voicing noted throughout session    TEP Accessories: 10/55 fenestrated leonie tube with push to talk HMEs.     Stoma Size:  adequate     Lymphedema:  yes    Manual therapy: MLD/CDP continues for head and neck.    Measurement taken of face and neck as follows.     Facial Lymphedema Measurement Form  PRE-TREATMENT  Neck Composite   Location Right Left   Superior Circumference (A):   (just below mandible)  5.5 5.5   Middle Circumference (B):  (midway between top & bottom 5 4.5   Inferior Circumference (C):  (lowest circumferential location)  4.75 4.5   TOTAL: 16 16       Facial Composite   Location Right Left   1.Tragus to mental protuberance  5 5   2.Tragus to mouth angle  4 3.75   3.Mandibular angle to nasal wing  4 3.75   4.Mandibular angle to internal eye corner  4.5 4.5   5.Mandibular angle to external eye corner  3.5 3.5   6.Mental Protuberance to internal eye corner to external eye corner  4 4.25   7.Mandibular angle to mental protuberance 4 4   TOTALS: 29 28.75     POST-TREATMENT  Neck Composite   Location Right Left   Superior Circumference (A):   (just below mandible)  4.5 5   Middle Circumference (B):   (midway between top & bottom 4.5 4.25   Inferior Circumference (C):   (lowest circumferential location)  4.5 4   TOTAL: 13.5 13.25       Facial Composite   Location Right Left   1.Tragus to mental protuberance  5 5   2.Tragus to mouth angle  4 4   3.Mandibular angle to nasal wing  4 4   4.Mandibular angle to internal eye corner  4.5 4.5   5.Mandibular angle to external eye corner  3.5 3.5   6.Mental Protuberance to internal eye corner to external eye corner  4.5 4.5   7.Mandibular angle to mental protuberance  4.5 4.5   TOTALS: 30 30   Greater than a 2% difference from pre-to post-treatment? yes         Treatment   Heavenondina Malloryl noted to tolerate pneumatic compression well this date. Good measurable change noted with Neck and facial composite totals noted pre-and post treatment this date. Facial decrease of 2.5 inches on R and 2.75 inches on L. No measurable change noted with facial composite. SLP ended session with neck stretches, ROM and diaphragmatic breathing. Pt exhibited reduced fibrotic texture and increased neck ROM noted after intervention. Good compliance with all training. SLP to DC as of this date due good management of lymphedema without intervention. SLP continues to recommend home compression device to maintain quality of life for swallowing and alaryngeal communication despite insurance denial as lymphedema is a lifelong complication of total laryngectomy and radiation intervention.     SLP to continue intervention to address alaryngeal communication and voice prosthesis.     Education: Plan of Care, role of SLP in care, and scheduling/ cancellation policy were discussed with pt. Patient expressed understanding.     Goals       LongTerm Goals:  Current Progress:   1. Patient will demonstrate understanding and implementation of long-term home management interventions as noted by improvement in lymphatic function, conduction of oral care, completion of daily exercises/stretches, and use of home management tools  Discontinue     LongTerm Goals:  Current Progress:   1.  Patient will utilize alaryngeal communication via TEP to express needs and desires without respiratory compromise >90% of the time.  Progressing towards goal        Short Term Goals:  Current Progress:   1. Patient will participate in manual interventions to target improvement in alaryngeal communication function related to reduced lymphatic function Goal Met   2.  Patient will completed diaphragmatic breathing exercises at an independent  level.  Goal Met   3.  Patient will improve alaryngeal communication to maintain voicing >90% of the time.  Goal Met      Short Term Goals:  Current Progress:   1. Patient will demonstrate care and use of HME system for maximum pulmonary benefit >90% of the time. Progressing towards goal   2.  Patient will demonstrate adequate care and use of TEP to maintain TEP voicing >90% of the time.  Progressing towards goal   3.  Patient will demonstrate adequate occlusion and cleaning of TEP to maintain voicing >90% of the time.  Progressing towards goal   4. Patient will increase laryngectomy tube usage daily or PRN to maintain and increase stomal patency.  Progressing towards goal     Ms. 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  She is currently utilizing esophageal speech via a voice prosthesis for alaryngeal communication for functional communication with her family, friends, medical providers, and others to perform her daily life activities.  She requires the use of a laryngectomy tube at all times to keep the airway patent and prevent stomal stenosis. HMEs are required daily for mucus management and pulmonary optimization .     SLP to additionally provided MLD/CDP for head and neck lymphedema to improve functional communication.    Plan      SLP to DC from lymphedema intervention at this time. Continue intervention for prosthesis management and alaryngeal communication as warranted to address communication needs for a minimum of 1 year due to the chronic nature of the impairment.     Additional Information   Ms. Boston entered with overall reduced swelling this date. All skin noted to be intact this date. Reduced swelling noted upon entering. Good measurable change noted with decongestion intervention this date. Visually, patient's tissue texture noted to change after manual techniques this date.  No complaints of neck tightness this date. SLP will continue to address POC as stated with reduced  frequency 1 time a month to manage lymphedema.  SLP to discharge lymphedema intervention as of this date. SLP to follow up for alaryngeal communication and TEP management as warranted for voice prosthesis change.  SLP to educated to continued with home exercise program for neck and shoulder stretches. SLP continues to recommend home pneumatic compression device for lymphedema management despite insurance denial as lymphedema is a life long complication from head and neck surgical dissection s/p total laryngectomy and post radiation intervention.       Sunitha Jean MS, CCC-SLP   7/27/2023

## 2023-07-29 DIAGNOSIS — E53.8 FOLIC ACID DEFICIENCY: ICD-10-CM

## 2023-07-31 RX ORDER — FOLIC ACID 1 MG/1
TABLET ORAL
Qty: 30 TABLET | Refills: 0 | Status: SHIPPED | OUTPATIENT
Start: 2023-07-31 | End: 2023-08-29

## 2023-08-08 RX ORDER — CARBAMAZEPINE 200 MG/1
200 TABLET, EXTENDED RELEASE ORAL 2 TIMES DAILY
Qty: 60 TABLET | Refills: 2 | OUTPATIENT
Start: 2023-08-08

## 2023-08-15 ENCOUNTER — OFFICE VISIT (OUTPATIENT)
Dept: OTOLARYNGOLOGY | Facility: CLINIC | Age: 58
End: 2023-08-15
Payer: MEDICAID

## 2023-08-15 VITALS
SYSTOLIC BLOOD PRESSURE: 119 MMHG | DIASTOLIC BLOOD PRESSURE: 69 MMHG | TEMPERATURE: 98 F | WEIGHT: 131 LBS | HEART RATE: 79 BPM | BODY MASS INDEX: 23.96 KG/M2

## 2023-08-15 DIAGNOSIS — Z90.02 H/O LARYNGECTOMY: ICD-10-CM

## 2023-08-15 DIAGNOSIS — Z85.21 HISTORY OF LARYNGEAL CANCER: Primary | ICD-10-CM

## 2023-08-15 DIAGNOSIS — G50.0 TRIGEMINAL NEURALGIA: ICD-10-CM

## 2023-08-15 PROCEDURE — 31575 DIAGNOSTIC LARYNGOSCOPY: CPT | Mod: PBBFAC | Performed by: STUDENT IN AN ORGANIZED HEALTH CARE EDUCATION/TRAINING PROGRAM

## 2023-08-15 PROCEDURE — 99214 OFFICE O/P EST MOD 30 MIN: CPT | Mod: PBBFAC,25

## 2023-08-15 RX ORDER — CARBAMAZEPINE 200 MG/1
200 TABLET, EXTENDED RELEASE ORAL 2 TIMES DAILY
Qty: 60 TABLET | Refills: 0 | Status: SHIPPED | OUTPATIENT
Start: 2023-08-15 | End: 2023-11-28 | Stop reason: SDUPTHER

## 2023-08-15 NOTE — PROGRESS NOTES
The scope used for the exam was:  Flexible scope ENF-P4  Serial Number:  1)    [id number]    []   2)    [id number]    []   3)    [id number]    []   4)    [id number]    []   5)    [id number]    [x]   6)    [id number]    []       The scope used for the exam was:  Rigid scope   Serial Number:  1)   [id number]    []   2)   [id number]    []   3)   [id number]    []   4)    3384   []   5)    0824   []   6)    5554   []     7)   7425    []   8)   2240    []   9)   1109    []

## 2023-08-15 NOTE — PROGRESS NOTES
Patient Name: Heaven Boston   YOB: 1965     Chief Complaint: No chief complaint on file.       History of Present Illness:  Heaven Boston is a 57 y.o. 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 Patient had an uneventful hospitalization course and was discharged on postoperative day 9 after an esophagram showed no pharyngeal leak.  However she re-presented to our facility on 4/6/22 with surgical site infection and a pharyngocutaneous fistula. Patient underwent a neck washout with primary repair on 4/11/2022, with placement of a gastrostomy tube the same day. She was maintained on NPO and transferred to [facility] for further evaluation and management on 4/15/22 after evidence of persistent pharyngocutaneous fistula. There she underwent a 2nd neck washout with primary closure and placement of salivary bypass tube.      5/2/22: Patient had an uneventful hospitalization course and removal of salivary bypass tube before discharge last week and now presenting for postoperative evaluation.  Patient presents today without any complaints.  She is wondering about when he pointed that her x-rays for swallowing then.  She seen Dr. Laguna today after our visit to undergo-stimulation for radiation.     5/9/22: Pt. Did not answer. Let voicemail to advance diet to CLD. Also discussed this with son.  Plan to see in clinic in 2 weeks     5/19/22: In radiation therapy, 5x weekly until 6/16/22. Has TEP in place, brought another TEP requesting swap out. Will go to speech therapy for exchange of TEP. Went to ER for bleeding from G-tube site 1 week ago but declines visit to Gen Surg clinic today. On [facility]D taking Ensure by G-tube, water and soups by mouth. Had questions about advancing diet. No weight loss  or appetite change. No other issues.      6/21/22:  Returns today for follow up.  Completed radiation last week 6/16/22.  Is having difficulty voicing with TEP though it has been swapped out by SLP.  Able to eat what she wants.  Having some tightness of her throat and soreness, otherwise no issues.     7/21/22: Here today for follow up. Has overall been doing very well. She is tolerating her diet by mouth and gaining weight. No dysphagia. Has not used her PEG tube since prior to radiation. She is interested in having it removed. Has some fullness in the left neck which intermittently swells and then resolves again. Otherwise no new lumps/bumps of the head and neck.      8/23/22: Ms. Boston returns today for follow up. She complains of neck pain exacerbated by flexion and movement to the right that began 2 weeks ago. She also reports neck swelling and tightness which occasionally increases in size and is tender to palpation. She reports cough with clear mucus production as well as nasal congestion without drainage. She also notes dry, itchy eyes that have not improved with Visine. She also notes dysuria. She is tolerating her diet by mouth and gaining weight. She had her PEG tube removed on 8/16/22. She has been to SLP twice and is still having difficulty voicing with TEP.     9/27/22: Here for follow up. Reports she is doing okay. Still coughing a lot and not voicing well with TEP. No weight loss, tolerating diet. No otalgia/lumps bumps. Still having intermittent neck swelling/pain.      10/27/22:  Returns for surveillance.  Did not get TSH, T4.  Has been going to SLP, now can talk well.  Cough has improved.  No new H&N complaints     12/22/22: Doing very well. Voicing well. No pain. Doing well with speech and lymphedema therapy. Eating well, gaining weight constantly. Always tired. Sometimes sleeps all day.    February 14, 2023:   58-year-old female presents today for follow-up of her laryngeal squamous cell  carcinoma and postoperative hypothyroidism.  In regards to her squamous cell carcinoma of the larynx she is doing well.  She has no complaints of any pain.  He is not noticed any swelling in her neck.  She is not have any difficulty eating or swallowing in his indicated she is gaining weight.  She continues to use her TEP without problems and does not have any leakage around her TEP.  She did have a follow-up PET-CT scan done on December 30, 2022, in the report indicated that there was no evidence of new or progressive hypermetabolic metastatic disease.  On reviewing those images, however, there does appear to be an area of increased FDG activity in the upper cervical esophagus in the area above her trachea stoma and possibly at the site of the TEP.  This was also present on a PET-CT scan done on October 12, 2022 and there does not appear debris any change in this area between the 2 scans.  The report on the scan done on October 12, 2022, had indicated this area had an SUV max of 4.4 without any apparent lesions noted on the corresponding CT.  In regards to her hypothyroidism she continues to take levothyroxine 88 mcg daily.  Lab work from February 6, 2023, showed her TSH level be low at 0.115 with a normal free T4 of 1.48.  Her prior TSH from October 27, 2022, was elevated at 55.1672.     3/30/23:  Patient routine follow-up for squamous cell carcinoma and postop hypothyroidism.  She denies any new lumps, bumps hemoptysis or weight loss.  She is able to swallow without difficulty, TEP functioning well with excellent speech.  She has postop hypothyroidism, does complain of some weakness.  She did have a change in her blood pressure medicine in that it was increased and she felt some of her fatigue might have been related to it.  She currently has blood work ordered by her PCP including thyroid function and she is going to get it next week, we will schedule a TeleMed follow-up.  Duloxetine 30 mg helps her sleep and  helps her neck pain.     23: Here today for cancer surveillance. Recently had an add on appt with Dr. Aparicio for a new bump on her neck that appeared to be a neuroma. He injected it with lidocaine and she reports that the bump has decreased in size and is no longer painful. She also recently got her CT neck. She denies any new issues. She's eating and drinking well and denies any dysphagia, odynophagia, ear pain or new lumps/bumps.     8/15/23:  Here today for cancer surveillance.  She has no complaints today and is feeling well.  No weight loss.  No hemoptysis.  No new ear pain or voice changes.  No new lumps or bumps.  No nonhealing oral ulcers.  Eating and drinking well.  No throat pain.    Past Medical History:  Past Medical History:   Diagnosis Date    Cancer     Laryngeal    Diabetes mellitus     Hypertension      Past Surgical History:   Procedure Laterality Date     SECTION      AR REMOVAL OF LARYNX  2022       Review of Systems:  Unremarkable except as mentioned above.    Current Medications:  Current Outpatient Medications   Medication Sig    atorvastatin (LIPITOR) 80 MG tablet Take 1 tablet (80 mg total) by mouth every evening.    azelastine (ASTELIN) 137 mcg (0.1 %) nasal spray 1 spray by Nasal route.    carBAMazepine (CARBATROL) 200 MG CM12 Take 200 mg by mouth.    carBAMazepine (TEGRETOL XR) 200 MG 12 hr tablet Take 200 mg by mouth 2 (two) times daily.    cetirizine (ZYRTEC) 10 MG tablet Take 10 mg by mouth once daily.    DULoxetine (CYMBALTA) 30 MG capsule Take 30 mg by mouth 2 (two) times daily.    fluticasone propionate (FLONASE) 50 mcg/actuation nasal spray 1 spray by Each Nostril route 2 (two) times daily.    fluticasone-salmeterol diskus inhaler 100-50 mcg Inhale 1 puff into the lungs every 12 (twelve) hours.    levothyroxine (SYNTHROID) 88 MCG tablet Take 1 tablet (88 mcg total) by mouth before breakfast.    loratadine (CLARITIN) 10 mg tablet Take 1 tablet (10 mg total) by  "mouth once daily.    metFORMIN (GLUCOPHAGE) 500 MG tablet Take 1 tablet (500 mg total) by mouth once daily.    metoprolol succinate (TOPROL-XL) 25 MG 24 hr tablet Take 0.5 tablets (12.5 mg total) by mouth once daily.    montelukast (SINGULAIR) 5 MG chewable tablet Take 1 tablet (5 mg total) by mouth every evening.    NIFEdipine (PROCARDIA-XL) 60 MG (OSM) 24 hr tablet Take 1 tablet (60 mg total) by mouth once daily.    rivaroxaban (XARELTO) 20 mg Tab Take 1 tablet (20 mg total) by mouth daily with dinner or evening meal.    aspirin (ECOTRIN) 81 MG EC tablet Take 1 tablet (81 mg total) by mouth once daily.    HYDROcodone-acetaminophen 5-300 mg Tab Take 1 tablet by mouth every 8 (eight) hours as needed.    ibuprofen (ADVIL,MOTRIN) 800 MG tablet Take 800 mg by mouth every 6 (six) hours as needed.    omeprazole (PRILOSEC) 40 MG capsule Take 40 mg by mouth once daily.    prednisoLONE acetate (PRED FORTE) 1 % DrpS Place into both eyes.     Current Facility-Administered Medications   Medication    silver nitrate applicators applicator 1 applicator        Allergies:  Review of patient's allergies indicates:  No Known Allergies     Physical Exam:  Vital signs:   Vitals:    02/14/23 1102   BP: 129/75   BP Location: Right arm   Patient Position: Sitting   BP Method: Medium (Automatic)   Pulse: 83   Resp: 16   Temp: 99 °F (37.2 °C)   TempSrc: Oral   Weight: 59.4 kg (131 lb)   Height: 5' 2" (1.575 m)   General:  Well-developed well-nourished female in no acute distress.  Patient does communicate well with clear speech using her TEP.  Head and face:  Normocephalic.  No facial lesions.  No temporomandibular joint tenderness or click.  Ears:  Right ear-auricle is normally developed.  External auditory canal is clear.  Tympanic membrane is nonerythematous.  No middle ear effusion.  Left ear-auricle is normally developed.  External auditory canal is clear.  Tympanic membrane is nonerythematous.  No middle ear effusion.  Nose:  Nasal " dorsum is unremarkable.  No significant septal deviation.  No significant intranasal congestion.  Secretions are clear.  Oral cavity and oropharynx:  Tongue and floor mouth are without lesions.  Mucosa is moist.  No pharyngeal erythema or exudates.  No oropharyngeal masses.  No tonsillar hypertrophy.  Neck:  Supple without palpable adenopathy.  She does have some induration related to her surgery and postradiation changes.  Trachea stoma is patent.  TEP is in place without any gross drainage from around the prosthesis.  No granulation is noted. Small 3mm firm nodule at right apron incision that is nontender  Eyes:  Extraocular muscles intact.  No nystagmus.  No exophthalmos or enophthalmos.  Neurologic:  Alert and oriented.  Cranial nerves 2-12 are grossly normal.    Procedure note: Flexible tracheoscopy, nasopharyngoscopy  The flexible fiberoptic laryngoscope was introduced into the right nostril and advanced. Examination of the nose showed the above mentioned findings. Examination of the nasopharynx showed no nasopharyngeal masses or eustachian tube obstruction. Examination of the base of tongue showed no masses or lesions. Examination of the hypopharynx showed no masses or lesions in the neopharynx or hypopharynx.  The esophageal inlet is without apparent lesions.  No evidence of fungal disease. The scope was passed in the stoma and the trachea was clear down to the aleja.     Assessment/Plan:  Heaven Boston is a 57 y.o. female with T3N1M0 supraglottic SCCa s/p TL BSND CPM complicated by PCF s/p washout & primary closure x2 (2nd in [place]) now s/p adjuvant RT. One of her post-treatment PETS noted hypermetabolism at the site of her TEP. She was taken for a DL on 3/3/23 which was unremarkable. Doing well today. Has a small neuroma at her right incision line that has decreased in size and is no longer tender. Recent CT Neck unremarkable. No evidence of disease today.     Plan:  - Recent CT Neck in May  unremarkable. Recommend ordering 2 year CT for surveillance around May of 2024.   - CXR from March had some opacities of the RUL. Will plan to repeat prior to next visit. Can order a CT chest if still with any abnormalities.   - RTC 2-3 months. Will order new TSH at this visit.     Shelby Marcos MD  [facility]      HEAD & NECK CANCER SURVEILLANCE   Primary Surgical Treatment     Head & Neck Staff: Celina  Medical Oncologist: Drew   Radiation Oncologist: Jase     Primary tumor: T3N1M0 SCCa  of the supraglottis       Surgery Date: 3/2022  Induction Chemotherapy: no  Adjuvant Therapy: Radiation  Completion Date: completed 6/2022        Place date if completed   3 6 12 18 24 36 48 60   CT Neck X X 5/2023 X X X X X   PET/CT 9/2022 12/2022         CXR  X X X X X X X   TFT X  4/2023  X X X X     Current Surveillance Interval: post-tx year 1-2, q2-3 mo     Suggested imaging surveillance. Patient and tumor specific factors should always be individually considered.

## 2023-08-28 DIAGNOSIS — E53.8 FOLIC ACID DEFICIENCY: ICD-10-CM

## 2023-08-29 RX ORDER — FOLIC ACID 1 MG/1
TABLET ORAL
Qty: 30 TABLET | Refills: 0 | Status: SHIPPED | OUTPATIENT
Start: 2023-08-29 | End: 2024-01-04

## 2023-09-07 ENCOUNTER — HOSPITAL ENCOUNTER (OUTPATIENT)
Dept: RADIOLOGY | Facility: HOSPITAL | Age: 58
Discharge: HOME | End: 2023-09-07
Attending: STUDENT IN AN ORGANIZED HEALTH CARE EDUCATION/TRAINING PROGRAM
Payer: MEDICAID

## 2023-09-07 DIAGNOSIS — Z90.02 H/O LARYNGECTOMY: ICD-10-CM

## 2023-09-07 DIAGNOSIS — Z85.21 HISTORY OF LARYNGEAL CANCER: ICD-10-CM

## 2023-09-07 PROCEDURE — 71046 X-RAY EXAM CHEST 2 VIEWS: CPT | Mod: TC

## 2023-09-15 ENCOUNTER — OFFICE VISIT (OUTPATIENT)
Dept: FAMILY MEDICINE | Facility: CLINIC | Age: 58
End: 2023-09-15
Payer: MEDICAID

## 2023-09-15 VITALS
OXYGEN SATURATION: 99 % | DIASTOLIC BLOOD PRESSURE: 68 MMHG | HEART RATE: 85 BPM | WEIGHT: 133 LBS | SYSTOLIC BLOOD PRESSURE: 121 MMHG | BODY MASS INDEX: 24.48 KG/M2 | RESPIRATION RATE: 18 BRPM | HEIGHT: 62 IN | TEMPERATURE: 98 F

## 2023-09-15 DIAGNOSIS — E11.9 TYPE 2 DIABETES MELLITUS WITHOUT COMPLICATION, WITHOUT LONG-TERM CURRENT USE OF INSULIN: Primary | ICD-10-CM

## 2023-09-15 DIAGNOSIS — Z12.31 ENCOUNTER FOR SCREENING MAMMOGRAM FOR MALIGNANT NEOPLASM OF BREAST: ICD-10-CM

## 2023-09-15 DIAGNOSIS — B37.9 YEAST INFECTION: ICD-10-CM

## 2023-09-15 DIAGNOSIS — E78.5 HYPERLIPIDEMIA, UNSPECIFIED HYPERLIPIDEMIA TYPE: ICD-10-CM

## 2023-09-15 DIAGNOSIS — Z01.419 WELL WOMAN EXAM: ICD-10-CM

## 2023-09-15 DIAGNOSIS — I10 HYPERTENSION, UNSPECIFIED TYPE: ICD-10-CM

## 2023-09-15 DIAGNOSIS — Z90.02 H/O LARYNGECTOMY: ICD-10-CM

## 2023-09-15 DIAGNOSIS — Z23 NEEDS FLU SHOT: ICD-10-CM

## 2023-09-15 DIAGNOSIS — Z92.3 HISTORY OF RADIATION TO HEAD AND NECK REGION: ICD-10-CM

## 2023-09-15 DIAGNOSIS — B37.81 ESOPHAGEAL CANDIDIASIS: ICD-10-CM

## 2023-09-15 DIAGNOSIS — J30.2 SEASONAL ALLERGIES: ICD-10-CM

## 2023-09-15 DIAGNOSIS — E03.9 HYPOTHYROIDISM, UNSPECIFIED TYPE: ICD-10-CM

## 2023-09-15 DIAGNOSIS — Z85.21 HISTORY OF LARYNGEAL CANCER: ICD-10-CM

## 2023-09-15 PROCEDURE — 1160F PR REVIEW ALL MEDS BY PRESCRIBER/CLIN PHARMACIST DOCUMENTED: ICD-10-PCS | Mod: CPTII,,, | Performed by: FAMILY MEDICINE

## 2023-09-15 PROCEDURE — 1159F PR MEDICATION LIST DOCUMENTED IN MEDICAL RECORD: ICD-10-PCS | Mod: CPTII,,, | Performed by: FAMILY MEDICINE

## 2023-09-15 PROCEDURE — 3078F PR MOST RECENT DIASTOLIC BLOOD PRESSURE < 80 MM HG: ICD-10-PCS | Mod: CPTII,,, | Performed by: FAMILY MEDICINE

## 2023-09-15 PROCEDURE — 90686 IIV4 VACC NO PRSV 0.5 ML IM: CPT | Mod: PBBFAC

## 2023-09-15 PROCEDURE — 3074F SYST BP LT 130 MM HG: CPT | Mod: CPTII,,, | Performed by: FAMILY MEDICINE

## 2023-09-15 PROCEDURE — 99214 OFFICE O/P EST MOD 30 MIN: CPT | Mod: S$PBB,,, | Performed by: FAMILY MEDICINE

## 2023-09-15 PROCEDURE — 3008F PR BODY MASS INDEX (BMI) DOCUMENTED: ICD-10-PCS | Mod: CPTII,,, | Performed by: FAMILY MEDICINE

## 2023-09-15 PROCEDURE — 3078F DIAST BP <80 MM HG: CPT | Mod: CPTII,,, | Performed by: FAMILY MEDICINE

## 2023-09-15 PROCEDURE — 1160F RVW MEDS BY RX/DR IN RCRD: CPT | Mod: CPTII,,, | Performed by: FAMILY MEDICINE

## 2023-09-15 PROCEDURE — 99214 PR OFFICE/OUTPT VISIT, EST, LEVL IV, 30-39 MIN: ICD-10-PCS | Mod: S$PBB,,, | Performed by: FAMILY MEDICINE

## 2023-09-15 PROCEDURE — 90471 IMMUNIZATION ADMIN: CPT | Mod: PBBFAC

## 2023-09-15 PROCEDURE — 3008F BODY MASS INDEX DOCD: CPT | Mod: CPTII,,, | Performed by: FAMILY MEDICINE

## 2023-09-15 PROCEDURE — 99214 OFFICE O/P EST MOD 30 MIN: CPT | Mod: PBBFAC | Performed by: FAMILY MEDICINE

## 2023-09-15 PROCEDURE — 3074F PR MOST RECENT SYSTOLIC BLOOD PRESSURE < 130 MM HG: ICD-10-PCS | Mod: CPTII,,, | Performed by: FAMILY MEDICINE

## 2023-09-15 PROCEDURE — 1159F MED LIST DOCD IN RCRD: CPT | Mod: CPTII,,, | Performed by: FAMILY MEDICINE

## 2023-09-15 RX ORDER — CETIRIZINE HYDROCHLORIDE 10 MG/1
10 TABLET ORAL DAILY
Qty: 90 TABLET | Refills: 1 | Status: SHIPPED | OUTPATIENT
Start: 2023-09-15 | End: 2024-02-28

## 2023-09-15 RX ORDER — FLUTICASONE PROPIONATE 50 MCG
1 SPRAY, SUSPENSION (ML) NASAL 2 TIMES DAILY
Qty: 16 G | Refills: 1 | Status: SHIPPED | OUTPATIENT
Start: 2023-09-15

## 2023-09-15 RX ORDER — DULOXETIN HYDROCHLORIDE 30 MG/1
30 CAPSULE, DELAYED RELEASE ORAL 2 TIMES DAILY
Qty: 60 CAPSULE | Refills: 1 | Status: SHIPPED | OUTPATIENT
Start: 2023-09-15 | End: 2024-02-02 | Stop reason: SDUPTHER

## 2023-09-15 RX ADMIN — INFLUENZA VIRUS VACCINE 0.5 ML: 15; 15; 15; 15 SUSPENSION INTRAMUSCULAR at 09:09

## 2023-09-15 NOTE — PROGRESS NOTES
"  Patient Name: Heaven Boston   : 1965  MRN: [id number]     SUBJECTIVE:  Heaven Boston is a 58 y.o. female here for Follow-up  .    HPI  Here for routine follow up.  Feels well, no complaints.  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 Follows with ENT. Saw them last month. Having regular imaging for surveillance by them.  She is also going to have thyroid rechecked by them next visit.    Diabetes well-controlled on metformin only  HTN well controlled.    Also, cologuard was positive- had colonoscopy done in May. Repeat in 5 years from their recommendations.      ALLERGIES: Review of patient's allergies indicates:  No Known Allergies      ROS:  Review of Systems   Constitutional:  Negative for chills and fever.   HENT:  Negative for congestion.    Eyes:  Negative for blurred vision.   Respiratory:  Negative for cough and shortness of breath.    Cardiovascular:  Negative for chest pain, palpitations and leg swelling.   Gastrointestinal:  Negative for abdominal pain, nausea and vomiting.   Genitourinary:  Negative for dysuria and hematuria.   Neurological:  Negative for dizziness and headaches.   Psychiatric/Behavioral:  Negative for depression. The patient is not nervous/anxious.          OBJECTIVE:  Vital signs  Vitals:    09/15/23 0855   BP: 121/68   Pulse: 85   Resp: 18   Temp: 98.2 °F (36.8 °C)   TempSrc: Oral   SpO2: 99%   Weight: 60.3 kg (133 lb)   Height: 5' 2" (1.575 m)      Body mass index is 24.33 kg/m².    PHYSICAL EXAM:   Physical Exam  Vitals reviewed.   Constitutional:       General: She is not in acute distress.     Appearance: Normal appearance. She is not ill-appearing.   HENT:      Head: Normocephalic and atraumatic.      Right Ear: External ear normal.      Left Ear: External ear normal.      Nose: Nose normal.      Mouth/Throat:      Mouth: Mucous membranes are moist.   Eyes:      General: No scleral icterus.        Right eye: No discharge.    "      Left eye: No discharge.      Conjunctiva/sclera: Conjunctivae normal.      Pupils: Pupils are equal, round, and reactive to light.   Neck:      Comments: TEP in place  Cardiovascular:      Rate and Rhythm: Normal rate and regular rhythm.   Pulmonary:      Effort: Pulmonary effort is normal. No respiratory distress.      Breath sounds: No wheezing, rhonchi or rales.   Abdominal:      General: Bowel sounds are normal. There is no distension.      Palpations: Abdomen is soft.      Tenderness: There is no abdominal tenderness.   Musculoskeletal:         General: Normal range of motion.      Cervical back: Normal range of motion. No rigidity.      Right lower leg: No edema.      Left lower leg: No edema.   Skin:     General: Skin is warm.      Findings: No rash.   Neurological:      General: No focal deficit present.      Mental Status: She is alert and oriented to person, place, and time.   Psychiatric:         Mood and Affect: Mood normal.         Behavior: Behavior normal.          ASSESSMENT/PLAN:  1. Type 2 diabetes mellitus without complication, without long-term current use of insulin  -     Hemoglobin A1C; Future; Expected date: 09/15/2023  -     Microalbumin/Creatinine Ratio, Urine; Future; Expected date: 09/15/2023    2. Hypertension, unspecified type    3. Hyperlipidemia, unspecified hyperlipidemia type    4. Seasonal allergies  -     fluticasone propionate (FLONASE) 50 mcg/actuation nasal spray; 1 spray (50 mcg total) by Each Nostril route 2 (two) times daily.  Dispense: 16 g; Refill: 1  -     cetirizine (ZYRTEC) 10 MG tablet; Take 1 tablet (10 mg total) by mouth once daily.  Dispense: 90 tablet; Refill: 1    5. Encounter for screening mammogram for malignant neoplasm of breast  -     Mammo Digital Screening Bilat w/ Levi; Future; Expected date: 09/15/2023    6. Needs flu shot  -     influenza (QUADRIVALENT PF) vaccine 0.5 mL    7. Well woman exam  -     Ambulatory referral/consult to Gynecology; Future;  Expected date: 09/22/2023       PLAN  - history of well-controlled diabetes.  Will need updated A1c and microalbuminuria.  In the meantime continue with metformin 500 mg daily.  Patient states she has enough refills available.  -well-controlled hypertension.  Continue with Toprol 12.5 mg daily and nifedipine 60 mg daily.  Continue to follow up with Cardiology as well.  -Flonase and Zyrtec helping with allergies.  Well-controlled.  Continue with them.  -mammogram ordered.  Flu shot given.  Refer to gynecology for regular well-woman exams.  Going to be due for Pap smear soon.  -continue to follow up with ENT      Previous medical history/lab work/radiology reviewed and considered during medical management decisions.   Medication list reviewed and medication reconciliation performed.  Patient was provided  and care about his/her current diagnosis (es) and medications including risk/benefit and side effects/adverse events, over the counter medication uses/doses, home self-care and contact precautions,  and red flags and indications for when to seek immediate medical attention.   Patient was advised to continue compliance with current medication list and medical recommendations.  Recommended/ Advised continued compliance with recommended eating habits/ diets for medical conditions and exercise 150 minutes/ week (if possible) for medical condition (s).        RESULTS:  No results found for this or any previous visit (from the past 1008 hour(s)).      Follow Up:  Follow up in about 6 months (around 3/15/2024).     [unfilled]    This note was created with the assistance of a voice recognition software or phone dictation. There may be transcription errors as a result of using this technology however minimal. Effort has been made to assure accuracy of transcription but any obvious errors or omissions should be clarified with the author of the document

## 2023-10-13 ENCOUNTER — HOSPITAL ENCOUNTER (OUTPATIENT)
Dept: RADIOLOGY | Facility: HOSPITAL | Age: 58
Discharge: HOME | End: 2023-10-13
Attending: FAMILY MEDICINE
Payer: MEDICAID

## 2023-10-13 DIAGNOSIS — Z12.31 ENCOUNTER FOR SCREENING MAMMOGRAM FOR MALIGNANT NEOPLASM OF BREAST: ICD-10-CM

## 2023-10-13 PROCEDURE — 77067 SCR MAMMO BI INCL CAD: CPT | Mod: 26,,, | Performed by: RADIOLOGY

## 2023-10-13 PROCEDURE — 77063 MAMMO DIGITAL SCREENING BILAT WITH TOMO: ICD-10-PCS | Mod: 26,,, | Performed by: RADIOLOGY

## 2023-10-13 PROCEDURE — 77067 MAMMO DIGITAL SCREENING BILAT WITH TOMO: ICD-10-PCS | Mod: 26,,, | Performed by: RADIOLOGY

## 2023-10-13 PROCEDURE — 77063 BREAST TOMOSYNTHESIS BI: CPT | Mod: 26,,, | Performed by: RADIOLOGY

## 2023-10-13 PROCEDURE — 77067 SCR MAMMO BI INCL CAD: CPT | Mod: TC

## 2023-11-06 ENCOUNTER — LAB VISIT (OUTPATIENT)
Dept: LAB | Facility: HOSPITAL | Age: 58
End: 2023-11-06
Attending: FAMILY MEDICINE
Payer: MEDICAID

## 2023-11-06 DIAGNOSIS — E11.9 TYPE 2 DIABETES MELLITUS WITHOUT COMPLICATION, WITHOUT LONG-TERM CURRENT USE OF INSULIN: ICD-10-CM

## 2023-11-06 DIAGNOSIS — E78.01 FAMILIAL HYPERCHOLESTEROLEMIA: ICD-10-CM

## 2023-11-06 LAB
CHOLEST SERPL-MCNC: 198 MG/DL
CHOLEST/HDLC SERPL: 5 {RATIO} (ref 0–5)
EST. AVERAGE GLUCOSE BLD GHB EST-MCNC: 131.2 MG/DL
HBA1C MFR BLD: 6.2 %
HDLC SERPL-MCNC: 43 MG/DL (ref 35–60)
LDLC SERPL CALC-MCNC: 132 MG/DL (ref 50–140)
TRIGL SERPL-MCNC: 114 MG/DL (ref 37–140)
VLDLC SERPL CALC-MCNC: 23 MG/DL

## 2023-11-06 PROCEDURE — 80061 LIPID PANEL: CPT

## 2023-11-06 PROCEDURE — 36415 COLL VENOUS BLD VENIPUNCTURE: CPT

## 2023-11-06 PROCEDURE — 83036 HEMOGLOBIN GLYCOSYLATED A1C: CPT

## 2023-11-14 ENCOUNTER — TELEPHONE (OUTPATIENT)
Dept: FAMILY MEDICINE | Facility: CLINIC | Age: 58
End: 2023-11-14
Payer: MEDICAID

## 2023-11-14 NOTE — TELEPHONE ENCOUNTER
LVM    ----- Message from Shannan Brown MD sent at 11/10/2023  2:54 PM CST -----  Please let the patient know that her diabetes remains well-controlled.  Hemoglobin A1c 6.2%.

## 2023-11-15 ENCOUNTER — CLINICAL SUPPORT (OUTPATIENT)
Dept: REHABILITATION | Facility: HOSPITAL | Age: 58
End: 2023-11-15
Payer: MEDICAID

## 2023-11-15 ENCOUNTER — OFFICE VISIT (OUTPATIENT)
Dept: OTOLARYNGOLOGY | Facility: CLINIC | Age: 58
End: 2023-11-15
Payer: MEDICAID

## 2023-11-15 VITALS
TEMPERATURE: 97 F | BODY MASS INDEX: 25.1 KG/M2 | HEART RATE: 82 BPM | WEIGHT: 136.38 LBS | RESPIRATION RATE: 22 BRPM | HEIGHT: 62 IN | OXYGEN SATURATION: 98 % | DIASTOLIC BLOOD PRESSURE: 76 MMHG | SYSTOLIC BLOOD PRESSURE: 144 MMHG

## 2023-11-15 DIAGNOSIS — Z90.02 H/O LARYNGECTOMY: Primary | ICD-10-CM

## 2023-11-15 PROCEDURE — 31575 DIAGNOSTIC LARYNGOSCOPY: CPT | Mod: PBBFAC | Performed by: STUDENT IN AN ORGANIZED HEALTH CARE EDUCATION/TRAINING PROGRAM

## 2023-11-15 PROCEDURE — L8509 TRACH-ESOPH VOICE PROS MD IN: HCPCS

## 2023-11-15 PROCEDURE — 99214 OFFICE O/P EST MOD 30 MIN: CPT | Mod: PBBFAC | Performed by: STUDENT IN AN ORGANIZED HEALTH CARE EDUCATION/TRAINING PROGRAM

## 2023-11-15 PROCEDURE — 92507 TX SP LANG VOICE COMM INDIV: CPT

## 2023-11-15 NOTE — PROGRESS NOTES
The scope used for the exam was:  Flexible scope ENF-P4  Serial Number:  1)    [id number]    []   2)    [id number]    []   3)    [id number]    [x]   4)    [id number]    []   5)    [id number]    []   6)    [id number]    []       The scope used for the exam was:  Rigid scope   Serial Number:  1)   [id number]    []   2)   [id number]    []   3)   [id number]    []   4)    3384   []   5)    0824   []   6)    5554   []     7)   7425    []   8)   2240    []   9)   1109    []

## 2023-11-15 NOTE — PROGRESS NOTES
Patient Name: Heaven Boston   YOB: 1965     Chief Complaint: No chief complaint on file.       History of Present Illness:  Heaven Boston is a 57 y.o. 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 Patient had an uneventful hospitalization course and was discharged on postoperative day 9 after an esophagram showed no pharyngeal leak.  However she re-presented to our facility on 4/6/22 with surgical site infection and a pharyngocutaneous fistula. Patient underwent a neck washout with primary repair on 4/11/2022, with placement of a gastrostomy tube the same day. She was maintained on NPO and transferred to [place] for further evaluation and management on 4/15/22 after evidence of persistent pharyngocutaneous fistula. There she underwent a 2nd neck washout with primary closure and placement of salivary bypass tube.      5/2/22: Patient had an uneventful hospitalization course and removal of salivary bypass tube before discharge last week and now presenting for postoperative evaluation.  Patient presents today without any complaints.  She is wondering about when he pointed that her x-rays for swallowing then.  She seen Dr. Laguna today after our visit to undergo-stimulation for radiation.     5/9/22: Pt. Did not answer. Let voicemail to advance diet to [facility]. Also discussed this with son.  Plan to see in clinic in 2 weeks     5/19/22: In radiation therapy, 5x weekly until 6/16/22. Has TEP in place, brought another TEP requesting swap out. Will go to speech therapy for exchange of TEP. Went to ER for bleeding from G-tube site 1 week ago but declines visit to Gen Surg clinic today. On [facility] taking Ensure by G-tube, water and soups by mouth. Had questions about advancing diet. No weight loss  or appetite change. No other issues.      6/21/22:  Returns today for follow up.  Completed radiation last week 6/16/22.  Is having difficulty voicing with TEP though it has been swapped out by [facility].  Able to eat what she wants.  Having some tightness of her throat and soreness, otherwise no issues.     7/21/22: Here today for follow up. Has overall been doing very well. She is tolerating her diet by mouth and gaining weight. No dysphagia. Has not used her PEG tube since prior to radiation. She is interested in having it removed. Has some fullness in the left neck which intermittently swells and then resolves again. Otherwise no new lumps/bumps of the head and neck.      8/23/22: Ms. Boston returns today for follow up. She complains of neck pain exacerbated by flexion and movement to the right that began 2 weeks ago. She also reports neck swelling and tightness which occasionally increases in size and is tender to palpation. She reports cough with clear mucus production as well as nasal congestion without drainage. She also notes dry, itchy eyes that have not improved with Visine. She also notes dysuria. She is tolerating her diet by mouth and gaining weight. She had her PEG tube removed on 8/16/22. She has been to [facility] twice and is still having difficulty voicing with TEP.     9/27/22: Here for follow up. Reports she is doing okay. Still coughing a lot and not voicing well with TEP. No weight loss, tolerating diet. No otalgia/lumps bumps. Still having intermittent neck swelling/pain.      10/27/22:  Returns for surveillance.  Did not get TSH, T4.  Has been going to [facility], now can talk well.  Cough has improved.  No new H&N complaints     12/22/22: Doing very well. Voicing well. No pain. Doing well with speech and lymphedema therapy. Eating well, gaining weight constantly. Always tired. Sometimes sleeps all day.    February 14, 2023:   58-year-old female presents today for follow-up of her laryngeal squamous cell  carcinoma and postoperative hypothyroidism.  In regards to her squamous cell carcinoma of the larynx she is doing well.  She has no complaints of any pain.  He is not noticed any swelling in her neck.  She is not have any difficulty eating or swallowing in his indicated she is gaining weight.  She continues to use her TEP without problems and does not have any leakage around her TEP.  She did have a follow-up PET-CT scan done on December 30, 2022, in the report indicated that there was no evidence of new or progressive hypermetabolic metastatic disease.  On reviewing those images, however, there does appear to be an area of increased FDG activity in the upper cervical esophagus in the area above her trachea stoma and possibly at the site of the TEP.  This was also present on a PET-CT scan done on October 12, 2022 and there does not appear debris any change in this area between the 2 scans.  The report on the scan done on October 12, 2022, had indicated this area had an SUV max of 4.4 without any apparent lesions noted on the corresponding CT.  In regards to her hypothyroidism she continues to take levothyroxine 88 mcg daily.  Lab work from February 6, 2023, showed her TSH level be low at 0.115 with a normal free T4 of 1.48.  Her prior TSH from October 27, 2022, was elevated at 55.1672.     3/30/23:  Patient routine follow-up for squamous cell carcinoma and postop hypothyroidism.  She denies any new lumps, bumps hemoptysis or weight loss.  She is able to swallow without difficulty, TEP functioning well with excellent speech.  She has postop hypothyroidism, does complain of some weakness.  She did have a change in her blood pressure medicine in that it was increased and she felt some of her fatigue might have been related to it.  She currently has blood work ordered by her PCP including thyroid function and she is going to get it next week, we will schedule a TeleMed follow-up.  Duloxetine 30 mg helps her sleep and  helps her neck pain.     23: Here today for cancer surveillance. Recently had an add on appt with Dr. Aparicio for a new bump on her neck that appeared to be a neuroma. He injected it with lidocaine and she reports that the bump has decreased in size and is no longer painful. She also recently got her CT neck. She denies any new issues. She's eating and drinking well and denies any dysphagia, odynophagia, ear pain or new lumps/bumps.     8/15/23:  Here today for cancer surveillance.  She has no complaints today and is feeling well.  No weight loss.  No hemoptysis.  No new ear pain or voice changes.  No new lumps or bumps.  No nonhealing oral ulcers.  Eating and drinking well.  No throat pain.    11/15/23: Here today for surveillance. Patient underwent repeat CXR which showed resolution of lung opacities. No weight loss.  Has been feeling well.  No new lumps or bumps.  She does not have any dysphagia or odynophagia.  No changes in health.  She is not had any issues with her TEP    Past Medical History:  Past Medical History:   Diagnosis Date    Cancer     Laryngeal    Diabetes mellitus     Hypertension      Past Surgical History:   Procedure Laterality Date     SECTION      TX REMOVAL OF LARYNX  2022       Review of Systems:  Unremarkable except as mentioned above.    Current Medications:  Current Outpatient Medications   Medication Sig    atorvastatin (LIPITOR) 80 MG tablet Take 1 tablet (80 mg total) by mouth every evening.    azelastine (ASTELIN) 137 mcg (0.1 %) nasal spray 1 spray by Nasal route.    carBAMazepine (CARBATROL) 200 MG CM12 Take 200 mg by mouth.    carBAMazepine (TEGRETOL XR) 200 MG 12 hr tablet Take 200 mg by mouth 2 (two) times daily.    cetirizine (ZYRTEC) 10 MG tablet Take 10 mg by mouth once daily.    DULoxetine (CYMBALTA) 30 MG capsule Take 30 mg by mouth 2 (two) times daily.    fluticasone propionate (FLONASE) 50 mcg/actuation nasal spray 1 spray by Each Nostril route 2 (two)  "times daily.    fluticasone-salmeterol diskus inhaler 100-50 mcg Inhale 1 puff into the lungs every 12 (twelve) hours.    levothyroxine (SYNTHROID) 88 MCG tablet Take 1 tablet (88 mcg total) by mouth before breakfast.    loratadine (CLARITIN) 10 mg tablet Take 1 tablet (10 mg total) by mouth once daily.    metFORMIN (GLUCOPHAGE) 500 MG tablet Take 1 tablet (500 mg total) by mouth once daily.    metoprolol succinate (TOPROL-XL) 25 MG 24 hr tablet Take 0.5 tablets (12.5 mg total) by mouth once daily.    montelukast (SINGULAIR) 5 MG chewable tablet Take 1 tablet (5 mg total) by mouth every evening.    NIFEdipine (PROCARDIA-XL) 60 MG (OSM) 24 hr tablet Take 1 tablet (60 mg total) by mouth once daily.    rivaroxaban (XARELTO) 20 mg Tab Take 1 tablet (20 mg total) by mouth daily with dinner or evening meal.    aspirin (ECOTRIN) 81 MG EC tablet Take 1 tablet (81 mg total) by mouth once daily.    HYDROcodone-acetaminophen 5-300 mg Tab Take 1 tablet by mouth every 8 (eight) hours as needed.    ibuprofen (ADVIL,MOTRIN) 800 MG tablet Take 800 mg by mouth every 6 (six) hours as needed.    omeprazole (PRILOSEC) 40 MG capsule Take 40 mg by mouth once daily.    prednisoLONE acetate (PRED FORTE) 1 % DrpS Place into both eyes.     Current Facility-Administered Medications   Medication    silver nitrate applicators applicator 1 applicator        Allergies:  Review of patient's allergies indicates:  No Known Allergies     Physical Exam:  Vital signs:   Vitals:    02/14/23 1102   BP: 129/75   BP Location: Right arm   Patient Position: Sitting   BP Method: Medium (Automatic)   Pulse: 83   Resp: 16   Temp: 99 °F (37.2 °C)   TempSrc: Oral   Weight: 59.4 kg (131 lb)   Height: 5' 2" (1.575 m)   General:  Well-developed well-nourished female in no acute distress.  Patient does communicate well with clear speech using her TEP.  Head and face:  Normocephalic.  No facial lesions.  No temporomandibular joint tenderness or click.  Ears:  Right " ear-auricle is normally developed.  External auditory canal is clear.  Tympanic membrane is nonerythematous.  No middle ear effusion.  Left ear-auricle is normally developed.  External auditory canal is clear.  Tympanic membrane is nonerythematous.  No middle ear effusion.  Nose:  Nasal dorsum is unremarkable.  No significant septal deviation.  No significant intranasal congestion.  Secretions are clear.  Oral cavity and oropharynx:  Tongue and floor mouth are without lesions.  Mucosa is moist.  No pharyngeal erythema or exudates.  No oropharyngeal masses.  No tonsillar hypertrophy.  Neck:  Supple without palpable adenopathy.  She does have some induration related to her surgery and postradiation changes.  Trachea stoma is patent.  TEP is in place without any gross drainage from around the prosthesis.  No granulation is noted. Small 3mm firm nodule at right apron incision that is nontender  Eyes:  Extraocular muscles intact.  No nystagmus.  No exophthalmos or enophthalmos.  Neurologic:  Alert and oriented.  Cranial nerves 2-12 are grossly normal.    Procedure note: Flexible tracheoscopy, nasopharyngoscopy  The flexible fiberoptic laryngoscope was introduced into the right nostril and advanced. Examination of the nose showed the above mentioned findings. Examination of the nasopharynx showed no nasopharyngeal masses or eustachian tube obstruction. Examination of the base of tongue showed no masses or lesions. Examination of the hypopharynx showed no masses or lesions in the neopharynx or hypopharynx.  The esophageal inlet is without apparent lesions.  No evidence of fungal disease. The scope was passed in the stoma and the trachea was clear down to the aleja.     Assessment/Plan:  Heaven Boston is a 57 y.o. female with T3N1M0 supraglottic SCCa s/p TL BSND CPM complicated by PCF s/p washout & primary closure x2 (2nd in [place]) now s/p adjuvant RT. One of her post-treatment PETS noted hypermetabolism at the site  of her TEP. She was taken for a DL on 3/3/23 which was unremarkable. Doing well today. Has a small neuroma at her right incision line that has decreased in size and is no longer tender. CT Neck unremarkable in May. No evidence of disease today.     Plan:  - Order repeat CT for 18 month check up  - RTC 2-3 months. Will order new TSH at this visit.     ADOLFO Sanders MD  [facility] Department of Otolaryngology  HO-III         HEAD & NECK CANCER SURVEILLANCE   Primary Surgical Treatment     Head & Neck Staff: Celina  Medical Oncologist: Drew   Radiation Oncologist: Jase     Primary tumor: T3N1M0 SCCa  of the supraglottis       Surgery Date: 3/2022  Induction Chemotherapy: no  Adjuvant Therapy: Radiation  Completion Date: completed 6/2022        Place date if completed   3 6 12 18 24 36 48 60   CT Neck X X 5/2023 X X X X X   PET/CT 9/2022 12/2022         CXR  X X X X X X X   TFT X  4/2023  X X X X     Current Surveillance Interval: post-tx year 1-2, q2-3 mo     Suggested imaging surveillance. Patient and tumor specific factors should always be individually considered.

## 2023-11-15 NOTE — PROGRESS NOTES
[facility]  Speech Therapy Treatment Note  Laryngectomy      PATIENT IS A NECK-BREATHER - DO NOT ORALLY INTUBATE    Name: Heaven Boston   MRN: [id number]   Therapy Diagnosis: s/p total laryngectomy  Physician: Shelby Marcos MD     Date: 11/15/2023  Time In:  0920  Time Out:  1015  Total Billable Time: 55 min.     Precautions: Standard  Subjective:   Pt reports: leaking around prosthesis     Pain Scale:  6/10 on VAS currently.   Pain Location: bilateral neck. Not compliant with previous HEP for neck stretches    SURGICAL HISTORY  Past Surgical History:   Procedure Laterality Date     SECTION      COLONOSCOPY N/A 5/15/2023    Procedure: COLONOSCOPY;  Surgeon: Francisco Zavala MD;  Location: [facility] ENDOSCOPY;  Service: Endoscopy;  Laterality: N/A;    DIRECT DIAGNOSTIC LARYNGOSCOPY WITH BRONCHOSCOPY AND ESOPHAGOSCOPY N/A 3/3/2023    Procedure: LARYNGOSCOPY, DIRECT, DIAGNOSTIC, WITH BRONCHOSCOPY AND ESOPHAGOSCOPY;  Surgeon: Connor Patrick MD;  Location: [facility] OR;  Service: ENT;  Laterality: N/A;  Will not need to do bronchoscopy for this procedure    [facility] REMOVAL OF LARYNX  2022        Current Level of Swallow Function/Complaints:   no complaints of dysphagia   Current Level of Voice Function/Complaints:  alaryngeal communication via voice prosthesis     Prior Therapy:  2023 , prosthesis evaluation    Objective:        UNTIMED  Procedure Min.   Speech- Language- Voice Therapy  55   Total Untimed Units: 55  Charges Billed/# of units: 55/1    LongTerm Goals:  Current Progress:   1.  Patient will utilize alaryngeal communication via TEP to express needs and desires without respiratory compromise >90% of the time.  Progressing towards goal         Short Term Goals:  Current Progress:   1. Patient will demonstrate care and use of HME system for maximum pulmonary benefit >90% of the time. Progressing towards goal   2.  Patient will demonstrate adequate care and use of TEP to  "maintain TEP voicing >90% of the time.  Progressing towards goal   3.  Patient will demonstrate adequate occlusion and cleaning of TEP to maintain voicing >90% of the time.  Progressing towards goal   4. Patient will increase laryngectomy tube usage daily or PRN to maintain and increase stomal patency.  Progressing towards goal      Ms. 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  She is currently utilizing esophageal speech via a voice prosthesis for alaryngeal communication for functional communication with her family, friends, medical providers, and others to perform her daily life activities.  Ms. Boston requires the use of a laryngectomy tube at all times to keep the airway patent and prevent stomal stenosis. HMEs are required daily for mucus management and pulmonary optimization .        Patient Education/Response:   Education completed: Yes Plan of Care, role of SLP in care, use of HMEs, TEP and stomal care were discussed with pt. Patient expressed understanding.     Barriers to Learning:  None    Teaching Method: Verbal    Assessment:   Heaven is progressing well towards her goals. Current goals remain appropriate. Goals to be updated as necessary.     TEP Assessment:    Initial Fitting: No    Re-fitting:No    TEP Initial Fitting Date: 03/21/2023     TEP Current Status: Pt is currently wearing a 17FR, 8mm Provox Aquino placed on 03/21/2023. Currently, Ms. Boston does not wears a leonie tube with push to talk HMEs, she uses adhesives intermittently due to discomfort and persistent coughing with leonie tube. Currently maintaining an open stoma due to insurance issues with receiving products    TEP Patient Complaints: leaking around prosthesis and requesting a "smaller" leonie tube    TEP Accessories: 10/55 fenestrated leonie tube with push to talk HMEs. However, she has not been using the leonie tube due to discomfort and coughing and use of adhesives.     Stoma Size:  Adequate     Lymphedema:   None " "currently    TEP Fitting/Re-sizing:     Removed current TEP: Yes moderate biofilm noted on entire surface of prosthesis. Prosthesis noted to be malformed    TEP Removed catheter : No     TEP Sizing:Yes 8mm    Puncture Dilation: No     TEP Prosthesis Placed:Yes 17 Fr, 8mm    TEP Voicing with Open Tract:No     TEP Voicing with Prosthesis:Yes, good voicing noted after TEP replacement     TEP Leaking through Prothesis:No     TEP Leaking around Prosthesis:No       Ms. 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  She is currently utilizing esophageal speech via a voice prosthesis for alaryngeal communication for functional communication with her family, friends, medical providers, and others to perform her daily life activities.  Ms. Boston requires the use of a laryngectomy tube at all times to keep the airway patent and prevent stomal stenosis. HMEs are required daily for mucus management and pulmonary optimization .     Barriers to Therapy: None.    Pt's spiritual, cultural and educational needs considered and pt agreeable to plan of care and goals.Yes.    Plan:     SLP intervention is warranted 1 times a month or PRN for communication needs for a minimum of 1 year due to the chronic nature of the impairment.     Additional Information:   Ms. Boston entered this date requesting a prothesis change and a "smaller' leonie tube. Patient entered session wearing a 17FR, 8mm Provox Aquino prosthesis. Patient entered with an unused 10/55 fenestrated leonie tube that she had received from [facility]. She reported that the leonie tube was "uncomfortable" and caused her to cough persistently. No reports of difficulty swallowing. SLP educated patient about leonie tube options, differences in sizes, and usage of HMEs. She reported that her current prosthesis had been leaking, however upon liquid trials no leaking noted through or around prosthesis. SLP observed moderate biofilm on all aspects of prosthesis due to extended time " "since last visit which further warranted a prosthesis change. SLP provided patient with a 10/36 fenestrated leonie tube, which was placed this date. Following leonie tube insertion, patient reported that it felt "better" and more comfortable than the 10/55 fenestrated leonie tube. SLP educated patient about using the leonie tube and HMEs daily for stoma stability, airway protection, and mucus management. SLP advised patient to wear leonie tube for the next two days so that she can desensitize stoma site to daily use of leonie tube and HMEs. SLP replaced prosthesis with a 17FR, 8mm Provox Aquino without incident this date. Good voicing noted immediately after prosthesis replacement. SLP provided patient with standard and sensitive adhesive samples. Life and legacy adhesive provided as pt reported she have HMEs at home. SLP to follow up for prosthesis management as warranted.    NANCY Chao   11/15/2023        "

## 2023-11-16 ENCOUNTER — TELEPHONE (OUTPATIENT)
Dept: OTOLARYNGOLOGY | Facility: CLINIC | Age: 58
End: 2023-11-16
Payer: MEDICAID

## 2023-11-16 ENCOUNTER — OFFICE VISIT (OUTPATIENT)
Dept: CARDIOLOGY | Facility: CLINIC | Age: 58
End: 2023-11-16
Payer: MEDICAID

## 2023-11-16 VITALS
BODY MASS INDEX: 24.7 KG/M2 | OXYGEN SATURATION: 100 % | HEIGHT: 62 IN | HEART RATE: 73 BPM | RESPIRATION RATE: 20 BRPM | TEMPERATURE: 98 F | DIASTOLIC BLOOD PRESSURE: 74 MMHG | WEIGHT: 134.25 LBS | SYSTOLIC BLOOD PRESSURE: 130 MMHG

## 2023-11-16 DIAGNOSIS — Z85.21 HISTORY OF LARYNGEAL CANCER: Primary | ICD-10-CM

## 2023-11-16 DIAGNOSIS — Z90.02 H/O LARYNGECTOMY: ICD-10-CM

## 2023-11-16 DIAGNOSIS — I10 HYPERTENSION, UNSPECIFIED TYPE: Primary | ICD-10-CM

## 2023-11-16 DIAGNOSIS — E78.01 FAMILIAL HYPERCHOLESTEROLEMIA: ICD-10-CM

## 2023-11-16 DIAGNOSIS — I47.10 SVT (SUPRAVENTRICULAR TACHYCARDIA): ICD-10-CM

## 2023-11-16 PROCEDURE — 99214 OFFICE O/P EST MOD 30 MIN: CPT | Mod: PBBFAC | Performed by: INTERNAL MEDICINE

## 2023-11-16 RX ORDER — EZETIMIBE 10 MG/1
10 TABLET ORAL DAILY
Qty: 90 TABLET | Refills: 1 | Status: SHIPPED | OUTPATIENT
Start: 2023-11-16 | End: 2024-11-15

## 2023-11-16 RX ORDER — LEVOTHYROXINE SODIUM 50 UG/1
100 TABLET ORAL
Qty: 60 TABLET | Refills: 11 | Status: SHIPPED | OUTPATIENT
Start: 2023-11-16 | End: 2024-11-15

## 2023-11-16 NOTE — TELEPHONE ENCOUNTER
Patient underwent thyroid function yesterday.  TSH came back as 25.  Call patient to let her know that her thyroid levels were high.  Discussed with patient importance of adherence to thyroid medication.  She reports that she is been taking it every day.  Ordered in 100 mcg of Synthroid.  Discussed with patient about higher dose.  We will obtain labs in 2 months    IAddie Sanders MD  [facility] Department of Otolaryngology  [facility]

## 2023-11-16 NOTE — PROGRESS NOTES
11/16/2023 9:01 AM    Subjective:     CHIEF COMPLAINT:   Chief Complaint   Patient presents with    f/u denies chest pain or sob since LV no questions                            HPI:    Ms. Heaven Boston is a 58 y.o. 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    She has no acute complaints.  She denies palpitations, presyncope, or syncopal events.  She reports adherence with her metoprolol succinate for rate control in the setting of a previously noted SVT on monitor.  Regarding her hypertension she does not monitor her blood pressures at home but is reportedly adherent with her nifedipine 60 mg daily.  She is been adherent with Lipitor 80.  Her LDL is not less than sign 100 and she has type 2 diabetes.    She denies congestive symptoms of heart failure including orthopnea, PND, abdominal distention, or lower extremity edema.  She denies dyspnea on exertion.  She denies symptoms of angina or anginal equivalents.    Lab Results   Component Value Date    CREATININE 1.08 (H) 05/23/2023    CREATININE 0.83 04/03/2023    CREATININE 1.06 (H) 09/19/2022       Past Medical History    Patient Active Problem List   Diagnosis    H/O laryngectomy    Laryngeal cancer    Hypertension    Familial hypercholesterolemia    Syncope    SVT (supraventricular tachycardia)    Type 2 diabetes mellitus without complication, without long-term current use of insulin    Tracheostomy in place    PEG (percutaneous endoscopic gastrostomy) status    History of radiation to head and neck region    Hypothyroidism    Bilateral carotid artery stenosis    Deep vein thrombosis (DVT) of distal vein of left lower extremity    Diverticulosis large intestine w/o perforation or abscess w/o bleeding    Positive colorectal cancer screening using Cologuard test        Surgical History    Past Surgical History:   Procedure Laterality Date     SECTION      COLONOSCOPY N/A 5/15/2023    Procedure: COLONOSCOPY;  Surgeon: Francisco Zavala MD;  Location: [facility] ENDOSCOPY;  Service: Endoscopy;  Laterality: N/A;    DIRECT DIAGNOSTIC LARYNGOSCOPY WITH BRONCHOSCOPY AND ESOPHAGOSCOPY N/A 3/3/2023    Procedure: LARYNGOSCOPY, DIRECT, DIAGNOSTIC, WITH BRONCHOSCOPY AND ESOPHAGOSCOPY;  Surgeon: Connor Patrick MD;  Location: [facility] OR;  Service: ENT;  Laterality: N/A;  Will not need to do bronchoscopy for this procedure    [facility] REMOVAL OF LARYNX  2022       Social History     Socioeconomic History    Marital status: Single   Tobacco Use    Smoking status: Former     Current packs/day: 0.00     Average packs/day: 0.5 packs/day for 39.0 years (19.5 ttl pk-yrs)     Types: Cigarettes     Start date: 1983     Quit date: 2022     Years since quittin.6    Smokeless tobacco: Former     Quit date:    Substance and Sexual Activity    Alcohol use: Never    Drug use: Never    Sexual activity: Not Currently       History reviewed. No pertinent family history.  Review of patient's allergies indicates:  No Known Allergies    Current Medications    Current Outpatient Medications   Medication Instructions    atorvastatin (LIPITOR) 80 mg, Oral, Nightly    carBAMazepine (TEGRETOL XR) 200 mg, Oral, 2 times daily    cetirizine (ZYRTEC) 10 mg, Oral, Daily    DULoxetine (CYMBALTA) 30 mg, Oral, 2 times daily    fluticasone propionate (FLONASE) 50 mcg, Each Nostril, 2 times daily    folic acid (FOLVITE) 1 MG tablet Take 1 tablet by mouth once daily    levothyroxine (SYNTHROID) 50 mcg, Oral, Before breakfast    metFORMIN (GLUCOPHAGE) 500 mg, Oral, Daily    metoprolol succinate (TOPROL-XL) 12.5 mg, Oral, Daily    montelukast (SINGULAIR) 5 mg, Oral, Every other day    NIFEdipine (PROCARDIA-XL) 60 mg, Oral, Daily         Denies headaches, changes in vision, nausea, vomiting, fever, chills,  "chest pain, palpitations, dyspnea, abdominal pain, or changes in urinary or bowel habits.    Objective:     BP Readings from Last 3 Encounters:   11/16/23 130/74   11/15/23 (!) 144/76   09/15/23 121/68        Pulse Readings from Last 3 Encounters:   11/16/23 73   11/15/23 82   09/15/23 85        Temp Readings from Last 3 Encounters:   11/16/23 98.2 °F (36.8 °C) (Oral)   11/15/23 97.3 °F (36.3 °C) (Oral)   09/15/23 98.2 °F (36.8 °C) (Oral)       Wt Readings from Last 3 Encounters:   11/16/23 60.9 kg (134 lb 4.2 oz)   11/15/23 61.9 kg (136 lb 6.4 oz)   09/15/23 60.3 kg (133 lb)         PE:  Blood pressure 130/74, pulse 73, temperature 98.2 °F (36.8 °C), temperature source Oral, resp. rate 20, height 5' 2" (1.575 m), weight 60.9 kg (134 lb 4.2 oz), SpO2 100 %.   Physical Exam  Vitals reviewed.   Constitutional:       General: She is not in acute distress.     Appearance: Normal appearance. She is normal weight. She is not ill-appearing.   HENT:      Head: Normocephalic and atraumatic.      Right Ear: External ear normal.      Left Ear: External ear normal.      Nose: Nose normal.      Mouth/Throat:      Mouth: Mucous membranes are moist.      Comments: Trach with   Eyes:      Conjunctiva/sclera: Conjunctivae normal.   Neck:      Comments: Speaking valve.   Cardiovascular:      Rate and Rhythm: Normal rate and regular rhythm.      Pulses: Normal pulses.      Heart sounds: Normal heart sounds. No murmur heard.  Pulmonary:      Effort: Pulmonary effort is normal. No respiratory distress.      Breath sounds: Normal breath sounds. No stridor. No wheezing, rhonchi or rales.   Chest:      Chest wall: No tenderness.   Abdominal:      General: Abdomen is flat. Bowel sounds are normal. There is no distension.      Palpations: Abdomen is soft.   Musculoskeletal:      Cervical back: Neck supple.      Right lower leg: No edema.      Left lower leg: No edema.   Skin:     General: Skin is warm and dry.      Capillary Refill: " Capillary refill takes less than 2 seconds.   Neurological:      Mental Status: She is alert and oriented to person, place, and time.   Psychiatric:         Mood and Affect: Mood normal.         Behavior: Behavior normal.                                                                                                                                                                                                                                                                                                                                                                                                                                                                                CARDIAC TESTING:  Echocardiogram  No results found for this or any previous visit.      Stress Test  No results found for this or any previous visit.     Coronary Angiogram  No results found for this or any previous visit.     Holter Monitor  No cardiac monitor results found for the past 12 months    Last BMP BMP  Lab Results   Component Value Date     04/03/2023    K 4.5 04/03/2023     04/23/2022    CO2 27 04/03/2023    BUN 8.1 (L) 04/03/2023    CREATININE 1.08 (H) 05/23/2023    CALCIUM 9.7 04/03/2023    ANIONGAP 11 04/23/2022    EGFRNORACEVR 60 05/23/2023      Last CBC     Lab Results   Component Value Date    WBC 4.6 04/03/2023    HGB 10.6 (L) 04/03/2023    HCT 35.2 (L) 04/03/2023    MCV 92.6 04/03/2023     04/03/2023           BNP    Lab Results   Component Value Date    BNP 16.3 03/10/2022    .0 (H) 08/16/2020     Last lipids    Lab Results   Component Value Date    CHOL 198 11/06/2023    CHOL 181 04/03/2023    CHOL 219 (H) 08/23/2022    HDL 43 11/06/2023    HDL 42 04/03/2023    HDL 50 08/23/2022    .00 11/06/2023    .00 04/03/2023    .00 (H) 08/23/2022    TRIG 114 11/06/2023    TRIG 107 04/03/2023    TRIG 114 08/23/2022    TOTALCHOLEST 5 11/06/2023    TOTALCHOLEST 4 04/03/2023    TOTALCHOLEST  4 08/23/2022      LFT     Lab Results   Component Value Date    ALT 12 04/03/2023    ALT 17 05/10/2022    ALT 6 04/14/2022    AST 14 04/03/2023    AST 22 05/10/2022    AST 17 04/14/2022       Assessment:   Ms. Heaven Boston is a 58 y.o. 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      Plan:     Hypertension  Continue nifedipine 60 mg daily.  Blood pressure log next office visit.  She is near goal at clinic; however, I would like to push her closer to 120/80 given her risk factors.  If additional agent is needed would start lisinopril for renal protective effect.    Hypercholesterolemia  Continue Lipitor 80 mg daily.  I would target an LDL <100 given her type 2 diabetes.  We will start Zetia 10 mg daily today.    History of SVT noted on event monitor  Asymptomatic.  Continue Toprol 12.5 mg daily.    Glynn Rosario MD  Cardiology Fellow    Future Appointments   Date Time Provider Department Center   2/20/2024  8:30 AM RESIDENT 1, [facility] OTORHINOLARYNGOLOGY [facility] [place] [facility]   3/15/2024  8:30 AM Shannan Brown MD [facility] N[facility]STOR [place]   5/16/2024  8:30 AM RESIDENT 2, [facility] OTORHINOLARYNGOLOGY [facility] [place]    5/30/2024  1:00 PM PROVIDERS, [facility] [place]

## 2023-11-17 ENCOUNTER — TELEPHONE (OUTPATIENT)
Dept: FAMILY MEDICINE | Facility: CLINIC | Age: 58
End: 2023-11-17
Payer: MEDICAID

## 2023-11-28 ENCOUNTER — HOSPITAL ENCOUNTER (OUTPATIENT)
Dept: RADIOLOGY | Facility: HOSPITAL | Age: 58
Discharge: HOME | End: 2023-11-28
Attending: STUDENT IN AN ORGANIZED HEALTH CARE EDUCATION/TRAINING PROGRAM
Payer: MEDICAID

## 2023-11-28 DIAGNOSIS — Z85.21 HISTORY OF LARYNGEAL CANCER: ICD-10-CM

## 2023-11-28 DIAGNOSIS — Z90.02 H/O LARYNGECTOMY: ICD-10-CM

## 2023-11-28 DIAGNOSIS — G50.0 TRIGEMINAL NEURALGIA: ICD-10-CM

## 2023-11-28 LAB
CREAT SERPL-MCNC: 1.1 MG/DL (ref 0.55–1.02)
GFR SERPLBLD CREATININE-BSD FMLA CKD-EPI: 59 MLS/MIN/1.73/M2

## 2023-11-28 PROCEDURE — 70491 CT SOFT TISSUE NECK W/DYE: CPT | Mod: TC

## 2023-11-28 PROCEDURE — 25500020 PHARM REV CODE 255

## 2023-11-28 PROCEDURE — 82565 ASSAY OF CREATININE: CPT | Performed by: STUDENT IN AN ORGANIZED HEALTH CARE EDUCATION/TRAINING PROGRAM

## 2023-11-28 RX ORDER — CARBAMAZEPINE 200 MG/1
200 TABLET, EXTENDED RELEASE ORAL 2 TIMES DAILY
Qty: 60 TABLET | Refills: 0 | Status: SHIPPED | OUTPATIENT
Start: 2023-11-28

## 2023-11-28 RX ADMIN — IOHEXOL 100 ML: 350 INJECTION, SOLUTION INTRAVENOUS at 08:11

## 2023-11-28 NOTE — TELEPHONE ENCOUNTER
----- Message from Jeanette Iraheta sent at 11/28/2023  9:09 AM CST -----  Regarding: Refill  Provider: Dr Nani Brown  Preferred Pharmacy: [place] [id number] - [address]      Last Visit:  Next Visit: 3/15/2024  Patient's Contact Number:    1. Name of Medication: carBAMazepine (TEGRETOL XR) 200 MG 12 hr tablet   Dosage:  Comments:    2. Name of Medication:  Dosage:  Comments:    3. Name of Medication:  Dosage:  Comments    4. Name of Medication:  Dosage:  Comments:    5. Name of Medication:  Dosage:  Comments:

## 2024-01-03 DIAGNOSIS — E53.8 FOLIC ACID DEFICIENCY: ICD-10-CM

## 2024-01-04 RX ORDER — FOLIC ACID 1 MG/1
TABLET ORAL
Qty: 30 TABLET | Refills: 0 | Status: SHIPPED | OUTPATIENT
Start: 2024-01-04 | End: 2024-02-08 | Stop reason: SDUPTHER

## 2024-02-02 DIAGNOSIS — B37.9 YEAST INFECTION: ICD-10-CM

## 2024-02-02 DIAGNOSIS — E03.9 HYPOTHYROIDISM, UNSPECIFIED TYPE: ICD-10-CM

## 2024-02-02 DIAGNOSIS — Z85.21 HISTORY OF LARYNGEAL CANCER: ICD-10-CM

## 2024-02-02 DIAGNOSIS — B37.81 ESOPHAGEAL CANDIDIASIS: ICD-10-CM

## 2024-02-02 DIAGNOSIS — Z90.02 H/O LARYNGECTOMY: ICD-10-CM

## 2024-02-02 DIAGNOSIS — Z92.3 HISTORY OF RADIATION TO HEAD AND NECK REGION: ICD-10-CM

## 2024-02-08 DIAGNOSIS — Z90.02 H/O LARYNGECTOMY: ICD-10-CM

## 2024-02-08 DIAGNOSIS — Z85.21 HISTORY OF LARYNGEAL CANCER: ICD-10-CM

## 2024-02-08 DIAGNOSIS — G50.0 TRIGEMINAL NEURALGIA: ICD-10-CM

## 2024-02-08 DIAGNOSIS — E53.8 FOLIC ACID DEFICIENCY: ICD-10-CM

## 2024-02-08 RX ORDER — DULOXETIN HYDROCHLORIDE 30 MG/1
30 CAPSULE, DELAYED RELEASE ORAL 2 TIMES DAILY
Qty: 60 CAPSULE | Refills: 1 | Status: SHIPPED | OUTPATIENT
Start: 2024-02-08 | End: 2024-02-08 | Stop reason: SDUPTHER

## 2024-02-08 RX ORDER — DULOXETIN HYDROCHLORIDE 30 MG/1
30 CAPSULE, DELAYED RELEASE ORAL 2 TIMES DAILY
Qty: 60 CAPSULE | Refills: 1 | Status: SHIPPED | OUTPATIENT
Start: 2024-02-08

## 2024-02-08 NOTE — TELEPHONE ENCOUNTER
----- Message from Jeanette Iraheta sent at 2/7/2024  2:57 PM CST -----  Regarding: Refill  Provider: Dr Nani Brown  Preferred Pharmacy: [place] [id number] - [address]      Last Visit:  Next Visit: 2/23/2024  Patient's Contact Number:    1. Name of Medication: folic acid (FOLVITE) 1 MG tablet  Dosage:  Comments:    2. Name of Medication: carBAMazepine (TEGRETOL XR) 200 MG 12 hr tablet  Dosage:  Comments:    3. Name of Medication: DULoxetine (CYMBALTA) 30 MG capsule  Dosage:  Comments    4. Name of Medication:  Dosage:  Comments:    5. Name of Medication:  Dosage:  Comments:

## 2024-02-09 RX ORDER — FOLIC ACID 1 MG/1
1000 TABLET ORAL DAILY
Qty: 30 TABLET | Refills: 0 | Status: SHIPPED | OUTPATIENT
Start: 2024-02-09 | End: 2024-03-07

## 2024-02-09 RX ORDER — CARBAMAZEPINE 200 MG/1
200 TABLET, EXTENDED RELEASE ORAL 2 TIMES DAILY
Qty: 60 TABLET | Refills: 0 | OUTPATIENT
Start: 2024-02-09

## 2024-02-20 ENCOUNTER — LAB VISIT (OUTPATIENT)
Dept: LAB | Facility: HOSPITAL | Age: 59
End: 2024-02-20
Attending: STUDENT IN AN ORGANIZED HEALTH CARE EDUCATION/TRAINING PROGRAM
Payer: MEDICAID

## 2024-02-20 ENCOUNTER — OFFICE VISIT (OUTPATIENT)
Dept: OTOLARYNGOLOGY | Facility: CLINIC | Age: 59
End: 2024-02-20
Payer: MEDICAID

## 2024-02-20 VITALS
BODY MASS INDEX: 25.76 KG/M2 | SYSTOLIC BLOOD PRESSURE: 133 MMHG | RESPIRATION RATE: 16 BRPM | TEMPERATURE: 98 F | HEART RATE: 71 BPM | HEIGHT: 62 IN | WEIGHT: 140 LBS | DIASTOLIC BLOOD PRESSURE: 69 MMHG

## 2024-02-20 DIAGNOSIS — C32.9 LARYNGEAL CANCER: ICD-10-CM

## 2024-02-20 DIAGNOSIS — Z90.02 H/O LARYNGECTOMY: Primary | ICD-10-CM

## 2024-02-20 DIAGNOSIS — Z90.02 H/O LARYNGECTOMY: ICD-10-CM

## 2024-02-20 DIAGNOSIS — Z92.3 HISTORY OF RADIATION TO HEAD AND NECK REGION: ICD-10-CM

## 2024-02-20 DIAGNOSIS — Z85.21 HISTORY OF LARYNGEAL CANCER: ICD-10-CM

## 2024-02-20 LAB
T4 FREE SERPL-MCNC: 0.83 NG/DL (ref 0.7–1.48)
TSH SERPL-ACNC: 10.64 UIU/ML (ref 0.35–4.94)

## 2024-02-20 PROCEDURE — 84443 ASSAY THYROID STIM HORMONE: CPT

## 2024-02-20 PROCEDURE — 99213 OFFICE O/P EST LOW 20 MIN: CPT | Mod: PBBFAC | Performed by: STUDENT IN AN ORGANIZED HEALTH CARE EDUCATION/TRAINING PROGRAM

## 2024-02-20 PROCEDURE — 84439 ASSAY OF FREE THYROXINE: CPT

## 2024-02-20 PROCEDURE — 31575 DIAGNOSTIC LARYNGOSCOPY: CPT | Mod: PBBFAC | Performed by: STUDENT IN AN ORGANIZED HEALTH CARE EDUCATION/TRAINING PROGRAM

## 2024-02-20 PROCEDURE — 36415 COLL VENOUS BLD VENIPUNCTURE: CPT

## 2024-02-20 RX ORDER — LEVOTHYROXINE SODIUM 50 UG/1
125 TABLET ORAL
Qty: 75 TABLET | Refills: 11 | Status: SHIPPED | OUTPATIENT
Start: 2024-02-20 | End: 2024-05-16

## 2024-02-20 NOTE — PROGRESS NOTES
The scope used for the exam was:  Flexible scope ENF-P4  Serial Number:  1)    [id number]    []   2)    [id number]    []   3)    [id number]    [x]   4)    [id number]    []   5)    [id number]    []   6)    [id number]    []       The scope used for the exam was:  Rigid scope   Serial Number:  1)   [id number]    []   2)   [id number]    []   3)   7330    []   4)    3384   []   5)    0824   []   6)    5554   []     7)   7425    []   8)   2240    []   9)   1109    []

## 2024-02-20 NOTE — TELEPHONE ENCOUNTER
----- Message from Jeanette Iraheta sent at 2/20/2024  9:21 AM CST -----  Regarding: Refill  Provider: Dr Nani Brown  Preferred Pharmacy: [facility]  Last Visit:  Next Visit: 2/23/2024  Patient's Contact Number:    1. Name of Medication: metFORMIN (GLUCOPHAGE) 500 MG tablet  Dosage:  Comments:    2. Name of Medication:  Dosage:  Comments:    3. Name of Medication:  Dosage:  Comments    4. Name of Medication:  Dosage:  Comments:    5. Name of Medication:  Dosage:  Comments:

## 2024-02-20 NOTE — PROGRESS NOTES
Patient Name: Heaven Boston   YOB: 1965     Chief Complaint: No chief complaint on file.       History of Present Illness:  Heaven Boston is a 57 y.o. 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 Patient had an uneventful hospitalization course and was discharged on postoperative day 9 after an esophagram showed no pharyngeal leak.  However she re-presented to our facility on 4/6/22 with surgical site infection and a pharyngocutaneous fistula. Patient underwent a neck washout with primary repair on 4/11/2022, with placement of a gastrostomy tube the same day. She was maintained on NPO and transferred to [place] for further evaluation and management on 4/15/22 after evidence of persistent pharyngocutaneous fistula. There she underwent a 2nd neck washout with primary closure and placement of salivary bypass tube.      5/2/22: Patient had an uneventful hospitalization course and removal of salivary bypass tube before discharge last week and now presenting for postoperative evaluation.  Patient presents today without any complaints.  She is wondering about when he pointed that her x-rays for swallowing then.  She seen Dr. Laguna today after our visit to undergo-stimulation for radiation.     5/9/22: Pt. Did not answer. Let voicemail to advance diet to CLD. Also discussed this with son.  Plan to see in clinic in 2 weeks     5/19/22: In radiation therapy, 5x weekly until 6/16/22. Has TEP in place, brought another TEP requesting swap out. Will go to speech therapy for exchange of TEP. Went to ER for bleeding from G-tube site 1 week ago but declines visit to Gen Surg clinic today. On CLD taking Ensure by G-tube, water and soups by mouth. Had questions about advancing diet. No weight loss  or appetite change. No other issues.      6/21/22:  Returns today for follow up.  Completed radiation last week 6/16/22.  Is having difficulty voicing with TEP though it has been swapped out by SLP.  Able to eat what she wants.  Having some tightness of her throat and soreness, otherwise no issues.     7/21/22: Here today for follow up. Has overall been doing very well. She is tolerating her diet by mouth and gaining weight. No dysphagia. Has not used her PEG tube since prior to radiation. She is interested in having it removed. Has some fullness in the left neck which intermittently swells and then resolves again. Otherwise no new lumps/bumps of the head and neck.      8/23/22: Ms. Boston returns today for follow up. She complains of neck pain exacerbated by flexion and movement to the right that began 2 weeks ago. She also reports neck swelling and tightness which occasionally increases in size and is tender to palpation. She reports cough with clear mucus production as well as nasal congestion without drainage. She also notes dry, itchy eyes that have not improved with Visine. She also notes dysuria. She is tolerating her diet by mouth and gaining weight. She had her PEG tube removed on 8/16/22. She has been to SLP twice and is still having difficulty voicing with TEP.     9/27/22: Here for follow up. Reports she is doing okay. Still coughing a lot and not voicing well with TEP. No weight loss, tolerating diet. No otalgia/lumps bumps. Still having intermittent neck swelling/pain.      10/27/22:  Returns for surveillance.  Did not get TSH, T4.  Has been going to SLP, now can talk well.  Cough has improved.  No new H&N complaints     12/22/22: Doing very well. Voicing well. No pain. Doing well with speech and lymphedema therapy. Eating well, gaining weight constantly. Always tired. Sometimes sleeps all day.    February 14, 2023:   58-year-old female presents today for follow-up of her laryngeal squamous cell  carcinoma and postoperative hypothyroidism.  In regards to her squamous cell carcinoma of the larynx she is doing well.  She has no complaints of any pain.  He is not noticed any swelling in her neck.  She is not have any difficulty eating or swallowing in his indicated she is gaining weight.  She continues to use her TEP without problems and does not have any leakage around her TEP.  She did have a follow-up PET-CT scan done on December 30, 2022, in the report indicated that there was no evidence of new or progressive hypermetabolic metastatic disease.  On reviewing those images, however, there does appear to be an area of increased FDG activity in the upper cervical esophagus in the area above her trachea stoma and possibly at the site of the TEP.  This was also present on a PET-CT scan done on October 12, 2022 and there does not appear debris any change in this area between the 2 scans.  The report on the scan done on October 12, 2022, had indicated this area had an SUV max of 4.4 without any apparent lesions noted on the corresponding CT.  In regards to her hypothyroidism she continues to take levothyroxine 88 mcg daily.  Lab work from February 6, 2023, showed her TSH level be low at 0.115 with a normal free T4 of 1.48.  Her prior TSH from October 27, 2022, was elevated at 55.1672.     3/30/23:  Patient routine follow-up for squamous cell carcinoma and postop hypothyroidism.  She denies any new lumps, bumps hemoptysis or weight loss.  She is able to swallow without difficulty, TEP functioning well with excellent speech.  She has postop hypothyroidism, does complain of some weakness.  She did have a change in her blood pressure medicine in that it was increased and she felt some of her fatigue might have been related to it.  She currently has blood work ordered by her PCP including thyroid function and she is going to get it next week, we will schedule a TeleMed follow-up.  Duloxetine 30 mg helps her sleep and  helps her neck pain.     23: Here today for cancer surveillance. Recently had an add on appt with Dr. Aparicio for a new bump on her neck that appeared to be a neuroma. He injected it with lidocaine and she reports that the bump has decreased in size and is no longer painful. She also recently got her CT neck. She denies any new issues. She's eating and drinking well and denies any dysphagia, odynophagia, ear pain or new lumps/bumps.     8/15/23:  Here today for cancer surveillance.  She has no complaints today and is feeling well.  No weight loss.  No hemoptysis.  No new ear pain or voice changes.  No new lumps or bumps.  No nonhealing oral ulcers.  Eating and drinking well.  No throat pain.    11/15/23: Here today for surveillance. Patient underwent repeat CXR which showed resolution of lung opacities. No weight loss.  Has been feeling well.  No new lumps or bumps.  She does not have any dysphagia or odynophagia.  No changes in health.  She is not had any issues with her TEP    24:  Here today for follow-up.  Patient states she has been eating and gaining weight well.  She has not having any trismus hemoptysis weight loss weakness fatigue ear pain.  She has been taking her Synthroid as instructed she did not get new labs.  No new lumps or bumps    Past Medical History:  Past Medical History:   Diagnosis Date    Cancer     Laryngeal    Diabetes mellitus     Hypertension      Past Surgical History:   Procedure Laterality Date     SECTION      [place] REMOVAL OF LARYNX  2022       Review of Systems:  Unremarkable except as mentioned above.    Current Medications:  Current Outpatient Medications   Medication Sig    atorvastatin (LIPITOR) 80 MG tablet Take 1 tablet (80 mg total) by mouth every evening.    azelastine (ASTELIN) 137 mcg (0.1 %) nasal spray 1 spray by Nasal route.    carBAMazepine (CARBATROL) 200 MG CM12 Take 200 mg by mouth.    carBAMazepine (TEGRETOL XR) 200 MG 12 hr tablet Take 200 mg  by mouth 2 (two) times daily.    cetirizine (ZYRTEC) 10 MG tablet Take 10 mg by mouth once daily.    DULoxetine (CYMBALTA) 30 MG capsule Take 30 mg by mouth 2 (two) times daily.    fluticasone propionate (FLONASE) 50 mcg/actuation nasal spray 1 spray by Each Nostril route 2 (two) times daily.    fluticasone-salmeterol diskus inhaler 100-50 mcg Inhale 1 puff into the lungs every 12 (twelve) hours.    levothyroxine (SYNTHROID) 88 MCG tablet Take 1 tablet (88 mcg total) by mouth before breakfast.    loratadine (CLARITIN) 10 mg tablet Take 1 tablet (10 mg total) by mouth once daily.    metFORMIN (GLUCOPHAGE) 500 MG tablet Take 1 tablet (500 mg total) by mouth once daily.    metoprolol succinate (TOPROL-XL) 25 MG 24 hr tablet Take 0.5 tablets (12.5 mg total) by mouth once daily.    montelukast (SINGULAIR) 5 MG chewable tablet Take 1 tablet (5 mg total) by mouth every evening.    NIFEdipine (PROCARDIA-XL) 60 MG (OSM) 24 hr tablet Take 1 tablet (60 mg total) by mouth once daily.    rivaroxaban (XARELTO) 20 mg Tab Take 1 tablet (20 mg total) by mouth daily with dinner or evening meal.    aspirin (ECOTRIN) 81 MG EC tablet Take 1 tablet (81 mg total) by mouth once daily.    HYDROcodone-acetaminophen 5-300 mg Tab Take 1 tablet by mouth every 8 (eight) hours as needed.    ibuprofen (ADVIL,MOTRIN) 800 MG tablet Take 800 mg by mouth every 6 (six) hours as needed.    omeprazole (PRILOSEC) 40 MG capsule Take 40 mg by mouth once daily.    prednisoLONE acetate (PRED FORTE) 1 % DrpS Place into both eyes.     Current Facility-Administered Medications   Medication    silver nitrate applicators applicator 1 applicator        Allergies:  Review of patient's allergies indicates:  No Known Allergies     Physical Exam:  Vital signs:   Vitals:    02/14/23 1102   BP: 129/75   BP Location: Right arm   Patient Position: Sitting   BP Method: Medium (Automatic)   Pulse: 83   Resp: 16   Temp: 99 °F (37.2 °C)   TempSrc: Oral   Weight: 59.4 kg (131  "lb)   Height: 5' 2" (1.575 m)   General:  Well-developed well-nourished female in no acute distress.  Patient does communicate well with clear speech using her TEP.  Head and face:  Normocephalic.  No facial lesions.  No temporomandibular joint tenderness or click.  Ears:  Right ear-auricle is normally developed.  External auditory canal is clear.  Tympanic membrane is nonerythematous.  No middle ear effusion.  Left ear-auricle is normally developed.  External auditory canal is clear.  Tympanic membrane is nonerythematous.  No middle ear effusion.  Nose:  Nasal dorsum is unremarkable.  No significant septal deviation.  No significant intranasal congestion.  Secretions are clear.  Oral cavity and oropharynx:  Tongue and floor mouth are without lesions.  Mucosa is moist.  No pharyngeal erythema or exudates.  No oropharyngeal masses.  No tonsillar hypertrophy.  Neck:  Supple without palpable adenopathy.  She does have some induration related to her surgery and postradiation changes.  Trachea stoma is patent.  TEP is in place without any gross drainage from around the prosthesis.  No granulation is noted. Small 3mm firm nodule at right apron incision that is nontender  Eyes:  Extraocular muscles intact.  No nystagmus.  No exophthalmos or enophthalmos.  Neurologic:  Alert and oriented.  Cranial nerves 2-12 are grossly normal.    Procedure note: Flexible tracheoscopy, nasopharyngoscopy  The flexible fiberoptic laryngoscope was introduced into the right nostril and advanced. Examination of the nose showed the above mentioned findings. Examination of the nasopharynx showed no nasopharyngeal masses or eustachian tube obstruction. Examination of the base of tongue showed no masses or lesions. Examination of the hypopharynx showed no masses or lesions in the neopharynx or hypopharynx.  The esophageal inlet is without apparent lesions.  No evidence of fungal disease. The scope was passed in the stoma and the trachea was clear " down to the aleja.     Assessment/Plan:  Heaven Boston is a 57 y.o. female with T3N1M0 supraglottic SCCa s/p TL BSND CPM complicated by PCF s/p washout & primary closure x2 (2nd in [place]) now s/p adjuvant RT. One of her post-treatment PETS noted hypermetabolism at the site of her TEP. She was taken for a DL on 3/3/23 which was unremarkable. Doing well today. Some crusting around TEP, not sitting exactly flush. Going to make appt with SLP.      Plan:  - CT neck for 18 mo  -TSH  -SLP for tep  -recommend wearing leonie tube at night for small stoma  -RTC 3 months for surveillance         HEAD & NECK CANCER SURVEILLANCE   Primary Surgical Treatment     Head & Neck Staff: Celina  Medical Oncologist: Drew   Radiation Oncologist: Jase     Primary tumor: T3N1M0 SCCa  of the supraglottis       Surgery Date: 3/2022  Induction Chemotherapy: no  Adjuvant Therapy: Radiation  Completion Date: completed 6/2022        Place date if completed   3 6 12 18 24 36 48 60   CT Neck X X 5/2023 X X X X X   PET/CT 9/2022 12/2022         CXR  X X X X X X X   TFT X  4/2023  X X X X     Current Surveillance Interval: post-tx year 1-2, q2-3 mo     Suggested imaging surveillance. Patient and tumor specific factors should always be individually considered.

## 2024-02-21 RX ORDER — METFORMIN HYDROCHLORIDE 500 MG/1
500 TABLET ORAL DAILY
Qty: 90 TABLET | Refills: 1 | Status: SHIPPED | OUTPATIENT
Start: 2024-02-21 | End: 2024-06-13

## 2024-02-23 ENCOUNTER — OFFICE VISIT (OUTPATIENT)
Dept: FAMILY MEDICINE | Facility: CLINIC | Age: 59
End: 2024-02-23
Payer: MEDICAID

## 2024-02-23 VITALS
DIASTOLIC BLOOD PRESSURE: 68 MMHG | HEIGHT: 62 IN | HEART RATE: 72 BPM | SYSTOLIC BLOOD PRESSURE: 123 MMHG | WEIGHT: 141 LBS | TEMPERATURE: 98 F | OXYGEN SATURATION: 100 % | RESPIRATION RATE: 18 BRPM | BODY MASS INDEX: 25.95 KG/M2

## 2024-02-23 DIAGNOSIS — I10 HYPERTENSION, UNSPECIFIED TYPE: ICD-10-CM

## 2024-02-23 DIAGNOSIS — E78.5 HYPERLIPIDEMIA, UNSPECIFIED HYPERLIPIDEMIA TYPE: ICD-10-CM

## 2024-02-23 DIAGNOSIS — E11.9 TYPE 2 DIABETES MELLITUS WITHOUT COMPLICATION, WITHOUT LONG-TERM CURRENT USE OF INSULIN: Primary | ICD-10-CM

## 2024-02-23 DIAGNOSIS — R79.89 ELEVATED SERUM CREATININE: ICD-10-CM

## 2024-02-23 DIAGNOSIS — G50.0 TRIGEMINAL NEURALGIA: ICD-10-CM

## 2024-02-23 DIAGNOSIS — Z90.02 H/O LARYNGECTOMY: ICD-10-CM

## 2024-02-23 PROCEDURE — 99214 OFFICE O/P EST MOD 30 MIN: CPT | Mod: S$PBB,,, | Performed by: FAMILY MEDICINE

## 2024-02-23 PROCEDURE — 3008F BODY MASS INDEX DOCD: CPT | Mod: CPTII,,, | Performed by: FAMILY MEDICINE

## 2024-02-23 PROCEDURE — 1159F MED LIST DOCD IN RCRD: CPT | Mod: CPTII,,, | Performed by: FAMILY MEDICINE

## 2024-02-23 PROCEDURE — 3074F SYST BP LT 130 MM HG: CPT | Mod: CPTII,,, | Performed by: FAMILY MEDICINE

## 2024-02-23 PROCEDURE — 99213 OFFICE O/P EST LOW 20 MIN: CPT | Mod: PBBFAC | Performed by: FAMILY MEDICINE

## 2024-02-23 PROCEDURE — 1160F RVW MEDS BY RX/DR IN RCRD: CPT | Mod: CPTII,,, | Performed by: FAMILY MEDICINE

## 2024-02-23 PROCEDURE — 3078F DIAST BP <80 MM HG: CPT | Mod: CPTII,,, | Performed by: FAMILY MEDICINE

## 2024-02-23 RX ORDER — CARBAMAZEPINE 200 MG/1
200 TABLET, EXTENDED RELEASE ORAL 2 TIMES DAILY
Qty: 60 TABLET | Refills: 0 | Status: CANCELLED | OUTPATIENT
Start: 2024-02-23

## 2024-02-23 NOTE — PROGRESS NOTES
"Patient Name: Heaven Boston   : 1965  MRN: [id number]     SUBJECTIVE:  Heaven Boston is a 59 y.o. female here for Follow-up  .    HPI  Here for routine follow-up.  Hemoglobin A1c 3 months ago 6. 2%.  Compliant with metformin 500 mg daily  Regarding HTN, on nifedipine 60 mg daily.  Follows with cardiology.  She was added Zetia 10 mg in addition to Lipitor 80 mg daily for target of LDL less than 100.  Patient also on Toprol 12.5 mg daily for history of SVT on Holter monitor.    Used to be on tegretol but off of it for a year. States it was given to her for "my nerves from previous PCP".  On cymbalta for trigeminal neuralgia when she had laryngeal cancer.  Patient will reach out to ENT regarding Tegretol. Probably was on Tegretol and Cymbalta trigeminal neuralgia.    Regarding hypothyroidism, patient showed me the bottle and she was mistakenly taking 50 mcg instead of 100 mcg.  Patient will reach out to ENT to let them know because there was a new prescription with the adjusted dose of levothyroxine added yesterday.  Last pap smear normal , neg HPV. Has appt with gynecology next year.      ALLERGIES: Review of patient's allergies indicates:  No Known Allergies      ROS:  Review of Systems   Constitutional:  Negative for chills and fever.   HENT:  Negative for congestion.    Eyes:  Negative for blurred vision.   Respiratory:  Negative for cough and shortness of breath.    Cardiovascular:  Negative for chest pain, palpitations and leg swelling.   Gastrointestinal:  Negative for abdominal pain, blood in stool, diarrhea, nausea and vomiting.   Genitourinary:  Negative for dysuria and hematuria.   Neurological:  Negative for dizziness and headaches.   Psychiatric/Behavioral:  Negative for depression. The patient is not nervous/anxious.          OBJECTIVE:  Vital signs  Vitals:    24 0831   BP: 123/68   Pulse: 72   Resp: 18   Temp: 98.2 °F (36.8 °C)   TempSrc: Oral   SpO2: 100%   Weight: 64 kg (141 lb) " "  Height: 5' 2" (1.575 m)      Body mass index is 25.79 kg/m².    PHYSICAL EXAM:   Physical Exam  Vitals reviewed.   Constitutional:       General: She is not in acute distress.     Appearance: Normal appearance. She is not ill-appearing.   HENT:      Head: Normocephalic and atraumatic.      Right Ear: External ear normal.      Left Ear: External ear normal.      Nose: Nose normal.      Mouth/Throat:      Mouth: Mucous membranes are moist.      Comments: Trach in place with speaking valve  Eyes:      General: No scleral icterus.        Right eye: No discharge.         Left eye: No discharge.      Conjunctiva/sclera: Conjunctivae normal.      Pupils: Pupils are equal, round, and reactive to light.   Cardiovascular:      Rate and Rhythm: Normal rate and regular rhythm.   Pulmonary:      Effort: Pulmonary effort is normal. No respiratory distress.      Breath sounds: No wheezing, rhonchi or rales.   Abdominal:      General: Bowel sounds are normal. There is no distension.      Palpations: Abdomen is soft.      Tenderness: There is no abdominal tenderness.   Musculoskeletal:      Cervical back: Normal range of motion and neck supple.      Right lower leg: No edema.      Left lower leg: No edema.   Skin:     General: Skin is warm.      Findings: No rash.   Neurological:      General: No focal deficit present.      Mental Status: She is alert and oriented to person, place, and time.   Psychiatric:         Mood and Affect: Mood normal.         Behavior: Behavior normal.          ASSESSMENT/PLAN:  1. Type 2 diabetes mellitus without complication, without long-term current use of insulin  Well-controlled.  Continue with metformin 500 mg daily.  Recheck A1c.  -     Hemoglobin A1C; Future; Expected date: 02/23/2024  -     Basic Metabolic Panel; Future; Expected date: 02/23/2024    2. Elevated serum creatinine  Patient denies any daily use of NSAIDs.  Advised patient to continue being off of them and keep herself hydrated.  " Patient does admit that she does not drink a lot water.  Will recheck BMP.  -     Basic Metabolic Panel; Future; Expected date: 02/23/2024    3. Hypertension, unspecified type  Well-controlled.  Continue with nifedipine 60 mg daily and Toprol for history of SVT.  -     Hemoglobin A1C; Future; Expected date: 02/23/2024  -     Basic Metabolic Panel; Future; Expected date: 02/23/2024    4. Hyperlipidemia, unspecified hyperlipidemia type  Not at goal but Zetia was just started by Cardiology.  Continue with it and Lipitor    5. Trigeminal neuralgia  Continue following up with ENT.  Patient will reach out to them regarding Tegretol.  Also on Cymbalta.    6. H/O laryngectomy  Follows up with ENT             Previous medical history/lab work/radiology reviewed and considered during medical management decisions.   Medication list reviewed and medication reconciliation performed.  Patient was provided  and care about his/her current diagnosis (es) and medications including risk/benefit and side effects/adverse events, over the counter medication uses/doses, home self-care and contact precautions,  and red flags and indications for when to seek immediate medical attention.   Patient was advised to continue compliance with current medication list and medical recommendations.  Recommended/ Advised continued compliance with recommended eating habits/ diets for medical conditions and exercise 150 minutes/ week (if possible) for medical condition (s).        RESULTS:  Recent Results (from the past 1008 hour(s))   TSH    Collection Time: 02/20/24  9:04 AM   Result Value Ref Range    TSH 10.644 (H) 0.350 - 4.940 uIU/mL   T4, Free    Collection Time: 02/20/24  9:04 AM   Result Value Ref Range    Thyroxine Free 0.83 0.70 - 1.48 ng/dL         Follow Up:  Follow up in about 6 months (around 8/23/2024).       This note was created with the assistance of a voice recognition software or phone dictation. There may be transcription  errors as a result of using this technology however minimal. Effort has been made to assure accuracy of transcription but any obvious errors or omissions should be clarified with the author of the document

## 2024-02-25 DIAGNOSIS — J30.2 SEASONAL ALLERGIES: ICD-10-CM

## 2024-02-26 ENCOUNTER — DOCUMENTATION ONLY (OUTPATIENT)
Dept: REHABILITATION | Facility: HOSPITAL | Age: 59
End: 2024-02-26

## 2024-02-26 NOTE — PROGRESS NOTES
[facility]  Cancel Note    Patient: Heaven Boston  Date of Session: 2/26/2024  Diagnosis: s/p total laryngectomy  MRN: [id number]    Heaven Boston did not attend her scheduled OP SLP therapy session at 0800 on 02/26/2024. Session cancelled due to voice prosthesis not currently in stock. Office will contact patient when supplies available.       Sunitha Jean MS, CCC-SLP    02/26/2024

## 2024-02-28 ENCOUNTER — CLINICAL SUPPORT (OUTPATIENT)
Dept: REHABILITATION | Facility: HOSPITAL | Age: 59
End: 2024-02-28
Payer: MEDICAID

## 2024-02-28 DIAGNOSIS — Z90.02 H/O LARYNGECTOMY: Primary | ICD-10-CM

## 2024-02-28 PROCEDURE — 92507 TX SP LANG VOICE COMM INDIV: CPT

## 2024-02-28 PROCEDURE — L8509 TRACH-ESOPH VOICE PROS MD IN: HCPCS

## 2024-02-28 RX ORDER — CETIRIZINE HYDROCHLORIDE 10 MG/1
10 TABLET, FILM COATED ORAL
Qty: 90 TABLET | Refills: 0 | Status: SHIPPED | OUTPATIENT
Start: 2024-02-28

## 2024-02-28 NOTE — PROGRESS NOTES
"[facility]  Speech Therapy Treatment Note  Laryngectomy      PATIENT IS A NECK-BREATHER - DO NOT ORALLY INTUBATE    Name: Heaven Boston   MRN: [id number]   Therapy Diagnosis: s/p total laryngectomy  Physician: Shelby Marcos MD     Date: 2024  Time In:  0900  Time Out:  0940  Total Billable Time: 40 min.     Precautions: Standard  Subjective:   Pt reports:"hard to talk"    Pain Scale:  0/10 on VAS currently.   Pain Location: NA    SURGICAL HISTORY  Past Surgical History:   Procedure Laterality Date     SECTION      COLONOSCOPY N/A 5/15/2023    Procedure: COLONOSCOPY;  Surgeon: Francisco Zavala MD;  Location: [facility] ENDOSCOPY;  Service: Endoscopy;  Laterality: N/A;    DIRECT DIAGNOSTIC LARYNGOSCOPY WITH BRONCHOSCOPY AND ESOPHAGOSCOPY N/A 3/3/2023    Procedure: LARYNGOSCOPY, DIRECT, DIAGNOSTIC, WITH BRONCHOSCOPY AND ESOPHAGOSCOPY;  Surgeon: Connor Patrick MD;  Location: [facility] OR;  Service: ENT;  Laterality: N/A;  Will not need to do bronchoscopy for this procedure    [place] REMOVAL OF LARYNX  2022        Current Level of Swallow Function/Complaints:   no complaints of dysphagia   Current Level of Voice Function/Complaints:  alaryngeal communication via voice prosthesis     Prior Therapy:  11/15/2023    Objective:        UNTIMED  Procedure Min.   Speech- Language- Voice Therapy  40   Total Untimed Units: 40  Charges Billed/# of units: 40/1    LongTerm Goals:  Current Progress:   1.  Patient will utilize alaryngeal communication via TEP to express needs and desires without respiratory compromise >90% of the time.  Progressing towards goal         Short Term Goals:  Current Progress:   1. Patient will demonstrate care and use of HME system for maximum pulmonary benefit >90% of the time. Progressing towards goal   2.  Patient will demonstrate adequate care and use of TEP to maintain TEP voicing >90% of the time.  Progressing towards goal   3.  Patient will demonstrate adequate " occlusion and cleaning of TEP to maintain voicing >90% of the time.  Progressing towards goal   4. Patient will increase laryngectomy tube usage daily or PRN to maintain and increase stomal patency.  Progressing towards goal      Ms. 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  She is currently utilizing esophageal speech via a voice prosthesis for alaryngeal communication for functional communication with her family, friends, medical providers, and others to perform her daily life activities.  Ms. Boston requires the use of a laryngectomy tube at all times to keep the airway patent and prevent stomal stenosis. HMEs are required daily for mucus management and pulmonary optimization .        Patient Education/Response:   Education completed: Yes Plan of Care, role of SLP in care, use of HMEs, TEP and stomal care were discussed with pt. Patient expressed understanding.     Barriers to Learning:  None    Teaching Method: Verbal    Assessment:   Heaven is progressing well towards her goals. Current goals remain appropriate. Goals to be updated as necessary.     TEP Assessment:    Initial Fitting: No    Re-fitting:Yes    TEP Initial Fitting Date: 03/21/2022     TEP Current Status: Pt is currently wearing a 17FR, 8mm Provox Aquino placed on 11/152023. Currently, Ms. Boston she uses adhesives due to discomfort and persistent coughing with leonie tube. She attempted use of 10/36 leonie tube however, reported excessive coughing of 30+ minutes.     TEP Patient Complaints: strained voice    TEP Accessories: 10/36 fenestrated leonie tube (occasionally) with push to talk HMEs. Frequent usage of adhesives.     Stoma Size:  Adequate     Lymphedema:   None currently    TEP Fitting/Re-sizing:     Removed current TEP: Yes moderate biofilm noted on entire surface of prosthesis. Tracheal phalange of prosthesis noted to be malformed    TEP Removed catheter : No     TEP Sizing:Yes 8mm    Puncture Dilation: No     TEP Prosthesis  Placed:Yes 17 FR, 8mm Aquino    TEP Voicing with Open Tract:No     TEP Voicing with Prosthesis:Yes, good voicing noted after TEP replacement     TEP Leaking through Prothesis:No     TEP Leaking around Prosthesis:No       Ms. 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  She is currently utilizing esophageal speech via a voice prosthesis for alaryngeal communication for functional communication with her family, friends, medical providers, and others to perform her daily life activities.  Ms. Boston requires the use of a laryngectomy tube at all times to keep the airway patent and prevent stomal stenosis. HMEs are required daily for mucus management and pulmonary optimization .     Barriers to Therapy: None.    Pt's spiritual, cultural and educational needs considered and pt agreeable to plan of care and goals.Yes.    Plan:     SLP intervention is warranted 1 times a month or PRN for communication needs for a minimum of 1 year due to the chronic nature of the impairment.     Additional Information:   Ms. Boston entered this date requesting a prothesis change  due to strained voicing. No leaking reported. Patient entered with open stoma and reported that ENT requested to attempt leonie tube usage every three days. However, she stated leonie tube (10/36) caused excessive coughing of 30+ minutes. SLP assessed stoma and noted reduced mild stenosis. Prosthesis noted to have adequate fit, however, excessive biofilm visualized on entire surface with tracheal phalange noted to be malformed.  SLP replaced prosthesis with a 17FR, 8mm Provox Aquino without incident this date. Good voicing noted immediately after prosthesis replacement. SLP provided donated 18/8 leonie button for trial this date. Minimal cough noted upon insertion which cease quickly. SLP educated to use button 2-3 days for stomal patency and switch to adhesives once stoma open and button loose. Continued to alternate as warranted to maintain stomal  patency.  SLP to follow up for prosthesis management as warranted.    Sunitha Jean MS, CCC-SLP  2/28/2024

## 2024-03-05 ENCOUNTER — HOSPITAL ENCOUNTER (OUTPATIENT)
Dept: RADIOLOGY | Facility: HOSPITAL | Age: 59
Discharge: HOME | End: 2024-03-05
Attending: STUDENT IN AN ORGANIZED HEALTH CARE EDUCATION/TRAINING PROGRAM
Payer: MEDICAID

## 2024-03-05 DIAGNOSIS — Z92.3 HISTORY OF RADIATION TO HEAD AND NECK REGION: ICD-10-CM

## 2024-03-05 DIAGNOSIS — Z90.02 H/O LARYNGECTOMY: ICD-10-CM

## 2024-03-05 DIAGNOSIS — C32.9 LARYNGEAL CANCER: ICD-10-CM

## 2024-03-05 PROCEDURE — 82565 ASSAY OF CREATININE: CPT | Performed by: STUDENT IN AN ORGANIZED HEALTH CARE EDUCATION/TRAINING PROGRAM

## 2024-03-05 PROCEDURE — 70491 CT SOFT TISSUE NECK W/DYE: CPT | Mod: TC

## 2024-03-05 PROCEDURE — 25500020 PHARM REV CODE 255: Performed by: STUDENT IN AN ORGANIZED HEALTH CARE EDUCATION/TRAINING PROGRAM

## 2024-03-05 RX ADMIN — IOHEXOL 100 ML: 350 INJECTION, SOLUTION INTRAVENOUS at 11:03

## 2024-03-07 DIAGNOSIS — E53.8 FOLIC ACID DEFICIENCY: ICD-10-CM

## 2024-03-07 RX ORDER — FOLIC ACID 1 MG/1
1000 TABLET ORAL
Qty: 30 TABLET | Refills: 0 | Status: SHIPPED | OUTPATIENT
Start: 2024-03-07 | End: 2024-04-08

## 2024-04-05 NOTE — PROGRESS NOTES
I have reviewed and agree with the resident's findings, including all diagnostic interpretations and plans as written.     Connor Patrick M.D.

## 2024-04-07 DIAGNOSIS — E53.8 FOLIC ACID DEFICIENCY: ICD-10-CM

## 2024-04-08 RX ORDER — FOLIC ACID 1 MG/1
1000 TABLET ORAL
Qty: 30 TABLET | Refills: 0 | Status: SHIPPED | OUTPATIENT
Start: 2024-04-08 | End: 2024-05-06

## 2024-04-16 ENCOUNTER — CLINICAL SUPPORT (OUTPATIENT)
Dept: REHABILITATION | Facility: HOSPITAL | Age: 59
End: 2024-04-16
Payer: MEDICAID

## 2024-04-16 ENCOUNTER — HOSPITAL ENCOUNTER (EMERGENCY)
Facility: HOSPITAL | Age: 59
Discharge: HOME | End: 2024-04-16
Attending: STUDENT IN AN ORGANIZED HEALTH CARE EDUCATION/TRAINING PROGRAM
Payer: MEDICAID

## 2024-04-16 VITALS
TEMPERATURE: 98 F | DIASTOLIC BLOOD PRESSURE: 88 MMHG | BODY MASS INDEX: 23.92 KG/M2 | OXYGEN SATURATION: 100 % | HEART RATE: 96 BPM | HEIGHT: 62 IN | RESPIRATION RATE: 20 BRPM | SYSTOLIC BLOOD PRESSURE: 129 MMHG | WEIGHT: 130 LBS

## 2024-04-16 DIAGNOSIS — Z90.02 H/O LARYNGECTOMY: Primary | ICD-10-CM

## 2024-04-16 DIAGNOSIS — R09.A2 SENSATION OF FOREIGN BODY IN THROAT: Primary | ICD-10-CM

## 2024-04-16 DIAGNOSIS — T17.908A FOREIGN BODY ASPIRATION: ICD-10-CM

## 2024-04-16 PROCEDURE — 92597 ORAL SPEECH DEVICE EVAL: CPT

## 2024-04-16 PROCEDURE — L8509 TRACH-ESOPH VOICE PROS MD IN: HCPCS

## 2024-04-16 PROCEDURE — 99283 EMERGENCY DEPT VISIT LOW MDM: CPT | Mod: 25

## 2024-04-16 NOTE — ED PROVIDER NOTES
Encounter Date: 2024       History     Chief Complaint   Patient presents with    Foreign Body In Throat     Patient larkin balloon to dilated trach/ laryngeal area slipped out. Needs it back in to hold vocal cords so she can still speak.      HPI  Patient is a 59 year old female past medical history of hypertension, diabetes, laryngeal cancer, who presents to ER complaining of laryngectomy tube being dislodged.  Patient states she was eating when this occurred.  Presents to ER to have Larkin balloon placed into site to avoid closure.  Patient denies any other complaints.  Review of patient's allergies indicates:  No Known Allergies  Past Medical History:   Diagnosis Date    Cancer     Laryngeal    Diabetes mellitus     Hypertension      Past Surgical History:   Procedure Laterality Date     SECTION      COLONOSCOPY N/A 5/15/2023    Procedure: COLONOSCOPY;  Surgeon: Francisco Zavala MD;  Location: [facility] ENDOSCOPY;  Service: Endoscopy;  Laterality: N/A;    DIRECT DIAGNOSTIC LARYNGOSCOPY WITH BRONCHOSCOPY AND ESOPHAGOSCOPY N/A 3/3/2023    Procedure: LARYNGOSCOPY, DIRECT, DIAGNOSTIC, WITH BRONCHOSCOPY AND ESOPHAGOSCOPY;  Surgeon: Connor Patrick MD;  Location: [facility] OR;  Service: ENT;  Laterality: N/A;  Will not need to do bronchoscopy for this procedure    [place] REMOVAL OF LARYNX  2022     No family history on file.  Social History     Tobacco Use    Smoking status: Former     Current packs/day: 0.00     Average packs/day: 0.5 packs/day for 39.0 years (19.5 ttl pk-yrs)     Types: Cigarettes     Start date: 1983     Quit date: 2022     Years since quittin.0    Smokeless tobacco: Former     Quit date:    Substance Use Topics    Alcohol use: Never    Drug use: Never     Review of Systems   Constitutional:  Negative for fever.   HENT:  Negative for sore throat.    Eyes:  Negative for visual disturbance.   Respiratory:  Negative for shortness of breath.    Cardiovascular:  Negative for chest  pain.   Gastrointestinal:  Negative for nausea.   Endocrine: Negative for polyuria.   Genitourinary:  Negative for dysuria.   Musculoskeletal:  Negative for back pain.   Skin:  Negative for rash.   Neurological:  Negative for weakness.   Hematological:  Does not bruise/bleed easily.   All other systems reviewed and are negative.      Physical Exam     Initial Vitals [04/16/24 0044]   BP Pulse Resp Temp SpO2   129/88 (!) 138 20 97.7 °F (36.5 °C) 100 %      MAP       --         Physical Exam    Nursing note and vitals reviewed.  Constitutional: Vital signs are normal. She appears well-developed and well-nourished. She is not diaphoretic. She is active.  Non-toxic appearance. She does not appear ill. No distress.   HENT:   Head: Normocephalic and atraumatic.   Eyes: Conjunctivae are normal. Pupils are equal, round, and reactive to light. Right conjunctiva is not injected. Left conjunctiva is not injected.   Neck: Trachea normal. Neck supple.   Laryngectomy stoma appears clean, no surrounding erythema noted.  No obvious foreign body noted in the site.   Normal range of motion.   Full passive range of motion without pain.     Cardiovascular:  Normal rate, regular rhythm, S1 normal, S2 normal, intact distal pulses and normal pulses.           Pulmonary/Chest: Breath sounds normal. No respiratory distress. She has no wheezes.   Abdominal: Abdomen is soft. Bowel sounds are normal. There is no abdominal tenderness.   Musculoskeletal:         General: No tenderness or edema. Normal range of motion.      Cervical back: Full passive range of motion without pain, normal range of motion and neck supple. No rigidity.      Right lower leg: No swelling. No edema.      Left lower leg: No swelling. No edema.     Neurological: She is alert and oriented to person, place, and time.   Skin: Skin is warm and dry. Capillary refill takes less than 2 seconds.         ED Course   Procedures  Labs Reviewed - No data to display       Imaging  Results              X-Ray Chest AP Portable (In process)                   X-Rays:   Independently Interpreted Readings:   Other Readings:  Chest x-ray per my interpretation negative for any acute abnormalities.  No obvious foreign body noted.    Medications - No data to display  Medical Decision Making  Patient is a 59 year old female past medical history of hypertension, diabetes, laryngeal cancer, who presents to ER complaining of laryngectomy tube being dislodged.      Differential diagnosis includes but not limited to:  Foreign body aspiration, dislodgement of laryngectomy tube    Patient is satting well on room air.  Patient requesting tube be placed in laryngectomy site to avoid closure.  This was done at the bedside after chest x-ray negative for any foreign body.  Initially was going to transfer patient to outside hospital for bronchoscopy as patient states she is unsure where the plastic piece attached to laryngectomy to went.  However as patient was satting well she states she will like to just follow with her ENT in the morning.  Discussed case with [facility] who states patient did not need to be transferred to come to their hospital that it was appropriate for patient to be seen tomorrow morning by her ENT.  Patient in agreement with plan.  Strict return precautions given if symptoms worsen, change return to ER patient stable for discharge.     Jesus Cortez M.D.  Emergency Medicine        Amount and/or Complexity of Data Reviewed  Radiology: ordered.                                      Clinical Impression:  Final diagnoses:  [T17.908A] Foreign body aspiration  [R09.A2] Sensation of foreign body in throat (Primary)          ED Disposition Condition    Discharge Stable          ED Prescriptions    None       Follow-up Information       Follow up With Specialties Details Why Contact Info    Shannan Brown MD Family Medicine Schedule an appointment as soon as possible for a visit in 2 days For follow up  [address] [facility] [address]  [phone]      [facility] - Emergency Dept Emergency Medicine  If symptoms worsen [address] [facility] [id number] [phone]             Jesus Cortez MD  04/16/24 0149

## 2024-04-16 NOTE — PLAN OF CARE
[facility]  Speech Therapy Evaluation   Laryngectomy  Date: 2024    Name: Heaven Boston   MRN: [id number]    Therapy Diagnosis: s/p total laryngectomy  Physician: Dr. Patrick     PATIENT IS A NECK-BREATHER - DO NOT ORALLY INTUBATE    Time In: 0830   Time Out: 0900     Procedure Min.   Prosthesis Evaluation  30   Total Untimed Units: 30  Charges Billed/# of units: 30/1    Precautions: Standard  Subjective      Chief Complaint: prosthesis dislodgment    AFFECT: normal affect    Pain:   0/10  Pain Location / Description: n/a  Current Medical History: Heaven Boston is a 59 y.o. female referred by Dr. Patrick for TEP management to maximize alaryngeal communication without respiratory compromise.    Past Medical History:   Past Medical History:   Diagnosis Date    Cancer     Laryngeal    Diabetes mellitus     Hypertension     Heaven Boston  has a past surgical history that includes pr removal of larynx (2022);  section; Direct diagnostic laryngoscopy with bronchoscopy and esophagoscopy (N/A, 3/3/2023); and Colonoscopy (N/A, 5/15/2023).   Active Ambulatory Problems     Diagnosis Date Noted    H/O laryngectomy 2022    Laryngeal cancer 2022    Hypertension 2022    Familial hypercholesterolemia 2022    Syncope 2022    SVT (supraventricular tachycardia) 2022    Type 2 diabetes mellitus without complication, without long-term current use of insulin 2022    Tracheostomy in place 2022    PEG (percutaneous endoscopic gastrostomy) status 2022    History of radiation to head and neck region 2022    Hypothyroidism 2022    Bilateral carotid artery stenosis 2023    Deep vein thrombosis (DVT) of distal vein of left lower extremity 2023    Diverticulosis large intestine w/o perforation or abscess w/o bleeding 05/15/2023    Positive colorectal cancer screening using Cologuard test 05/15/2023     Resolved  Ambulatory Problems     Diagnosis Date Noted    Lymphedema 03/16/2023     Past Medical History:   Diagnosis Date    Cancer     Diabetes mellitus       Medical Hx and Allergies: Heaven has a current medication list which includes the following prescription(s): allergy relief (cetirizine), atorvastatin, carbamazepine, duloxetine, ezetimibe, fluticasone propionate, folic acid, levothyroxine, levothyroxine, metformin, metoprolol succinate, montelukast, and nifedipine, and the following Facility-Administered Medications: dextrose 10%, dextrose 10%, diphenhydramine, droperidol, hydromorphone, insulin aspart u-100, insulin aspart u-100, lactated ringers, lidocaine (pf) 10 mg/ml (1%), lidocaine (pf) 10 mg/ml (1%), lidocaine hcl 20 mg/ml (2%), metoclopramide, midazolam, oxycodone, silver nitrate applicators, and triamcinolone acetonide. Review of patient's allergies indicates:  No Known Allergies    Current Voice Function: pt utilizing esophageal speech with RRC in place. Strained voice quality noted which resolved with replacement of prosthesis this date.   Current Level of Swallow Function/Complaints:  no complaints of dysphagia at this time  Prior Therapy:  2/28/2024  SLP Note:   Ms. Boston entered this date requesting a prothesis change  due to strained voicing. No leaking reported. Patient entered with open stoma and reported that ENT requested to attempt leonie tube usage every three days. However, she stated leonie tube (10/36) caused excessive coughing of 30+ minutes. SLP assessed stoma and noted reduced mild stenosis. Prosthesis noted to have adequate fit, however, excessive biofilm visualized on entire surface with tracheal phalange noted to be malformed.  SLP replaced prosthesis with a 17FR, 8mm Provox Aquino without incident this date. Good voicing noted immediately after prosthesis replacement. SLP provided donated 18/8 leonie button for trial this date. Minimal cough noted upon insertion which cease quickly. SLP  educated to use button 2-3 days for stomal patency and switch to adhesives once stoma open and button loose. Continued to alternate as warranted to maintain stomal patency.  SLP to follow up for prosthesis management as warranted.     Sunitha Jean MS, CCC-SLP    TEP ASSESSMENT     Initial Fitting: No    Re-fitting:Yes    TEP Initial Fitting Date: 03/21/2022    TEP Current Status: Dislodged. Pt entered with 15 FR RRC.    TEP Patient Complaints: prosthesis dislodgment    TEP Accessories: 10/36 fenestrated leonie tube (occasionally) with push to talk HMEs. Frequent usage of adhesives.     Stoma Size:  narrow     Lymphedema:  No    TEP Fitting/Re-sizing:     Removed current TEP: No     TEP Removed catheter : Yes 15 FR RRC    TEP Sizing:Yes 8 mm    Puncture Dilation: No     TEP Prosthesis Placed:Yes 17 FR 8 mm    TEP Voicing with Open Tract:No     TEP Voicing with Prosthesis:Yes     TEP Leaking through Prothesis:No     TEP Leaking around Prosthesis:No       Education     Education: Plan of Care, role of SLP in care, and leonie tube/push to talk HMEs to facilitate pulmonary optimization, protocols for prosthesis dislodgment  were discussed with pt. Patient expressed understanding.     Barriers to Learning: participation level - evidenced by reduced compliance with previous education provided related to protocol when prothesis becomes dislodged. SLP provided extensive education using multiple modalities this date to reiterate.     Teaching Method: Verbal, Written, Demonstration  Assessment       LongTerm Goals:  Current Progress:   Patient will utilize alaryngeal communication via TEP to express needs and desires without respiratory compromise >90% of the time.  Progressing towards goal       Short Term Goals:  Current Progress:   Patient will demonstrate care and use of HME system for maximum pulmonary benefit >90% of the time. Progressing towards goal   Patient will demonstrate adequate care and use of TEP to maintain TEP  voicing >90% of the time.  Progressing towards goal   Patient will demonstrate adequate occlusion and cleaning of TEP to maintain voicing >90% of the time.  Progressing towards goal   Patient will increase laryngectomy tube usage daily or PRN to maintain and increase stomal patency.  Progressing towards goal     Mrs. 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  She is currently utilizing esophageal speech via a voice prosthesis for alaryngeal communication for functional communication with family, friends, medical providers, and others to perform daily life activities.  Mrs. Collado requires the use of a laryngectomy tube at all times to keep the airway patent and prevent stomal stenosis. HMEs are required daily for mucus management and pulmonary optimization .     Plan   SLP intervention is warranted 1 times a month or PRN for communication needs for a minimum of 1 year due to the chronic nature of the impairment.     Additional Information   Mrs. Collado entered reporting prosthesis dislodgment during meal time last night. She reported visiting the emergency room due to dislodgment where 15 FR RRC was placed. Pt utilizing strained esophageal speech due to prosthesis being dislodged. SLP provided education related to protocol when prosthesis becomes dislodged and how to effectively place RRC in the home setting to prevent TE puncture stenosis. Increased secretions noted this date. Pt was also provided education related to use of leonie tube and push to talk HMEs to facilitate continued pulmonary optimization. Stoma narrow upon visualization despite pt reporting compliance with daily leonie tube use. Pt encouraged to continue wearing leonie tube 24 hours a day to prevent stomal stenosis. TE puncture sizing conducted this date. 8mm remains adequate. SLP placed 17 FR 8mm Provox Aquino prosthesis. No leaking observed around or through prosthesis following placement. Adequate voicing with reduced strain  noted following placement. SLP to follow up as warranted.     NANCY Palmer   4/16/2024

## 2024-05-04 DIAGNOSIS — E53.8 FOLIC ACID DEFICIENCY: ICD-10-CM

## 2024-05-06 RX ORDER — FOLIC ACID 1 MG/1
1000 TABLET ORAL
Qty: 30 TABLET | Refills: 0 | Status: SHIPPED | OUTPATIENT
Start: 2024-05-06 | End: 2024-06-05

## 2024-05-16 ENCOUNTER — OFFICE VISIT (OUTPATIENT)
Dept: OTOLARYNGOLOGY | Facility: CLINIC | Age: 59
End: 2024-05-16
Payer: MEDICAID

## 2024-05-16 VITALS
HEIGHT: 62 IN | BODY MASS INDEX: 23.92 KG/M2 | SYSTOLIC BLOOD PRESSURE: 116 MMHG | DIASTOLIC BLOOD PRESSURE: 62 MMHG | HEART RATE: 88 BPM | OXYGEN SATURATION: 98 % | WEIGHT: 130 LBS | TEMPERATURE: 98 F

## 2024-05-16 DIAGNOSIS — Z92.3 HISTORY OF RADIATION TO HEAD AND NECK REGION: ICD-10-CM

## 2024-05-16 DIAGNOSIS — Z90.02 H/O LARYNGECTOMY: Primary | ICD-10-CM

## 2024-05-16 DIAGNOSIS — E03.9 HYPOTHYROIDISM, UNSPECIFIED TYPE: ICD-10-CM

## 2024-05-16 DIAGNOSIS — C32.9 LARYNGEAL CANCER: ICD-10-CM

## 2024-05-16 PROCEDURE — 99215 OFFICE O/P EST HI 40 MIN: CPT | Mod: PBBFAC

## 2024-05-16 RX ORDER — LEVOTHYROXINE SODIUM 100 UG/1
100 TABLET ORAL
Qty: 30 TABLET | Refills: 11 | Status: SHIPPED | OUTPATIENT
Start: 2024-05-16 | End: 2025-05-16

## 2024-05-16 NOTE — PROGRESS NOTES
The scope used for the exam was:  Flexible scope ENF-P4  Serial Number:  1)    [id number]    []   2)    [id number]    []   3)    [id number]    [x]   4)    [id number]    []   5)    [id number]    []   6)    [id number]    []       The scope used for the exam was:  Rigid scope   Serial Number:  1)   [id number]    []   2)   [id number]    []   3)   7330    []   4)    3384   []   5)    0824   []   6)    5554   []     7)   7425    []   8)   2240    []   9)   1109    []

## 2024-05-16 NOTE — PROGRESS NOTES
Patient Name: Heaven Boston   YOB: 1965     Chief Complaint: No chief complaint on file.       History of Present Illness:  Heaven Boston is a 57 y.o. 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 Patient had an uneventful hospitalization course and was discharged on postoperative day 9 after an esophagram showed no pharyngeal leak.  However she re-presented to our facility on 4/6/22 with surgical site infection and a pharyngocutaneous fistula. Patient underwent a neck washout with primary repair on 4/11/2022, with placement of a gastrostomy tube the same day. She was maintained on NPO and transferred to [facility] for further evaluation and management on 4/15/22 after evidence of persistent pharyngocutaneous fistula. There she underwent a 2nd neck washout with primary closure and placement of salivary bypass tube.      5/2/22: Patient had an uneventful hospitalization course and removal of salivary bypass tube before discharge last week and now presenting for postoperative evaluation.  Patient presents today without any complaints.  She is wondering about when he pointed that her x-rays for swallowing then.  She seen Dr. Laguna today after our visit to undergo-stimulation for radiation.     5/9/22: Pt. Did not answer. Let voicemail to advance diet to CLD. Also discussed this with son.  Plan to see in clinic in 2 weeks     5/19/22: In radiation therapy, 5x weekly until 6/16/22. Has TEP in place, brought another TEP requesting swap out. Will go to speech therapy for exchange of TEP. Went to ER for bleeding from G-tube site 1 week ago but declines visit to Gen Surg clinic today. On CLD taking Ensure by G-tube, water and soups by mouth. Had questions about advancing diet. No weight loss  or appetite change. No other issues.      6/21/22:  Returns today for follow up.  Completed radiation last week 6/16/22.  Is having difficulty voicing with TEP though it has been swapped out by SLP.  Able to eat what she wants.  Having some tightness of her throat and soreness, otherwise no issues.     7/21/22: Here today for follow up. Has overall been doing very well. She is tolerating her diet by mouth and gaining weight. No dysphagia. Has not used her PEG tube since prior to radiation. She is interested in having it removed. Has some fullness in the left neck which intermittently swells and then resolves again. Otherwise no new lumps/bumps of the head and neck.      8/23/22: Ms. Boston returns today for follow up. She complains of neck pain exacerbated by flexion and movement to the right that began 2 weeks ago. She also reports neck swelling and tightness which occasionally increases in size and is tender to palpation. She reports cough with clear mucus production as well as nasal congestion without drainage. She also notes dry, itchy eyes that have not improved with Visine. She also notes dysuria. She is tolerating her diet by mouth and gaining weight. She had her PEG tube removed on 8/16/22. She has been to SLP twice and is still having difficulty voicing with TEP.     9/27/22: Here for follow up. Reports she is doing okay. Still coughing a lot and not voicing well with TEP. No weight loss, tolerating diet. No otalgia/lumps bumps. Still having intermittent neck swelling/pain.      10/27/22:  Returns for surveillance.  Did not get TSH, T4.  Has been going to SLP, now can talk well.  Cough has improved.  No new H&N complaints     12/22/22: Doing very well. Voicing well. No pain. Doing well with speech and lymphedema therapy. Eating well, gaining weight constantly. Always tired. Sometimes sleeps all day.    February 14, 2023:   58-year-old female presents today for follow-up of her laryngeal squamous cell  carcinoma and postoperative hypothyroidism.  In regards to her squamous cell carcinoma of the larynx she is doing well.  She has no complaints of any pain.  He is not noticed any swelling in her neck.  She is not have any difficulty eating or swallowing in his indicated she is gaining weight.  She continues to use her TEP without problems and does not have any leakage around her TEP.  She did have a follow-up PET-CT scan done on December 30, 2022, in the report indicated that there was no evidence of new or progressive hypermetabolic metastatic disease.  On reviewing those images, however, there does appear to be an area of increased FDG activity in the upper cervical esophagus in the area above her trachea stoma and possibly at the site of the TEP.  This was also present on a PET-CT scan done on October 12, 2022 and there does not appear debris any change in this area between the 2 scans.  The report on the scan done on October 12, 2022, had indicated this area had an SUV max of 4.4 without any apparent lesions noted on the corresponding CT.  In regards to her hypothyroidism she continues to take levothyroxine 88 mcg daily.  Lab work from February 6, 2023, showed her TSH level be low at 0.115 with a normal free T4 of 1.48.  Her prior TSH from October 27, 2022, was elevated at 55.1672.     3/30/23:  Patient routine follow-up for squamous cell carcinoma and postop hypothyroidism.  She denies any new lumps, bumps hemoptysis or weight loss.  She is able to swallow without difficulty, TEP functioning well with excellent speech.  She has postop hypothyroidism, does complain of some weakness.  She did have a change in her blood pressure medicine in that it was increased and she felt some of her fatigue might have been related to it.  She currently has blood work ordered by her PCP including thyroid function and she is going to get it next week, we will schedule a TeleMed follow-up.  Duloxetine 30 mg helps her sleep and  helps her neck pain.     23: Here today for cancer surveillance. Recently had an add on appt with Dr. Aparicio for a new bump on her neck that appeared to be a neuroma. He injected it with lidocaine and she reports that the bump has decreased in size and is no longer painful. She also recently got her CT neck. She denies any new issues. She's eating and drinking well and denies any dysphagia, odynophagia, ear pain or new lumps/bumps.     8/15/23:  Here today for cancer surveillance.  She has no complaints today and is feeling well.  No weight loss.  No hemoptysis.  No new ear pain or voice changes.  No new lumps or bumps.  No nonhealing oral ulcers.  Eating and drinking well.  No throat pain.    11/15/23: Here today for surveillance. Patient underwent repeat CXR which showed resolution of lung opacities. No weight loss.  Has been feeling well.  No new lumps or bumps.  She does not have any dysphagia or odynophagia.  No changes in health.  She is not had any issues with her TEP    24:  Here today for follow-up.  Patient states she has been eating and gaining weight well.  She has not having any trismus hemoptysis weight loss weakness fatigue ear pain.  She has been taking her Synthroid as instructed she did not get new labs.  No new lumps or bumps.    24:  Here today for follow.  She is doing well.  She has been eating well and gaining weight.  She denies dysphagia, odynophagia, otalgia, lumps or bumps in the head or neck.  TSH was elevated.  She says that she takes the Synthroid on an empty stomach everyday and has not missed doses.  She takes 88 mcg daily.    Past Medical History:  Past Medical History:   Diagnosis Date    Cancer     Laryngeal    Diabetes mellitus     Hypertension      Past Surgical History:   Procedure Laterality Date     SECTION      NM REMOVAL OF LARYNX  2022       Review of Systems:  Unremarkable except as mentioned above.    Current Medications:  Current Outpatient  Medications   Medication Sig    atorvastatin (LIPITOR) 80 MG tablet Take 1 tablet (80 mg total) by mouth every evening.    azelastine (ASTELIN) 137 mcg (0.1 %) nasal spray 1 spray by Nasal route.    carBAMazepine (CARBATROL) 200 MG CM12 Take 200 mg by mouth.    carBAMazepine (TEGRETOL XR) 200 MG 12 hr tablet Take 200 mg by mouth 2 (two) times daily.    cetirizine (ZYRTEC) 10 MG tablet Take 10 mg by mouth once daily.    DULoxetine (CYMBALTA) 30 MG capsule Take 30 mg by mouth 2 (two) times daily.    fluticasone propionate (FLONASE) 50 mcg/actuation nasal spray 1 spray by Each Nostril route 2 (two) times daily.    fluticasone-salmeterol diskus inhaler 100-50 mcg Inhale 1 puff into the lungs every 12 (twelve) hours.    levothyroxine (SYNTHROID) 88 MCG tablet Take 1 tablet (88 mcg total) by mouth before breakfast.    loratadine (CLARITIN) 10 mg tablet Take 1 tablet (10 mg total) by mouth once daily.    metFORMIN (GLUCOPHAGE) 500 MG tablet Take 1 tablet (500 mg total) by mouth once daily.    metoprolol succinate (TOPROL-XL) 25 MG 24 hr tablet Take 0.5 tablets (12.5 mg total) by mouth once daily.    montelukast (SINGULAIR) 5 MG chewable tablet Take 1 tablet (5 mg total) by mouth every evening.    NIFEdipine (PROCARDIA-XL) 60 MG (OSM) 24 hr tablet Take 1 tablet (60 mg total) by mouth once daily.    rivaroxaban (XARELTO) 20 mg Tab Take 1 tablet (20 mg total) by mouth daily with dinner or evening meal.    aspirin (ECOTRIN) 81 MG EC tablet Take 1 tablet (81 mg total) by mouth once daily.    HYDROcodone-acetaminophen 5-300 mg Tab Take 1 tablet by mouth every 8 (eight) hours as needed.    ibuprofen (ADVIL,MOTRIN) 800 MG tablet Take 800 mg by mouth every 6 (six) hours as needed.    omeprazole (PRILOSEC) 40 MG capsule Take 40 mg by mouth once daily.    prednisoLONE acetate (PRED FORTE) 1 % DrpS Place into both eyes.     Current Facility-Administered Medications   Medication    silver nitrate applicators applicator 1 applicator     "    Allergies:  Review of patient's allergies indicates:  No Known Allergies     Physical Exam:  Vital signs:   Vitals:    02/14/23 1102   BP: 129/75   BP Location: Right arm   Patient Position: Sitting   BP Method: Medium (Automatic)   Pulse: 83   Resp: 16   Temp: 99 °F (37.2 °C)   TempSrc: Oral   Weight: 59.4 kg (131 lb)   Height: 5' 2" (1.575 m)   General:  Well-developed well-nourished female in no acute distress.  Patient does communicate well with clear speech using her TEP.  Head and face:  Normocephalic.  No facial lesions.  No temporomandibular joint tenderness or click.  Ears:  Right ear-auricle is normally developed.  External auditory canal is clear.  Tympanic membrane is nonerythematous.  No middle ear effusion.  Left ear-auricle is normally developed.  External auditory canal is clear.  Tympanic membrane is nonerythematous.  No middle ear effusion.  Nose:  Nasal dorsum is unremarkable.  No significant septal deviation.  No significant intranasal congestion.  Secretions are clear.  Oral cavity and oropharynx:  Tongue and floor mouth are without lesions.  Mucosa is moist.  No pharyngeal erythema or exudates.  No oropharyngeal masses.  No tonsillar hypertrophy.  Neck:  Supple without palpable adenopathy.  She does have some induration related to her surgery and postradiation changes.  Trachea stoma is patent.  TEP is in place without any gross drainage from around the prosthesis.  No granulation is noted. Small 3mm firm nodule at right apron incision that is nontender  Eyes:  Extraocular muscles intact.  No nystagmus.  No exophthalmos or enophthalmos.  Neurologic:  Alert and oriented.  Cranial nerves 2-12 are grossly normal.    Procedure note: Flexible tracheoscopy, nasopharyngoscopy  The flexible fiberoptic laryngoscope was introduced into the right nostril and advanced. Examination of the nose showed the above mentioned findings. Examination of the nasopharynx showed no nasopharyngeal masses or " eustachian tube obstruction. Examination of the base of tongue showed no masses or lesions. Examination of the hypopharynx showed no masses or lesions in the neopharynx or hypopharynx.  The esophageal inlet is without apparent lesions.  No evidence of fungal disease. The scope was passed in the stoma and the trachea was clear down to the aleja.     Assessment/Plan:  Heaven Boston is a 57 y.o. female with T3N1M0 supraglottic SCCa s/p TL BSND CPM complicated by PCF s/p washout & primary closure x2 (2nd in [place]) now s/p adjuvant RT. One of her post-treatment PETS noted hypermetabolism at the site of her TEP. She was taken for a DL on 3/3/23 which was unremarkable. Doing well today.  No evidence of disease on exam.      Plan:  - TSH was elevated, will increase Synthroid to 100 mcg daily  - continue SLP for tep  - recommend wearing leonie tube at night for small stoma  - RTC 3 months for surveillance         HEAD & NECK CANCER SURVEILLANCE   Primary Surgical Treatment     Head & Neck Staff: Celina  Medical Oncologist: Drew   Radiation Oncologist: Jase     Primary tumor: T3N1M0 SCCa  of the supraglottis       Surgery Date: 3/2022  Induction Chemotherapy: no  Adjuvant Therapy: Radiation  Completion Date: completed 6/2022        Place date if completed   3 6 12 18 24 36 48 60   CT Neck X X 5/2023 X X X X X   PET/CT 9/2022 12/2022         CXR  X X X X X X X   TFT X  4/2023  X X X X     Current Surveillance Interval: post-tx year 1-2, q2-3 mo     Suggested imaging surveillance. Patient and tumor specific factors should always be individually considered.

## 2024-05-17 ENCOUNTER — LAB VISIT (OUTPATIENT)
Dept: LAB | Facility: HOSPITAL | Age: 59
End: 2024-05-17
Attending: STUDENT IN AN ORGANIZED HEALTH CARE EDUCATION/TRAINING PROGRAM
Payer: MEDICAID

## 2024-05-17 ENCOUNTER — OFFICE VISIT (OUTPATIENT)
Dept: CARDIOLOGY | Facility: CLINIC | Age: 59
End: 2024-05-17
Payer: MEDICAID

## 2024-05-17 VITALS
HEART RATE: 84 BPM | HEIGHT: 62 IN | TEMPERATURE: 98 F | SYSTOLIC BLOOD PRESSURE: 110 MMHG | WEIGHT: 125.63 LBS | DIASTOLIC BLOOD PRESSURE: 69 MMHG | RESPIRATION RATE: 20 BRPM | OXYGEN SATURATION: 99 % | BODY MASS INDEX: 23.12 KG/M2

## 2024-05-17 DIAGNOSIS — E78.01 FAMILIAL HYPERCHOLESTEROLEMIA: Primary | ICD-10-CM

## 2024-05-17 DIAGNOSIS — E11.9 TYPE 2 DIABETES MELLITUS WITHOUT COMPLICATION, WITHOUT LONG-TERM CURRENT USE OF INSULIN: ICD-10-CM

## 2024-05-17 DIAGNOSIS — E78.01 FAMILIAL HYPERCHOLESTEROLEMIA: ICD-10-CM

## 2024-05-17 DIAGNOSIS — R79.89 ELEVATED SERUM CREATININE: ICD-10-CM

## 2024-05-17 DIAGNOSIS — I10 HYPERTENSION, UNSPECIFIED TYPE: ICD-10-CM

## 2024-05-17 LAB
ANION GAP SERPL CALC-SCNC: 9 MEQ/L
BUN SERPL-MCNC: 11 MG/DL (ref 9.8–20.1)
CALCIUM SERPL-MCNC: 9.9 MG/DL (ref 8.4–10.2)
CHLORIDE SERPL-SCNC: 110 MMOL/L (ref 98–107)
CHOLEST SERPL-MCNC: 149 MG/DL
CHOLEST/HDLC SERPL: 4 {RATIO} (ref 0–5)
CO2 SERPL-SCNC: 23 MMOL/L (ref 22–29)
CREAT SERPL-MCNC: 1.23 MG/DL (ref 0.55–1.02)
CREAT/UREA NIT SERPL: 9
EST. AVERAGE GLUCOSE BLD GHB EST-MCNC: 128.4 MG/DL
GFR SERPLBLD CREATININE-BSD FMLA CKD-EPI: 51 ML/MIN/1.73/M2
GLUCOSE SERPL-MCNC: 146 MG/DL (ref 74–100)
HBA1C MFR BLD: 6.1 %
HDLC SERPL-MCNC: 37 MG/DL (ref 35–60)
LDLC SERPL CALC-MCNC: 97 MG/DL (ref 50–140)
OHS QRS DURATION: 78 MS
OHS QTC CALCULATION: 412 MS
POTASSIUM SERPL-SCNC: 4.5 MMOL/L (ref 3.5–5.1)
SODIUM SERPL-SCNC: 142 MMOL/L (ref 136–145)
TRIGL SERPL-MCNC: 73 MG/DL (ref 37–140)
VLDLC SERPL CALC-MCNC: 15 MG/DL

## 2024-05-17 PROCEDURE — 80061 LIPID PANEL: CPT

## 2024-05-17 PROCEDURE — 83036 HEMOGLOBIN GLYCOSYLATED A1C: CPT

## 2024-05-17 PROCEDURE — 99215 OFFICE O/P EST HI 40 MIN: CPT | Mod: PBBFAC,25 | Performed by: INTERNAL MEDICINE

## 2024-05-17 PROCEDURE — 36415 COLL VENOUS BLD VENIPUNCTURE: CPT

## 2024-05-17 PROCEDURE — 93005 ELECTROCARDIOGRAM TRACING: CPT

## 2024-05-17 PROCEDURE — 80048 BASIC METABOLIC PNL TOTAL CA: CPT

## 2024-05-17 NOTE — PROGRESS NOTES
CARDIOLOGY CLINIC NOTE  2024 9:01 AM    Subjective:     CHIEF COMPLAINT:   Chief Complaint   Patient presents with    f/u denies chest pain or sob c/o pain in feet and legs                            HPI:    Ms. Heaven Boston is a 59 y.o. 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    She has no acute complaints.  She denies palpitations, presyncope, or syncopal events.     She denies congestive symptoms of heart failure including orthopnea, PND, abdominal distention, or lower extremity edema.  She denies dyspnea on exertion.  She denies symptoms of angina or anginal equivalents.    Lab Results   Component Value Date    CREATININE 1.23 (H) 2024    CREATININE 1.08 (H) 2024    CREATININE 1.10 (H) 2023       Past Medical History    Patient Active Problem List   Diagnosis    H/O laryngectomy    Laryngeal cancer    Hypertension    Familial hypercholesterolemia    Syncope    SVT (supraventricular tachycardia)    Type 2 diabetes mellitus without complication, without long-term current use of insulin    Tracheostomy in place    PEG (percutaneous endoscopic gastrostomy) status    History of radiation to head and neck region    Hypothyroidism    Bilateral carotid artery stenosis    Deep vein thrombosis (DVT) of distal vein of left lower extremity    Diverticulosis large intestine w/o perforation or abscess w/o bleeding    Positive colorectal cancer screening using Cologuard test       Surgical History    Past Surgical History:   Procedure Laterality Date     SECTION      COLONOSCOPY N/A 05/15/2023    Procedure: COLONOSCOPY;  Surgeon: Francisco Zavala MD;  Location: [facility] ENDOSCOPY;  Service: Endoscopy;  Laterality: N/A;    DIRECT DIAGNOSTIC LARYNGOSCOPY WITH BRONCHOSCOPY AND ESOPHAGOSCOPY N/A  2023    Procedure: LARYNGOSCOPY, DIRECT, DIAGNOSTIC, WITH BRONCHOSCOPY AND ESOPHAGOSCOPY;  Surgeon: Connor Patrick MD;  Location: [facility];  Service: ENT;  Laterality: N/A;  Will not need to do bronchoscopy for this procedure    LA REMOVAL OF LARYNX  2022       Social History     Socioeconomic History    Marital status: Single   Tobacco Use    Smoking status: Former     Current packs/day: 0.00     Average packs/day: 0.5 packs/day for 39.0 years (19.5 ttl pk-yrs)     Types: Cigarettes     Start date: 1983     Quit date: 2022     Years since quittin.1    Smokeless tobacco: Former     Quit date:    Substance and Sexual Activity    Alcohol use: Never    Drug use: Never    Sexual activity: Not Currently       No family history on file.  Review of patient's allergies indicates:  No Known Allergies    Current Medications    Current Outpatient Medications   Medication Instructions    ALLERGY RELIEF (CETIRIZINE) 10 mg, Oral    atorvastatin (LIPITOR) 80 mg, Oral, Nightly    carBAMazepine (TEGRETOL XR) 200 mg, Oral, 2 times daily    DULoxetine (CYMBALTA) 30 mg, Oral, 2 times daily    ezetimibe (ZETIA) 10 mg, Oral, Daily    fluticasone propionate (FLONASE) 50 mcg, Each Nostril, 2 times daily    folic acid (FOLVITE) 1,000 mcg, Oral    levothyroxine (SYNTHROID) 100 mcg, Oral, Before breakfast    metFORMIN (GLUCOPHAGE) 500 mg, Oral, Daily    metoprolol succinate (TOPROL-XL) 12.5 mg, Oral, Daily    montelukast (SINGULAIR) 5 mg, Oral, Every other day    NIFEdipine (PROCARDIA-XL) 60 mg, Oral, Daily         Denies headaches, changes in vision, nausea, vomiting, fever, chills, chest pain, palpitations, dyspnea, abdominal pain, or changes in urinary or bowel habits.    Objective:     BP Readings from Last 3 Encounters:   24 110/69   24 116/62   24 129/88        Pulse Readings from Last 3 Encounters:   24 84   24 88   24 96        Temp Readings from Last 3 Encounters:  "  05/17/24 98.2 °F (36.8 °C) (Oral)   05/16/24 98.2 °F (36.8 °C) (Oral)   04/16/24 97.7 °F (36.5 °C)       Wt Readings from Last 3 Encounters:   05/17/24 57 kg (125 lb 9.6 oz)   05/16/24 59 kg (130 lb)   04/16/24 59 kg (130 lb)         PE:  Blood pressure 110/69, pulse 84, temperature 98.2 °F (36.8 °C), temperature source Oral, resp. rate 20, height 5' 2" (1.575 m), weight 57 kg (125 lb 9.6 oz), SpO2 99%.   Physical Exam  Vitals reviewed.   Constitutional:       General: She is not in acute distress.     Appearance: Normal appearance. She is normal weight. She is not ill-appearing.   HENT:      Head: Normocephalic and atraumatic.      Right Ear: External ear normal.      Left Ear: External ear normal.      Nose: Nose normal.      Mouth/Throat:      Mouth: Mucous membranes are moist.      Comments: Trach in place  Eyes:      Conjunctiva/sclera: Conjunctivae normal.   Neck:      Comments: Speaking valve.   Cardiovascular:      Rate and Rhythm: Normal rate and regular rhythm.      Pulses: Normal pulses.      Heart sounds: Normal heart sounds. No murmur heard.  Pulmonary:      Effort: Pulmonary effort is normal. No respiratory distress.      Breath sounds: Normal breath sounds. No stridor. No wheezing, rhonchi or rales.   Chest:      Chest wall: No tenderness.   Abdominal:      General: Abdomen is flat. Bowel sounds are normal. There is no distension.      Palpations: Abdomen is soft.   Musculoskeletal:      Cervical back: Neck supple.      Right lower leg: No edema.      Left lower leg: No edema.   Skin:     General: Skin is warm and dry.      Capillary Refill: Capillary refill takes less than 2 seconds.   Neurological:      Mental Status: She is alert and oriented to person, place, and time.   Psychiatric:         Mood and Affect: Mood normal.         Behavior: Behavior normal.                                                                                                                                                "                                                                                                                                                                                                                                                                                                                                 CARDIAC TESTING:  Echocardiogram  No results found for this or any previous visit.      Stress Test  No results found for this or any previous visit.     Coronary Angiogram  No results found for this or any previous visit.     Holter Monitor  No cardiac monitor results found for the past 12 months    Last BMP BMP  Lab Results   Component Value Date     05/17/2024    K 4.5 05/17/2024     04/23/2022    CO2 23 05/17/2024    BUN 11.0 05/17/2024    CREATININE 1.23 (H) 05/17/2024    CALCIUM 9.9 05/17/2024    ANIONGAP 11 04/23/2022    EGFRNORACEVR 51 05/17/2024      Last CBC     Lab Results   Component Value Date    WBC 4.6 04/03/2023    HGB 10.6 (L) 04/03/2023    HCT 35.2 (L) 04/03/2023    MCV 92.6 04/03/2023     04/03/2023           BNP    Lab Results   Component Value Date    BNP 16.3 03/10/2022    .0 (H) 08/16/2020     Last lipids    Lab Results   Component Value Date    CHOL 149 05/17/2024    CHOL 198 11/06/2023    CHOL 181 04/03/2023    HDL 37 05/17/2024    HDL 43 11/06/2023    HDL 42 04/03/2023    LDL 97.00 05/17/2024    .00 11/06/2023    .00 04/03/2023    TRIG 73 05/17/2024    TRIG 114 11/06/2023    TRIG 107 04/03/2023    TOTALCHOLEST 4 05/17/2024    TOTALCHOLEST 5 11/06/2023    TOTALCHOLEST 4 04/03/2023      LFT     Lab Results   Component Value Date    ALT 12 04/03/2023    ALT 17 05/10/2022    ALT 6 04/14/2022    AST 14 04/03/2023    AST 22 05/10/2022    AST 17 04/14/2022       Assessment:   Ms. Heaven Boston is a 58 y.o. female with hypertension, controlled type 2 diabetes, hyperlipidemia, and previous provoked lower extremity DVT after laryngectomy for  laryngeal cancer as well as supraventricular tachycardia noted on event monitor who presents for follow-up.    Plan:     Hypertension  BP controlled today in clinic, 110/69  Continue nifedipine 60 mg daily.      Hypercholesterolemia  Continue Lipitor 80 mg daily and Zetia 10 mg daily  LDL 97 on 5/17    History of SVT noted on event monitor  Asymptomatic.  Continue Toprol 12.5 mg daily.    BL Leg pain  - worse on exertion  - obtain GAGAN to evaluate vasculature     Ha Tan DO  [facility] PGY-1

## 2024-05-31 DIAGNOSIS — E53.8 FOLIC ACID DEFICIENCY: ICD-10-CM

## 2024-06-05 RX ORDER — FOLIC ACID 1 MG/1
1000 TABLET ORAL
Qty: 30 TABLET | Refills: 0 | Status: SHIPPED | OUTPATIENT
Start: 2024-06-05

## 2024-06-13 ENCOUNTER — TELEPHONE (OUTPATIENT)
Dept: FAMILY MEDICINE | Facility: CLINIC | Age: 59
End: 2024-06-13
Payer: MEDICAID

## 2024-06-13 DIAGNOSIS — E11.9 TYPE 2 DIABETES MELLITUS WITHOUT COMPLICATION, WITHOUT LONG-TERM CURRENT USE OF INSULIN: Primary | ICD-10-CM

## 2024-06-13 DIAGNOSIS — N18.31 STAGE 3A CHRONIC KIDNEY DISEASE: ICD-10-CM

## 2024-06-13 NOTE — TELEPHONE ENCOUNTER
Informed patient that kidney function continues to be abnormal. Please advise not take any NSAIDs at all and take Tylenol if needed, also drink plenty of water.  I would Also recommend switching from metformin to Jardiance for diabetes control and kidney protective purposes.  Patient is in agreement with switching from metformin to Jardiance.      ----- Message from Shannan Brown MD sent at 6/12/2024 11:58 PM CDT -----  Please let the patient know that kidney function continues to be abnormal. Please advise not take any NSAIDs at all and take Tylenol if needed, also drink plenty of water.  I would Also recommend switching from metformin to Jardiance for diabetes control and kidney protective purposes.

## 2024-06-13 NOTE — TELEPHONE ENCOUNTER
Attempted to call patient no answer left voicemail.    ----- Message from Shannan Brown MD sent at 6/12/2024 11:58 PM CDT -----  Please let the patient know that kidney function continues to be abnormal. Please advise not take any NSAIDs at all and take Tylenol if needed, also drink plenty of water.  I would Also recommend switching from metformin to Jardiance for diabetes control and kidney protective purposes.

## 2024-07-01 DIAGNOSIS — E53.8 FOLIC ACID DEFICIENCY: ICD-10-CM

## 2024-07-01 RX ORDER — FOLIC ACID 1 MG/1
1000 TABLET ORAL
Qty: 30 TABLET | Refills: 0 | Status: SHIPPED | OUTPATIENT
Start: 2024-07-01

## 2024-07-24 ENCOUNTER — HOSPITAL ENCOUNTER (OUTPATIENT)
Dept: RADIOLOGY | Facility: HOSPITAL | Age: 59
Discharge: HOME | End: 2024-07-24
Payer: MEDICAID

## 2024-07-24 ENCOUNTER — OFFICE VISIT (OUTPATIENT)
Dept: OPHTHALMOLOGY | Facility: CLINIC | Age: 59
End: 2024-07-24
Payer: MEDICAID

## 2024-07-24 VITALS — BODY MASS INDEX: 23.13 KG/M2 | HEIGHT: 62 IN | WEIGHT: 125.69 LBS

## 2024-07-24 DIAGNOSIS — E78.01 FAMILIAL HYPERCHOLESTEROLEMIA: ICD-10-CM

## 2024-07-24 DIAGNOSIS — E11.9 TYPE 2 DIABETES MELLITUS WITHOUT COMPLICATION, WITHOUT LONG-TERM CURRENT USE OF INSULIN: Primary | ICD-10-CM

## 2024-07-24 DIAGNOSIS — H53.15 VISUAL DISTORTIONS OF SHAPE AND SIZE: ICD-10-CM

## 2024-07-24 DIAGNOSIS — H52.223 REGULAR ASTIGMATISM OF BOTH EYES: ICD-10-CM

## 2024-07-24 LAB
IMMEDIATE ARM BP: 116 MMHG
IMMEDIATE LEFT ABI: 1.02
IMMEDIATE LEFT TIBIAL: 118 MMHG
IMMEDIATE RIGHT ABI: 1.03
IMMEDIATE RIGHT TIBIAL: 120 MMHG
LEFT ABI: 1.17
LEFT ARM BP: 126 MMHG
LEFT DORSALIS PEDIS: 153 MMHG
LEFT POSTERIOR TIBIAL: 133 MMHG
RIGHT ABI: 1.14
RIGHT ARM BP: 131 MMHG
RIGHT DORSALIS PEDIS: 149 MMHG
RIGHT POSTERIOR TIBIAL: 146 MMHG
TOE RAISES: 35

## 2024-07-24 PROCEDURE — 92134 CPTRZ OPH DX IMG PST SGM RTA: CPT | Mod: PBBFAC,PN | Performed by: OPHTHALMOLOGY

## 2024-07-24 PROCEDURE — 93924 LWR XTR VASC STDY BILAT: CPT

## 2024-07-24 PROCEDURE — 99212 OFFICE O/P EST SF 10 MIN: CPT | Mod: PBBFAC,25,PN

## 2024-07-24 NOTE — PROGRESS NOTES
HPI    RTC 1 year DFE  Patient would like an updated MRX today if possible   Last edited by Pratima Carroll MA on 7/24/2024  1:38 PM.            Assessment /Plan     For exam results, see Encounter Report.    There are no diagnoses linked to this encounter.    OCT MAC   07/24/2024  OD: , intact foveal contour, no IRF/SRF  OS: , intact foveal contour, no IRF/SRF      1. DM2 no DR  -No A1C in records  -BG/BP control  -RTC 1 year DFE    2. 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 Astigmatism, OU  - New Mrx on 7/24/24  - CTM    4. Cornea Scar, OU  - Symmetric between both eyes, appears as faint iron-like deposition in a ring-like pattern paracentral cornea  - Corrects to 20/20 OU, appears chronic. Can continue to monitor       RTC 1 year for annual exam

## 2024-07-31 DIAGNOSIS — E53.8 FOLIC ACID DEFICIENCY: ICD-10-CM

## 2024-07-31 RX ORDER — FOLIC ACID 1 MG/1
1000 TABLET ORAL
Qty: 30 TABLET | Refills: 0 | Status: SHIPPED | OUTPATIENT
Start: 2024-07-31

## 2024-08-15 DIAGNOSIS — N18.31 STAGE 3A CHRONIC KIDNEY DISEASE: ICD-10-CM

## 2024-08-19 RX ORDER — EMPAGLIFLOZIN 10 MG/1
10 TABLET, FILM COATED ORAL
Qty: 90 TABLET | Refills: 1 | Status: SHIPPED | OUTPATIENT
Start: 2024-08-19

## 2024-08-22 ENCOUNTER — CLINICAL SUPPORT (OUTPATIENT)
Dept: REHABILITATION | Facility: HOSPITAL | Age: 59
End: 2024-08-22
Payer: MEDICAID

## 2024-08-22 DIAGNOSIS — Z90.02 H/O LARYNGECTOMY: Primary | ICD-10-CM

## 2024-08-22 PROCEDURE — 92507 TX SP LANG VOICE COMM INDIV: CPT

## 2024-08-22 PROCEDURE — L8509 TRACH-ESOPH VOICE PROS MD IN: HCPCS

## 2024-08-22 NOTE — PROGRESS NOTES
[facility]  Speech Therapy Treatment Note  Laryngectomy      PATIENT IS A NECK-BREATHER - DO NOT ORALLY INTUBATE    Name: Heaven Boston   MRN: [id number]   Therapy Diagnosis: s/p total laryngectomy  Physician: Shelby Marcos MD     Date: 2024  Time In:  0840  Time Out:  0915  Total Billable Time: 35 min.     Precautions: Standard  Subjective:   Pt reports:Leaking for ~1 week    Pain Scale:  0/10 on VAS currently.   Pain Location: NA    SURGICAL HISTORY  Past Surgical History:   Procedure Laterality Date     SECTION      COLONOSCOPY N/A 05/15/2023    Procedure: COLONOSCOPY;  Surgeon: Francisco Zavala MD;  Location: [facility] ENDOSCOPY;  Service: Endoscopy;  Laterality: N/A;    DIRECT DIAGNOSTIC LARYNGOSCOPY WITH BRONCHOSCOPY AND ESOPHAGOSCOPY N/A 2023    Procedure: LARYNGOSCOPY, DIRECT, DIAGNOSTIC, WITH BRONCHOSCOPY AND ESOPHAGOSCOPY;  Surgeon: Connor Patrick MD;  Location: [facility] OR;  Service: ENT;  Laterality: N/A;  Will not need to do bronchoscopy for this procedure    VT REMOVAL OF LARYNX  2022        Current Level of Swallow Function/Complaints:   no complaints of dysphagia   Current Level of Voice Function/Complaints:  alaryngeal communication via voice prosthesis     Prior Therapy:  11/15/2023    Objective:        UNTIMED  Procedure Min.   Speech- Language- Voice Therapy  35   Total Untimed Units: 35  Charges Billed/# of units: 35/1    LongTerm Goals:  Current Progress:   1.  Patient will utilize alaryngeal communication via TEP to express needs and desires without respiratory compromise >90% of the time.  Progressing towards goal         Short Term Goals:  Current Progress:   1. Patient will demonstrate care and use of HME system for maximum pulmonary benefit >90% of the time. Progressing towards goal   2.  Patient will demonstrate adequate care and use of TEP to maintain TEP voicing >90% of the time.  Progressing towards goal   3.  Patient will demonstrate  adequate occlusion and cleaning of TEP to maintain voicing >90% of the time.  Progressing towards goal   4. Patient will increase laryngectomy tube usage daily or PRN to maintain and increase stomal patency.  Progressing towards goal      Ms. 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  She is currently utilizing esophageal speech via a voice prosthesis for alaryngeal communication for functional communication with her family, friends, medical providers, and others to perform her daily life activities.  Ms. Boston requires the use of a laryngectomy tube at all times to keep the airway patent and prevent stomal stenosis. HMEs are required daily for mucus management and pulmonary optimization .        Patient Education/Response:   Education completed: Yes Plan of Care, role of SLP in care, use of HMEs, TEP and stomal care were discussed with pt. Patient expressed understanding.     Barriers to Learning:  None    Teaching Method: Verbal    Assessment:   Heaven is progressing well towards her goals. Current goals remain appropriate. Goals to be updated as necessary.     TEP Assessment:    Initial Fitting: No    Re-fitting:Yes    TEP Initial Fitting Date: 03/21/2022     TEP Current Status: Pt is currently wearing a 17FR, 8mm Provox Aquino placed on 04/16/2024.  Additionally, she wears a 10/36 leonie tube with intermittent uses adhesives    TEP Patient Complaints: leaking for ~ 1 week    TEP Accessories: 10/36 fenestrated leonie tube with push to talk HMEs. Frequent usage of adhesives.     Stoma Size:  Adequate     Lymphedema:   None currently    TEP Fitting/Re-sizing:     Removed current TEP: Yes moderate biofilm noted on entire surface of prosthesis. Tracheal phalange of prosthesis noted to be malformed    TEP Removed catheter : No     TEP Sizing:Yes 6mm    Puncture Dilation: No     TEP Prosthesis Placed:Yes 17 FR, 6mm Provox Aquino    TEP Voicing with Open Tract:No     TEP Voicing with Prosthesis:Yes, good  voicing noted after TEP replacement     TEP Leaking through Prothesis:No     TEP Leaking around Prosthesis:No       Ms. 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  She is currently utilizing esophageal speech via a voice prosthesis for alaryngeal communication for functional communication with her family, friends, medical providers, and others to perform her daily life activities.  Ms. Boston requires the use of a laryngectomy tube at all times to keep the airway patent and prevent stomal stenosis. HMEs are required daily for mucus management and pulmonary optimization .     Barriers to Therapy: None.    Pt's spiritual, cultural and educational needs considered and pt agreeable to plan of care and goals.Yes.    Plan:     SLP intervention is warranted 1 times a month or PRN for communication needs for a minimum of 1 year due to the chronic nature of the impairment.     Additional Information:   Ms. Boston entered this date requesting a prothesis change due to leaking. SLP assessed prothesis and noted increased length with current prosthesis. Tracheal phalange noted to malformed and coated in biofilm. SLP opted to downsize to 6mm. Good voicing noted immediately after prosthesis replacement. Peripheral leaking noted with thin liquids. SLP placed Xtra Flange which eliminated leaking this date. SLP provided donated 10/36 leonie tube this date. SLP to follow up for prosthesis management as warranted.    Sunitha Jean MS, CCC-SLP  8/22/2024

## 2024-08-23 ENCOUNTER — OFFICE VISIT (OUTPATIENT)
Dept: FAMILY MEDICINE | Facility: CLINIC | Age: 59
End: 2024-08-23
Payer: MEDICAID

## 2024-08-23 VITALS
OXYGEN SATURATION: 100 % | RESPIRATION RATE: 18 BRPM | HEART RATE: 85 BPM | DIASTOLIC BLOOD PRESSURE: 73 MMHG | BODY MASS INDEX: 24.11 KG/M2 | HEIGHT: 62 IN | TEMPERATURE: 98 F | SYSTOLIC BLOOD PRESSURE: 129 MMHG | WEIGHT: 131 LBS

## 2024-08-23 DIAGNOSIS — E11.21 TYPE 2 DIABETES MELLITUS WITH DIABETIC NEPHROPATHY, WITHOUT LONG-TERM CURRENT USE OF INSULIN: Primary | ICD-10-CM

## 2024-08-23 DIAGNOSIS — Z86.718 HISTORY OF DVT (DEEP VEIN THROMBOSIS): ICD-10-CM

## 2024-08-23 DIAGNOSIS — M79.605 BILATERAL LEG PAIN: ICD-10-CM

## 2024-08-23 DIAGNOSIS — Z12.31 ENCOUNTER FOR SCREENING MAMMOGRAM FOR MALIGNANT NEOPLASM OF BREAST: ICD-10-CM

## 2024-08-23 DIAGNOSIS — M79.604 BILATERAL LEG PAIN: ICD-10-CM

## 2024-08-23 PROCEDURE — 99214 OFFICE O/P EST MOD 30 MIN: CPT | Mod: PBBFAC | Performed by: FAMILY MEDICINE

## 2024-08-23 RX ORDER — LISINOPRIL 2.5 MG/1
2.5 TABLET ORAL DAILY
Qty: 90 TABLET | Refills: 1 | Status: SHIPPED | OUTPATIENT
Start: 2024-08-23

## 2024-08-23 RX ORDER — GABAPENTIN 100 MG/1
100 CAPSULE ORAL 3 TIMES DAILY
Qty: 90 CAPSULE | Refills: 2 | Status: SHIPPED | OUTPATIENT
Start: 2024-08-23

## 2024-08-23 NOTE — PROGRESS NOTES
Patient Name: Heaven Boston   : 1965  MRN: [id number]     SUBJECTIVE:  Heaven Boston is a 59 y.o. female here for Follow-up (The patient is experiencing leg pain in both legs.)  .    HPI  Here for routine follow-up of diabetes and hypertension  Diabetes remains well-controlled, last A1c 6.1%.  Chart reviewing seems to have developed CKD likely from hypertension/diabetes.  Reports compliance with Jardiance 10 mg daily for underlying diabetes and renal and cardioprotective purposes.  Microalbuminuria few months ago was slightly worsened.  Discussed with patient starting ACE inhibitor or Arb.  Has an eye doctor she follows with.    Regarding hypertension, on nifedipine 60 mg daily.  Also on Toprol 12.5 mg daily for history of SVT.  Follows with cardiology.  Last visit they ordered GAGAN of the lower extremities given leg pain, and the test was normal. Leg pain for the past 3-4 months. Left side worse. Worse with walking. Hx of dvt left side.  The 10-year ASCVD risk score (Carrington DK, et al., 2019) is: 14.4%    Values used to calculate the score:      Age: 59 years      Sex: Female      Is Non- : Yes      Diabetic: Yes      Tobacco smoker: No      Systolic Blood Pressure: 129 mmHg      Is BP treated: Yes      HDL Cholesterol: 37 mg/dL      Total Cholesterol: 149 mg/dL  Agreeable to start a baby aspirin    Follows with gynecology for regular well-woman exams.  Follows with ENT for history of laryngeal cancer.      ALLERGIES: Review of patient's allergies indicates:  No Known Allergies      ROS:  Review of Systems   Constitutional:  Negative for chills, fever and weight loss.   HENT:  Negative for congestion.    Eyes:  Negative for blurred vision.   Respiratory:  Negative for cough and shortness of breath.    Cardiovascular:  Negative for chest pain, palpitations and leg swelling.   Gastrointestinal:  Negative for abdominal pain, blood in stool, diarrhea, nausea and vomiting.   Genitourinary:   "Negative for dysuria and hematuria.   Neurological:  Negative for dizziness and headaches.   Psychiatric/Behavioral:  Negative for depression. The patient is not nervous/anxious.          OBJECTIVE:  Vital signs  Vitals:    08/23/24 0859   BP: 129/73   Pulse: 85   Resp: 18   Temp: 98 °F (36.7 °C)   TempSrc: Oral   SpO2: 100%   Weight: 59.4 kg (131 lb)   Height: 5' 2" (1.575 m)      Body mass index is 23.96 kg/m².    PHYSICAL EXAM:   Physical Exam  Vitals reviewed.   Constitutional:       General: She is not in acute distress.     Appearance: Normal appearance. She is not ill-appearing.      Comments: Communicating clear speech via TEP   HENT:      Head: Normocephalic and atraumatic.      Right Ear: External ear normal.      Left Ear: External ear normal.      Nose: Nose normal. No rhinorrhea.      Mouth/Throat:      Mouth: Mucous membranes are moist.   Eyes:      General: No scleral icterus.        Right eye: No discharge.         Left eye: No discharge.      Conjunctiva/sclera: Conjunctivae normal.      Pupils: Pupils are equal, round, and reactive to light.   Neck:      Comments: TEP in place  Cardiovascular:      Rate and Rhythm: Normal rate and regular rhythm.   Pulmonary:      Effort: Pulmonary effort is normal. No respiratory distress.      Breath sounds: No wheezing, rhonchi or rales.   Abdominal:      General: Bowel sounds are normal. There is no distension.      Palpations: Abdomen is soft.      Tenderness: There is no abdominal tenderness.   Musculoskeletal:      Cervical back: Normal range of motion and neck supple. No rigidity.      Right lower leg: No edema.      Left lower leg: No edema.   Skin:     General: Skin is warm.      Findings: No rash.   Neurological:      General: No focal deficit present.      Mental Status: She is alert and oriented to person, place, and time.   Psychiatric:         Mood and Affect: Mood normal.         Behavior: Behavior normal.          ASSESSMENT/PLAN:  1. Type 2 " diabetes mellitus with diabetic nephropathy, without long-term current use of insulin  Well-controlled, continue with Jardiance 10 mg daily but having some protein spillage in her urine and kidney issues, will add lisinopril low dose 2.5 mg daily for renal protective purposes only.  Recheck CMP before next visit  -     lisinopriL (PRINIVIL,ZESTRIL) 2.5 MG tablet; Take 1 tablet (2.5 mg total) by mouth once daily.  Dispense: 90 tablet; Refill: 1  -     Hemoglobin A1C; Future; Expected date: 08/23/2024    2. Bilateral leg pain  Recheck Doppler ultrasound of the arteries venous.  Might be developing neuropathy, so will do trial of gabapentin.  Also check ferritin level, magnesium and CBC CMP folate.  -     Folate; Future; Expected date: 08/23/2024  -     Comprehensive Metabolic Panel; Future; Expected date: 08/23/2024  -     CBC Auto Differential; Future; Expected date: 08/23/2024  -     CV Ultrasound doppler arterial legs bilat; Future  -     Ferritin; Future; Expected date: 08/23/2024  -     Magnesium; Future; Expected date: 08/23/2024  -     CV Ultrasound doppler venous legs bilat; Future  -     gabapentin (NEURONTIN) 100 MG capsule; Take 1 capsule (100 mg total) by mouth 3 (three) times daily.  Dispense: 90 capsule; Refill: 2    3. History of DVT (deep vein thrombosis)  -     CV Ultrasound doppler venous legs bilat; Future    4. Encounter for screening mammogram for malignant neoplasm of breast  -     Mammo Digital Screening Bilat w/ Levi; Future; Expected date: 08/23/2024             Previous medical history/lab work/radiology reviewed and considered during medical management decisions.   Medication list reviewed and medication reconciliation performed.  Patient was provided  and care about his/her current diagnosis (es) and medications including risk/benefit and side effects/adverse events, over the counter medication uses/doses, home self-care and contact precautions,  and red flags and indications for  when to seek immediate medical attention.   Patient was advised to continue compliance with current medication list and medical recommendations.  Recommended/ Advised continued compliance with recommended eating habits/ diets for medical conditions and exercise 150 minutes/ week (if possible) for medical condition (s).        RESULTS:  Recent Results (from the past 1008 hour(s))   Ankle Brachial Indices (GAGAN)    Collection Time: 07/24/24 10:05 AM   Result Value Ref Range    Right arm .00 mmHg    Right posterior tibial 146.00 mmHg    Right dorsalis pedis 149.00 mmHg    Right GAGAN 1.14     Left arm .00 mmHg    Left posterior tibial 133.00 mmHg    Left dorsalis pedis 153.00 mmHg    Left GAGAN 1.17     Toe raises 35     Immediate arm .00 mmHg    Immediate right tibial 120.00 mmHg    Immediate right GAGAN 1.03     Immediate left tibial 118.00 mmHg    Immediate left GAGAN 1.02          Follow Up:  Follow up in about 3 months (around 11/23/2024) for diabetes, leg pain.     This note was created with the assistance of a voice recognition software or phone dictation. There may be transcription errors as a result of using this technology however minimal. Effort has been made to assure accuracy of transcription but any obvious errors or omissions should be clarified with the author of the document

## 2024-08-27 DIAGNOSIS — E53.8 FOLIC ACID DEFICIENCY: ICD-10-CM

## 2024-08-28 RX ORDER — FOLIC ACID 1 MG/1
1000 TABLET ORAL
Qty: 30 TABLET | Refills: 0 | Status: SHIPPED | OUTPATIENT
Start: 2024-08-28

## 2024-09-05 ENCOUNTER — LAB VISIT (OUTPATIENT)
Dept: LAB | Facility: HOSPITAL | Age: 59
End: 2024-09-05
Attending: OTOLARYNGOLOGY
Payer: MEDICARE

## 2024-09-05 ENCOUNTER — OFFICE VISIT (OUTPATIENT)
Dept: OTOLARYNGOLOGY | Facility: CLINIC | Age: 59
End: 2024-09-05
Payer: MEDICARE

## 2024-09-05 VITALS
BODY MASS INDEX: 22.26 KG/M2 | SYSTOLIC BLOOD PRESSURE: 138 MMHG | WEIGHT: 121 LBS | HEART RATE: 98 BPM | OXYGEN SATURATION: 99 % | DIASTOLIC BLOOD PRESSURE: 76 MMHG | RESPIRATION RATE: 20 BRPM | TEMPERATURE: 98 F | HEIGHT: 62 IN

## 2024-09-05 DIAGNOSIS — E89.0 POSTOPERATIVE HYPOTHYROIDISM: ICD-10-CM

## 2024-09-05 DIAGNOSIS — M79.604 BILATERAL LEG PAIN: ICD-10-CM

## 2024-09-05 DIAGNOSIS — M79.605 BILATERAL LEG PAIN: ICD-10-CM

## 2024-09-05 DIAGNOSIS — Z08 ROUTINE CANCER FOLLOW-UP VISIT: ICD-10-CM

## 2024-09-05 DIAGNOSIS — E11.21 TYPE 2 DIABETES MELLITUS WITH DIABETIC NEPHROPATHY, WITHOUT LONG-TERM CURRENT USE OF INSULIN: ICD-10-CM

## 2024-09-05 DIAGNOSIS — E89.0 POSTOPERATIVE HYPOTHYROIDISM: Primary | ICD-10-CM

## 2024-09-05 DIAGNOSIS — Z85.21 H/O LARYNGEAL CANCER: ICD-10-CM

## 2024-09-05 LAB
ALBUMIN SERPL-MCNC: 3.5 G/DL (ref 3.5–5)
ALBUMIN/GLOB SERPL: 0.9 RATIO (ref 1.1–2)
ALP SERPL-CCNC: 137 UNIT/L (ref 40–150)
ALT SERPL-CCNC: 13 UNIT/L (ref 0–55)
ANION GAP SERPL CALC-SCNC: 8 MEQ/L
AST SERPL-CCNC: 13 UNIT/L (ref 5–34)
BASOPHILS # BLD AUTO: 0.02 X10(3)/MCL
BASOPHILS NFR BLD AUTO: 0.4 %
BILIRUB SERPL-MCNC: 0.2 MG/DL
BUN SERPL-MCNC: 11.5 MG/DL (ref 9.8–20.1)
CALCIUM SERPL-MCNC: 9.9 MG/DL (ref 8.4–10.2)
CHLORIDE SERPL-SCNC: 108 MMOL/L (ref 98–107)
CO2 SERPL-SCNC: 25 MMOL/L (ref 22–29)
CREAT SERPL-MCNC: 1.18 MG/DL (ref 0.55–1.02)
CREAT/UREA NIT SERPL: 10
EOSINOPHIL # BLD AUTO: 0.16 X10(3)/MCL (ref 0–0.9)
EOSINOPHIL NFR BLD AUTO: 3.3 %
ERYTHROCYTE [DISTWIDTH] IN BLOOD BY AUTOMATED COUNT: 12.8 % (ref 11.5–17)
EST. AVERAGE GLUCOSE BLD GHB EST-MCNC: 134.1 MG/DL
FERRITIN SERPL-MCNC: 137.79 NG/ML (ref 4.63–204)
FOLATE SERPL-MCNC: 15.1 NG/ML (ref 7–31.4)
GFR SERPLBLD CREATININE-BSD FMLA CKD-EPI: 54 ML/MIN/1.73/M2
GLOBULIN SER-MCNC: 3.8 GM/DL (ref 2.4–3.5)
GLUCOSE SERPL-MCNC: 164 MG/DL (ref 74–100)
HBA1C MFR BLD: 6.3 %
HCT VFR BLD AUTO: 39.6 % (ref 37–47)
HGB BLD-MCNC: 12.2 G/DL (ref 12–16)
IMM GRANULOCYTES # BLD AUTO: 0.01 X10(3)/MCL (ref 0–0.04)
IMM GRANULOCYTES NFR BLD AUTO: 0.2 %
LYMPHOCYTES # BLD AUTO: 1.27 X10(3)/MCL (ref 0.6–4.6)
LYMPHOCYTES NFR BLD AUTO: 26.1 %
MAGNESIUM SERPL-MCNC: 2.1 MG/DL (ref 1.6–2.6)
MCH RBC QN AUTO: 27.4 PG (ref 27–31)
MCHC RBC AUTO-ENTMCNC: 30.8 G/DL (ref 33–36)
MCV RBC AUTO: 88.8 FL (ref 80–94)
MONOCYTES # BLD AUTO: 0.28 X10(3)/MCL (ref 0.1–1.3)
MONOCYTES NFR BLD AUTO: 5.8 %
NEUTROPHILS # BLD AUTO: 3.12 X10(3)/MCL (ref 2.1–9.2)
NEUTROPHILS NFR BLD AUTO: 64.2 %
NRBC BLD AUTO-RTO: 0 %
PLATELET # BLD AUTO: 287 X10(3)/MCL (ref 130–400)
PMV BLD AUTO: 9.6 FL (ref 7.4–10.4)
POTASSIUM SERPL-SCNC: 4.1 MMOL/L (ref 3.5–5.1)
PROT SERPL-MCNC: 7.3 GM/DL (ref 6.4–8.3)
RBC # BLD AUTO: 4.46 X10(6)/MCL (ref 4.2–5.4)
SODIUM SERPL-SCNC: 141 MMOL/L (ref 136–145)
T4 FREE SERPL-MCNC: 1.38 NG/DL (ref 0.7–1.48)
TSH SERPL-ACNC: 0.01 UIU/ML (ref 0.35–4.94)
WBC # BLD AUTO: 4.86 X10(3)/MCL (ref 4.5–11.5)

## 2024-09-05 PROCEDURE — 85025 COMPLETE CBC W/AUTO DIFF WBC: CPT

## 2024-09-05 PROCEDURE — 84443 ASSAY THYROID STIM HORMONE: CPT

## 2024-09-05 PROCEDURE — 83735 ASSAY OF MAGNESIUM: CPT

## 2024-09-05 PROCEDURE — 80053 COMPREHEN METABOLIC PANEL: CPT

## 2024-09-05 PROCEDURE — 84439 ASSAY OF FREE THYROXINE: CPT

## 2024-09-05 PROCEDURE — 83036 HEMOGLOBIN GLYCOSYLATED A1C: CPT

## 2024-09-05 PROCEDURE — 82746 ASSAY OF FOLIC ACID SERUM: CPT

## 2024-09-05 PROCEDURE — 82728 ASSAY OF FERRITIN: CPT

## 2024-09-05 PROCEDURE — 31575 DIAGNOSTIC LARYNGOSCOPY: CPT | Mod: PBBFAC | Performed by: OTOLARYNGOLOGY

## 2024-09-05 PROCEDURE — 99215 OFFICE O/P EST HI 40 MIN: CPT | Mod: PBBFAC | Performed by: OTOLARYNGOLOGY

## 2024-09-05 PROCEDURE — 36415 COLL VENOUS BLD VENIPUNCTURE: CPT

## 2024-09-05 RX ORDER — LIDOCAINE HYDROCHLORIDE 40 MG/ML
SOLUTION TOPICAL
Status: DISCONTINUED
Start: 2024-09-05 | End: 2024-09-05 | Stop reason: HOSPADM

## 2024-09-05 NOTE — PROGRESS NOTES
Patient Name: Heaven Boston   YOB: 1965     Chief Complaint: follow-up for laryngeal cancer       History of Present Illness:  Heaven Boston is a 57 y.o. 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 Patient had an uneventful hospitalization course and was discharged on postoperative day 9 after an esophagram showed no pharyngeal leak.  However she re-presented to our facility on 4/6/22 with surgical site infection and a pharyngocutaneous fistula. Patient underwent a neck washout with primary repair on 4/11/2022, with placement of a gastrostomy tube the same day. She was maintained on NPO and transferred to [place] for further evaluation and management on 4/15/22 after evidence of persistent pharyngocutaneous fistula. There she underwent a 2nd neck washout with primary closure and placement of salivary bypass tube.      5/2/22: Patient had an uneventful hospitalization course and removal of salivary bypass tube before discharge last week and now presenting for postoperative evaluation.  Patient presents today without any complaints.  She is wondering about when he pointed that her x-rays for swallowing then.  She seen Dr. Laguna today after our visit to undergo-stimulation for radiation.     5/9/22: Pt. Did not answer. Let voicemail to advance diet to [facility]. Also discussed this with son.  Plan to see in clinic in 2 weeks     5/19/22: In radiation therapy, 5x weekly until 6/16/22. Has TEP in place, brought another TEP requesting swap out. Will go to speech therapy for exchange of TEP. Went to ER for bleeding from G-tube site 1 week ago but declines visit to Gen Surg clinic today. On [facility] taking Ensure by G-tube, water and soups by mouth. Had questions about advancing diet. No weight  loss or appetite change. No other issues.      6/21/22:  Returns today for follow up.  Completed radiation last week 6/16/22.  Is having difficulty voicing with TEP though it has been swapped out by [facility].  Able to eat what she wants.  Having some tightness of her throat and soreness, otherwise no issues.     7/21/22: Here today for follow up. Has overall been doing very well. She is tolerating her diet by mouth and gaining weight. No dysphagia. Has not used her PEG tube since prior to radiation. She is interested in having it removed. Has some fullness in the left neck which intermittently swells and then resolves again. Otherwise no new lumps/bumps of the head and neck.      8/23/22: Ms. Boston returns today for follow up. She complains of neck pain exacerbated by flexion and movement to the right that began 2 weeks ago. She also reports neck swelling and tightness which occasionally increases in size and is tender to palpation. She reports cough with clear mucus production as well as nasal congestion without drainage. She also notes dry, itchy eyes that have not improved with Visine. She also notes dysuria. She is tolerating her diet by mouth and gaining weight. She had her PEG tube removed on 8/16/22. She has been to [facility] twice and is still having difficulty voicing with TEP.     9/27/22: Here for follow up. Reports she is doing okay. Still coughing a lot and not voicing well with TEP. No weight loss, tolerating diet. No otalgia/lumps bumps. Still having intermittent neck swelling/pain.      10/27/22:  Returns for surveillance.  Did not get TSH, T4.  Has been going to [facility], now can talk well.  Cough has improved.  No new H&N complaints     12/22/22: Doing very well. Voicing well. No pain. Doing well with speech and lymphedema therapy. Eating well, gaining weight constantly. Always tired. Sometimes sleeps all day.    February 14, 2023:   58-year-old female presents today for follow-up of her laryngeal squamous cell  carcinoma and postoperative hypothyroidism.  In regards to her squamous cell carcinoma of the larynx she is doing well.  She has no complaints of any pain.  He is not noticed any swelling in her neck.  She is not have any difficulty eating or swallowing in his indicated she is gaining weight.  She continues to use her TEP without problems and does not have any leakage around her TEP.  She did have a follow-up PET-CT scan done on December 30, 2022, in the report indicated that there was no evidence of new or progressive hypermetabolic metastatic disease.  On reviewing those images, however, there does appear to be an area of increased FDG activity in the upper cervical esophagus in the area above her trachea stoma and possibly at the site of the TEP.  This was also present on a PET-CT scan done on October 12, 2022 and there does not appear debris any change in this area between the 2 scans.  The report on the scan done on October 12, 2022, had indicated this area had an SUV max of 4.4 without any apparent lesions noted on the corresponding CT.  In regards to her hypothyroidism she continues to take levothyroxine 88 mcg daily.  Lab work from February 6, 2023, showed her TSH level be low at 0.115 with a normal free T4 of 1.48.  Her prior TSH from October 27, 2022, was elevated at 55.1672.     3/30/23:  Patient routine follow-up for squamous cell carcinoma and postop hypothyroidism.  She denies any new lumps, bumps hemoptysis or weight loss.  She is able to swallow without difficulty, TEP functioning well with excellent speech.  She has postop hypothyroidism, does complain of some weakness.  She did have a change in her blood pressure medicine in that it was increased and she felt some of her fatigue might have been related to it.  She currently has blood work ordered by her PCP including thyroid function and she is going to get it next week, we will schedule a TeleMed follow-up.  Duloxetine 30 mg helps her sleep and  helps her neck pain.     5/30/23: Here today for cancer surveillance. Recently had an add on appt with Dr. Aparicio for a new bump on her neck that appeared to be a neuroma. He injected it with lidocaine and she reports that the bump has decreased in size and is no longer painful. She also recently got her CT neck. She denies any new issues. She's eating and drinking well and denies any dysphagia, odynophagia, ear pain or new lumps/bumps.     8/15/23:  Here today for cancer surveillance.  She has no complaints today and is feeling well.  No weight loss.  No hemoptysis.  No new ear pain or voice changes.  No new lumps or bumps.  No nonhealing oral ulcers.  Eating and drinking well.  No throat pain.    11/15/23: Here today for surveillance. Patient underwent repeat CXR which showed resolution of lung opacities. No weight loss.  Has been feeling well.  No new lumps or bumps.  She does not have any dysphagia or odynophagia.  No changes in health.  She is not had any issues with her TEP    2/20/24:  Here today for follow-up.  Patient states she has been eating and gaining weight well.  She has not having any trismus hemoptysis weight loss weakness fatigue ear pain.  She has been taking her Synthroid as instructed she did not get new labs.  No new lumps or bumps.    5/16/24:  Here today for follow.  She is doing well.  She has been eating well and gaining weight.  She denies dysphagia, odynophagia, otalgia, lumps or bumps in the head or neck.  TSH was elevated.  She says that she takes the Synthroid on an empty stomach everyday and has not missed doses.  She takes 88 mcg daily.    9/5/2024:   Patient presents today for follow-up of her laryngeal squamous cell carcinoma and hypothyroidism.  Patient has no complaints at this time.  She is tolerating regular diet and doing well with her TEP.  She continues to take levothyroxine 100 mcg daily.  She has not had follow-up thyroid function studies since her dose was increased.   She has no complaints of any heat or cold intolerance or palpitations.  No other new problems.    Past Medical History:  Past Medical History:   Diagnosis Date    Cancer     Laryngeal    Diabetes mellitus     Hypertension      Past Surgical History:   Procedure Laterality Date     SECTION      SC REMOVAL OF LARYNX  2022       Review of Systems:  Unremarkable except as mentioned above.    Current Medications:  Current Outpatient Medications   Medication Sig    atorvastatin (LIPITOR) 80 MG tablet Take 1 tablet (80 mg total) by mouth every evening.    azelastine (ASTELIN) 137 mcg (0.1 %) nasal spray 1 spray by Nasal route.    carBAMazepine (CARBATROL) 200 MG CM12 Take 200 mg by mouth.    carBAMazepine (TEGRETOL XR) 200 MG 12 hr tablet Take 200 mg by mouth 2 (two) times daily.    cetirizine (ZYRTEC) 10 MG tablet Take 10 mg by mouth once daily.    DULoxetine (CYMBALTA) 30 MG capsule Take 30 mg by mouth 2 (two) times daily.    fluticasone propionate (FLONASE) 50 mcg/actuation nasal spray 1 spray by Each Nostril route 2 (two) times daily.    fluticasone-salmeterol diskus inhaler 100-50 mcg Inhale 1 puff into the lungs every 12 (twelve) hours.    levothyroxine (SYNTHROID) 100 MCG tablet Take 1 tablet (100 mcg total) by mouth before breakfast.    loratadine (CLARITIN) 10 mg tablet Take 1 tablet (10 mg total) by mouth once daily.    metFORMIN (GLUCOPHAGE) 500 MG tablet Take 1 tablet (500 mg total) by mouth once daily.    metoprolol succinate (TOPROL-XL) 25 MG 24 hr tablet Take 0.5 tablets (12.5 mg total) by mouth once daily.    montelukast (SINGULAIR) 5 MG chewable tablet Take 1 tablet (5 mg total) by mouth every evening.    NIFEdipine (PROCARDIA-XL) 60 MG (OSM) 24 hr tablet Take 1 tablet (60 mg total) by mouth once daily.    rivaroxaban (XARELTO) 20 mg Tab Take 1 tablet (20 mg total) by mouth daily with dinner or evening meal.    aspirin (ECOTRIN) 81 MG EC tablet Take 1 tablet (81 mg total) by mouth once daily.  "   HYDROcodone-acetaminophen 5-300 mg Tab Take 1 tablet by mouth every 8 (eight) hours as needed.    ibuprofen (ADVIL,MOTRIN) 800 MG tablet Take 800 mg by mouth every 6 (six) hours as needed.    omeprazole (PRILOSEC) 40 MG capsule Take 40 mg by mouth once daily.    prednisoLONE acetate (PRED FORTE) 1 % DrpS Place into both eyes.     Current Facility-Administered Medications   Medication    silver nitrate applicators applicator 1 applicator        Allergies:  Review of patient's allergies indicates:  No Known Allergies     Physical Exam:  Vital signs:   Vitals:    02/14/23 1102   BP: 129/75   BP Location: Right arm   Patient Position: Sitting   BP Method: Medium (Automatic)   Pulse: 83   Resp: 16   Temp: 99 °F (37.2 °C)   TempSrc: Oral   Weight: 59.4 kg (131 lb)   Height: 5' 2" (1.575 m)   General:  Well-developed well-nourished female in no acute distress.  Patient does communicate well with clear speech using her TEP.  Head and face:  Normocephalic.  No facial lesions.  No temporomandibular joint tenderness or click.  Ears:  Right ear-auricle is normally developed.  External auditory canal is clear.  Tympanic membrane is nonerythematous.  No middle ear effusion.  Left ear-auricle is normally developed.  External auditory canal is clear.  Tympanic membrane is nonerythematous.  No middle ear effusion.  Nose:  Nasal dorsum is unremarkable.  No significant septal deviation.  No significant intranasal congestion.  Secretions are clear.  Oral cavity and oropharynx:  Tongue and floor mouth are without lesions.  Mucosa is moist.  No pharyngeal erythema or exudates.  No oropharyngeal masses.  No tonsillar hypertrophy.  Neck:  Supple without palpable adenopathy.  She does have some induration related to her surgery and postradiation changes.  Trachea stoma is patent.  TEP is in place without any gross drainage from around the prosthesis.    Eyes:  Extraocular muscles intact.  No nystagmus.  No exophthalmos or " enophthalmos.  Neurologic:  Alert and oriented.  Cranial nerves 2-12 are grossly normal.    Procedure note: Flexible tracheoscopy, nasopharyngoscopy  The flexible fiberoptic laryngoscope was introduced into the right nostril and advanced. Examination of the nose showed the above mentioned findings. Examination of the nasopharynx showed no nasopharyngeal masses or eustachian tube obstruction. Examination of the base of tongue showed no masses or lesions. Examination of the hypopharynx showed no masses or lesions in the neopharynx or hypopharynx.  The esophageal inlet is without apparent lesions.  No evidence of fungal disease. The scope was passed in the stoma and the trachea was clear down to the aleja.     Assessment/Plan:  Heaven Boston is a 59 y.o. female with T3N1M0 supraglottic SCCa s/p TL BSND CPM on 3/24/2022, complicated by PCF s/p washout & primary closure x2 (2nd in [place]) now s/p adjuvant RT. One of her post-treatment PETS noted hypermetabolism at the site of her TEP. She was taken for a DL on 3/3/23 which was unremarkable. Doing well today.  No evidence of disease on exam.  Postoperative hypothyroidism.      Plan:  Continue levothyroxine 100 mcg p.o. q.d..    TSH and free T4 levels today.  If abnormal we will then adjust her levothyroxine.  Continue follow-up with speech therapy.    Follow-up here in 3 months.      Addendum:   Results of the patient's TSH level shows it to be low at 0.10 with a normal free T4 of 1.38.  Review of her her lab work from the past couple of years shows that her TSH level has very widely ranging from a low of 0.010 to a high of 55.672.  Free T4 levels have been fairly stable.    The patient had indicated that she does take her medication on a routine basis on an empty stomach.  She does not have any complaints of any palpitations or heat or cold intolerance or unusual anxiety.  We will plan to continue levothyroxine 100 mcg p.o. q.d. and not make any changes in her dose  today given that her levels have fluctuated.  When she returns in 3 months we will recheck her thyroid function studies and make adjustments then if they continue to be abnormal.    Connor Patrick M.D.          HEAD & NECK CANCER SURVEILLANCE   Primary Surgical Treatment     Head & Neck Staff: Celina  Medical Oncologist: Drew   Radiation Oncologist: Jase     Primary tumor: T3N1M0 SCCa  of the supraglottis       Surgery Date: 3/24/2022  Induction Chemotherapy: no  Adjuvant Therapy: Radiation  Completion Date: completed 6/2022        Place date if completed   3 6 12 18 24 36 48 60   CT Neck X X 5/2023 X X X X X   PET/CT 9/2022 12/2022         CXR  X X X X X X X   TFT X  4/2023  X X X X     Current Surveillance Interval: post-tx year 1-2, q2-3 mo     Suggested imaging surveillance. Patient and tumor specific factors should always be individually considered.

## 2024-09-17 DIAGNOSIS — Z90.02 H/O LARYNGECTOMY: ICD-10-CM

## 2024-09-17 DIAGNOSIS — Z92.3 HISTORY OF RADIATION TO HEAD AND NECK REGION: ICD-10-CM

## 2024-09-17 DIAGNOSIS — B37.81 ESOPHAGEAL CANDIDIASIS: ICD-10-CM

## 2024-09-17 DIAGNOSIS — E03.9 HYPOTHYROIDISM, UNSPECIFIED TYPE: ICD-10-CM

## 2024-09-17 DIAGNOSIS — B37.9 YEAST INFECTION: ICD-10-CM

## 2024-09-17 DIAGNOSIS — Z85.21 HISTORY OF LARYNGEAL CANCER: ICD-10-CM

## 2024-09-20 ENCOUNTER — OFFICE VISIT (OUTPATIENT)
Dept: CARDIOLOGY | Facility: CLINIC | Age: 59
End: 2024-09-20
Payer: MEDICARE

## 2024-09-20 VITALS
HEART RATE: 88 BPM | TEMPERATURE: 98 F | WEIGHT: 132 LBS | DIASTOLIC BLOOD PRESSURE: 65 MMHG | RESPIRATION RATE: 18 BRPM | HEIGHT: 62 IN | SYSTOLIC BLOOD PRESSURE: 126 MMHG | OXYGEN SATURATION: 98 % | BODY MASS INDEX: 24.29 KG/M2

## 2024-09-20 DIAGNOSIS — I47.10 SVT (SUPRAVENTRICULAR TACHYCARDIA): ICD-10-CM

## 2024-09-20 DIAGNOSIS — M79.605 LEG PAIN, LEFT: ICD-10-CM

## 2024-09-20 DIAGNOSIS — I10 HYPERTENSION, UNSPECIFIED TYPE: Primary | ICD-10-CM

## 2024-09-20 PROCEDURE — 99214 OFFICE O/P EST MOD 30 MIN: CPT | Mod: PBBFAC | Performed by: INTERNAL MEDICINE

## 2024-09-20 NOTE — PROGRESS NOTES
CARDIOLOGY CLINIC NOTE  2024 9:01 AM    Subjective:     CHIEF COMPLAINT:   No chief complaint on file.                          HPI:    Ms. Heaven Boston is a 59 y.o. 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    The patient complains of leg pain today.  The pain is worse in her left leg compared to her right.  The pain occurs with exertion.  She will feel pain with walking less than a block.  GAGAN was normal.  An arterial and venous ultrasound are scheduled.  Blood pressure today 126/65.  LDL 97 on statin.  She has no other complaints.    Lab Results   Component Value Date    CREATININE 1.18 (H) 2024    CREATININE 1.23 (H) 2024    CREATININE 1.08 (H) 2024       Past Medical History    Patient Active Problem List   Diagnosis    H/O laryngectomy    Laryngeal cancer    Hypertension    Familial hypercholesterolemia    Syncope    SVT (supraventricular tachycardia)    Type 2 diabetes mellitus with diabetic nephropathy, without long-term current use of insulin    Tracheostomy in place    PEG (percutaneous endoscopic gastrostomy) status    History of radiation to head and neck region    Hypothyroidism    Bilateral carotid artery stenosis    Deep vein thrombosis (DVT) of distal vein of left lower extremity    Diverticulosis large intestine w/o perforation or abscess w/o bleeding    Positive colorectal cancer screening using Cologuard test       Surgical History    Past Surgical History:   Procedure Laterality Date     SECTION      COLONOSCOPY N/A 05/15/2023    Procedure: COLONOSCOPY;  Surgeon: Francisco Zavala MD;  Location: [facility] ENDOSCOPY;  Service: Endoscopy;  Laterality: N/A;    DIRECT DIAGNOSTIC LARYNGOSCOPY WITH BRONCHOSCOPY AND ESOPHAGOSCOPY N/A 2023    Procedure: LARYNGOSCOPY,  DIRECT, DIAGNOSTIC, WITH BRONCHOSCOPY AND ESOPHAGOSCOPY;  Surgeon: Connor Patrick MD;  Location: [facility];  Service: ENT;  Laterality: N/A;  Will not need to do bronchoscopy for this procedure    MT REMOVAL OF LARYNX  2022       Social History     Socioeconomic History    Marital status: Single   Tobacco Use    Smoking status: Former     Current packs/day: 0.00     Average packs/day: 0.5 packs/day for 39.0 years (19.5 ttl pk-yrs)     Types: Cigarettes     Start date: 1983     Quit date: 2022     Years since quittin.4    Smokeless tobacco: Former     Quit date:    Substance and Sexual Activity    Alcohol use: Never    Drug use: Never    Sexual activity: Not Currently       No family history on file.  Review of patient's allergies indicates:  No Known Allergies    Current Medications    Current Outpatient Medications   Medication Instructions    ALLERGY RELIEF (CETIRIZINE) 10 mg, Oral    atorvastatin (LIPITOR) 80 mg, Oral, Nightly    carBAMazepine (TEGRETOL XR) 200 mg, Oral, 2 times daily    DULoxetine (CYMBALTA) 30 mg, Oral, 2 times daily    ezetimibe (ZETIA) 10 mg, Oral, Daily    fluticasone propionate (FLONASE) 50 mcg, Each Nostril, 2 times daily    folic acid (FOLVITE) 1,000 mcg, Oral    gabapentin (NEURONTIN) 100 mg, Oral, 3 times daily    JARDIANCE 10 mg, Oral    levothyroxine (SYNTHROID) 100 mcg, Oral, Before breakfast    lisinopriL (PRINIVIL,ZESTRIL) 2.5 mg, Oral, Daily    metoprolol succinate (TOPROL-XL) 12.5 mg, Oral, Daily    montelukast (SINGULAIR) 5 mg, Oral, Every other day    NIFEdipine (PROCARDIA-XL) 60 mg, Oral, Daily         Objective:     BP Readings from Last 3 Encounters:   24 138/76   24 129/73   24 110/69        Pulse Readings from Last 3 Encounters:   24 98   24 85   24 84        Temp Readings from Last 3 Encounters:   24 98.4 °F (36.9 °C) (Oral)   24 98 °F (36.7 °C) (Oral)   24 98.2 °F (36.8 °C) (Oral)       Wt  Readings from Last 3 Encounters:   09/05/24 54.9 kg (121 lb)   08/23/24 59.4 kg (131 lb)   07/24/24 57 kg (125 lb 10.6 oz)         PE:  There were no vitals taken for this visit.   Physical Exam  Vitals reviewed.   Constitutional:       General: She is not in acute distress.     Appearance: Normal appearance. She is normal weight. She is not ill-appearing.   HENT:      Head: Normocephalic and atraumatic.      Right Ear: External ear normal.      Left Ear: External ear normal.      Nose: Nose normal.      Mouth/Throat:      Mouth: Mucous membranes are moist.      Comments: Trach in place  Eyes:      Conjunctiva/sclera: Conjunctivae normal.   Neck:      Comments: Speaking valve.   Cardiovascular:      Rate and Rhythm: Normal rate and regular rhythm.      Pulses: Normal pulses.      Heart sounds: Normal heart sounds. No murmur heard.  Pulmonary:      Effort: Pulmonary effort is normal. No respiratory distress.      Breath sounds: Normal breath sounds. No stridor. No wheezing, rhonchi or rales.   Chest:      Chest wall: No tenderness.   Abdominal:      General: Abdomen is flat. Bowel sounds are normal. There is no distension.      Palpations: Abdomen is soft.   Musculoskeletal:      Cervical back: Neck supple.      Right lower leg: No edema.      Left lower leg: No edema.   Skin:     General: Skin is warm and dry.      Capillary Refill: Capillary refill takes less than 2 seconds.   Neurological:      Mental Status: She is alert and oriented to person, place, and time.   Psychiatric:         Mood and Affect: Mood normal.         Behavior: Behavior normal.                                                                                                                                                                                                                                                                                                                                                                                                                                                                                 CARDIAC TESTING:  Echocardiogram  No results found for this or any previous visit.      Stress Test  No results found for this or any previous visit.     Coronary Angiogram  No results found for this or any previous visit.     Holter Monitor  No cardiac monitor results found for the past 12 months    Last BMP BMP  Lab Results   Component Value Date     09/05/2024    K 4.1 09/05/2024     (H) 09/05/2024    CO2 25 09/05/2024    BUN 11.5 09/05/2024    CREATININE 1.18 (H) 09/05/2024    CALCIUM 9.9 09/05/2024    ANIONGAP 11 04/23/2022    EGFRNORACEVR 54 09/05/2024      Last CBC     Lab Results   Component Value Date    WBC 4.86 09/05/2024    HGB 12.2 09/05/2024    HCT 39.6 09/05/2024    MCV 88.8 09/05/2024     09/05/2024           BNP    Lab Results   Component Value Date    BNP 16.3 03/10/2022    .0 (H) 08/16/2020     Last lipids    Lab Results   Component Value Date    CHOL 149 05/17/2024    CHOL 198 11/06/2023    CHOL 181 04/03/2023    HDL 37 05/17/2024    HDL 43 11/06/2023    HDL 42 04/03/2023    LDL 97.00 05/17/2024    .00 11/06/2023    .00 04/03/2023    TRIG 73 05/17/2024    TRIG 114 11/06/2023    TRIG 107 04/03/2023    TOTALCHOLEST 4 05/17/2024    TOTALCHOLEST 5 11/06/2023    TOTALCHOLEST 4 04/03/2023      LFT     Lab Results   Component Value Date    ALT 13 09/05/2024    ALT 12 04/03/2023    ALT 17 05/10/2022    AST 13 09/05/2024    AST 14 04/03/2023    AST 22 05/10/2022       Assessment:   Ms. Hevaen Boston is a 58 y.o. 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    Plan:     Hypertension  BP controlled today in clinic,  Continue nifedipine 60 mg daily, lisinopril 2.5.       Hypercholesterolemia  Continue Lipitor 80 mg daily   LDL 97 on 5/17    History of SVT noted on event monitor  Asymptomatic.  Continue Toprol 12.5 mg daily.    BL Leg pain  - possibly DVT pain or venous insufficiency pain  -  GAGAN - Normal  -  Has scheduled Doppler USG venous and arterial b/l legs     Follow up in 6mo    Rosamaria Simmons, PGY-5  [employer] Cardiology Fellow

## 2024-09-20 NOTE — PROGRESS NOTES
Cardiology Attending    I evaluated Heaven Boston and discussed the patient's symptoms, findings, and management plan with the resident.  Please see the Cardiology note for details.

## 2024-09-24 DIAGNOSIS — E53.8 FOLIC ACID DEFICIENCY: ICD-10-CM

## 2024-09-27 RX ORDER — FOLIC ACID 1 MG/1
1000 TABLET ORAL
Qty: 30 TABLET | Refills: 0 | Status: SHIPPED | OUTPATIENT
Start: 2024-09-27

## 2024-10-16 ENCOUNTER — TELEPHONE (OUTPATIENT)
Dept: HEMATOLOGY/ONCOLOGY | Facility: CLINIC | Age: 59
End: 2024-10-16
Payer: MEDICARE

## 2024-10-16 ENCOUNTER — HOSPITAL ENCOUNTER (OUTPATIENT)
Dept: RADIOLOGY | Facility: HOSPITAL | Age: 59
Discharge: HOME | End: 2024-10-16
Attending: FAMILY MEDICINE
Payer: MEDICARE

## 2024-10-16 DIAGNOSIS — Z12.31 ENCOUNTER FOR SCREENING MAMMOGRAM FOR MALIGNANT NEOPLASM OF BREAST: ICD-10-CM

## 2024-10-16 DIAGNOSIS — M79.605 BILATERAL LEG PAIN: ICD-10-CM

## 2024-10-16 DIAGNOSIS — Z86.718 HISTORY OF DVT (DEEP VEIN THROMBOSIS): ICD-10-CM

## 2024-10-16 DIAGNOSIS — I73.9 PAD (PERIPHERAL ARTERY DISEASE): ICD-10-CM

## 2024-10-16 DIAGNOSIS — M79.604 BILATERAL LEG PAIN: ICD-10-CM

## 2024-10-16 DIAGNOSIS — I82.5Z2 CHRONIC DEEP VEIN THROMBOSIS (DVT) OF DISTAL VEIN OF LEFT LOWER EXTREMITY: Primary | ICD-10-CM

## 2024-10-16 LAB
LEFT CFA PSV: 123 CM/S
LEFT DORSALIS PEDIS PSV: 18 CM/S
LEFT PERONEAL SYS PSV: 35 CM/S
LEFT POPLITEAL PSV: 68 CM/S
LEFT POST TIBIAL SYS PSV: 18 CM/S
LEFT PROFUNDA SYS PSV: 109 CM/S
LEFT SUPER FEMORAL DIST SYS PSV: 101 CM/S
LEFT SUPER FEMORAL MID SYS PSV: 97 CM/S
LEFT SUPER FEMORAL PROX SYS PSV: 83 CM/S
RIGHT CFA PSV: 82 CM/S
RIGHT DORSALIS PEDIS PSV: 14 CM/S
RIGHT PERONEAL SYS PSV: 76 CM/S
RIGHT POPLITEAL PSV: 53 CM/S
RIGHT POST TIBIAL SYS PSV: 13 CM/S
RIGHT PROFUNDA SYS PSV: 77 CM/S
RIGHT SUPER FEMORAL DIST SYS PSV: 109 CM/S
RIGHT SUPER FEMORAL MID SYS PSV: 62 CM/S
RIGHT SUPER FEMORAL PROX SYS PSV: 77 CM/S

## 2024-10-16 PROCEDURE — 93925 LOWER EXTREMITY STUDY: CPT

## 2024-10-16 PROCEDURE — 93970 EXTREMITY STUDY: CPT

## 2024-10-16 PROCEDURE — 77067 SCR MAMMO BI INCL CAD: CPT | Mod: TC

## 2024-10-16 PROCEDURE — 77067 SCR MAMMO BI INCL CAD: CPT | Mod: 26,,, | Performed by: RADIOLOGY

## 2024-10-16 PROCEDURE — 77063 BREAST TOMOSYNTHESIS BI: CPT | Mod: 26,,, | Performed by: RADIOLOGY

## 2024-10-16 NOTE — PROGRESS NOTES
"Spoke to patient over the phone regarding Doppler ultrasound findings of blood clot.  Radiology reached out to me about the result showing: "There is chronic appearing nonocclusive thrombus in the left distal femoral vein". Then upon discussion with radiologist, sent last results of previous doppler US in April 2022 showing: "The left lower extremity was positive for a poorly visualized short segment, age-indeterminate, nearly occluded to occluded deep vein thrombus located at the level of the above the knee popliteal vein". It seems to be more as chronic thrombus but not exactly same area as previous one.  Hence, will treat with eliquis 10 mg bid for the first week, then 5 BID afterwards. Patient agreeable.  Of note, she was diagnosed with a DVT in April 2022 and was initially given eliquis for 30 days.  On follow-up with Cardiology, DOAC was resumed (Xarelto not Eliquis due to cost).  Patient was wondering how long she needed to be on anticoagulation  thought dvt to be malignancy related, but pt seems to be in remission. At that time she was told to resume for a year. Took xarelto until May 2023.  Discussed with patient that will send referral to Heme-Onc again to discuss further recommendations.  In the meantime resume Eliquis.  If not covered, will switch to Xarelto.  Patient voiced understanding.  She denies any shortness a breath, chest pain or palpitations, but discussed that if new symptoms develop to go to the emergency room to get further evaluated because of risk of PE.  Also arterial ultrasound with mild to moderate arterial flow reduction of the right and left lower extremities.  will refer to vascular surgery.  Patient on Lipitor 80 mg daily.      "

## 2024-10-16 NOTE — TELEPHONE ENCOUNTER
Good afternoon, received referral for hematology. All hematology referrals require two recent sets of labs ranging from 2 weeks to one month apart. Labs need to include both CBC and CMP. Are you able to order more labs?    Pt would also need an ultrasound to identify DVT.    Elmer, RITCHIE  Hem/Onc

## 2024-10-17 RX ORDER — DULOXETIN HYDROCHLORIDE 30 MG/1
30 CAPSULE, DELAYED RELEASE ORAL 2 TIMES DAILY
Qty: 60 CAPSULE | Refills: 1 | OUTPATIENT
Start: 2024-10-17

## 2024-10-24 RX ORDER — NIFEDIPINE 60 MG/1
60 TABLET, EXTENDED RELEASE ORAL DAILY
Qty: 30 TABLET | Refills: 11 | Status: SHIPPED | OUTPATIENT
Start: 2024-10-24 | End: 2025-10-24

## 2024-10-24 NOTE — TELEPHONE ENCOUNTER
----- Message from Med Assistant Ramya sent at 10/24/2024  1:34 PM CDT -----  Regarding: Medication  Patient need a refill on her procardia. Thank you!

## 2024-10-26 DIAGNOSIS — E53.8 FOLIC ACID DEFICIENCY: ICD-10-CM

## 2024-10-29 RX ORDER — FOLIC ACID 1 MG/1
1000 TABLET ORAL
Qty: 30 TABLET | Refills: 0 | Status: SHIPPED | OUTPATIENT
Start: 2024-10-29

## 2024-11-25 DIAGNOSIS — E53.8 FOLIC ACID DEFICIENCY: ICD-10-CM

## 2024-11-26 ENCOUNTER — LAB VISIT (OUTPATIENT)
Dept: LAB | Facility: HOSPITAL | Age: 59
End: 2024-11-26
Attending: OTOLARYNGOLOGY
Payer: MEDICARE

## 2024-11-26 ENCOUNTER — OFFICE VISIT (OUTPATIENT)
Dept: FAMILY MEDICINE | Facility: CLINIC | Age: 59
End: 2024-11-26
Payer: MEDICARE

## 2024-11-26 VITALS
TEMPERATURE: 98 F | RESPIRATION RATE: 18 BRPM | WEIGHT: 135 LBS | BODY MASS INDEX: 24.84 KG/M2 | HEIGHT: 62 IN | DIASTOLIC BLOOD PRESSURE: 66 MMHG | HEART RATE: 95 BPM | OXYGEN SATURATION: 99 % | SYSTOLIC BLOOD PRESSURE: 111 MMHG

## 2024-11-26 DIAGNOSIS — E08.22 DIABETES MELLITUS DUE TO UNDERLYING CONDITION WITH STAGE 3A CHRONIC KIDNEY DISEASE, WITHOUT LONG-TERM CURRENT USE OF INSULIN: ICD-10-CM

## 2024-11-26 DIAGNOSIS — N18.31 DIABETES MELLITUS DUE TO UNDERLYING CONDITION WITH STAGE 3A CHRONIC KIDNEY DISEASE, WITHOUT LONG-TERM CURRENT USE OF INSULIN: ICD-10-CM

## 2024-11-26 DIAGNOSIS — E89.0 POSTOPERATIVE HYPOTHYROIDISM: ICD-10-CM

## 2024-11-26 DIAGNOSIS — I82.5Z2 CHRONIC DEEP VEIN THROMBOSIS (DVT) OF DISTAL VEIN OF LEFT LOWER EXTREMITY: Primary | ICD-10-CM

## 2024-11-26 DIAGNOSIS — Z01.419 WELL WOMAN EXAM: ICD-10-CM

## 2024-11-26 LAB
T4 FREE SERPL-MCNC: 1.4 NG/DL (ref 0.7–1.48)
TSH SERPL-ACNC: 0.02 UIU/ML (ref 0.35–4.94)

## 2024-11-26 PROCEDURE — 3078F DIAST BP <80 MM HG: CPT | Mod: CPTII,,, | Performed by: FAMILY MEDICINE

## 2024-11-26 PROCEDURE — 99214 OFFICE O/P EST MOD 30 MIN: CPT | Mod: S$PBB,,, | Performed by: FAMILY MEDICINE

## 2024-11-26 PROCEDURE — 3074F SYST BP LT 130 MM HG: CPT | Mod: CPTII,,, | Performed by: FAMILY MEDICINE

## 2024-11-26 PROCEDURE — 1160F RVW MEDS BY RX/DR IN RCRD: CPT | Mod: CPTII,,, | Performed by: FAMILY MEDICINE

## 2024-11-26 PROCEDURE — 99215 OFFICE O/P EST HI 40 MIN: CPT | Mod: PBBFAC | Performed by: FAMILY MEDICINE

## 2024-11-26 PROCEDURE — 84439 ASSAY OF FREE THYROXINE: CPT

## 2024-11-26 PROCEDURE — 3008F BODY MASS INDEX DOCD: CPT | Mod: CPTII,,, | Performed by: FAMILY MEDICINE

## 2024-11-26 PROCEDURE — 36415 COLL VENOUS BLD VENIPUNCTURE: CPT

## 2024-11-26 PROCEDURE — 1159F MED LIST DOCD IN RCRD: CPT | Mod: CPTII,,, | Performed by: FAMILY MEDICINE

## 2024-11-26 PROCEDURE — 4010F ACE/ARB THERAPY RXD/TAKEN: CPT | Mod: CPTII,,, | Performed by: FAMILY MEDICINE

## 2024-11-26 PROCEDURE — 84443 ASSAY THYROID STIM HORMONE: CPT

## 2024-11-26 PROCEDURE — 3044F HG A1C LEVEL LT 7.0%: CPT | Mod: CPTII,,, | Performed by: FAMILY MEDICINE

## 2024-11-26 RX ORDER — FOLIC ACID 1 MG/1
1000 TABLET ORAL
Qty: 30 TABLET | Refills: 0 | Status: SHIPPED | OUTPATIENT
Start: 2024-11-26

## 2024-11-26 RX ORDER — GABAPENTIN 100 MG/1
100 CAPSULE ORAL 3 TIMES DAILY
Qty: 90 CAPSULE | Refills: 2 | Status: SHIPPED | OUTPATIENT
Start: 2024-11-26

## 2024-11-26 NOTE — PROGRESS NOTES
"Patient Name: Heaven Boston   : 1965  MRN: [id number]     SUBJECTIVE:  Heaven Boston is a 59 y.o. female here for Follow-up (The patient states that her leg pain has gotten better.)  .    HPI  Here for routine follow-up of diabetes and the recent DVT.  Patient used to be on Xarelto for a year last year for left-sided DVT.  Then that repeat ultrasound showed left-sided clot which seemed to be chronic appearing but not exactly the same area so we resumed anticoagulant Eliquis.  Has been taking it once a day instead of twice, was confused with xarelto that she used to take once a day.  Will start to take it twice a day  Reminded patient to have labs done for repeat CBC per Hematology policy  No more leg pain, no leg swelling.    Regarding diabetes, well-controlled on Jardiance.  Also takes lisinopril 2.5 mg for renal protective purposes.  Has underlying CKD.  Tolerates them well.        ALLERGIES: Review of patient's allergies indicates:  No Known Allergies      ROS:  Review of Systems   Constitutional:  Negative for chills and fever.   HENT:  Negative for congestion.    Respiratory:  Negative for cough and shortness of breath.    Cardiovascular:  Negative for chest pain, palpitations and leg swelling.   Gastrointestinal:  Negative for abdominal pain, blood in stool, diarrhea, nausea and vomiting.   Genitourinary:  Negative for dysuria and hematuria.   Neurological:  Negative for dizziness and headaches.   Psychiatric/Behavioral:  Negative for depression. The patient is not nervous/anxious.          OBJECTIVE:  Vital signs  Vitals:    24 0829   BP: 111/66   Pulse: 95   Resp: 18   Temp: 97.9 °F (36.6 °C)   TempSrc: Oral   SpO2: 99%   Weight: 61.2 kg (135 lb)   Height: 5' 2" (1.575 m)      Body mass index is 24.69 kg/m².    PHYSICAL EXAM:   Physical Exam  Vitals reviewed.   Constitutional:       General: She is not in acute distress.     Appearance: Normal appearance. She is not ill-appearing.   HENT:      " Head: Normocephalic and atraumatic.      Nose: Nose normal. No rhinorrhea.   Eyes:      General: No scleral icterus.        Right eye: No discharge.         Left eye: No discharge.      Conjunctiva/sclera: Conjunctivae normal.      Pupils: Pupils are equal, round, and reactive to light.   Neck:      Comments: TEP in place with speaking valve  Cardiovascular:      Rate and Rhythm: Normal rate and regular rhythm.   Pulmonary:      Effort: No respiratory distress.      Breath sounds: No wheezing, rhonchi or rales.   Abdominal:      General: Bowel sounds are normal. There is no distension.      Palpations: Abdomen is soft.      Tenderness: There is no abdominal tenderness.   Musculoskeletal:      Cervical back: Normal range of motion and neck supple. No rigidity or tenderness.      Right lower leg: No edema.      Left lower leg: No edema.   Skin:     General: Skin is warm.      Findings: No rash.   Neurological:      General: No focal deficit present.      Mental Status: She is alert and oriented to person, place, and time.   Psychiatric:         Mood and Affect: Mood normal.         Behavior: Behavior normal.          ASSESSMENT/PLAN:  1. Chronic deep vein thrombosis (DVT) of distal vein of left lower extremity  Improved pain and swelling.  Encouraged patient to take Eliquis as prescribed 5 mg 2 times a day.  Patient voiced understanding and reminded to have CBC done so we can refer to hematology.  This is hematology's policy.  -     CBC Auto Differential; Future; Expected date: 11/26/2024  -     gabapentin (NEURONTIN) 100 MG capsule; Take 1 capsule (100 mg total) by mouth 3 (three) times daily.  Dispense: 90 capsule; Refill: 2      2. Diabetes mellitus due to underlying condition with stage 3a chronic kidney disease, without long-term current use of insulin  Well-controlled diabetes.  Continue with Jardiance that it is also protecting her kidneys along with lisinopril 2.5 mg daily.  Continue with them.    3. Well  woman exam  -     Ambulatory referral/consult to Gynecology; Future; Expected date: 12/03/2024             Previous medical history/lab work/radiology reviewed and considered during medical management decisions.   Medication list reviewed and medication reconciliation performed.  Patient was provided  and care about his/her current diagnosis (es) and medications including risk/benefit and side effects/adverse events, over the counter medication uses/doses, home self-care and contact precautions,  and red flags and indications for when to seek immediate medical attention.   Patient was advised to continue compliance with current medication list and medical recommendations.  Recommended/ Advised continued compliance with recommended eating habits/ diets for medical conditions and exercise 150 minutes/ week (if possible) for medical condition (s).        RESULTS:  Recent Results (from the past 6 weeks)   TSH    Collection Time: 11/26/24  7:01 AM   Result Value Ref Range    TSH 0.018 (L) 0.350 - 4.940 uIU/mL   T4, Free    Collection Time: 11/26/24  7:01 AM   Result Value Ref Range    Thyroxine Free 1.40 0.70 - 1.48 ng/dL   CBC with Differential    Collection Time: 12/03/24  9:13 AM   Result Value Ref Range    WBC 6.10 4.50 - 11.50 x10(3)/mcL    RBC 4.44 4.20 - 5.40 x10(6)/mcL    Hgb 12.0 12.0 - 16.0 g/dL    Hct 39.4 37.0 - 47.0 %    MCV 88.7 80.0 - 94.0 fL    MCH 27.0 27.0 - 31.0 pg    MCHC 30.5 (L) 33.0 - 36.0 g/dL    RDW 13.4 11.5 - 17.0 %    Platelet 278 130 - 400 x10(3)/mcL    MPV 9.6 7.4 - 10.4 fL    Neut % 71.4 %    Lymph % 19.3 %    Mono % 5.2 %    Eos % 3.4 %    Basophil % 0.5 %    Lymph # 1.18 0.6 - 4.6 x10(3)/mcL    Neut # 4.35 2.1 - 9.2 x10(3)/mcL    Mono # 0.32 0.1 - 1.3 x10(3)/mcL    Eos # 0.21 0 - 0.9 x10(3)/mcL    Baso # 0.03 <=0.2 x10(3)/mcL    IG# 0.01 0 - 0.04 x10(3)/mcL    IG% 0.2 %    NRBC% 0.0 %         Follow Up:  Follow up in about 6 months (around 5/26/2025).      [unfilled]    This note was created with the assistance of a voice recognition software or phone dictation. There may be transcription errors as a result of using this technology however minimal. Effort has been made to assure accuracy of transcription but any obvious errors or omissions should be clarified with the author of the document

## 2024-12-01 DIAGNOSIS — N18.31 STAGE 3A CHRONIC KIDNEY DISEASE: ICD-10-CM

## 2024-12-02 RX ORDER — EMPAGLIFLOZIN 10 MG/1
TABLET, FILM COATED ORAL
Qty: 90 TABLET | Refills: 11 | Status: SHIPPED | OUTPATIENT
Start: 2024-12-02

## 2024-12-03 ENCOUNTER — LAB VISIT (OUTPATIENT)
Dept: LAB | Facility: HOSPITAL | Age: 59
End: 2024-12-03
Attending: FAMILY MEDICINE
Payer: MEDICARE

## 2024-12-03 ENCOUNTER — CLINICAL SUPPORT (OUTPATIENT)
Dept: REHABILITATION | Facility: HOSPITAL | Age: 59
End: 2024-12-03
Payer: MEDICARE

## 2024-12-03 DIAGNOSIS — I82.5Z2 CHRONIC DEEP VEIN THROMBOSIS (DVT) OF DISTAL VEIN OF LEFT LOWER EXTREMITY: ICD-10-CM

## 2024-12-03 DIAGNOSIS — B37.9 YEAST INFECTION: ICD-10-CM

## 2024-12-03 DIAGNOSIS — Z90.02 H/O LARYNGECTOMY: Primary | ICD-10-CM

## 2024-12-03 DIAGNOSIS — E03.9 HYPOTHYROIDISM, UNSPECIFIED TYPE: ICD-10-CM

## 2024-12-03 DIAGNOSIS — Z85.21 HISTORY OF LARYNGEAL CANCER: ICD-10-CM

## 2024-12-03 DIAGNOSIS — B37.81 ESOPHAGEAL CANDIDIASIS: ICD-10-CM

## 2024-12-03 DIAGNOSIS — Z90.02 H/O LARYNGECTOMY: ICD-10-CM

## 2024-12-03 DIAGNOSIS — Z92.3 HISTORY OF RADIATION TO HEAD AND NECK REGION: ICD-10-CM

## 2024-12-03 PROBLEM — N18.31 DIABETES MELLITUS DUE TO UNDERLYING CONDITION WITH STAGE 3A CHRONIC KIDNEY DISEASE, WITHOUT LONG-TERM CURRENT USE OF INSULIN: Status: ACTIVE | Noted: 2024-12-03

## 2024-12-03 PROBLEM — E08.22 DIABETES MELLITUS DUE TO UNDERLYING CONDITION WITH STAGE 3A CHRONIC KIDNEY DISEASE, WITHOUT LONG-TERM CURRENT USE OF INSULIN: Status: ACTIVE | Noted: 2024-12-03

## 2024-12-03 LAB
BASOPHILS # BLD AUTO: 0.03 X10(3)/MCL
BASOPHILS NFR BLD AUTO: 0.5 %
EOSINOPHIL # BLD AUTO: 0.21 X10(3)/MCL (ref 0–0.9)
EOSINOPHIL NFR BLD AUTO: 3.4 %
ERYTHROCYTE [DISTWIDTH] IN BLOOD BY AUTOMATED COUNT: 13.4 % (ref 11.5–17)
HCT VFR BLD AUTO: 39.4 % (ref 37–47)
HGB BLD-MCNC: 12 G/DL (ref 12–16)
IMM GRANULOCYTES # BLD AUTO: 0.01 X10(3)/MCL (ref 0–0.04)
IMM GRANULOCYTES NFR BLD AUTO: 0.2 %
LYMPHOCYTES # BLD AUTO: 1.18 X10(3)/MCL (ref 0.6–4.6)
LYMPHOCYTES NFR BLD AUTO: 19.3 %
MCH RBC QN AUTO: 27 PG (ref 27–31)
MCHC RBC AUTO-ENTMCNC: 30.5 G/DL (ref 33–36)
MCV RBC AUTO: 88.7 FL (ref 80–94)
MONOCYTES # BLD AUTO: 0.32 X10(3)/MCL (ref 0.1–1.3)
MONOCYTES NFR BLD AUTO: 5.2 %
NEUTROPHILS # BLD AUTO: 4.35 X10(3)/MCL (ref 2.1–9.2)
NEUTROPHILS NFR BLD AUTO: 71.4 %
NRBC BLD AUTO-RTO: 0 %
PLATELET # BLD AUTO: 278 X10(3)/MCL (ref 130–400)
PMV BLD AUTO: 9.6 FL (ref 7.4–10.4)
RBC # BLD AUTO: 4.44 X10(6)/MCL (ref 4.2–5.4)
WBC # BLD AUTO: 6.1 X10(3)/MCL (ref 4.5–11.5)

## 2024-12-03 PROCEDURE — 36415 COLL VENOUS BLD VENIPUNCTURE: CPT

## 2024-12-03 PROCEDURE — 92597 ORAL SPEECH DEVICE EVAL: CPT

## 2024-12-03 PROCEDURE — L8509 TRACH-ESOPH VOICE PROS MD IN: HCPCS

## 2024-12-03 PROCEDURE — 85025 COMPLETE CBC W/AUTO DIFF WBC: CPT

## 2024-12-03 RX ORDER — DULOXETIN HYDROCHLORIDE 30 MG/1
30 CAPSULE, DELAYED RELEASE ORAL 2 TIMES DAILY
Qty: 60 CAPSULE | Refills: 1 | Status: SHIPPED | OUTPATIENT
Start: 2024-12-03

## 2024-12-03 NOTE — PLAN OF CARE
[facility]  Speech Therapy Evaluation   Laryngectomy  Date: 2024    Name: Heaven Boston   MRN: [id number]    Therapy Diagnosis: s/p total laryngectomy  Physician: Dr. Patrick     PATIENT IS A NECK-BREATHER - DO NOT ORALLY INTUBATE    Time In: 0830   Time Out: 0900     Procedure Min.   Prosthesis Evaluation  30   Total Untimed Units: 30  Charges Billed/# of units: 30/1    Precautions: Standard  Subjective      Chief Complaint: prosthesis dislodgment    AFFECT: normal affect    Pain:   0/10  Pain Location / Description: n/a  Current Medical History: Heaven Boston is a 59 y.o. female referred by Dr. Patrick for TEP management to maximize alaryngeal communication without respiratory compromise.    Past Medical History:   Past Medical History:   Diagnosis Date    Cancer     Laryngeal    Diabetes mellitus     Hypertension     Heaven Boston  has a past surgical history that includes pr removal of larynx (2022);  section; Direct diagnostic laryngoscopy with bronchoscopy and esophagoscopy (N/A, 2023); and Colonoscopy (N/A, 05/15/2023).   Active Ambulatory Problems     Diagnosis Date Noted    H/O laryngectomy 2022    Laryngeal cancer 2022    Hypertension 2022    Familial hypercholesterolemia 2022    Syncope 2022    SVT (supraventricular tachycardia) 2022    Type 2 diabetes mellitus with diabetic nephropathy, without long-term current use of insulin 2022    Tracheostomy in place 2022    PEG (percutaneous endoscopic gastrostomy) status 2022    History of radiation to head and neck region 2022    Hypothyroidism 2022    Bilateral carotid artery stenosis 2023    Deep vein thrombosis (DVT) of distal vein of left lower extremity 2023    Diverticulosis large intestine w/o perforation or abscess w/o bleeding 05/15/2023    Positive colorectal cancer screening using Cologuard test 05/15/2023     Resolved  Ambulatory Problems     Diagnosis Date Noted    Lymphedema 03/16/2023     Past Medical History:   Diagnosis Date    Cancer     Diabetes mellitus       Medical Hx and Allergies: Heaven has a current medication list which includes the following prescription(s): allergy relief (cetirizine), apixaban, atorvastatin, carbamazepine, duloxetine, ezetimibe, fluticasone propionate, folic acid, gabapentin, jardiance, levothyroxine, lisinopril, metoprolol succinate, montelukast, and nifedipine, and the following Facility-Administered Medications: dextrose 10%, dextrose 10%, diphenhydramine, droperidol, hydromorphone, insulin aspart u-100, insulin aspart u-100, lactated ringers, lidocaine (pf) 10 mg/ml (1%), lidocaine (pf) 10 mg/ml (1%), lidocaine hcl 20 mg/ml (2%), metoclopramide, midazolam, oxycodone, silver nitrate applicators, and triamcinolone acetonide. Review of patient's allergies indicates:  No Known Allergies    Current Voice Function: pt utilizing esophageal speech with RRC in place. Strained voice quality noted which resolved with replacement of prosthesis this date.   Current Level of Swallow Function/Complaints:  no complaints of dysphagia at this time  Prior Therapy:  2/28/2024  SLP Note:   Ms. Boston entered this date requesting a prothesis change  due to strained voicing. No leaking reported. Patient entered with open stoma and reported that ENT requested to attempt leonie tube usage every three days. However, she stated leonie tube (10/36) caused excessive coughing of 30+ minutes. SLP assessed stoma and noted reduced mild stenosis. Prosthesis noted to have adequate fit, however, excessive biofilm visualized on entire surface with tracheal phalange noted to be malformed.  SLP replaced prosthesis with a 17FR, 8mm [employer] Aquino without incident this date. Good voicing noted immediately after prosthesis replacement. SLP provided donated 18/8 leonie button for trial this date. Minimal cough noted upon insertion which cease  quickly. SLP educated to use button 2-3 days for stomal patency and switch to adhesives once stoma open and button loose. Continued to alternate as warranted to maintain stomal patency.  SLP to follow up for prosthesis management as warranted.     Sunitha Jean MS, [facility]-SLP    TEP ASSESSMENT     Initial Fitting: No    Re-fitting:Yes    TEP Initial Fitting Date: 03/21/2022    TEP Current Status: Dislodged. Pt entered with 15 FR RRC.    TEP Patient Complaints: prosthesis dislodgment    TEP Accessories: 10/36 fenestrated leonie tube (occasionally) with push to talk HMEs. Frequent usage of adhesives.     Stoma Size:  narrow     Lymphedema:  No    TEP Fitting/Re-sizing:     Removed current TEP: No     TEP Removed catheter : Yes 15 FR RRC    TEP Sizing:Yes 8 mm    Puncture Dilation: No     TEP Prosthesis Placed:Yes 17 FR 8 mm    TEP Voicing with Open Tract:No     TEP Voicing with Prosthesis:Yes     TEP Leaking through Prothesis:No     TEP Leaking around Prosthesis:No       Education     Education: Plan of Care, role of SLP in care, and leonie tube/push to talk HMEs to facilitate pulmonary optimization, protocols for prosthesis dislodgment  were discussed with pt. Patient expressed understanding.     Barriers to Learning: participation level - evidenced by reduced compliance with previous education provided related to protocol when prothesis becomes dislodged. SLP provided extensive education using multiple modalities this date to reiterate.     Teaching Method: Verbal, Written, Demonstration  Assessment       LongTerm Goals:  Current Progress:   Patient will utilize alaryngeal communication via TEP to express needs and desires without respiratory compromise >90% of the time.  Progressing towards goal       Short Term Goals:  Current Progress:   Patient will demonstrate care and use of HME system for maximum pulmonary benefit >90% of the time. Progressing towards goal   Patient will demonstrate adequate care and use of TEP to  "maintain TEP voicing >90% of the time.  Progressing towards goal   Patient will demonstrate adequate occlusion and cleaning of TEP to maintain voicing >90% of the time.  Progressing towards goal   Patient will increase laryngectomy tube usage daily or PRN to maintain and increase stomal patency.  Progressing towards goal     Mrs. 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  She is currently utilizing esophageal speech via a voice prosthesis for alaryngeal communication for functional communication with family, friends, medical providers, and others to perform daily life activities.  Mrs. Collado requires the use of a laryngectomy tube at all times to keep the airway patent and prevent stomal stenosis. HMEs are required daily for mucus management and pulmonary optimization .     Plan   SLP intervention is warranted 1 times a month or PRN for communication needs for a minimum of 1 year due to the chronic nature of the impairment.     Additional Information   Mrs. Collado entered reporting prosthesis leaking and "looks all turned up". SLP assessed and noted that prosthesis was malformed and discolored. Size remains adequate at this time. Prothesis changed without incident this date. No leaking noted centrally with thin liquids trials. However, peripheral leaking noted. SLP placed XtraFlange which controlled peripheral  leaking. Pt reported consistent use of leonie tube and HME resulting in improved pulmonary symptoms and mucus management. SLP provided patient with samples from [facility] which included a leonie tube and HMEs. SLP to follow up as warranted.     Sunitha Jean MS, CCC-SLP   12/3/2024   "

## 2024-12-04 ENCOUNTER — OFFICE VISIT (OUTPATIENT)
Dept: FAMILY MEDICINE | Facility: CLINIC | Age: 59
End: 2024-12-04
Payer: MEDICARE

## 2024-12-04 ENCOUNTER — HOSPITAL ENCOUNTER (OUTPATIENT)
Dept: RADIOLOGY | Facility: HOSPITAL | Age: 59
Discharge: HOME | End: 2024-12-04
Attending: FAMILY MEDICINE
Payer: MEDICARE

## 2024-12-04 ENCOUNTER — TELEPHONE (OUTPATIENT)
Dept: HEMATOLOGY/ONCOLOGY | Facility: CLINIC | Age: 59
End: 2024-12-04
Payer: MEDICARE

## 2024-12-04 VITALS
HEIGHT: 62 IN | WEIGHT: 137.81 LBS | HEART RATE: 101 BPM | RESPIRATION RATE: 18 BRPM | TEMPERATURE: 99 F | OXYGEN SATURATION: 99 % | SYSTOLIC BLOOD PRESSURE: 130 MMHG | BODY MASS INDEX: 25.36 KG/M2 | DIASTOLIC BLOOD PRESSURE: 73 MMHG

## 2024-12-04 DIAGNOSIS — G89.29 CHRONIC LEFT SHOULDER PAIN: ICD-10-CM

## 2024-12-04 DIAGNOSIS — I82.5Z2 CHRONIC DEEP VEIN THROMBOSIS (DVT) OF DISTAL VEIN OF LEFT LOWER EXTREMITY: ICD-10-CM

## 2024-12-04 DIAGNOSIS — Z85.21 HISTORY OF LARYNGEAL CANCER: ICD-10-CM

## 2024-12-04 DIAGNOSIS — M79.604 RIGHT LEG PAIN: ICD-10-CM

## 2024-12-04 DIAGNOSIS — I82.401 LEG DVT (DEEP VENOUS THROMBOEMBOLISM), ACUTE, RIGHT: Primary | ICD-10-CM

## 2024-12-04 DIAGNOSIS — M25.512 CHRONIC LEFT SHOULDER PAIN: ICD-10-CM

## 2024-12-04 DIAGNOSIS — R91.8 PULMONARY INFILTRATE: ICD-10-CM

## 2024-12-04 PROCEDURE — 3075F SYST BP GE 130 - 139MM HG: CPT | Mod: CPTII,,, | Performed by: FAMILY MEDICINE

## 2024-12-04 PROCEDURE — 4010F ACE/ARB THERAPY RXD/TAKEN: CPT | Mod: CPTII,,, | Performed by: FAMILY MEDICINE

## 2024-12-04 PROCEDURE — 3078F DIAST BP <80 MM HG: CPT | Mod: CPTII,,, | Performed by: FAMILY MEDICINE

## 2024-12-04 PROCEDURE — 3008F BODY MASS INDEX DOCD: CPT | Mod: CPTII,,, | Performed by: FAMILY MEDICINE

## 2024-12-04 PROCEDURE — 1160F RVW MEDS BY RX/DR IN RCRD: CPT | Mod: CPTII,,, | Performed by: FAMILY MEDICINE

## 2024-12-04 PROCEDURE — 93971 EXTREMITY STUDY: CPT | Mod: RT

## 2024-12-04 PROCEDURE — 3044F HG A1C LEVEL LT 7.0%: CPT | Mod: CPTII,,, | Performed by: FAMILY MEDICINE

## 2024-12-04 PROCEDURE — 99214 OFFICE O/P EST MOD 30 MIN: CPT | Mod: S$PBB,,, | Performed by: FAMILY MEDICINE

## 2024-12-04 PROCEDURE — 1159F MED LIST DOCD IN RCRD: CPT | Mod: CPTII,,, | Performed by: FAMILY MEDICINE

## 2024-12-04 PROCEDURE — 99215 OFFICE O/P EST HI 40 MIN: CPT | Mod: PBBFAC,25 | Performed by: FAMILY MEDICINE

## 2024-12-04 RX ORDER — DICLOFENAC SODIUM 10 MG/G
2 GEL TOPICAL 4 TIMES DAILY PRN
Qty: 800 G | Refills: 1 | Status: SHIPPED | OUTPATIENT
Start: 2024-12-04

## 2024-12-04 NOTE — PROGRESS NOTES
Patient Name: Heaven Boston   : 1965  MRN: [id number]     SUBJECTIVE:  Heaven Boston is a 59 y.o. female here for Leg Pain (The patient states that she has a lump on her right leg that is blue and it hurts. She says it been like that for about a week. Also, arm pain that has been keeping her up at night. She used diclofenac from her cousin and she says it works so she ask if some can be prescribed.) and Arm Pain  .    HPI  Here for above concern of right leg pain and a lump that she noticed that has been traveling.  Right leg pain for a week now.  Initially noticed a lump above knee that kind of traveled into the upper thigh, painful.    These happened right after our appointment last week.  Last week we advised patient to take Eliquis 5 mg b.i.d. for the chronic DVT instead of 5 mg once a day that she was taking.  She did start to take the Eliquis 5 mg b.i.d. the next day.  Of note, patient used to be on Xarelto for a year last year for left-sided DVT.  Thought to be malignancy related.  Then that repeat ultrasound showed left-sided clot which seemed to be chronic appearing but not exactly the same area so we resumed anticoagulant Eliquis.   Called imaging- accepted pt to have the study now.  Patient agreeable to have the Doppler ultrasound repeated, high concern for clot.  Otherwise denies any chest pain or shortness for breath, no palpitations.    Ever since working at [employer], left shoulder pain intermittent.  When sleeping, can not lay on left side. Tried voltaren gel from her cousin and helped immediately. Would like to have some on her own.        ALLERGIES: Review of patient's allergies indicates:  No Known Allergies      ROS:  Review of Systems   Constitutional:  Negative for chills and fever.   HENT:  Negative for congestion.    Respiratory:  Negative for cough and shortness of breath.    Cardiovascular:  Negative for chest pain, palpitations and leg swelling.   Gastrointestinal:  Negative for  "abdominal pain, blood in stool, nausea and vomiting.   Genitourinary:  Negative for dysuria and hematuria.   Neurological:  Negative for dizziness and headaches.   Psychiatric/Behavioral:  Negative for depression. The patient is not nervous/anxious.          OBJECTIVE:  Vital signs  Vitals:    12/04/24 0744   BP: 130/73   Pulse: 101   Resp: 18   Temp: 98.7 °F (37.1 °C)   TempSrc: Oral   SpO2: 99%   Weight: 62.5 kg (137 lb 12.8 oz)   Height: 5' 2" (1.575 m)      Body mass index is 25.2 kg/m².    PHYSICAL EXAM:   Physical Exam  Vitals reviewed.   Constitutional:       General: She is not in acute distress.     Appearance: Normal appearance. She is not ill-appearing.   HENT:      Head: Normocephalic and atraumatic.      Right Ear: External ear normal.      Left Ear: External ear normal.      Nose: Nose normal. No rhinorrhea.      Mouth/Throat:      Mouth: Mucous membranes are moist.   Eyes:      General: No scleral icterus.        Right eye: No discharge.         Left eye: No discharge.      Conjunctiva/sclera: Conjunctivae normal.      Pupils: Pupils are equal, round, and reactive to light.   Cardiovascular:      Rate and Rhythm: Normal rate and regular rhythm.      Pulses:           Femoral pulses are 2+ on the right side and 2+ on the left side.       Popliteal pulses are 2+ on the right side and 2+ on the left side.        Dorsalis pedis pulses are 2+ on the right side and 2+ on the left side.   Pulmonary:      Effort: Pulmonary effort is normal. No respiratory distress.      Breath sounds: No wheezing, rhonchi or rales.   Abdominal:      General: There is no distension.      Palpations: Abdomen is soft.      Tenderness: There is no abdominal tenderness.   Musculoskeletal:         General: Tenderness (left shoulder anteriorly. normal ROM.) present.      Cervical back: Normal range of motion and neck supple. No rigidity or tenderness.      Right lower leg: No edema.      Left lower leg: No edema.   Skin:     " General: Skin is warm.      Findings: No rash.   Neurological:      General: No focal deficit present.      Mental Status: She is alert and oriented to person, place, and time.   Psychiatric:         Mood and Affect: Mood normal.         Behavior: Behavior normal.          ASSESSMENT/PLAN:  1. Leg DVT (deep venous thromboembolism), acute, right  Updated Doppler ultrasound same day done, revealed right leg DVT.  Patient was just diagnosed with questionable acute on chronic DVT of the left side at that time she had both legs ultrasound and it was new right DVT, but likely developed a right-sided DVT now because of not appropriately taking Eliquis as directed twice a day.  She was taking it only once a day.  Just started to take twice a day and advised patient to continue doing so very strictly.  Will refer to Heme-Onc since having recurrent DVTs even though does not seem to be failure to DOACs because patient was not taking it appropriately.  Discussed with patient that she will likely need to be on Eliquis 5 mg b.i.d. lifelong but will refer to Heme-Onc for specialized recommendations.  In the meantime check thrombophilia panel.  Will also check CT chest because of patient history of cancer.  Advised also patient to check with her ENT as soon as possible and update them with the current issues of clots to be extra cautious, having history of laryngeal cancer.  Patient has upcoming appointment with ENT within 2 weeks.  She had CT neck done for this year for surveillance in March.  -     CT Chest Without Contrast; Future; Expected date: 12/04/2024  -     Antithrombin III; Future; Expected date: 12/04/2024  -     Factor V (Leiden) Mutation Analysis - [facility] [place] Only; Future; Expected date: 12/04/2024  -     Protein C activity; Future; Expected date: 12/04/2024  -     Protein S Antigen, Free - [facility] Only; Future; Expected date: 12/04/2024  -     Protein S antigen, free; Future; Expected date: 12/04/2024    2. Right leg pain  As  above.  -     CV Ultrasound doppler venous DVT leg right; Future; Expected date: 12/04/2024    3. Chronic deep vein thrombosis (DVT) of distal vein of left lower extremity  -     Ambulatory referral/consult to Hematology / Oncology; Future; Expected date: 12/11/2024  -     CT Chest Without Contrast; Future; Expected date: 12/04/2024  -     Antithrombin III; Future; Expected date: 12/04/2024  -     Factor V ([place]iden) Mutation Analysis [place] [facility] [place]; Future; Expected date: 12/04/2024  -     Protein C activity; Future; Expected date: 12/04/2024  -     Protein S Antigen, Free - [facility] Only; Future; Expected date: 12/04/2024  -     Protein S antigen, free; Future; Expected date: 12/04/2024    4. Chronic left shoulder pain  Stable on Voltaren gel.   Declines further meds/referrals because it is not as bothersome and Voltaren gel works great.  Check an x-ray.  -     X-Ray Shoulder 2 or More Views Left; Future; Expected date: 12/04/2024  -     diclofenac sodium (VOLTAREN ARTHRITIS PAIN) 1 % Gel; Apply 2 g topically 4 (four) times daily as needed (pain).  Dispense: 800 g; Refill: 1    5. Pulmonary infiltrate  -     CT Chest Without Contrast; Future; Expected date: 12/04/2024    6. History of laryngeal cancer  -     CT Chest Without Contrast; Future; Expected date: 12/04/2024             Previous medical history/lab work/radiology reviewed and considered during medical management decisions.   Medication list reviewed and medication reconciliation performed.  Patient was provided  and care about his/her current diagnosis (es) and medications including risk/benefit and side effects/adverse events, over the counter medication uses/doses, home self-care and contact precautions,  and red flags and indications for when to seek immediate medical attention.   Patient was advised to continue compliance with current medication list and medical recommendations.  Recommended/ Advised continued compliance with recommended eating habits/  diets for medical conditions and exercise 150 minutes/ week (if possible) for medical condition (s).        RESULTS:  Recent Results (from the past 6 weeks)   TSH    Collection Time: 11/26/24  7:01 AM   Result Value Ref Range    TSH 0.018 (L) 0.350 - 4.940 uIU/mL   T4, Free    Collection Time: 11/26/24  7:01 AM   Result Value Ref Range    Thyroxine Free 1.40 0.70 - 1.48 ng/dL   CBC with Differential    Collection Time: 12/03/24  9:13 AM   Result Value Ref Range    WBC 6.10 4.50 - 11.50 x10(3)/mcL    RBC 4.44 4.20 - 5.40 x10(6)/mcL    Hgb 12.0 12.0 - 16.0 g/dL    Hct 39.4 37.0 - 47.0 %    MCV 88.7 80.0 - 94.0 fL    MCH 27.0 27.0 - 31.0 pg    MCHC 30.5 (L) 33.0 - 36.0 g/dL    RDW 13.4 11.5 - 17.0 %    Platelet 278 130 - 400 x10(3)/mcL    MPV 9.6 7.4 - 10.4 fL    Neut % 71.4 %    Lymph % 19.3 %    Mono % 5.2 %    Eos % 3.4 %    Basophil % 0.5 %    Lymph # 1.18 0.6 - 4.6 x10(3)/mcL    Neut # 4.35 2.1 - 9.2 x10(3)/mcL    Mono # 0.32 0.1 - 1.3 x10(3)/mcL    Eos # 0.21 0 - 0.9 x10(3)/mcL    Baso # 0.03 <=0.2 x10(3)/mcL    IG# 0.01 0 - 0.04 x10(3)/mcL    IG% 0.2 %    NRBC% 0.0 %         Follow Up:  Follow up for as scheduled.     [unfilled]    This note was created with the assistance of a voice recognition software or phone dictation. There may be transcription errors as a result of using this technology however minimal. Effort has been made to assure accuracy of transcription but any obvious errors or omissions should be clarified with the author of the document

## 2024-12-04 NOTE — TELEPHONE ENCOUNTER
Patient did CBC twice for this specific reason.  Please review her chart and call her for an appointment if agreeable.  If not let me know so I can refer her externally.  She needs to be seen as soon as possible, had a new clot today.

## 2024-12-04 NOTE — TELEPHONE ENCOUNTER
Good morning, received referral for hematology. All hematology referrals require two recent sets of labs ranging from 2 weeks to one month apart. Labs need to include both CBC and CMP. Are you able to order more labs?      RITCHIE Payne  Hem/Onc

## 2024-12-06 DIAGNOSIS — I82.5Z2 CHRONIC DEEP VEIN THROMBOSIS (DVT) OF DISTAL VEIN OF LEFT LOWER EXTREMITY: Primary | ICD-10-CM

## 2024-12-06 DIAGNOSIS — Z85.21 HISTORY OF LARYNGEAL CANCER: ICD-10-CM

## 2024-12-06 DIAGNOSIS — I82.401 LEG DVT (DEEP VENOUS THROMBOEMBOLISM), ACUTE, RIGHT: ICD-10-CM

## 2024-12-12 ENCOUNTER — OFFICE VISIT (OUTPATIENT)
Dept: GYNECOLOGY | Facility: CLINIC | Age: 59
End: 2024-12-12
Payer: MEDICARE

## 2024-12-12 ENCOUNTER — OFFICE VISIT (OUTPATIENT)
Dept: OTOLARYNGOLOGY | Facility: CLINIC | Age: 59
End: 2024-12-12
Payer: MEDICARE

## 2024-12-12 VITALS
RESPIRATION RATE: 18 BRPM | HEART RATE: 78 BPM | SYSTOLIC BLOOD PRESSURE: 132 MMHG | WEIGHT: 134.81 LBS | TEMPERATURE: 98 F | BODY MASS INDEX: 24.81 KG/M2 | HEIGHT: 62 IN | OXYGEN SATURATION: 100 % | DIASTOLIC BLOOD PRESSURE: 74 MMHG

## 2024-12-12 VITALS
TEMPERATURE: 99 F | RESPIRATION RATE: 20 BRPM | BODY MASS INDEX: 24.78 KG/M2 | DIASTOLIC BLOOD PRESSURE: 84 MMHG | WEIGHT: 134.69 LBS | OXYGEN SATURATION: 99 % | SYSTOLIC BLOOD PRESSURE: 125 MMHG | HEART RATE: 76 BPM | HEIGHT: 62 IN

## 2024-12-12 DIAGNOSIS — Z92.3 HISTORY OF RADIATION TO HEAD AND NECK REGION: ICD-10-CM

## 2024-12-12 DIAGNOSIS — Z12.31 ENCOUNTER FOR SCREENING MAMMOGRAM FOR BREAST CANCER: ICD-10-CM

## 2024-12-12 DIAGNOSIS — E03.9 HYPOTHYROIDISM, UNSPECIFIED TYPE: ICD-10-CM

## 2024-12-12 DIAGNOSIS — Z12.4 ENCOUNTER FOR PAPANICOLAOU SMEAR FOR CERVICAL CANCER SCREENING: Primary | ICD-10-CM

## 2024-12-12 DIAGNOSIS — Z85.21 HISTORY OF LARYNGEAL CANCER: Primary | ICD-10-CM

## 2024-12-12 DIAGNOSIS — Z01.419 WELL WOMAN EXAM: ICD-10-CM

## 2024-12-12 PROCEDURE — 99215 OFFICE O/P EST HI 40 MIN: CPT | Mod: PBBFAC,27 | Performed by: STUDENT IN AN ORGANIZED HEALTH CARE EDUCATION/TRAINING PROGRAM

## 2024-12-12 PROCEDURE — 99215 OFFICE O/P EST HI 40 MIN: CPT | Mod: PBBFAC

## 2024-12-12 PROCEDURE — 88174 CYTOPATH C/V AUTO IN FLUID: CPT

## 2024-12-12 NOTE — PROGRESS NOTES
[facility] -  Gynecology / Women's Health Clinic     Subjective:      Patient ID: Heaven Boston is a 59 y.o. female.    Chief Complaint:  Gynecologic Exam      History of Present Illness:  The patient  here for annual exam. Pt Postmenopausal. Denies history of abnormal paps. Last pap -NIL. MG- 10/18/24 & BIRADS 1. Denies breast or urinary complaints. Denies pelvic pain, abnormal bleeding or discharge. Pt reports no STIs in the past and no concerns. Denies tobacco use. Dep. screening 0. Denies fly hx of breast, ovarian, uterine or colon cancer. Cologuard + followed by Colonoscopy  repeat 5 yrs.    GYN & OB History:  No LMP recorded. Patient is postmenopausal.   Date of Last Pap: 2020    OB History    Para Term  AB Living   3 1 1   2     SAB IAB Ectopic Multiple Live Births   2              # Outcome Date GA Lbr Cal/2nd Weight Sex Type Anes PTL Lv   3 Term      CS-LTranv      2 SAB            1 SAB                Past Medical History:   Diagnosis Date    Cancer     Laryngeal    Diabetes mellitus     Hypertension         Past Surgical History:   Procedure Laterality Date     SECTION      COLONOSCOPY N/A 05/15/2023    Procedure: COLONOSCOPY;  Surgeon: Francisco Zavala MD;  Location: [facility] ENDOSCOPY;  Service: Endoscopy;  Laterality: N/A;    DIRECT DIAGNOSTIC LARYNGOSCOPY WITH BRONCHOSCOPY AND ESOPHAGOSCOPY N/A 2023    Procedure: LARYNGOSCOPY, DIRECT, DIAGNOSTIC, WITH BRONCHOSCOPY AND ESOPHAGOSCOPY;  Surgeon: Connor Patrick MD;  Location: [facility] OR;  Service: ENT;  Laterality: N/A;  Will not need to do bronchoscopy for this procedure    SC REMOVAL OF LARYNX  2022        Social History     Tobacco Use    Smoking status: Former     Current packs/day: 0.00     Average packs/day: 0.5 packs/day for 39.0 years (19.5 ttl pk-yrs)     Types: Cigarettes     Start date: 1983     Quit date: 2022     Years since quittin.7    Smokeless tobacco:  "Former     Quit date: 2022   Substance and Sexual Activity    Alcohol use: Never    Drug use: Never    Sexual activity: Not Currently        Current Outpatient Medications   Medication Instructions    ALLERGY RELIEF (CETIRIZINE) 10 mg, Oral    apixaban (ELIQUIS) 5 mg Tab 10 mg ( 2 tablets) twice daily for 7 days followed by 5 mg ( one tablet)  twice daily.    atorvastatin (LIPITOR) 80 mg, Oral, Nightly    carBAMazepine (TEGRETOL XR) 200 mg, Oral, 2 times daily    diclofenac sodium (VOLTAREN ARTHRITIS PAIN) 2 g, Topical (Top), 4 times daily PRN    DULoxetine (CYMBALTA) 30 mg, Oral, 2 times daily    ezetimibe (ZETIA) 10 mg, Oral, Daily    fluticasone propionate (FLONASE) 50 mcg, Each Nostril, 2 times daily    folic acid (FOLVITE) 1,000 mcg, Oral    gabapentin (NEURONTIN) 100 mg, Oral, 3 times daily    JARDIANCE 10 mg tablet TAKE ONE TABLET BY MOUTH DAILY AT 9 AM    levothyroxine (SYNTHROID) 100 mcg, Oral, Before breakfast    lisinopriL (PRINIVIL,ZESTRIL) 2.5 mg, Oral, Daily    metoprolol succinate (TOPROL-XL) 12.5 mg, Oral, Daily    montelukast (SINGULAIR) 5 mg, Every other day    NIFEdipine (PROCARDIA-XL) 60 mg, Oral, Daily       Review of patient's allergies indicates:  No Known Allergies      Review of Systems:  Review of Systems  Negative except for pertinent findings for positives per HPI.     Objective:     Physical Exam   Visit Vitals  /74 (Patient Position: Sitting)   Pulse 78   Temp 98 °F (36.7 °C) (Oral)   Resp 18   Ht 5' 2" (1.575 m)   Wt 61.1 kg (134 lb 12.8 oz)   SpO2 100%   BMI 24.66 kg/m²       GENERAL: Well-developed female. No acute distress.    SKIN: Normal to inspection, warm and intact.  BREASTS: No rashes or erythema. No masses, lumps, discharge, tenderness.  VULVA: General appearance normal; external genitalia with no lesions or erythema.  VAGINA: Mucosa/vaginal vault atrophic, no abnormal discharge or lesions.  CERVIX: Atrophic, parous appearing os, no erythema or abnormal " discharge.  BIMANUAL EXAM: reveals a 8 week-sized uterus. The uterus is non tender. Bilateral adnexa reveal no tenderness.  PSYCHIATRIC: Patient is oriented to person, place, and time. Mood and affect are normal.    Assessment:       ICD-10-CM ICD-9-CM   1. Encounter for Papanicolaou smear for cervical cancer screening  Z12.4 V76.2   2. Well woman exam  Z01.419 V72.31   3. Encounter for screening mammogram for breast cancer  Z12.31 V76.12       Plan:     1. Encounter for Papanicolaou smear for cervical cancer screening  -     Liquid-Based Pap Smear, Screening    2. Well woman exam  -     Ambulatory referral/consult to Gynecology    3. Encounter for screening mammogram for breast cancer  -     Mammo Digital Screening Bilat w/ Levi; Future; Expected date: 10/20/2025    Pap today  MG ordered    Call with any vaginal bleeding which would be abnormal    Follow up in about 1 year (around 12/12/2025) for Annual.

## 2024-12-12 NOTE — PROGRESS NOTES
The scope used for the exam was:  Flexible scope ENF-P4  Serial Number:  1)    [id number]    []   2)    [id number]    [x]   3)    [id number]    []   4)    [id number]    []   5)    [id number]    []   6)    [id number]    []       The scope used for the exam was:  Rigid scope   Serial Number:  1)   [id number]    []   2)   [id number]    []   3)   7330    []   4)    3384   []   5)    0824   []   6)    5554   []     7)   7425    []   8)   2240    []   9)   1109    []

## 2024-12-12 NOTE — PROGRESS NOTES
Patient Name: Heaven Boston   YOB: 1965     Chief Complaint: follow-up for laryngeal cancer       History of Present Illness:  Heaven Boston is a 57 y.o. 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 Patient had an uneventful hospitalization course and was discharged on postoperative day 9 after an esophagram showed no pharyngeal leak.  However she re-presented to our facility on 4/6/22 with surgical site infection and a pharyngocutaneous fistula. Patient underwent a neck washout with primary repair on 4/11/2022, with placement of a gastrostomy tube the same day. She was maintained on NPO and transferred to [facility] for further evaluation and management on 4/15/22 after evidence of persistent pharyngocutaneous fistula. There she underwent a 2nd neck washout with primary closure and placement of salivary bypass tube.      5/2/22: Patient had an uneventful hospitalization course and removal of salivary bypass tube before discharge last week and now presenting for postoperative evaluation.  Patient presents today without any complaints.  She is wondering about when he pointed that her x-rays for swallowing then.  She seen Dr. Laguna today after our visit to undergo-stimulation for radiation.     5/9/22: Pt. Did not answer. Let voicemail to advance diet to [facility]. Also discussed this with son.  Plan to see in clinic in 2 weeks     5/19/22: In radiation therapy, 5x weekly until 6/16/22. Has TEP in place, brought another TEP requesting swap out. Will go to speech therapy for exchange of TEP. Went to ER for bleeding from G-tube site 1 week ago but declines visit to [facility] Surg clinic today. On [facility] taking Ensure by G-tube, water and soups by mouth. Had questions about advancing diet. No weight  loss or appetite change. No other issues.      6/21/22:  Returns today for follow up.  Completed radiation last week 6/16/22.  Is having difficulty voicing with TEP though it has been swapped out by [facility].  Able to eat what she wants.  Having some tightness of her throat and soreness, otherwise no issues.     7/21/22: Here today for follow up. Has overall been doing very well. She is tolerating her diet by mouth and gaining weight. No dysphagia. Has not used her PEG tube since prior to radiation. She is interested in having it removed. Has some fullness in the left neck which intermittently swells and then resolves again. Otherwise no new lumps/bumps of the head and neck.      8/23/22: Ms. Boston returns today for follow up. She complains of neck pain exacerbated by flexion and movement to the right that began 2 weeks ago. She also reports neck swelling and tightness which occasionally increases in size and is tender to palpation. She reports cough with clear mucus production as well as nasal congestion without drainage. She also notes dry, itchy eyes that have not improved with Visine. She also notes dysuria. She is tolerating her diet by mouth and gaining weight. She had her PEG tube removed on 8/16/22. She has been to [facility] twice and is still having difficulty voicing with TEP.     9/27/22: Here for follow up. Reports she is doing okay. Still coughing a lot and not voicing well with TEP. No weight loss, tolerating diet. No otalgia/lumps bumps. Still having intermittent neck swelling/pain.      10/27/22:  Returns for surveillance.  Did not get TSH, T4.  Has been going to [facility], now can talk well.  Cough has improved.  No new H&N complaints     12/22/22: Doing very well. Voicing well. No pain. Doing well with speech and lymphedema therapy. Eating well, gaining weight constantly. Always tired. Sometimes sleeps all day.    February 14, 2023:   58-year-old female presents today for follow-up of her laryngeal squamous cell  carcinoma and postoperative hypothyroidism.  In regards to her squamous cell carcinoma of the larynx she is doing well.  She has no complaints of any pain.  He is not noticed any swelling in her neck.  She is not have any difficulty eating or swallowing in his indicated she is gaining weight.  She continues to use her TEP without problems and does not have any leakage around her TEP.  She did have a follow-up PET-CT scan done on December 30, 2022, in the report indicated that there was no evidence of new or progressive hypermetabolic metastatic disease.  On reviewing those images, however, there does appear to be an area of increased FDG activity in the upper cervical esophagus in the area above her trachea stoma and possibly at the site of the TEP.  This was also present on a PET-CT scan done on October 12, 2022 and there does not appear debris any change in this area between the 2 scans.  The report on the scan done on October 12, 2022, had indicated this area had an SUV max of 4.4 without any apparent lesions noted on the corresponding CT.  In regards to her hypothyroidism she continues to take levothyroxine 88 mcg daily.  Lab work from February 6, 2023, showed her TSH level be low at 0.115 with a normal free T4 of 1.48.  Her prior TSH from October 27, 2022, was elevated at 55.1672.     3/30/23:  Patient routine follow-up for squamous cell carcinoma and postop hypothyroidism.  She denies any new lumps, bumps hemoptysis or weight loss.  She is able to swallow without difficulty, TEP functioning well with excellent speech.  She has postop hypothyroidism, does complain of some weakness.  She did have a change in her blood pressure medicine in that it was increased and she felt some of her fatigue might have been related to it.  She currently has blood work ordered by her PCP including thyroid function and she is going to get it next week, we will schedule a TeleMed follow-up.  Duloxetine 30 mg helps her sleep and  helps her neck pain.     5/30/23: Here today for cancer surveillance. Recently had an add on appt with Dr. Aparicio for a new bump on her neck that appeared to be a neuroma. He injected it with lidocaine and she reports that the bump has decreased in size and is no longer painful. She also recently got her CT neck. She denies any new issues. She's eating and drinking well and denies any dysphagia, odynophagia, ear pain or new lumps/bumps.     8/15/23:  Here today for cancer surveillance.  She has no complaints today and is feeling well.  No weight loss.  No hemoptysis.  No new ear pain or voice changes.  No new lumps or bumps.  No nonhealing oral ulcers.  Eating and drinking well.  No throat pain.    11/15/23: Here today for surveillance. Patient underwent repeat CXR which showed resolution of lung opacities. No weight loss.  Has been feeling well.  No new lumps or bumps.  She does not have any dysphagia or odynophagia.  No changes in health.  She is not had any issues with her TEP    2/20/24:  Here today for follow-up.  Patient states she has been eating and gaining weight well.  She has not having any trismus hemoptysis weight loss weakness fatigue ear pain.  She has been taking her Synthroid as instructed she did not get new labs.  No new lumps or bumps.    5/16/24:  Here today for follow.  She is doing well.  She has been eating well and gaining weight.  She denies dysphagia, odynophagia, otalgia, lumps or bumps in the head or neck.  TSH was elevated.  She says that she takes the Synthroid on an empty stomach everyday and has not missed doses.  She takes 88 mcg daily.    9/5/2024:   Patient presents today for follow-up of her laryngeal squamous cell carcinoma and hypothyroidism.  Patient has no complaints at this time.  She is tolerating regular diet and doing well with her TEP.  She continues to take levothyroxine 100 mcg daily.  She has not had follow-up thyroid function studies since her dose was increased.   She has no complaints of any heat or cold intolerance or palpitations.  No other new problems.    24: Presents today in follow up. Doing well without issues. Denies issues swallowing or changes to voice. Weight has increased recently. No issues with TEP. Changed last week. Taking synthroid 100 without issues. Denies otalgia, fevers, chills, night sweats, weight loss, palpitations, diarrhea or any new problems.     Past Medical History:  Past Medical History:   Diagnosis Date    Cancer     Laryngeal    Diabetes mellitus     Hypertension      Past Surgical History:   Procedure Laterality Date     SECTION      [place] REMOVAL OF LARYNX  2022       Review of Systems:  Unremarkable except as mentioned above.    Current Medications:  Current Outpatient Medications   Medication Sig    atorvastatin (LIPITOR) 80 MG tablet Take 1 tablet (80 mg total) by mouth every evening.    azelastine (ASTELIN) 137 mcg (0.1 %) nasal spray 1 spray by Nasal route.    carBAMazepine (CARBATROL) 200 MG CM12 Take 200 mg by mouth.    carBAMazepine (TEGRETOL XR) 200 MG 12 hr tablet Take 200 mg by mouth 2 (two) times daily.    cetirizine (ZYRTEC) 10 MG tablet Take 10 mg by mouth once daily.    DULoxetine (CYMBALTA) 30 MG capsule Take 30 mg by mouth 2 (two) times daily.    fluticasone propionate (FLONASE) 50 mcg/actuation nasal spray 1 spray by Each Nostril route 2 (two) times daily.    fluticasone-salmeterol diskus inhaler 100-50 mcg Inhale 1 puff into the lungs every 12 (twelve) hours.    levothyroxine (SYNTHROID) 100 MCG tablet Take 1 tablet (100 mcg total) by mouth before breakfast.    loratadine (CLARITIN) 10 mg tablet Take 1 tablet (10 mg total) by mouth once daily.    metFORMIN (GLUCOPHAGE) 500 MG tablet Take 1 tablet (500 mg total) by mouth once daily.    metoprolol succinate (TOPROL-XL) 25 MG 24 hr tablet Take 0.5 tablets (12.5 mg total) by mouth once daily.    montelukast (SINGULAIR) 5 MG chewable tablet Take 1 tablet (5 mg  total) by mouth every evening.    NIFEdipine (PROCARDIA-XL) 60 MG (OSM) 24 hr tablet Take 1 tablet (60 mg total) by mouth once daily.    rivaroxaban (XARELTO) 20 mg Tab Take 1 tablet (20 mg total) by mouth daily with dinner or evening meal.    aspirin (ECOTRIN) 81 MG EC tablet Take 1 tablet (81 mg total) by mouth once daily.    HYDROcodone-acetaminophen 5-300 mg Tab Take 1 tablet by mouth every 8 (eight) hours as needed.    ibuprofen (ADVIL,MOTRIN) 800 MG tablet Take 800 mg by mouth every 6 (six) hours as needed.    omeprazole (PRILOSEC) 40 MG capsule Take 40 mg by mouth once daily.    prednisoLONE acetate (PRED FORTE) 1 % DrpS Place into both eyes.     Current Facility-Administered Medications   Medication    silver nitrate applicators applicator 1 applicator        Allergies:  Review of patient's allergies indicates:  No Known Allergies     Physical Exam:  Vital signs:   Vitals:    12/12/24 0859   BP: 125/84   Pulse: 76   Resp: 20   Temp: 98.7 °F (37.1 °C)     General:  Well-developed well-nourished female in no acute distress.  Patient does communicate well with clear speech using her TEP.  Head and face:  Normocephalic.  No facial lesions.  No temporomandibular joint tenderness or click.  Ears:  Right ear-auricle is normally developed.  External auditory canal is clear.  Tympanic membrane is nonerythematous.  No middle ear effusion.  Left ear-auricle is normally developed.  External auditory canal is clear.  Tympanic membrane is nonerythematous.  No middle ear effusion.  Nose:  Nasal dorsum is unremarkable.  No significant septal deviation.  No significant intranasal congestion.  Secretions are clear.  Oral cavity and oropharynx:  Tongue and floor mouth are without lesions.  Mucosa is moist.  No pharyngeal erythema or exudates.  No oropharyngeal masses.  No tonsillar hypertrophy.  Neck:  Supple without palpable adenopathy.  She does have some induration related to her surgery and postradiation changes.  Trachea  stoma is patent.  TEP is in place without any gross drainage from around the prosthesis.    Eyes:  Extraocular muscles intact.  No nystagmus.  No exophthalmos or enophthalmos.  Neurologic:  Alert and oriented.  Cranial nerves 2-12 are grossly normal.    Procedure note: Flexible tracheoscopy, nasopharyngoscopy  The flexible fiberoptic laryngoscope was introduced into the right nostril and advanced. Examination of the nose showed the above mentioned findings. Examination of the nasopharynx showed no nasopharyngeal masses or eustachian tube obstruction. Examination of the base of tongue showed no masses or lesions. Examination of the hypopharynx showed no masses or lesions in the neopharynx or hypopharynx.  The esophageal inlet is without apparent lesions.  No evidence of fungal disease. The scope was passed in the stoma and the trachea was clear down to the aleja.     Assessment/Plan:  Heaven Boston is a 59 y.o. female with T3N1M0 supraglottic SCCa s/p TL BSND CPM on 3/24/2022, complicated by PCF s/p washout & primary closure x2 (2nd in [place]) now s/p adjuvant RT. One of her post-treatment PET noted hypermetabolism at the site of her TEP. She was taken for a DL on 3/3/23 which was unremarkable. Doing well today.  No evidence of disease on exam.  Postoperative hypothyroidism.      Plan:  levothyroxine 100 mcg p.o. q.d. Offered to lower to 75, but patient declined stating that she is asymptomatic. TSH in 3 months prior to next appt.   Continue follow-up with speech therapy.    New CXR  Follow-up here in 3 months.        HEAD & NECK CANCER SURVEILLANCE   Primary Surgical Treatment     Head & Neck Staff: Celina  Medical Oncologist: Drew   Radiation Oncologist: Jase     Primary tumor: T3N1M0 SCCa  of the supraglottis     Surgery Date: 3/24/2022  Induction Chemotherapy: no  Adjuvant Therapy: Radiation  Completion Date: completed 6/2022    Place date if completed   3 6 12 18 24 36 48 60   CT Neck X X 5/2023  11/28/23 3/5/24 X X X   PET/CT 9/2022 12/2022         CXR  X X X X X X X   TFT X  4/2023 9/5/24 X X X     Current Surveillance Interval: post-tx year 1-2, q2-3 mo     Suggested imaging surveillance. Patient and tumor specific factors should always be individually considered.

## 2024-12-17 ENCOUNTER — HOSPITAL ENCOUNTER (OUTPATIENT)
Dept: RADIOLOGY | Facility: HOSPITAL | Age: 59
Discharge: HOME | End: 2024-12-17
Attending: FAMILY MEDICINE
Payer: MEDICARE

## 2024-12-17 DIAGNOSIS — I82.5Z2 CHRONIC DEEP VEIN THROMBOSIS (DVT) OF DISTAL VEIN OF LEFT LOWER EXTREMITY: ICD-10-CM

## 2024-12-17 DIAGNOSIS — R91.8 PULMONARY INFILTRATE: ICD-10-CM

## 2024-12-17 DIAGNOSIS — G89.29 CHRONIC LEFT SHOULDER PAIN: ICD-10-CM

## 2024-12-17 DIAGNOSIS — I82.401 LEG DVT (DEEP VENOUS THROMBOEMBOLISM), ACUTE, RIGHT: ICD-10-CM

## 2024-12-17 DIAGNOSIS — Z85.21 HISTORY OF LARYNGEAL CANCER: ICD-10-CM

## 2024-12-17 DIAGNOSIS — M25.512 CHRONIC LEFT SHOULDER PAIN: ICD-10-CM

## 2024-12-17 PROCEDURE — 73030 X-RAY EXAM OF SHOULDER: CPT | Mod: TC,LT

## 2024-12-17 PROCEDURE — 71250 CT THORAX DX C-: CPT | Mod: TC

## 2024-12-17 NOTE — PROGRESS NOTES
History:  Past Medical History:   Diagnosis Date    Cancer     Laryngeal    Diabetes mellitus     Hypertension    Past medical history:  Laryngeal cancer, S/P laryngectomy/bilateral neck dissection/left hemithyroidectomy; NIDDM; hypertension  Procedure/surgical history: ; colonoscopy 05/15/2023; direct laryngoscopy with biopsy 2023; laryngectomy 2022    Past Surgical History:   Procedure Laterality Date     SECTION      COLONOSCOPY N/A 05/15/2023    Procedure: COLONOSCOPY;  Surgeon: Francisco Zavala MD;  Location: [facility] ENDOSCOPY;  Service: Endoscopy;  Laterality: N/A;    DIRECT DIAGNOSTIC LARYNGOSCOPY WITH BRONCHOSCOPY AND ESOPHAGOSCOPY N/A 2023    Procedure: LARYNGOSCOPY, DIRECT, DIAGNOSTIC, WITH BRONCHOSCOPY AND ESOPHAGOSCOPY;  Surgeon: Connor Patrick MD;  Location: [facility] OR;  Service: ENT;  Laterality: N/A;  Will not need to do bronchoscopy for this procedure    TN REMOVAL OF LARYNX  2022      Social History     Socioeconomic History    Marital status: Single   Tobacco Use    Smoking status: Former     Current packs/day: 0.00     Average packs/day: 0.5 packs/day for 39.0 years (19.5 ttl pk-yrs)     Types: Cigarettes     Start date: 1983     Quit date: 2022     Years since quittin.7    Smokeless tobacco: Former     Quit date:    Substance and Sexual Activity    Alcohol use: Never    Drug use: Never    Sexual activity: Not Currently      No family history on file.     Reason for Follow-up:  Reason for consultation:   -recurrent bilateral lower extremity DVT  -history of squamous cell carcinoma of supraglottis, S/P total laryngectomy/bilateral neck dissection/left hemithyroidectomy 2022; T3 N1 M0  -now, cavitary lesion right lower lung lobe on CT chest 2024  -history of heavy tobacco abuse, history of adjuvant radiotherapy     History of Present Illness:   Chronic DVT (Chronic DVT)        Oncologic/Hematologic History:  Oncology History    Laryngeal cancer   3/21/2022 Cancer Staged    Staging form: Larynx - Supraglottis, AJCC 8th Edition  - Pathologic stage from 3/21/2022: Stage III (pT3, pN1, cM0)     2022 Initial Diagnosis    Laryngeal cancer     Squamous cell carcinoma of supraglottis   3/1/2022 Cancer Staged    Staging form: Larynx - Supraglottis, AJCC 8th Edition  - Pathologic stage from 3/1/2022: Stage III (pT3, pN1, cM0)     2024 Initial Diagnosis    Squamous cell carcinoma of supraglottis     Past medical history:  Laryngeal cancer; NIDDM; hypertension; dyslipidemia; heavy tobacco abuse; history of SVT, on metoprolol; history of supraglottic squamous cell carcinoma, T3 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  Lives in [place].  Has a 33-year-old son who lives with her.  Does not work.  Smoked half ppd for 50 years; quit 2 years ago.  Used to drink 4 beers daily; drank for 30 years; quit 20 years ago.  No illicit drugs.    Family history: Negative for cancers or blood dyscrasia.  Health maintenance:  -10/18/2024:  Bilateral screening mammogram (comparison:  10/13/2023 mammogram, etc.):  BI-RADS: 1-negative  -05/15/2023:  Colonoscopy (indication: Positive Cologuard test):  Diverticulosis sigmoid colon and descending colon; otherwise, normal; no biopsies taken (repeat colonoscopy in 5 years for screening)    59-year-old female, referred from [facility], with a history of chronic DVT      Previous 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      10/16/2024:  [facility] note:   Spoke to  "patient over the phone regarding Doppler ultrasound findings of blood clot.    Radiology reached out to me about the result showing:   "There is chronic appearing nonocclusive thrombus in the left distal femoral vein".   Then upon discussion with radiologist, sent last results of previous doppler US in April 2022 showing: "The left lower extremity was positive for a poorly visualized short segment, age-indeterminate, nearly occluded to occluded deep vein thrombus located at the level of the above the knee popliteal vein". It seems to be more as chronic thrombus but not exactly same area as previous one.  Hence, will treat with eliquis 10 mg bid for the first week, then 5 BID afterwards. Patient agreeable.  Of note, she was diagnosed with a DVT in April 2022 and was initially given eliquis for 30 days.  On follow-up with Cardiology, DOAC was resumed (Xarelto not Eliquis due to cost).  Patient was wondering how long she needed to be on anticoagulation  thought dvt to be malignancy related, but pt seems to be in remission. At that time she was told to resume for a year. Took xarelto until May 2023.  Discussed with patient that will send referral to Heme-Onc again to discuss further recommendations.  In the meantime resume Eliquis.  If not covered, will switch to Xarelto.  Patient voiced understanding.  She denies any shortness a breath, chest pain or palpitations, but discussed that if new symptoms develop to go to the emergency room to get further evaluated because of risk of PE.  Also arterial ultrasound with mild to moderate arterial flow reduction of the right and left lower extremities.  will refer to vascular surgery.  Patient on Lipitor 80 mg daily.      12/12/2024: ENT office note:   Heaven Boston is a 57 y.o. female PMH diabetes, hypertension, heavy tobacco smoking, sinus tachycardia on metoprolol, T3 N1 M0 supraglottic squamous cell carcinoma s/p total laryngectomy, bilateral neck dissection, left " hemithyroidectomy, cricopharyngeal myotomy, primary TEP on 03/21/2022. Patient had an uneventful hospitalization course and was discharged on postoperative day 9 after an esophagram showed no pharyngeal leak.  However she re-presented to our facility on 4/6/22 with surgical site infection and a pharyngocutaneous fistula. Patient underwent a neck washout with primary repair on 4/11/2022, with placement of a gastrostomy tube the same day. She was maintained on NPO and transferred to [place] for further evaluation and management on 4/15/22 after evidence of persistent pharyngocutaneous fistula. There she underwent a 2nd neck washout with primary closure and placement of salivary bypass tube.   2/20/24:  Here today for follow-up.  Patient states she has been eating and gaining weight well.  She has not having any trismus hemoptysis weight loss weakness fatigue ear pain.  She has been taking her Synthroid as instructed she did not get new labs.  No new lumps or bumps.  5/19/22: In radiation therapy, 5x weekly until 6/16/22 (6000 cGy/30 fractions/43 days).   5/16/24:  Here today for follow.  She is doing well.  She has been eating well and gaining weight.  She denies dysphagia, odynophagia, otalgia, lumps or bumps in the head or neck.  TSH was elevated.  She says that she takes the Synthroid on an empty stomach everyday and has not missed doses.  She takes 88 mcg daily.  9/5/2024:   Patient presents today for follow-up of her laryngeal squamous cell carcinoma and hypothyroidism.  Patient has no complaints at this time.  She is tolerating regular diet and doing well with her TEP.  She continues to take levothyroxine 100 mcg daily.  She has not had follow-up thyroid function studies since her dose was increased.  She has no complaints of any heat or cold intolerance or palpitations.  No other new problems.  12/12/24: Presents today in follow up. Doing well without issues. Denies issues swallowing or changes to voice.  Weight has increased recently. No issues with TEP. Changed last week. Taking synthroid 100 without issues. Denies otalgia, fevers, chills, night sweats, weight loss, palpitations, diarrhea or any new problems.   Procedure note: Flexible tracheoscopy, nasopharyngoscopy  The flexible fiberoptic laryngoscope was introduced into the right nostril and advanced. Examination of the nose showed the above mentioned findings. Examination of the nasopharynx showed no nasopharyngeal masses or eustachian tube obstruction. Examination of the base of tongue showed no masses or lesions. Examination of the hypopharynx showed no masses or lesions in the neopharynx or hypopharynx.  The esophageal inlet is without apparent lesions.  No evidence of fungal disease. The scope was passed in the stoma and the trachea was clear down to the aleja.   Assessment/Plan:  Heaven Boston is a 59 y.o. female with T3N1M0 supraglottic SCCa s/p TL BSND CPM on 3/24/2022, complicated by PCF s/p washout & primary closure x2 (2nd in [place]) now s/p adjuvant RT. One of her post-treatment PET noted hypermetabolism at the site of her TEP. She was taken for a DL on 3/3/23 which was unremarkable. Doing well today.  No evidence of disease on exam.  Postoperative hypothyroidism.  Plan:  levothyroxine 100 mcg p.o. q.d. Offered to lower to 75, but patient declined stating that she is asymptomatic.      12/04/2024:  [facility] note:   Heaven Boston is a 59 y.o. female here for Leg Pain   (The patient states that she has a lump on her right leg that is blue and it hurts. She says it been like that for about a week.   Also, arm pain that has been keeping her up at night. She used diclofenac from her cousin and she says it works so she ask if some can be prescribed.) and Arm Pain  HPI  Here for above concern of right leg pain and a lump that she noticed that has been traveling.  Right leg pain for a week now.  Initially noticed a lump above knee that  kind of traveled into the upper thigh, painful.    These happened right after our appointment last week.  Last week we advised patient to take Eliquis 5 mg b.i.d. for the chronic DVT instead of 5 mg once a day that she was taking.  She did start to take the Eliquis 5 mg b.i.d. the next day.  Of note, patient used to be on Xarelto for a year last year for left-sided DVT.  Thought to be malignancy related.  Then that repeat ultrasound showed left-sided clot which seemed to be chronic appearing but not exactly the same area so we resumed anticoagulant Eliquis.   Called imaging- accepted pt to have the study now.  Patient agreeable to have the Doppler ultrasound repeated, high concern for clot.  Otherwise denies any chest pain or shortness for breath, no palpitations.   Ever since working at [employer], left shoulder pain intermittent.  When sleeping, can not lay on left side. Tried voltaren gel from her cousin and helped immediately. Would like to have some on her own.  1. Leg DVT (deep venous thromboembolism), acute, right  Updated Doppler ultrasound same day done, revealed right leg DVT.    Patient was just diagnosed with questionable acute on chronic DVT of the left side at that time she had both legs ultrasound and it was new right DVT, but likely developed a right-sided DVT now because of not appropriately taking Eliquis as directed twice a day.  She was taking it only once a day.  Just started to take twice a day and advised patient to continue doing so very strictly.  Failure to DOACs because patient was not taking it appropriately.  Discussed with patient that she will likely need to be on Eliquis 5 mg b.i.d. lifelong but will refer to Heme-Onc for specialized recommendations.  In the meantime check thrombophilia panel.  Will also check CT chest because of patient history of cancer.  Advised also patient to check with her ENT as soon as possible and update them with the current issues of clots to be extra  cautious, having history of laryngeal cancer.  Patient has upcoming appointment with ENT within 2 weeks.  She had CT neck done for this year for surveillance in March.  2. Right leg pain  3. Chronic deep vein thrombosis (DVT) of distal vein of left lower extremity  4. Chronic left shoulder pain  Stable on Voltaren gel.   Declines further meds/referrals because it is not as bothersome and Voltaren gel works great.  Check an x-ray.  5. Pulmonary infiltrate  6. History of laryngeal cancer      Investigations reviewed:    -12/30/2022:  FDG PET-CT (comparison: FDG PET-CT 10/12/2022, CT neck 09/19/2022):  No new or progressive hypermetabolic metastatic disease  -03/05/2024: CT soft tissues of the neck with contrast (comparison: CT neck 11/28/2023): Few ground-glass opacities in the right upper lobe may be infectious or inflammatory.  Otherwise stable exam compared to 11/28/2023.  -12/17/2024:  CT chest without contrast (comparison: 03/10/2022):  Cavitary lesion right lower lobe, inflammatory versus neoplastic (2.5 x 2.3 cm; right lower lobe; associated area of infiltrate)      -10/16/2024:  Bilateral lower extremity venous Doppler (bilateral leg pain; history of DVT left lower extremity):  No right lower extremity DVT; left superficial femoral distal vein abnormal (partial compressibility) (positive for DVT left lower extremity)  -10/16/2024:  Bilateral lower extremity arterial Doppler (bilateral leg pain):  The right lower extremity demonstrated multiphasic waveforms at all levels with the exception of the distal PTA and DPA.  The right lower extremity demostrated mild to moderate arterial flow reduction.   The left lower extremity demonstrated multiphasic waveforms at all levels with the exception of the distal PTA and DPA.  The left lower extremity demonstrated mild to moderate arterial flow reduction.   The ABIs were omitted due to findings of thrombus on prior venous exam  -12/04/2024:  Venous Doppler right lower  extremity (right leg lumps starting above the knee and traveling upper thigh, concern for blood clot; history of recurrent DVT left side):  DVT right distal femoral vein and popliteal vein      Labs reviewed:  -12/03/2024: CBC unremarkable    12/19/2024:   Pleasant lady who presents for initial medical oncology consultation.  In no acute discomfort.  Flonase with the help of TEP; she is S/P laryngectomy.    On anticoagulation with Eliquis.  Reports no bleeding in any form.  No hematemesis, melena, hematochezia, hematuria, hemoptysis, etc..    Denies chest pain, cough, dyspnea, fevers, chills, mucopurulent sputum production, or hemoptysis.  Currently, some pain in the right thigh area, not severe.  Denies weakness or fatigue.  Great appetite.  Denies dysphagia, odynophagia, otalgia, throat pain, etc..  Denies unintentional weight loss, any new lumps or lymphadenopathy, unusual headaches, focal neurological symptoms, abdominal pain, nausea, vomiting, GI bleeding, change in bowel habits, etc..    ECOG 0-1.      Interval History:  [No matching plan found]   [No matching plan found]       Medications:  Current Outpatient Medications on File Prior to Visit   Medication Sig Dispense Refill    ALLERGY RELIEF, CETIRIZINE, 10 mg tablet Take 1 tablet by mouth once daily 90 tablet 0    apixaban (ELIQUIS) 5 mg Tab 10 mg ( 2 tablets) twice daily for 7 days followed by 5 mg ( one tablet)  twice daily. 70 tablet 2    atorvastatin (LIPITOR) 80 MG tablet Take 1 tablet (80 mg total) by mouth every evening. 90 tablet 3    carBAMazepine (TEGRETOL XR) 200 MG 12 hr tablet Take 1 tablet (200 mg total) by mouth 2 (two) times daily. 60 tablet 0    diclofenac sodium (VOLTAREN ARTHRITIS PAIN) 1 % Gel Apply 2 g topically 4 (four) times daily as needed (pain). 800 g 1    DULoxetine (CYMBALTA) 30 MG capsule Take 1 capsule (30 mg total) by mouth 2 (two) times daily. 60 capsule 1    fluticasone propionate (FLONASE) 50 mcg/actuation nasal spray 1  spray (50 mcg total) by Each Nostril route 2 (two) times daily. 16 g 1    folic acid (FOLVITE) 1 MG tablet Take 1 tablet by mouth once daily 30 tablet 0    gabapentin (NEURONTIN) 100 MG capsule Take 1 capsule (100 mg total) by mouth 3 (three) times daily. 90 capsule 2    JARDIANCE 10 mg tablet TAKE ONE TABLET BY MOUTH DAILY AT 9 AM 90 tablet 11    levothyroxine (SYNTHROID) 100 MCG tablet Take 1 tablet (100 mcg total) by mouth before breakfast. 30 tablet 11    lisinopriL (PRINIVIL,ZESTRIL) 2.5 MG tablet Take 1 tablet (2.5 mg total) by mouth once daily. 90 tablet 1    metoprolol succinate (TOPROL-XL) 25 MG 24 hr tablet Take 0.5 tablets (12.5 mg total) by mouth once daily. 45 tablet 3    montelukast (SINGULAIR) 5 MG chewable tablet Take 5 mg by mouth every other day.      NIFEdipine (PROCARDIA-XL) 60 MG (OSM) 24 hr tablet Take 1 tablet (60 mg total) by mouth once daily. 30 tablet 11    ezetimibe (ZETIA) 10 mg tablet Take 1 tablet (10 mg total) by mouth once daily. (Patient not taking: Reported on 9/5/2024) 90 tablet 1     Current Facility-Administered Medications on File Prior to Visit   Medication Dose Route Frequency Provider Last Rate Last Admin    dextrose 10% bolus 125 mL 125 mL  12.5 g Intravenous PRN Tamie Woody FNP        dextrose 10% bolus 125 mL 125 mL  12.5 g Intravenous PRN Tamie Woody FNP        diphenhydrAMINE injection 6.25 mg  6.25 mg Intravenous Once PRN Lolis Sood MD        droperidoL injection 0.25 mg  0.25 mg Intravenous Once PRN Lolis Sood MD        HYDROmorphone injection 0.2 mg  0.2 mg Intravenous Q5 Min PRN Lolis Sood MD        insulin aspart U-100 injection 2-9 Units  2-9 Units Subcutaneous Q6H PRN Tamie Woody FNP        insulin aspart U-100 injection 4-12 Units  4-12 Units Subcutaneous PRN Tamie Woody FNP        lactated ringers infusion   Intravenous Continuous Lolis Sood  mL/hr at 03/03/23 0656 Rate Change at  03/03/23 0656    LIDOcaine (PF) 10 mg/ml (1%) injection 10 mg  1 mL Intradermal Once RobersonErinTamie S, FNP        LIDOcaine (PF) 10 mg/ml (1%) injection 10 mg  1 mL Intradermal Once Lolis Sood MD        LIDOcaine HCL 20 mg/ml (2%) injection 1 mL  1 mL Intradermal 1 time in Clinic/HOD Travis Aparicio MD        metoclopramide HCl injection 10 mg  10 mg Intravenous Once PRN Lolis Sood MD        midazolam (VERSED) 1 mg/mL injection 1 mg  1 mg Intravenous Once PRN Lolis Sood MD        oxyCODONE immediate release tablet 5 mg  5 mg Oral Q3H PRN Lolis Sood MD        silver nitrate applicators applicator 1 applicator  1 applicator Topical (Top) 1 time in Clinic/HOD Patricia Montes MD        triamcinolone acetonide injection 40 mg  40 mg Intramuscular 1 time in Clinic/HOD Travis Aparicio MD           Review of Systems:   All systems reviewed and found to be negative except for the symptoms detailed above    Physical Examination:   VITAL SIGNS:   Vitals:    12/19/24 1313   BP: 121/67   Pulse: (!) 52   Resp: 18   Temp: 98.6 °F (37 °C)     GENERAL:  In no apparent distress.    HEAD:  No signs of head trauma.  EYES:  Pupils are equal.  Extraocular motions intact.    EARS:  Hearing grossly intact.  MOUTH:  Oropharynx is normal.   NECK:  No adenopathy, no JVD.     CHEST:  Chest with clear breath sounds bilaterally.  No wheezes, rales, rhonchi.    CARDIAC:  Regular rate and rhythm.  S1 and S2, without murmurs, gallops, rubs.  VASCULAR:  No Edema.  Peripheral pulses normal and equal in all extremities.  ABDOMEN:  Soft, without detectable tenderness.  No sign of distention.  No   rebound or guarding, and no masses palpated.   Bowel Sounds normal.  MUSCULOSKELETAL:  Good range of motion of all major joints. Extremities without clubbing, cyanosis or edema.    NEUROLOGIC EXAM:  Alert and oriented x 3.  No focal sensory or strength deficits.   Speech normal.  Follows commands.  PSYCHIATRIC:  Mood  normal.  -12/19/2024: S/P laryngectomy in the past; phonates via TEP; no lumps or lymphadenopathy in neck; in no acute discomfort; S/P radiation therapy to the neck, with tissue induration.    Results for orders placed or performed in visit on 12/03/24   CBC Auto Differential    Narrative    The following orders were created for panel order CBC Auto Differential.  Procedure                               Abnormality         Status                     ---------                               -----------         ------                     CBC with Differential[[id number]]       Abnormal            Final result                 Please view results for these tests on the individual orders.   CBC with Differential   Result Value Ref Range    WBC 6.10 4.50 - 11.50 x10(3)/mcL    RBC 4.44 4.20 - 5.40 x10(6)/mcL    Hgb 12.0 12.0 - 16.0 g/dL    Hct 39.4 37.0 - 47.0 %    MCV 88.7 80.0 - 94.0 fL    MCH 27.0 27.0 - 31.0 pg    MCHC 30.5 (L) 33.0 - 36.0 g/dL    RDW 13.4 11.5 - 17.0 %    Platelet 278 130 - 400 x10(3)/mcL    MPV 9.6 7.4 - 10.4 fL    Neut % 71.4 %    Lymph % 19.3 %    Mono % 5.2 %    Eos % 3.4 %    Basophil % 0.5 %    Lymph # 1.18 0.6 - 4.6 x10(3)/mcL    Neut # 4.35 2.1 - 9.2 x10(3)/mcL    Mono # 0.32 0.1 - 1.3 x10(3)/mcL    Eos # 0.21 0 - 0.9 x10(3)/mcL    Baso # 0.03 <=0.2 x10(3)/mcL    IG# 0.01 0 - 0.04 x10(3)/mcL    IG% 0.2 %    NRBC% 0.0 %     Results for orders placed or performed in visit on 09/05/24   Comprehensive Metabolic Panel   Result Value Ref Range    Sodium 141 136 - 145 mmol/L    Potassium 4.1 3.5 - 5.1 mmol/L    Chloride 108 (H) 98 - 107 mmol/L    CO2 25 22 - 29 mmol/L    Glucose 164 (H) 74 - 100 mg/dL    Blood Urea Nitrogen 11.5 9.8 - 20.1 mg/dL    Creatinine 1.18 (H) 0.55 - 1.02 mg/dL    Calcium 9.9 8.4 - 10.2 mg/dL    Protein Total 7.3 6.4 - 8.3 gm/dL    Albumin 3.5 3.5 - 5.0 g/dL    Globulin 3.8 (H) 2.4 - 3.5 gm/dL    Albumin/Globulin Ratio 0.9 (L) 1.1 - 2.0 ratio    Bilirubin Total 0.2 <=1.5 mg/dL      40 - 150 unit/L    ALT 13 0 - 55 unit/L    AST 13 5 - 34 unit/L    eGFR 54 mL/min/1.73/m2    Anion Gap 8.0 mEq/L    BUN/Creatinine Ratio 10        Assessment:  Problem List Items Addressed This Visit       Deep vein thrombosis (DVT) of distal vein of left lower extremity    Recurrent deep vein thrombosis (DVT) - Primary    DVT of lower extremity, bilateral    Cavitating mass in right lower lung lobe    History of tobacco abuse    History of head and neck radiation     Other Visit Diagnoses       Leg DVT (deep venous thromboembolism), acute, right        History of laryngeal cancer                Orders for 12/19/2024:   Today, check CBC, CMP, factor 5 Leiden mutation, prothrombin gene mutation, cardiolipin antibody, beta 2 glycoprotein 1 antibody  Needs anticoagulation with Eliquis indefinitely, deferred to PCP  Refer to Pulmonary ASAP for evaluation of new cavitary lesion in right lower lung lobe, noted on CT scan 12/17/2024 (question of primary lung malignancy versus metastases versus lung abscess)   Follow-up in 1 month     Above discussed with the patient.  All questions answered.  Discussed labs and scans and gave her copies of relevant results.  Plan of management discussed.    She understands and agrees with this plan.  ==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  # 04/2022:  Left lower extremity positive for poorly visualized short segment age indeterminate nearly occluded-occluded DVT at the level of above the knee popliteal vein  # apparently, given Eliquis X 30 days; subsequently, Xarelto (due to cost); Xarelto until 05/2023 (thus, was anticoagulated for 1 year)  # 10/16/2024:  (bilateral leg pain) Left superficial distal femoral vein abnormal, partial compressibility chronic appearing nonocclusive thrombus left distal femoral vein) (seems to be more as chronic thrombus but not  exactly in the same area as previous one)  # started on anticoagulation with Eliquis by PCP in family Medicine (however, she was taking Eliquis only 5 mg daily instead of b.i.d.)  # 12/04/2024 (right leg painful lumps starting above the knee and traveling up to upper thigh x1 week):  DVT right distal femoral and popliteal vein (a week prior, she was instructed by PCP to start taking Eliquis 5 mg p.o. b.i.d., rather than 5 mg once daily)  # Eliquis resumed by PCP  #12/17/2024:  CT chest without contrast (comparison: 03/10/2022):  Cavitary lesion right lower lobe, inflammatory versus neoplastic (2.5 x 2.3 cm; right lower lobe; associated area of infiltrate)  (Question of a new lung primary versus metastasis, possibly accounting for new DVT right lower extremity)  >>>  Plan:   Recurrent lower extremity DVT  -needs indefinite anticoagulation; continue Xarelto in therapeutic dose, 5 mg p.o. b.i.d., indefinitely; monitor for bleeding  -the latest episode of DVT could be secondary to new abnormality noted on CT chest 12/17/2024 which showed a 2.5 cm right lower lobe cavitary lesion, concerning for either primary malignancy or metastatic disease  -will order Hypercoag workup anyway (see below), including factor 5 Leiden mutation, prothrombin gene mutation, antiphospholipid antibody  -with Eliquis on board, can not measure protein C deficiency, protein S deficiency, and antithrombin 3 deficiency using functional assays  -lupus anticoagulant should only be tested in the absence of any anticoagulation as it is a clot base test, functional assay      With DOAC:  -Factor V Leiden, prothrombin gene mutation, antiphospholipid antibody are unaffected;  -cannot measure protein C deficiency, protein S deficiency, and Antithrombin III deficiency using functional assays (can cause overestimation of antithrombin levels; can not measure protein C, protein S, and antithrombin using functional assays with DOAC);  -antiphospholipid  antibodies are unaffected because they are serologic assays and are unaffected by anticoagulation (anticardiolipin antibody; beta 2 glycoprotein 1 antibody);  -lupus anticoagulant should only be tested in the absence of any anticoagulant as it is clot based test      # History of squamous cell carcinoma of supraglottis:  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 Patient had an uneventful hospitalization course and was discharged on postoperative day 9 after an esophagram showed no pharyngeal leak.  However she re-presented to our facility on 4/6/22 with surgical site infection and a pharyngocutaneous fistula. Patient underwent a neck washout with primary repair on 4/11/2022, with placement of a gastrostomy tube the same day. She was maintained on NPO and transferred to [place] for further evaluation and management on 4/15/22 after evidence of persistent pharyngocutaneous fistula. There she underwent a 2nd neck washout with primary closure and placement of salivary bypass tube.   S/P adjuvant radiotherapy, completed 06/16/2022 (6000 cGy/30 fractions/43 days)  One of her post-treatment PET noted hypermetabolism at the site of her TEP. She was taken for a DL on 3/3/23 which was unremarkable.  >>>  # Question of a new primary versus metastatic malignancy on CT chest 12/17/2024:   -12/17/2024:  CT chest without contrast (comparison: 03/10/2022):  Cavitary lesion right lower lobe, inflammatory versus neoplastic (2.5 x 2.3 cm; right lower lobe; associated area of infiltrate)     >>>  Plan:  -refer to Pulmonary ASAP from bronchoscopy/biopsy (question of primary lung malignancy versus metastasis versus lung abscess    Follow-up:  No follow-ups on file.

## 2024-12-18 ENCOUNTER — TELEPHONE (OUTPATIENT)
Dept: HEMATOLOGY/ONCOLOGY | Facility: CLINIC | Age: 59
End: 2024-12-18
Payer: MEDICARE

## 2024-12-19 ENCOUNTER — OFFICE VISIT (OUTPATIENT)
Dept: HEMATOLOGY/ONCOLOGY | Facility: CLINIC | Age: 59
End: 2024-12-19
Attending: INTERNAL MEDICINE
Payer: MEDICARE

## 2024-12-19 ENCOUNTER — TELEPHONE (OUTPATIENT)
Dept: FAMILY MEDICINE | Facility: CLINIC | Age: 59
End: 2024-12-19
Payer: MEDICARE

## 2024-12-19 ENCOUNTER — LAB VISIT (OUTPATIENT)
Dept: LAB | Facility: HOSPITAL | Age: 59
End: 2024-12-19
Attending: INTERNAL MEDICINE
Payer: MEDICARE

## 2024-12-19 VITALS
DIASTOLIC BLOOD PRESSURE: 67 MMHG | RESPIRATION RATE: 18 BRPM | OXYGEN SATURATION: 99 % | SYSTOLIC BLOOD PRESSURE: 121 MMHG | HEIGHT: 62 IN | HEART RATE: 52 BPM | TEMPERATURE: 99 F | WEIGHT: 136.81 LBS | BODY MASS INDEX: 25.18 KG/M2

## 2024-12-19 DIAGNOSIS — I82.401 LEG DVT (DEEP VENOUS THROMBOEMBOLISM), ACUTE, RIGHT: ICD-10-CM

## 2024-12-19 DIAGNOSIS — I82.5Z2 CHRONIC DEEP VEIN THROMBOSIS (DVT) OF DISTAL VEIN OF LEFT LOWER EXTREMITY: Primary | ICD-10-CM

## 2024-12-19 DIAGNOSIS — I82.409 RECURRENT DEEP VEIN THROMBOSIS (DVT): Primary | ICD-10-CM

## 2024-12-19 DIAGNOSIS — I82.5Y3 CHRONIC DEEP VEIN THROMBOSIS (DVT) OF PROXIMAL VEIN OF BOTH LOWER EXTREMITIES: ICD-10-CM

## 2024-12-19 DIAGNOSIS — Z87.891 HISTORY OF TOBACCO ABUSE: ICD-10-CM

## 2024-12-19 DIAGNOSIS — J98.4 CAVITATING MASS IN RIGHT LOWER LUNG LOBE: ICD-10-CM

## 2024-12-19 DIAGNOSIS — I82.5Z2 CHRONIC DEEP VEIN THROMBOSIS (DVT) OF DISTAL VEIN OF LEFT LOWER EXTREMITY: ICD-10-CM

## 2024-12-19 DIAGNOSIS — Z92.3 HISTORY OF HEAD AND NECK RADIATION: ICD-10-CM

## 2024-12-19 DIAGNOSIS — Z85.21 HISTORY OF LARYNGEAL CANCER: ICD-10-CM

## 2024-12-19 PROBLEM — I82.403 DVT OF LOWER EXTREMITY, BILATERAL: Status: ACTIVE | Noted: 2024-12-19

## 2024-12-19 PROBLEM — C32.1 SQUAMOUS CELL CARCINOMA OF SUPRAGLOTTIS: Status: ACTIVE | Noted: 2024-12-19

## 2024-12-19 PROCEDURE — 99215 OFFICE O/P EST HI 40 MIN: CPT | Mod: PBBFAC | Performed by: INTERNAL MEDICINE

## 2024-12-19 PROCEDURE — 99205 OFFICE O/P NEW HI 60 MIN: CPT | Mod: S$PBB,,, | Performed by: INTERNAL MEDICINE

## 2024-12-19 PROCEDURE — 1159F MED LIST DOCD IN RCRD: CPT | Mod: CPTII,,, | Performed by: INTERNAL MEDICINE

## 2024-12-19 PROCEDURE — 36415 COLL VENOUS BLD VENIPUNCTURE: CPT

## 2024-12-19 PROCEDURE — 81241 F5 GENE: CPT

## 2024-12-19 PROCEDURE — 85300 ANTITHROMBIN III ACTIVITY: CPT

## 2024-12-19 PROCEDURE — 3044F HG A1C LEVEL LT 7.0%: CPT | Mod: CPTII,,, | Performed by: INTERNAL MEDICINE

## 2024-12-19 PROCEDURE — 1160F RVW MEDS BY RX/DR IN RCRD: CPT | Mod: CPTII,,, | Performed by: INTERNAL MEDICINE

## 2024-12-19 PROCEDURE — 3074F SYST BP LT 130 MM HG: CPT | Mod: CPTII,,, | Performed by: INTERNAL MEDICINE

## 2024-12-19 PROCEDURE — 85303 CLOT INHIBIT PROT C ACTIVITY: CPT

## 2024-12-19 PROCEDURE — 3078F DIAST BP <80 MM HG: CPT | Mod: CPTII,,, | Performed by: INTERNAL MEDICINE

## 2024-12-19 PROCEDURE — 85306 CLOT INHIBIT PROT S FREE: CPT

## 2024-12-19 PROCEDURE — 3008F BODY MASS INDEX DOCD: CPT | Mod: CPTII,,, | Performed by: INTERNAL MEDICINE

## 2024-12-19 PROCEDURE — 4010F ACE/ARB THERAPY RXD/TAKEN: CPT | Mod: CPTII,,, | Performed by: INTERNAL MEDICINE

## 2024-12-19 NOTE — Clinical Note
Orders for 12/19/2024:  Today, check CBC, CMP, factor 5 Leiden mutation, prothrombin gene mutation, cardiolipin antibody, beta 2 glycoprotein 1 antibody Needs anticoagulation with Eliquis indefinitely, deferred to PCP Refer to Pulmonary ASAP for evaluation of new cavitary lesion in right lower lung lobe, noted on CT scan 12/17/2024 (question of primary lung malignancy versus metastases versus lung abscess)  Follow-up in 1 month

## 2024-12-19 NOTE — TELEPHONE ENCOUNTER
Called patient via phone call and patient understands results given. No further questions at this time.     ----- Message from Shannan Brown MD sent at 12/18/2024  4:02 PM CST -----  Called again no answer.  Please try reach out to patient about the abnormal CT chest showing cavitary lesion in the right lower lobe.  It can be from just inflammatory process to cancerous process.  They are recommending biopsy.   She is on a blood thinner though and she will need to discuss with her oncologist tomorrow about the risks of stopping the blood thinner to do the biopsy if patient is agreeable with biopsy.  If agreeable and discussing with oncologist, we can place referral to Interventional Radiology to get the biopsy done.

## 2024-12-20 LAB
AT III ACT/NOR PPP CHRO: 148 % (ref 80–130)
PROT C ACT/NOR PPP CHRO: 130 % (ref 70–150)
PROT S FREE AG ACT/NOR PPP IA: 106 % (ref 65–160)

## 2024-12-24 DIAGNOSIS — E53.8 FOLIC ACID DEFICIENCY: ICD-10-CM

## 2024-12-24 LAB
COAGULATION SPECIALIST REVIEW: NORMAL
F5 P.R506Q BLD/T QL: NEGATIVE
PROVIDER SIGNING NAME: NORMAL

## 2024-12-24 RX ORDER — FOLIC ACID 1 MG/1
1000 TABLET ORAL
Qty: 30 TABLET | Refills: 6 | Status: SHIPPED | OUTPATIENT
Start: 2024-12-24

## 2024-12-26 DIAGNOSIS — J98.4 CAVITATING MASS IN RIGHT LOWER LUNG LOBE: Primary | ICD-10-CM

## 2025-01-02 DIAGNOSIS — E11.21 TYPE 2 DIABETES MELLITUS WITH DIABETIC NEPHROPATHY, WITHOUT LONG-TERM CURRENT USE OF INSULIN: ICD-10-CM

## 2025-01-03 RX ORDER — LISINOPRIL 2.5 MG/1
2.5 TABLET ORAL DAILY
Qty: 90 TABLET | Refills: 2 | Status: SHIPPED | OUTPATIENT
Start: 2025-01-03 | End: 2026-01-03

## 2025-01-13 DIAGNOSIS — I82.5Z2 CHRONIC DEEP VEIN THROMBOSIS (DVT) OF DISTAL VEIN OF LEFT LOWER EXTREMITY: ICD-10-CM

## 2025-01-14 ENCOUNTER — OFFICE VISIT (OUTPATIENT)
Dept: VASCULAR SURGERY | Facility: CLINIC | Age: 60
End: 2025-01-14
Payer: MEDICARE

## 2025-01-14 VITALS
HEIGHT: 62 IN | TEMPERATURE: 98 F | WEIGHT: 134 LBS | HEART RATE: 102 BPM | OXYGEN SATURATION: 99 % | RESPIRATION RATE: 18 BRPM | DIASTOLIC BLOOD PRESSURE: 70 MMHG | SYSTOLIC BLOOD PRESSURE: 113 MMHG | BODY MASS INDEX: 24.66 KG/M2

## 2025-01-14 DIAGNOSIS — I73.9 PAD (PERIPHERAL ARTERY DISEASE): ICD-10-CM

## 2025-01-14 PROCEDURE — 99215 OFFICE O/P EST HI 40 MIN: CPT | Mod: PBBFAC

## 2025-01-14 NOTE — PROGRESS NOTES
[facility]    HPI: Heaven Boston is a 60 year old female with PmHx of/ Laryngeal Ca s/p laryngectomy and radiation, HTN, DM, recurrent DVT who presents to clinic for evaluation of peripheral artery disease. She endorses bilateral LE pain occurring daily for the last few months. Pain is worse with exertion. Reports pain is most prominent in the right thigh with no foot pain or  wounds. She is currently taking eliquis. She denies tobacco, ETOH or recreational drug use. Quit smoking cigarettes 2 years ago.  She says she does not check her blood sugar regularly.     PMH:   Past Medical History:   Diagnosis Date    Cancer     Laryngeal    Diabetes mellitus     Hypertension       Meds:   Current Outpatient Medications:     ALLERGY RELIEF, CETIRIZINE, 10 mg tablet, Take 1 tablet by mouth once daily, Disp: 90 tablet, Rfl: 0    apixaban (ELIQUIS) 5 mg Tab, Take 1 tablet (5 mg total) by mouth 2 (two) times daily. 10 mg ( 2 tablets) twice daily for 7 days followed by 5 mg ( one tablet)  twice daily., Disp: 180 tablet, Rfl: 2    atorvastatin (LIPITOR) 80 MG tablet, Take 1 tablet (80 mg total) by mouth every evening., Disp: 90 tablet, Rfl: 3    carBAMazepine (TEGRETOL XR) 200 MG 12 hr tablet, Take 1 tablet (200 mg total) by mouth 2 (two) times daily., Disp: 60 tablet, Rfl: 0    diclofenac sodium (VOLTAREN ARTHRITIS PAIN) 1 % Gel, Apply 2 g topically 4 (four) times daily as needed (pain)., Disp: 800 g, Rfl: 1    DULoxetine (CYMBALTA) 30 MG capsule, Take 1 capsule (30 mg total) by mouth 2 (two) times daily., Disp: 60 capsule, Rfl: 1    fluticasone propionate (FLONASE) 50 mcg/actuation nasal spray, 1 spray (50 mcg total) by Each Nostril route 2 (two) times daily., Disp: 16 g, Rfl: 1    folic acid (FOLVITE) 1 MG tablet, Take 1 tablet by mouth once daily, Disp: 30 tablet, Rfl: 6    gabapentin (NEURONTIN) 100 MG capsule, Take 1 capsule (100 mg total) by mouth 3 (three) times daily., Disp: 90 capsule, Rfl: 2     JARDIANCE 10 mg tablet, TAKE ONE TABLET BY MOUTH DAILY AT 9 AM, Disp: 90 tablet, Rfl: 11    levothyroxine (SYNTHROID) 100 MCG tablet, Take 1 tablet (100 mcg total) by mouth before breakfast., Disp: 30 tablet, Rfl: 11    lisinopriL (PRINIVIL,ZESTRIL) 2.5 MG tablet, Take 1 tablet (2.5 mg total) by mouth once daily., Disp: 90 tablet, Rfl: 2    metoprolol succinate (TOPROL-XL) 25 MG 24 hr tablet, Take 0.5 tablets (12.5 mg total) by mouth once daily., Disp: 45 tablet, Rfl: 3    montelukast (SINGULAIR) 5 MG chewable tablet, Take 5 mg by mouth every other day., Disp: , Rfl:     NIFEdipine (PROCARDIA-XL) 60 MG (OSM) 24 hr tablet, Take 1 tablet (60 mg total) by mouth once daily., Disp: 30 tablet, Rfl: 11    ezetimibe (ZETIA) 10 mg tablet, Take 1 tablet (10 mg total) by mouth once daily. (Patient not taking: Reported on 9/5/2024), Disp: 90 tablet, Rfl: 1    Current Facility-Administered Medications:     LIDOcaine HCL 20 mg/ml (2%) injection 1 mL, 1 mL, Intradermal, 1 time in Clinic/[facility], Travis Aparicio MD    silver nitrate applicators applicator 1 applicator, 1 applicator, Topical (Top), 1 time in Clinic/[facility], Patricia Montes MD    triamcinolone acetonide injection 40 mg, 40 mg, Intramuscular, 1 time in Clinic/[facility], Travis Aparicio MD    Facility-Administered Medications Ordered in Other Visits:     dextrose 10% bolus 125 mL 125 mL, 12.5 g, Intravenous, PRN, Tamie Woody S, FNP    dextrose 10% bolus 125 mL 125 mL, 12.5 g, Intravenous, PRN, Tamie Woody S, GERALDP    diphenhydrAMINE injection 6.25 mg, 6.25 mg, Intravenous, Once PRN, Lolis Sood MD    droperidoL injection 0.25 mg, 0.25 mg, Intravenous, Once PRN, Lolis Sood MD    HYDROmorphone injection 0.2 mg, 0.2 mg, Intravenous, Q5 Min PRN, Lolis Sood MD    insulin aspart U-100 injection 2-9 Units, 2-9 Units, Subcutaneous, Q6H PRN, Tamie Woody FNP    insulin aspart U-100 injection 4-12 Units, 4-12 Units, Subcutaneous, PRN,  Tamie Woody S, FNP    lactated ringers infusion, , Intravenous, Continuous, Llois Sood MD, Last Rate: 200 mL/hr at 23 0656, Rate Change at 23 0656    LIDOcaine (PF) 10 mg/ml (1%) injection 10 mg, 1 mL, Intradermal, Once, Tamie Woody FNP    LIDOcaine (PF) 10 mg/ml (1%) injection 10 mg, 1 mL, Intradermal, Once, Lolis Sood MD    metoclopramide HCl injection 10 mg, 10 mg, Intravenous, Once PRN, Lolis Sood MD    midazolam (VERSED) 1 mg/mL injection 1 mg, 1 mg, Intravenous, Once PRN, Lolis Sood MD    oxyCODONE immediate release tablet 5 mg, 5 mg, Oral, Q3H PRN, Lolis Sood MD  Allergies: Review of patient's allergies indicates:  No Known Allergies  Social History:   Social History     Tobacco Use    Smoking status: Former     Current packs/day: 0.00     Average packs/day: 0.5 packs/day for 39.0 years (19.5 ttl pk-yrs)     Types: Cigarettes     Start date: 1983     Quit date: 2022     Years since quittin.7    Smokeless tobacco: Former     Quit date:    Substance Use Topics    Alcohol use: Never    Drug use: Never     Family History: No family history on file.  Surgical History:   Past Surgical History:   Procedure Laterality Date     SECTION      COLONOSCOPY N/A 05/15/2023    Procedure: COLONOSCOPY;  Surgeon: Francisco Zavala MD;  Location: [facility] ENDOSCOPY;  Service: Endoscopy;  Laterality: N/A;    DIRECT DIAGNOSTIC LARYNGOSCOPY WITH BRONCHOSCOPY AND ESOPHAGOSCOPY N/A 2023    Procedure: LARYNGOSCOPY, DIRECT, DIAGNOSTIC, WITH BRONCHOSCOPY AND ESOPHAGOSCOPY;  Surgeon: Connor Patrick MD;  Location: [facility] OR;  Service: ENT;  Laterality: N/A;  Will not need to do bronchoscopy for this procedure    [place] REMOVAL OF LARYNX  2022     Review of Systems:  Skin: No rashes or itching.  Head: Denies headache or recent trauma.  Eyes: Denies eye pain or double vision.  Neck: Denies neck pain.  Respiratory: Denies shortness of breath or chest  "pain  Cardiac: Denies palpitations or swelling in hands/feet.  Gastrointestinal: Denies nausea, denies vomiting.   Urinary: Denies dysuria or hematuria.  Vascular: Endorses claudication, denies leg swelling.  Neuro: Denies motor deficits. Denies weakness.  Endocrine: Denies excessive sweating or cold intolerance.  Psych: Denies memory problems. Denies anxiety.    Objective:    Vitals:  Vitals:    01/14/25 1337   BP: 113/70   Pulse: 102   Resp: 18   Temp: 97.9 °F (36.6 °C)        Physical Exam:  Gen: NAD  Neuro: awake, alert, answering questions appropriately  CV: RRR.   Resp: non-labored breathing, CHE  Abd: soft, ND, NT  Ext: moves all 4 spontaneously and purposefully. Anterior aspect of right thigh TTP. Monophasic flow present at L DP, PT and R popliteal. No pulses heard on R DP or PT  Skin: warm, well perfused    Imaging:    The right lower extremity demonstrated multiphasic waveforms at all levels with the exception of the distal PTA and DPA.  The right lower extremity demostrated mild to moderate arterial flow reduction.     The left lower extremity demonstrated multiphasic waveforms at all levels with the exception of the distal PTA and DPA.  The left lower extremity demonstrated mild to moderate arterial flow reduction.     The ABIs were omitted due to findings of thrombus on prior venous exam.        Assessment/Plan:  Heaven Boston is a 60 year old F w/ PmHx of Laryngeal Ca, DM, HTN who is here for bilateral lower extremity pain. Right thigh pain is not consistent with claudication. He disease is at level of bilateral distal PTA and DPA- no foot wounds or pain. Surgical intervention not recommended at this time.        -RTC in 6 mo for GAGAN    Suleman Gilmore, Medical Student    I have seen and evaluated the patient. The note has been edited as appropriate and I agree with the plan.    Jenise Gregg MD (Anya)  [employer] General Surgery PGY3    "

## 2025-01-14 NOTE — PROGRESS NOTES
[facility] General Surgery Clinic Note    HPI: Heaven Boston is a 60 year old female with PmHx of/ Laryngeal Ca, HTN, DM, recurrent DVT to presents to clinic for evaluation of peripheral artery disease. She endorses bilateral LE pain occurring daily for the last few months. Pain is worse with exertion. Pain RLE> LLE. Pain is worst at anterior aspect of right thigh. Pain limits ADLs. She is currently taking eliquis. She denies tobacco, ETOH or recreational drug use. She says she does not check her blood sugar regularly.     PMH:   Past Medical History:   Diagnosis Date    Cancer     Laryngeal    Diabetes mellitus     Hypertension       Meds:   Current Outpatient Medications:     ALLERGY RELIEF, CETIRIZINE, 10 mg tablet, Take 1 tablet by mouth once daily, Disp: 90 tablet, Rfl: 0    apixaban (ELIQUIS) 5 mg Tab, Take 1 tablet (5 mg total) by mouth 2 (two) times daily. 10 mg ( 2 tablets) twice daily for 7 days followed by 5 mg ( one tablet)  twice daily., Disp: 180 tablet, Rfl: 2    atorvastatin (LIPITOR) 80 MG tablet, Take 1 tablet (80 mg total) by mouth every evening., Disp: 90 tablet, Rfl: 3    carBAMazepine (TEGRETOL XR) 200 MG 12 hr tablet, Take 1 tablet (200 mg total) by mouth 2 (two) times daily., Disp: 60 tablet, Rfl: 0    diclofenac sodium (VOLTAREN ARTHRITIS PAIN) 1 % Gel, Apply 2 g topically 4 (four) times daily as needed (pain)., Disp: 800 g, Rfl: 1    DULoxetine (CYMBALTA) 30 MG capsule, Take 1 capsule (30 mg total) by mouth 2 (two) times daily., Disp: 60 capsule, Rfl: 1    fluticasone propionate (FLONASE) 50 mcg/actuation nasal spray, 1 spray (50 mcg total) by Each Nostril route 2 (two) times daily., Disp: 16 g, Rfl: 1    folic acid (FOLVITE) 1 MG tablet, Take 1 tablet by mouth once daily, Disp: 30 tablet, Rfl: 6    gabapentin (NEURONTIN) 100 MG capsule, Take 1 capsule (100 mg total) by mouth 3 (three) times daily., Disp: 90 capsule, Rfl: 2    JARDIANCE 10 mg tablet, TAKE ONE TABLET BY MOUTH DAILY AT 9 AM, Disp:  90 tablet, Rfl: 11    levothyroxine (SYNTHROID) 100 MCG tablet, Take 1 tablet (100 mcg total) by mouth before breakfast., Disp: 30 tablet, Rfl: 11    lisinopriL (PRINIVIL,ZESTRIL) 2.5 MG tablet, Take 1 tablet (2.5 mg total) by mouth once daily., Disp: 90 tablet, Rfl: 2    metoprolol succinate (TOPROL-XL) 25 MG 24 hr tablet, Take 0.5 tablets (12.5 mg total) by mouth once daily., Disp: 45 tablet, Rfl: 3    montelukast (SINGULAIR) 5 MG chewable tablet, Take 5 mg by mouth every other day., Disp: , Rfl:     NIFEdipine (PROCARDIA-XL) 60 MG (OSM) 24 hr tablet, Take 1 tablet (60 mg total) by mouth once daily., Disp: 30 tablet, Rfl: 11    ezetimibe (ZETIA) 10 mg tablet, Take 1 tablet (10 mg total) by mouth once daily. (Patient not taking: Reported on 9/5/2024), Disp: 90 tablet, Rfl: 1    Current Facility-Administered Medications:     LIDOcaine HCL 20 mg/ml (2%) injection 1 mL, 1 mL, Intradermal, 1 time in Clinic/[facility], Travis Aparicio MD    silver nitrate applicators applicator 1 applicator, 1 applicator, Topical (Top), 1 time in Clinic/[facility], Patricia Montes MD    triamcinolone acetonide injection 40 mg, 40 mg, Intramuscular, 1 time in Clinic/[facility], Travis Aparicio MD    Facility-Administered Medications Ordered in Other Visits:     dextrose 10% bolus 125 mL 125 mL, 12.5 g, Intravenous, PRN, Tamie Woody S, FNP    dextrose 10% bolus 125 mL 125 mL, 12.5 g, Intravenous, PRN, Tamie Woody S, FNP    diphenhydrAMINE injection 6.25 mg, 6.25 mg, Intravenous, Once PRN, Lolis Sood MD    droperidoL injection 0.25 mg, 0.25 mg, Intravenous, Once PRN, Lolis Sood MD    HYDROmorphone injection 0.2 mg, 0.2 mg, Intravenous, Q5 Min PRN, Lolis Sood MD    insulin aspart U-100 injection 2-9 Units, 2-9 Units, Subcutaneous, Q6H PRN, Tamie Woody, HAVEN    insulin aspart U-100 injection 4-12 Units, 4-12 Units, Subcutaneous, PRN, Tamie Woody FNP    lactated ringers infusion, , Intravenous,  Continuous, Lolis Sood MD, Last Rate: 200 mL/hr at 23, Rate Change at 23 06    LIDOcaine (PF) 10 mg/ml (1%) injection 10 mg, 1 mL, Intradermal, Once, Tamie Woody FNP    LIDOcaine (PF) 10 mg/ml (1%) injection 10 mg, 1 mL, Intradermal, Once, Lolis Sood MD    metoclopramide HCl injection 10 mg, 10 mg, Intravenous, Once PRN, Lolis Sood MD    midazolam (VERSED) 1 mg/mL injection 1 mg, 1 mg, Intravenous, Once PRN, Lolis Sood MD    oxyCODONE immediate release tablet 5 mg, 5 mg, Oral, Q3H PRN, Lolis Sood MD  Allergies: Review of patient's allergies indicates:  No Known Allergies  Social History:   Social History     Tobacco Use    Smoking status: Former     Current packs/day: 0.00     Average packs/day: 0.5 packs/day for 39.0 years (19.5 ttl pk-yrs)     Types: Cigarettes     Start date: 1983     Quit date: 2022     Years since quittin.7    Smokeless tobacco: Former     Quit date:    Substance Use Topics    Alcohol use: Never    Drug use: Never     Family History: No family history on file.  Surgical History:   Past Surgical History:   Procedure Laterality Date     SECTION      COLONOSCOPY N/A 05/15/2023    Procedure: COLONOSCOPY;  Surgeon: Francisco Zavala MD;  Location: [facility] ENDOSCOPY;  Service: Endoscopy;  Laterality: N/A;    DIRECT DIAGNOSTIC LARYNGOSCOPY WITH BRONCHOSCOPY AND ESOPHAGOSCOPY N/A 2023    Procedure: LARYNGOSCOPY, DIRECT, DIAGNOSTIC, WITH BRONCHOSCOPY AND ESOPHAGOSCOPY;  Surgeon: Connor Patrick MD;  Location: [facility] OR;  Service: ENT;  Laterality: N/A;  Will not need to do bronchoscopy for this procedure    [place] REMOVAL OF LARYNX  2022     Review of Systems:  Skin: No rashes or itching.  Head: Denies headache or recent trauma.  Eyes: Denies eye pain or double vision.  Neck: Denies neck pain.  Respiratory: Denies shortness of breath or chest pain  Cardiac: Denies palpitations or swelling in  hands/feet.  Gastrointestinal: Denies nausea, denies vomiting.   Urinary: Denies dysuria or hematuria.  Vascular: Endorses claudication, denies leg swelling.  Neuro: Denies motor deficits. Denies weakness.  Endocrine: Denies excessive sweating or cold intolerance.  Psych: Denies memory problems. Denies anxiety.    Objective:    Vitals:  Vitals:    01/14/25 1337   BP: 113/70   Pulse: 102   Resp: 18   Temp: 97.9 °F (36.6 °C)        Physical Exam:  Gen: NAD  Neuro: awake, alert, answering questions appropriately  CV: RRR.   Resp: non-labored breathing, CHE  Abd: soft, ND, NT  Ext: moves all 4 spontaneously and purposefully. Anterior aspect of right thigh TTP. Monophasic flow present at L DP, PT and popliteal.   Skin: warm, well perfused    Pertinent Labs:  ***    Imaging:    The right lower extremity demonstrated multiphasic waveforms at all levels with the exception of the distal PTA and DPA.  The right lower extremity demostrated mild to moderate arterial flow reduction.     The left lower extremity demonstrated multiphasic waveforms at all levels with the exception of the distal PTA and DPA.  The left lower extremity demonstrated mild to moderate arterial flow reduction.     The ABIs were omitted due to findings of thrombus on prior venous exam.    Micro/Path/Other:  ***    Assessment/Plan:  Heaven Boston is a 60 year old F w/ PmHx of Laryngeal Ca, DM, HTN who is here for bilateral lower extremity pain. Patient's pain not c/w claudication. Surgical intervention not recommended at this time.        -RTC in 6 mo for GAGAN    Suleman Gilmore, Medical Student

## 2025-01-15 ENCOUNTER — TELEPHONE (OUTPATIENT)
Dept: HEMATOLOGY/ONCOLOGY | Facility: CLINIC | Age: 60
End: 2025-01-15
Payer: MEDICARE

## 2025-01-15 ENCOUNTER — TELEPHONE (OUTPATIENT)
Dept: INTERVENTIONAL RADIOLOGY/VASCULAR | Facility: HOSPITAL | Age: 60
End: 2025-01-15
Payer: MEDICARE

## 2025-01-15 NOTE — PROGRESS NOTES
I have reviewed the notes, assessments, and/or procedures performed this visit, and I concur with the documentation.    Patient is a 60 year old female with pain to the right thigh. Patient does not endorse symptoms of claudication. I have discussed with patient that the duplex US shown tibial artery disease. However patient's symptoms are in the right thigh.     I have discussed with patient that her symptoms are more musculoskeletal in nature than arterial insuffiencey.     I will see patient back for surveillance of her disease.     Jason Boland MD

## 2025-01-15 NOTE — TELEPHONE ENCOUNTER
Called patient to schedule IR Lung Biopsy and got no answer. A voicemail was left for patient to return call.

## 2025-01-24 DIAGNOSIS — E11.21 TYPE 2 DIABETES MELLITUS WITH DIABETIC NEPHROPATHY, WITHOUT LONG-TERM CURRENT USE OF INSULIN: ICD-10-CM

## 2025-01-24 RX ORDER — LISINOPRIL 2.5 MG/1
2.5 TABLET ORAL DAILY
Qty: 90 TABLET | Refills: 2 | Status: SHIPPED | OUTPATIENT
Start: 2025-01-24 | End: 2026-01-24

## 2025-01-31 DIAGNOSIS — J98.4 CAVITATING MASS IN RIGHT LOWER LUNG LOBE: Primary | ICD-10-CM

## 2025-01-31 DIAGNOSIS — I82.502 CHRONIC DEEP VEIN THROMBOSIS (DVT) OF LEFT LOWER EXTREMITY, UNSPECIFIED VEIN: ICD-10-CM

## 2025-02-04 ENCOUNTER — LAB VISIT (OUTPATIENT)
Dept: LAB | Facility: HOSPITAL | Age: 60
End: 2025-02-04
Attending: RADIOLOGY
Payer: MEDICARE

## 2025-02-04 DIAGNOSIS — I82.502 CHRONIC DEEP VEIN THROMBOSIS (DVT) OF LEFT LOWER EXTREMITY, UNSPECIFIED VEIN: ICD-10-CM

## 2025-02-04 DIAGNOSIS — J98.4 CAVITATING MASS IN RIGHT LOWER LUNG LOBE: ICD-10-CM

## 2025-02-04 LAB
ALBUMIN SERPL-MCNC: 3.8 G/DL (ref 3.5–5)
ALBUMIN/GLOB SERPL: 0.9 RATIO (ref 1.1–2)
ALP SERPL-CCNC: 141 UNIT/L (ref 40–150)
ALT SERPL-CCNC: 16 UNIT/L (ref 0–55)
ANION GAP SERPL CALC-SCNC: 10 MEQ/L
AST SERPL-CCNC: 18 UNIT/L (ref 5–34)
BASOPHILS # BLD AUTO: 0.02 X10(3)/MCL
BASOPHILS NFR BLD AUTO: 0.3 %
BILIRUB SERPL-MCNC: 0.4 MG/DL
BUN SERPL-MCNC: 19.8 MG/DL (ref 9.8–20.1)
CALCIUM SERPL-MCNC: 10 MG/DL (ref 8.4–10.2)
CHLORIDE SERPL-SCNC: 108 MMOL/L (ref 98–107)
CO2 SERPL-SCNC: 23 MMOL/L (ref 22–29)
CREAT SERPL-MCNC: 0.98 MG/DL (ref 0.55–1.02)
CREAT/UREA NIT SERPL: 20
EOSINOPHIL # BLD AUTO: 0.18 X10(3)/MCL (ref 0–0.9)
EOSINOPHIL NFR BLD AUTO: 3 %
ERYTHROCYTE [DISTWIDTH] IN BLOOD BY AUTOMATED COUNT: 13.4 % (ref 11.5–17)
GFR SERPLBLD CREATININE-BSD FMLA CKD-EPI: >60 ML/MIN/1.73/M2
GLOBULIN SER-MCNC: 4.2 GM/DL (ref 2.4–3.5)
GLUCOSE SERPL-MCNC: 151 MG/DL (ref 74–100)
HCT VFR BLD AUTO: 39.1 % (ref 37–47)
HGB BLD-MCNC: 12.2 G/DL (ref 12–16)
IMM GRANULOCYTES # BLD AUTO: 0.01 X10(3)/MCL (ref 0–0.04)
IMM GRANULOCYTES NFR BLD AUTO: 0.2 %
INR PPP: 0.9
LYMPHOCYTES # BLD AUTO: 1.21 X10(3)/MCL (ref 0.6–4.6)
LYMPHOCYTES NFR BLD AUTO: 20 %
MCH RBC QN AUTO: 27.4 PG (ref 27–31)
MCHC RBC AUTO-ENTMCNC: 31.2 G/DL (ref 33–36)
MCV RBC AUTO: 87.9 FL (ref 80–94)
MONOCYTES # BLD AUTO: 0.36 X10(3)/MCL (ref 0.1–1.3)
MONOCYTES NFR BLD AUTO: 6 %
NEUTROPHILS # BLD AUTO: 4.27 X10(3)/MCL (ref 2.1–9.2)
NEUTROPHILS NFR BLD AUTO: 70.5 %
NRBC BLD AUTO-RTO: 0 %
PLATELET # BLD AUTO: 292 X10(3)/MCL (ref 130–400)
PMV BLD AUTO: 9.1 FL (ref 7.4–10.4)
POTASSIUM SERPL-SCNC: 3.6 MMOL/L (ref 3.5–5.1)
PROT SERPL-MCNC: 8 GM/DL (ref 6.4–8.3)
PROTHROMBIN TIME: 12.5 SECONDS (ref 11.4–14)
RBC # BLD AUTO: 4.45 X10(6)/MCL (ref 4.2–5.4)
SODIUM SERPL-SCNC: 141 MMOL/L (ref 136–145)
WBC # BLD AUTO: 6.05 X10(3)/MCL (ref 4.5–11.5)

## 2025-02-04 PROCEDURE — 80053 COMPREHEN METABOLIC PANEL: CPT

## 2025-02-04 PROCEDURE — 85610 PROTHROMBIN TIME: CPT

## 2025-02-04 PROCEDURE — 36415 COLL VENOUS BLD VENIPUNCTURE: CPT

## 2025-02-04 PROCEDURE — 85025 COMPLETE CBC W/AUTO DIFF WBC: CPT

## 2025-02-05 ENCOUNTER — TELEPHONE (OUTPATIENT)
Dept: SURGERY | Facility: HOSPITAL | Age: 60
End: 2025-02-05
Payer: MEDICARE

## 2025-02-06 ENCOUNTER — HOSPITAL ENCOUNTER (OUTPATIENT)
Dept: RADIOLOGY | Facility: HOSPITAL | Age: 60
Discharge: HOME | End: 2025-02-06
Attending: RADIOLOGY
Payer: MEDICARE

## 2025-02-06 ENCOUNTER — HOSPITAL ENCOUNTER (OUTPATIENT)
Dept: INTERVENTIONAL RADIOLOGY/VASCULAR | Facility: HOSPITAL | Age: 60
Discharge: HOME | End: 2025-02-06
Attending: INTERNAL MEDICINE
Payer: MEDICARE

## 2025-02-06 VITALS
WEIGHT: 133.19 LBS | BODY MASS INDEX: 24.51 KG/M2 | RESPIRATION RATE: 20 BRPM | HEIGHT: 62 IN | OXYGEN SATURATION: 100 % | TEMPERATURE: 98 F | DIASTOLIC BLOOD PRESSURE: 61 MMHG | SYSTOLIC BLOOD PRESSURE: 117 MMHG | HEART RATE: 84 BPM

## 2025-02-06 DIAGNOSIS — J98.4 CAVITATING MASS IN RIGHT LOWER LUNG LOBE: ICD-10-CM

## 2025-02-06 DIAGNOSIS — Z98.890 S/P BIOPSY: ICD-10-CM

## 2025-02-06 PROCEDURE — 32408 CORE NDL BX LNG/MED PERQ: CPT

## 2025-02-06 PROCEDURE — 63600175 PHARM REV CODE 636 W HCPCS: Performed by: RADIOLOGY

## 2025-02-06 PROCEDURE — 88305 TISSUE EXAM BY PATHOLOGIST: CPT | Mod: TC | Performed by: INTERNAL MEDICINE

## 2025-02-06 PROCEDURE — 99152 MOD SED SAME PHYS/QHP 5/>YRS: CPT

## 2025-02-06 PROCEDURE — 71045 X-RAY EXAM CHEST 1 VIEW: CPT | Mod: TC

## 2025-02-06 RX ORDER — FENTANYL CITRATE 50 UG/ML
INJECTION, SOLUTION INTRAMUSCULAR; INTRAVENOUS
Status: COMPLETED | OUTPATIENT
Start: 2025-02-06 | End: 2025-02-06

## 2025-02-06 RX ORDER — SODIUM CHLORIDE 0.9 % (FLUSH) 0.9 %
10 SYRINGE (ML) INJECTION
Status: DISCONTINUED | OUTPATIENT
Start: 2025-02-06 | End: 2025-02-07 | Stop reason: HOSPADM

## 2025-02-06 RX ORDER — LIDOCAINE HYDROCHLORIDE 10 MG/ML
INJECTION, SOLUTION INFILTRATION; PERINEURAL
Status: COMPLETED | OUTPATIENT
Start: 2025-02-06 | End: 2025-02-06

## 2025-02-06 RX ORDER — MIDAZOLAM HYDROCHLORIDE 2 MG/2ML
INJECTION, SOLUTION INTRAMUSCULAR; INTRAVENOUS
Status: COMPLETED | OUTPATIENT
Start: 2025-02-06 | End: 2025-02-06

## 2025-02-06 RX ADMIN — LIDOCAINE HYDROCHLORIDE 5 ML: 10 INJECTION, SOLUTION INFILTRATION; PERINEURAL at 12:02

## 2025-02-06 RX ADMIN — MIDAZOLAM HYDROCHLORIDE 0.5 MG: 1 INJECTION, SOLUTION INTRAMUSCULAR; INTRAVENOUS at 12:02

## 2025-02-06 RX ADMIN — FENTANYL CITRATE 25 MCG: 50 INJECTION INTRAMUSCULAR; INTRAVENOUS at 12:02

## 2025-02-06 NOTE — DISCHARGE SUMMARY
Radiology Post-Procedure Note    Pre Op Diagnosis: RLL Lung Nodule with h/o Laryngeal Cancer    Post Op Diagnosis: Same    Secondary Diagnoses:   Problem List Items Addressed This Visit       Cavitating mass in right lower lung lobe    Relevant Orders    IR Biopsy Lung w/ guidance        Procedure: CT Guided RLL Lung Nodule Biopsy    Procedure performed by: Matt Almanza MD    Assistant: None    Written Informed Consent Obtained: Yes    Specimen Removed: 20g core x 8    Estimated Blood Loss: <10 cc    Condition: Stable    Outcome: The patient tolerated the procedure well with trace pneumothorax.    For further details please see the imaging report associated.    Disposition: Home or Self Care    Follow Up: With primary care provider    Discharge Instructions:  No discharge procedures on file.       Time Spent On Discharge: 2 minutes

## 2025-02-06 NOTE — INTERVAL H&P NOTE
The patient has been examined and the H&P has been reviewed:    I concur with the findings and no changes have occurred since H&P was written. Heaven Boston is a 60 y.o. female with h/o Squamous cell laryngeal cancer with RLL Lung Nodule who presents for RLL Lung Nodule Biopsy.           There are no hospital problems to display for this patient.

## 2025-02-10 DIAGNOSIS — I82.5Z2 CHRONIC DEEP VEIN THROMBOSIS (DVT) OF DISTAL VEIN OF LEFT LOWER EXTREMITY: ICD-10-CM

## 2025-02-10 DIAGNOSIS — I82.5Y3 CHRONIC DEEP VEIN THROMBOSIS (DVT) OF PROXIMAL VEIN OF BOTH LOWER EXTREMITIES: ICD-10-CM

## 2025-02-10 DIAGNOSIS — J98.4 CAVITATING MASS IN RIGHT LOWER LUNG LOBE: Primary | ICD-10-CM

## 2025-02-10 DIAGNOSIS — I82.409 RECURRENT DEEP VEIN THROMBOSIS (DVT): ICD-10-CM

## 2025-02-10 LAB
DHEA SERPL-MCNC: NORMAL
ESTROGEN SERPL-MCNC: NORMAL PG/ML
INSULIN SERPL-ACNC: NORMAL U[IU]/ML
LAB AP CLINICAL INFORMATION: NORMAL
LAB AP GROSS DESCRIPTION: NORMAL
LAB AP INTRA OP: NORMAL
LAB AP REPORT FOOTNOTES: NORMAL

## 2025-02-11 ENCOUNTER — LAB VISIT (OUTPATIENT)
Dept: HEMATOLOGY/ONCOLOGY | Facility: CLINIC | Age: 60
End: 2025-02-11
Payer: MEDICARE

## 2025-02-11 DIAGNOSIS — I82.5Z2 CHRONIC DEEP VEIN THROMBOSIS (DVT) OF DISTAL VEIN OF LEFT LOWER EXTREMITY: ICD-10-CM

## 2025-02-11 DIAGNOSIS — I82.401 LEG DVT (DEEP VENOUS THROMBOEMBOLISM), ACUTE, RIGHT: ICD-10-CM

## 2025-02-11 DIAGNOSIS — I82.409 RECURRENT DEEP VEIN THROMBOSIS (DVT): ICD-10-CM

## 2025-02-11 DIAGNOSIS — J98.4 CAVITATING MASS IN RIGHT LOWER LUNG LOBE: ICD-10-CM

## 2025-02-11 DIAGNOSIS — J98.4 CAVITATING MASS IN RIGHT LOWER LUNG LOBE: Primary | ICD-10-CM

## 2025-02-11 LAB
ALBUMIN SERPL-MCNC: 3.8 G/DL (ref 3.5–5)
ALBUMIN/GLOB SERPL: 0.9 RATIO (ref 1.1–2)
ALP SERPL-CCNC: 145 UNIT/L (ref 40–150)
ALT SERPL-CCNC: 18 UNIT/L (ref 0–55)
ANION GAP SERPL CALC-SCNC: 8 MEQ/L
AST SERPL-CCNC: 17 UNIT/L (ref 5–34)
BASOPHILS # BLD AUTO: 0.03 X10(3)/MCL
BASOPHILS NFR BLD AUTO: 0.7 %
BILIRUB SERPL-MCNC: 0.4 MG/DL
BUN SERPL-MCNC: 14.5 MG/DL (ref 9.8–20.1)
CALCIUM SERPL-MCNC: 9.7 MG/DL (ref 8.4–10.2)
CHLORIDE SERPL-SCNC: 109 MMOL/L (ref 98–107)
CO2 SERPL-SCNC: 25 MMOL/L (ref 22–29)
CREAT SERPL-MCNC: 0.97 MG/DL (ref 0.55–1.02)
CREAT/UREA NIT SERPL: 15
EOSINOPHIL # BLD AUTO: 0.19 X10(3)/MCL (ref 0–0.9)
EOSINOPHIL NFR BLD AUTO: 4.4 %
ERYTHROCYTE [DISTWIDTH] IN BLOOD BY AUTOMATED COUNT: 12.9 % (ref 11.5–17)
GFR SERPLBLD CREATININE-BSD FMLA CKD-EPI: >60 ML/MIN/1.73/M2
GLOBULIN SER-MCNC: 4.3 GM/DL (ref 2.4–3.5)
GLUCOSE SERPL-MCNC: 145 MG/DL (ref 74–100)
HCT VFR BLD AUTO: 38.8 % (ref 37–47)
HGB BLD-MCNC: 11.6 G/DL (ref 12–16)
IMM GRANULOCYTES # BLD AUTO: 0.01 X10(3)/MCL (ref 0–0.04)
IMM GRANULOCYTES NFR BLD AUTO: 0.2 %
LYMPHOCYTES # BLD AUTO: 0.83 X10(3)/MCL (ref 0.6–4.6)
LYMPHOCYTES NFR BLD AUTO: 19.4 %
MCH RBC QN AUTO: 26.9 PG (ref 27–31)
MCHC RBC AUTO-ENTMCNC: 29.9 G/DL (ref 33–36)
MCV RBC AUTO: 90 FL (ref 80–94)
MONOCYTES # BLD AUTO: 0.22 X10(3)/MCL (ref 0.1–1.3)
MONOCYTES NFR BLD AUTO: 5.1 %
NEUTROPHILS # BLD AUTO: 3 X10(3)/MCL (ref 2.1–9.2)
NEUTROPHILS NFR BLD AUTO: 70.2 %
NRBC BLD AUTO-RTO: 0 %
PLATELET # BLD AUTO: 317 X10(3)/MCL (ref 130–400)
PMV BLD AUTO: 9.3 FL (ref 7.4–10.4)
POTASSIUM SERPL-SCNC: 4 MMOL/L (ref 3.5–5.1)
PROT SERPL-MCNC: 8.1 GM/DL (ref 6.4–8.3)
RBC # BLD AUTO: 4.31 X10(6)/MCL (ref 4.2–5.4)
SODIUM SERPL-SCNC: 142 MMOL/L (ref 136–145)
WBC # BLD AUTO: 4.28 X10(3)/MCL (ref 4.5–11.5)

## 2025-02-11 PROCEDURE — 36415 COLL VENOUS BLD VENIPUNCTURE: CPT

## 2025-02-11 PROCEDURE — 85025 COMPLETE CBC W/AUTO DIFF WBC: CPT

## 2025-02-11 PROCEDURE — 86146 BETA-2 GLYCOPROTEIN ANTIBODY: CPT

## 2025-02-11 PROCEDURE — 81240 F2 GENE: CPT

## 2025-02-11 PROCEDURE — 80053 COMPREHEN METABOLIC PANEL: CPT

## 2025-02-11 PROCEDURE — 86147 CARDIOLIPIN ANTIBODY EA IG: CPT

## 2025-02-12 LAB
CARDIOLIPIN IGG QUANT (OHS): 3.5 GPL U/ML
CARDIOLIPIN IGM QUANT (OHS): 4.4 MPL U/ML

## 2025-02-13 LAB
B2 GLYCOPROT1 IGG SERPL IA-ACNC: <9.4 SGU
B2 GLYCOPROT1 IGM SERPL IA-ACNC: <9.4 SMU
COAGULATION SPECIALIST REVIEW: NORMAL
F2 C.20210G>A GENO BLD/T: NEGATIVE
PROVIDER SIGNING NAME: NORMAL

## 2025-02-27 DIAGNOSIS — E78.01 FAMILIAL HYPERCHOLESTEROLEMIA: Primary | ICD-10-CM

## 2025-02-28 RX ORDER — ATORVASTATIN CALCIUM 80 MG/1
80 TABLET, FILM COATED ORAL NIGHTLY
Qty: 90 TABLET | Refills: 3 | Status: SHIPPED | OUTPATIENT
Start: 2025-02-28 | End: 2026-02-28

## 2025-03-05 ENCOUNTER — HOSPITAL ENCOUNTER (OUTPATIENT)
Dept: RADIOLOGY | Facility: HOSPITAL | Age: 60
Discharge: HOME | End: 2025-03-05
Payer: MEDICARE

## 2025-03-05 DIAGNOSIS — I73.9 PAD (PERIPHERAL ARTERY DISEASE): ICD-10-CM

## 2025-03-05 PROCEDURE — 93924 LWR XTR VASC STDY BILAT: CPT

## 2025-03-06 ENCOUNTER — OFFICE VISIT (OUTPATIENT)
Dept: HEMATOLOGY/ONCOLOGY | Facility: CLINIC | Age: 60
End: 2025-03-06
Attending: INTERNAL MEDICINE
Payer: MEDICARE

## 2025-03-06 VITALS
HEIGHT: 62 IN | HEART RATE: 99 BPM | TEMPERATURE: 98 F | SYSTOLIC BLOOD PRESSURE: 105 MMHG | BODY MASS INDEX: 23.89 KG/M2 | OXYGEN SATURATION: 100 % | WEIGHT: 129.81 LBS | RESPIRATION RATE: 18 BRPM | DIASTOLIC BLOOD PRESSURE: 70 MMHG

## 2025-03-06 DIAGNOSIS — Z87.891 HISTORY OF TOBACCO ABUSE: ICD-10-CM

## 2025-03-06 DIAGNOSIS — Z93.1 PEG (PERCUTANEOUS ENDOSCOPIC GASTROSTOMY) STATUS: ICD-10-CM

## 2025-03-06 DIAGNOSIS — I82.5Y3 CHRONIC DEEP VEIN THROMBOSIS (DVT) OF PROXIMAL VEIN OF BOTH LOWER EXTREMITIES: ICD-10-CM

## 2025-03-06 DIAGNOSIS — C32.1 SQUAMOUS CELL CARCINOMA OF SUPRAGLOTTIS: ICD-10-CM

## 2025-03-06 DIAGNOSIS — C44.722 SQUAMOUS CELL CARCINOMA OF RIGHT LOWER LEG: ICD-10-CM

## 2025-03-06 DIAGNOSIS — I82.409 RECURRENT DEEP VEIN THROMBOSIS (DVT): ICD-10-CM

## 2025-03-06 DIAGNOSIS — I82.4Z2 DEEP VEIN THROMBOSIS (DVT) OF DISTAL VEIN OF LEFT LOWER EXTREMITY, UNSPECIFIED CHRONICITY: ICD-10-CM

## 2025-03-06 DIAGNOSIS — J98.4 CAVITATING MASS IN RIGHT LOWER LUNG LOBE: ICD-10-CM

## 2025-03-06 DIAGNOSIS — Z90.02 H/O LARYNGECTOMY: Primary | ICD-10-CM

## 2025-03-06 DIAGNOSIS — Z92.3 HISTORY OF HEAD AND NECK RADIATION: ICD-10-CM

## 2025-03-06 DIAGNOSIS — Z93.0 TRACHEOSTOMY IN PLACE: ICD-10-CM

## 2025-03-06 DIAGNOSIS — J98.4 CAVITATING MASS IN RIGHT LOWER LUNG LOBE: Primary | ICD-10-CM

## 2025-03-06 LAB
IMMEDIATE ARM BP: 131 MMHG
IMMEDIATE LEFT ABI: 0.91
IMMEDIATE LEFT TIBIAL: 119 MMHG
IMMEDIATE RIGHT ABI: 1.08
IMMEDIATE RIGHT TIBIAL: 141 MMHG
LEFT ABI: 0.9
LEFT ARM BP: 131 MMHG
LEFT DORSALIS PEDIS: 124 MMHG
LEFT POSTERIOR TIBIAL: 122 MMHG
RIGHT ABI: 1.03
RIGHT ARM BP: 138 MMHG
RIGHT DORSALIS PEDIS: 134 MMHG
RIGHT POSTERIOR TIBIAL: 142 MMHG

## 2025-03-06 PROCEDURE — 99215 OFFICE O/P EST HI 40 MIN: CPT | Mod: PBBFAC | Performed by: INTERNAL MEDICINE

## 2025-03-06 NOTE — Clinical Note
Orders for 03/06/2025: Please order FDG PET-CT for staging of newly diagnosed squamous cell carcinoma of right lung Brain MRI with and without contrast for staging Continue anticoagulation indefinitely Refer to Cardiothoracic surgery to consider resection of newly diagnosed right lower lung lobe squamous cell carcinoma Follow-up in 2 weeks, with scans

## 2025-03-06 NOTE — PROGRESS NOTES
History:  Past Medical History:   Diagnosis Date    Cancer     Laryngeal    Diabetes mellitus     Hypertension    Past medical history:  Laryngeal cancer, S/P laryngectomy/bilateral neck dissection/left hemithyroidectomy; NIDDM; hypertension  Procedure/surgical history: ; colonoscopy 05/15/2023; direct laryngoscopy with biopsy 2023; laryngectomy 2022    Past Surgical History:   Procedure Laterality Date     SECTION      COLONOSCOPY N/A 05/15/2023    Procedure: COLONOSCOPY;  Surgeon: Francisco Zavala MD;  Location: [facility] ENDOSCOPY;  Service: Endoscopy;  Laterality: N/A;    DIRECT DIAGNOSTIC LARYNGOSCOPY WITH BRONCHOSCOPY AND ESOPHAGOSCOPY N/A 2023    Procedure: LARYNGOSCOPY, DIRECT, DIAGNOSTIC, WITH BRONCHOSCOPY AND ESOPHAGOSCOPY;  Surgeon: Connor Patrick MD;  Location: [facility] OR;  Service: ENT;  Laterality: N/A;  Will not need to do bronchoscopy for this procedure    DC REMOVAL OF LARYNX  2022      Social History     Socioeconomic History    Marital status: Single   Tobacco Use    Smoking status: Former     Current packs/day: 0.00     Average packs/day: 0.5 packs/day for 39.0 years (19.5 ttl pk-yrs)     Types: Cigarettes     Start date: 1983     Quit date: 2022     Years since quittin.9    Smokeless tobacco: Former     Quit date:    Substance and Sexual Activity    Alcohol use: Never    Drug use: Never    Sexual activity: Not Currently      No family history on file.     Reason for Follow-up:  -recurrent bilateral lower extremity DVT  -history of squamous cell carcinoma of supraglottis, S/P total laryngectomy/bilateral neck dissection/left hemithyroidectomy 2022; T3 N1 M0  -Squamous cell carcinoma of right lower leg   -now, cavitary lesion right lower lung lobe on CT chest 2024  -history of heavy tobacco abuse, history of adjuvant radiotherapy     History of Present Illness:   Recurrent deep vein thrombosis (DVT)        Oncologic/Hematologic  History:  Oncology History   Laryngeal cancer   3/21/2022 Cancer Staged    Staging form: Larynx - Supraglottis, AJCC 8th Edition  - Pathologic stage from 3/21/2022: Stage III (pT3, pN1, cM0)     2022 Initial Diagnosis    Laryngeal cancer     Squamous cell carcinoma of supraglottis   3/1/2022 Cancer Staged    Staging form: Larynx - Supraglottis, AJCC 8th Edition  - Pathologic stage from 3/1/2022: Stage III (pT3, pN1, cM0)     2024 Initial Diagnosis    Squamous cell carcinoma of supraglottis     Past medical history:  Laryngeal cancer; NIDDM; hypertension; dyslipidemia; heavy tobacco abuse; history of SVT, on metoprolol; history of supraglottic squamous cell carcinoma, T3 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  Lives in [place].  Has a 33-year-old son who lives with her.  Does not work.  Smoked half ppd for 50 years; quit 2 years ago.  Used to drink 4 beers daily; drank for 30 years; quit 20 years ago.  No illicit drugs.    Family history: Negative for cancers or blood dyscrasia.  Health maintenance:  -10/18/2024:  Bilateral screening mammogram (comparison:  10/13/2023 mammogram, etc.):  BI-RADS: 1-negative  -05/15/2023:  Colonoscopy (indication: Positive Cologuard test):  Diverticulosis sigmoid colon and descending colon; otherwise, normal; no biopsies taken (repeat colonoscopy in 5 years for screening)      59-year-old female, referred from [facility], with a history of chronic DVT      Previous 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    10/16/2024:  [facility]  "Medicine note:   Spoke to patient over the phone regarding Doppler ultrasound findings of blood clot.    Radiology reached out to me about the result showing:   "There is chronic appearing nonocclusive thrombus in the left distal femoral vein".   Then upon discussion with radiologist, sent last results of previous doppler US in April 2022 showing: "The left lower extremity was positive for a poorly visualized short segment, age-indeterminate, nearly occluded to occluded deep vein thrombus located at the level of the above the knee popliteal vein". It seems to be more as chronic thrombus but not exactly same area as previous one.  Hence, will treat with eliquis 10 mg bid for the first week, then 5 BID afterwards. Patient agreeable.  Of note, she was diagnosed with a DVT in April 2022 and was initially given eliquis for 30 days.  On follow-up with Cardiology, DOAC was resumed (Xarelto not Eliquis due to cost).  Patient was wondering how long she needed to be on anticoagulation  thought dvt to be malignancy related, but pt seems to be in remission. At that time she was told to resume for a year. Took xarelto until May 2023.  Discussed with patient that will send referral to Heme-Onc again to discuss further recommendations.  In the meantime resume Eliquis.  If not covered, will switch to Xarelto.  Patient voiced understanding.  She denies any shortness a breath, chest pain or palpitations, but discussed that if new symptoms develop to go to the emergency room to get further evaluated because of risk of PE.  Also arterial ultrasound with mild to moderate arterial flow reduction of the right and left lower extremities.  will refer to vascular surgery.  Patient on Lipitor 80 mg daily.      12/12/2024: ENT office note:   Heaven Boston is a 57 y.o. female PMH diabetes, hypertension, heavy tobacco smoking, sinus tachycardia on metoprolol, T3 N1 M0 supraglottic squamous cell carcinoma s/p total laryngectomy, bilateral neck " dissection, left hemithyroidectomy, cricopharyngeal myotomy, primary TEP on 03/21/2022. Patient had an uneventful hospitalization course and was discharged on postoperative day 9 after an esophagram showed no pharyngeal leak.  However she re-presented to our facility on 4/6/22 with surgical site infection and a pharyngocutaneous fistula. Patient underwent a neck washout with primary repair on 4/11/2022, with placement of a gastrostomy tube the same day. She was maintained on NPO and transferred to [place] for further evaluation and management on 4/15/22 after evidence of persistent pharyngocutaneous fistula. There she underwent a 2nd neck washout with primary closure and placement of salivary bypass tube.   2/20/24:  Here today for follow-up.  Patient states she has been eating and gaining weight well.  She has not having any trismus hemoptysis weight loss weakness fatigue ear pain.  She has been taking her Synthroid as instructed she did not get new labs.  No new lumps or bumps.  5/19/22: In radiation therapy, 5x weekly until 6/16/22 (6000 cGy/30 fractions/43 days).   5/16/24:  Here today for follow.  She is doing well.  She has been eating well and gaining weight.  She denies dysphagia, odynophagia, otalgia, lumps or bumps in the head or neck.  TSH was elevated.  She says that she takes the Synthroid on an empty stomach everyday and has not missed doses.  She takes 88 mcg daily.  9/5/2024:   Patient presents today for follow-up of her laryngeal squamous cell carcinoma and hypothyroidism.  Patient has no complaints at this time.  She is tolerating regular diet and doing well with her TEP.  She continues to take levothyroxine 100 mcg daily.  She has not had follow-up thyroid function studies since her dose was increased.  She has no complaints of any heat or cold intolerance or palpitations.  No other new problems.  12/12/24: Presents today in follow up. Doing well without issues. Denies issues swallowing or  changes to voice. Weight has increased recently. No issues with TEP. Changed last week. Taking synthroid 100 without issues. Denies otalgia, fevers, chills, night sweats, weight loss, palpitations, diarrhea or any new problems.   Procedure note: Flexible tracheoscopy, nasopharyngoscopy  The flexible fiberoptic laryngoscope was introduced into the right nostril and advanced. Examination of the nose showed the above mentioned findings. Examination of the nasopharynx showed no nasopharyngeal masses or eustachian tube obstruction. Examination of the base of tongue showed no masses or lesions. Examination of the hypopharynx showed no masses or lesions in the neopharynx or hypopharynx.  The esophageal inlet is without apparent lesions.  No evidence of fungal disease. The scope was passed in the stoma and the trachea was clear down to the aleja.   Assessment/Plan:  Heaven Boston is a 59 y.o. female with T3N1M0 supraglottic SCCa s/p TL BSND CPM on 3/24/2022, complicated by PCF s/p washout & primary closure x2 (2nd in [place]) now s/p adjuvant RT. One of her post-treatment PET noted hypermetabolism at the site of her TEP. She was taken for a DL on 3/3/23 which was unremarkable. Doing well today.  No evidence of disease on exam.  Postoperative hypothyroidism.  Plan:  levothyroxine 100 mcg p.o. q.d. Offered to lower to 75, but patient declined stating that she is asymptomatic.      12/04/2024:  [facility] note:   Heaven Boston is a 59 y.o. female here for Leg Pain   (The patient states that she has a lump on her right leg that is blue and it hurts. She says it been like that for about a week.   Also, arm pain that has been keeping her up at night. She used diclofenac from her cousin and she says it works so she ask if some can be prescribed.) and Arm Pain  HPI  Here for above concern of right leg pain and a lump that she noticed that has been traveling.  Right leg pain for a week now.  Initially noticed a lump  above knee that kind of traveled into the upper thigh, painful.    These happened right after our appointment last week.  Last week we advised patient to take Eliquis 5 mg b.i.d. for the chronic DVT instead of 5 mg once a day that she was taking.  She did start to take the Eliquis 5 mg b.i.d. the next day.  Of note, patient used to be on Xarelto for a year last year for left-sided DVT.  Thought to be malignancy related.  Then that repeat ultrasound showed left-sided clot which seemed to be chronic appearing but not exactly the same area so we resumed anticoagulant Eliquis.   Called imaging- accepted pt to have the study now.  Patient agreeable to have the Doppler ultrasound repeated, high concern for clot.  Otherwise denies any chest pain or shortness for breath, no palpitations.   Ever since working at [employer], left shoulder pain intermittent.  When sleeping, can not lay on left side. Tried voltaren gel from her cousin and helped immediately. Would like to have some on her own.  1. Leg DVT (deep venous thromboembolism), acute, right  Updated Doppler ultrasound same day done, revealed right leg DVT.    Patient was just diagnosed with questionable acute on chronic DVT of the left side at that time she had both legs ultrasound and it was new right DVT, but likely developed a right-sided DVT now because of not appropriately taking Eliquis as directed twice a day.  She was taking it only once a day.  Just started to take twice a day and advised patient to continue doing so very strictly.  Failure to DOACs because patient was not taking it appropriately.  Discussed with patient that she will likely need to be on Eliquis 5 mg b.i.d. lifelong but will refer to Heme-Onc for specialized recommendations.  In the meantime check thrombophilia panel.  Will also check CT chest because of patient history of cancer.  Advised also patient to check with her ENT as soon as possible and update them with the current issues of clots to  be extra cautious, having history of laryngeal cancer.  Patient has upcoming appointment with ENT within 2 weeks.  She had CT neck done for this year for surveillance in March.  2. Right leg pain  3. Chronic deep vein thrombosis (DVT) of distal vein of left lower extremity  4. Chronic left shoulder pain  Stable on Voltaren gel.   Declines further meds/referrals because it is not as bothersome and Voltaren gel works great.  Check an x-ray.  5. Pulmonary infiltrate  6. History of laryngeal cancer      Investigations reviewed:    -12/30/2022:  FDG PET-CT (comparison: FDG PET-CT 10/12/2022, CT neck 09/19/2022):  No new or progressive hypermetabolic metastatic disease  -03/05/2024: CT soft tissues of the neck with contrast (comparison: CT neck 11/28/2023): Few ground-glass opacities in the right upper lobe may be infectious or inflammatory.  Otherwise stable exam compared to 11/28/2023.  -12/17/2024:  CT chest without contrast (comparison: 03/10/2022):  Cavitary lesion right lower lobe, inflammatory versus neoplastic (2.5 x 2.3 cm; right lower lobe; associated area of infiltrate)      -10/16/2024:  Bilateral lower extremity venous Doppler (bilateral leg pain; history of DVT left lower extremity):  No right lower extremity DVT; left superficial femoral distal vein abnormal (partial compressibility) (positive for DVT left lower extremity)  -10/16/2024:  Bilateral lower extremity arterial Doppler (bilateral leg pain):  The right lower extremity demonstrated multiphasic waveforms at all levels with the exception of the distal PTA and DPA.  The right lower extremity demostrated mild to moderate arterial flow reduction.   The left lower extremity demonstrated multiphasic waveforms at all levels with the exception of the distal PTA and DPA.  The left lower extremity demonstrated mild to moderate arterial flow reduction.   The ABIs were omitted due to findings of thrombus on prior venous exam  -12/04/2024:  Venous Doppler right  lower extremity (right leg lumps starting above the knee and traveling upper thigh, concern for blood clot; history of recurrent DVT left side):  DVT right distal femoral vein and popliteal vein      Labs reviewed:  -12/03/2024: CBC unremarkable    12/19/2024:   Pleasant lady who presents for initial medical oncology consultation.  In no acute discomfort.  Phonates with the help of TEP; she is S/P laryngectomy.    On anticoagulation with Eliquis.  Reports no bleeding in any form.  No hematemesis, melena, hematochezia, hematuria, hemoptysis, etc..    Denies chest pain, cough, dyspnea, fevers, chills, mucopurulent sputum production, or hemoptysis.  Currently, some pain in the right thigh area, not severe.  Denies weakness or fatigue.  Great appetite.  Denies dysphagia, odynophagia, otalgia, throat pain, etc..  Denies unintentional weight loss, any new lumps or lymphadenopathy, unusual headaches, focal neurological symptoms, abdominal pain, nausea, vomiting, GI bleeding, change in bowel habits, etc..    ECOG 0-1.      Interval History:  [No matching plan found]   [No matching plan found]     03/06/2025:   -12/17/2024:  CT chest without contrast (comparison: 03/10/2022): Cavitary lesion right lower lobe 2.5 x 2.3 cm with associated area of infiltrate  -right lung, transthoracic needle biopsy: Squamous cell carcinoma  -12/19/2024:  Antithrombin 3 activity 148% normal/elevated; factor 5 Leiden mutation negative; protein C activity 130% normal; protein S antigen free 106% normal  -02/11/2025: Cardiolipin antibody IgG, IgM: Negative  -02/11/2025: Beta 2 glycoprotein 1 antibodies IgG, IgM: Negative  -02/11/2025:  Prothrombin gene mutation negative  Presents for a follow-up visit.  In no acute discomfort.  Great appetite.  ECOG 0.  We discussed pathology report which shows new squamous cell carcinoma in the right lower lung lobe.  Denies cough, dyspnea, chest pain, or hemoptysis.  No weakness or fatigue.  No new lumps or  lymphadenopathy.  No unintentional weight loss.  Phonates with the help of TEP; she is S/P laryngectomy.    On anticoagulation with Eliquis for bilateral lower extremity DVTs.  Reports no bleeding in any form.  No hematemesis, melena, hematochezia, hematuria, hemoptysis, etc..    Denies dysphagia, odynophagia, otalgia, throat pain, etc..  Denies unintentional weight loss, any new lumps or lymphadenopathy, unusual headaches, focal neurological symptoms, abdominal pain, nausea, vomiting, GI bleeding, change in bowel habits, etc..    She has smoked half ppd X 50 years; quit a few years ago.      Medications:  Current Outpatient Medications on File Prior to Visit   Medication Sig Dispense Refill    ALLERGY RELIEF, CETIRIZINE, 10 mg tablet Take 1 tablet by mouth once daily 90 tablet 0    apixaban (ELIQUIS) 5 mg Tab Take 1 tablet (5 mg total) by mouth 2 (two) times daily. 10 mg ( 2 tablets) twice daily for 7 days followed by 5 mg ( one tablet)  twice daily. 180 tablet 2    atorvastatin (LIPITOR) 80 MG tablet Take 1 tablet (80 mg total) by mouth every evening. 90 tablet 3    diclofenac sodium (VOLTAREN ARTHRITIS PAIN) 1 % Gel Apply 2 g topically 4 (four) times daily as needed (pain). 800 g 1    fluticasone propionate (FLONASE) 50 mcg/actuation nasal spray 1 spray (50 mcg total) by Each Nostril route 2 (two) times daily. 16 g 1    folic acid (FOLVITE) 1 MG tablet Take 1 tablet by mouth once daily 30 tablet 6    JARDIANCE 10 mg tablet TAKE ONE TABLET BY MOUTH DAILY AT 9 AM 90 tablet 11    levothyroxine (SYNTHROID) 100 MCG tablet Take 1 tablet (100 mcg total) by mouth before breakfast. 30 tablet 11    lisinopriL (PRINIVIL,ZESTRIL) 2.5 MG tablet Take 1 tablet (2.5 mg total) by mouth once daily. 90 tablet 2    metoprolol succinate (TOPROL-XL) 25 MG 24 hr tablet Take 0.5 tablets (12.5 mg total) by mouth once daily. 45 tablet 3    montelukast (SINGULAIR) 5 MG chewable tablet Take 5 mg by mouth every other day.      NIFEdipine  (PROCARDIA-XL) 60 MG (OSM) 24 hr tablet Take 1 tablet (60 mg total) by mouth once daily. 30 tablet 11    carBAMazepine (TEGRETOL XR) 200 MG 12 hr tablet Take 1 tablet (200 mg total) by mouth 2 (two) times daily. (Patient not taking: Reported on 3/6/2025) 60 tablet 0    DULoxetine (CYMBALTA) 30 MG capsule Take 1 capsule (30 mg total) by mouth 2 (two) times daily. (Patient not taking: Reported on 3/6/2025) 60 capsule 1    ezetimibe (ZETIA) 10 mg tablet Take 1 tablet (10 mg total) by mouth once daily. (Patient not taking: Reported on 9/5/2024) 90 tablet 1    gabapentin (NEURONTIN) 100 MG capsule Take 1 capsule (100 mg total) by mouth 3 (three) times daily. (Patient not taking: Reported on 3/6/2025) 90 capsule 2     Current Facility-Administered Medications on File Prior to Visit   Medication Dose Route Frequency Provider Last Rate Last Admin    dextrose 10% bolus 125 mL 125 mL  12.5 g Intravenous PRN Tamie Woody FNP        dextrose 10% bolus 125 mL 125 mL  12.5 g Intravenous PRN Tamie Woody FNP        diphenhydrAMINE injection 6.25 mg  6.25 mg Intravenous Once PRN Lolis Sood MD        droperidoL injection 0.25 mg  0.25 mg Intravenous Once PRN Lolis Sood MD        HYDROmorphone injection 0.2 mg  0.2 mg Intravenous Q5 Min PRN Lolis Sood MD        insulin aspart U-100 injection 2-9 Units  2-9 Units Subcutaneous Q6H PRN Tamie Woody FNP        insulin aspart U-100 injection 4-12 Units  4-12 Units Subcutaneous PRN Tamie Woody FNP        lactated ringers infusion   Intravenous Continuous Lolis Sood  mL/hr at 03/03/23 0656 Rate Change at 03/03/23 0656    LIDOcaine (PF) 10 mg/ml (1%) injection 10 mg  1 mL Intradermal Once Tamie Woody FNP        LIDOcaine (PF) 10 mg/ml (1%) injection 10 mg  1 mL Intradermal Once Lolis Sood MD        LIDOcaine HCL 20 mg/ml (2%) injection 1 mL  1 mL Intradermal 1 time in Clinic/HOD Travis Aparicio MD         metoclopramide HCl injection 10 mg  10 mg Intravenous Once PRN Lolis Sood MD        midazolam (VERSED) 1 mg/mL injection 1 mg  1 mg Intravenous Once PRN Lolis Sood MD        oxyCODONE immediate release tablet 5 mg  5 mg Oral Q3H PRN Lolis Sood MD        silver nitrate applicators applicator 1 applicator  1 applicator Topical (Top) 1 time in Clinic/HOD Patricia Montes MD        triamcinolone acetonide injection 40 mg  40 mg Intramuscular 1 time in Clinic/HOD Travis Aparicio MD           Review of Systems:   All systems reviewed and found to be negative except for the symptoms detailed above    Physical Examination:   VITAL SIGNS:   Vitals:    03/06/25 1410   BP: 105/70   Pulse: 99   Resp: 18   Temp: 98.1 °F (36.7 °C)       GENERAL:  In no apparent distress.    HEAD:  No signs of head trauma.  EYES:  Pupils are equal.  Extraocular motions intact.    EARS:  Hearing grossly intact.  MOUTH:  Oropharynx is normal.   NECK:  No adenopathy, no JVD.     CHEST:  Chest with clear breath sounds bilaterally.  No wheezes, rales, rhonchi.    CARDIAC:  Regular rate and rhythm.  S1 and S2, without murmurs, gallops, rubs.  VASCULAR:  No Edema.  Peripheral pulses normal and equal in all extremities.  ABDOMEN:  Soft, without detectable tenderness.  No sign of distention.  No   rebound or guarding, and no masses palpated.   Bowel Sounds normal.  MUSCULOSKELETAL:  Good range of motion of all major joints. Extremities without clubbing, cyanosis or edema.    NEUROLOGIC EXAM:  Alert and oriented x 3.  No focal sensory or strength deficits.   Speech normal.  Follows commands.  PSYCHIATRIC:  Mood normal.  -12/19/2024: S/P laryngectomy in the past; phonates via TEP; no lumps or lymphadenopathy in neck; in no acute discomfort; S/P radiation therapy to the neck, with tissue induration.    Results for orders placed or performed in visit on 12/03/24   CBC Auto Differential    Narrative    The following orders were  created for panel order CBC Auto Differential.  Procedure                               Abnormality         Status                     ---------                               -----------         ------                     CBC with Differential[[id number]]       Abnormal            Final result                 Please view results for these tests on the individual orders.   CBC with Differential   Result Value Ref Range    WBC 6.10 4.50 - 11.50 x10(3)/mcL    RBC 4.44 4.20 - 5.40 x10(6)/mcL    Hgb 12.0 12.0 - 16.0 g/dL    Hct 39.4 37.0 - 47.0 %    MCV 88.7 80.0 - 94.0 fL    MCH 27.0 27.0 - 31.0 pg    MCHC 30.5 (L) 33.0 - 36.0 g/dL    RDW 13.4 11.5 - 17.0 %    Platelet 278 130 - 400 x10(3)/mcL    MPV 9.6 7.4 - 10.4 fL    Neut % 71.4 %    Lymph % 19.3 %    Mono % 5.2 %    Eos % 3.4 %    Basophil % 0.5 %    Lymph # 1.18 0.6 - 4.6 x10(3)/mcL    Neut # 4.35 2.1 - 9.2 x10(3)/mcL    Mono # 0.32 0.1 - 1.3 x10(3)/mcL    Eos # 0.21 0 - 0.9 x10(3)/mcL    Baso # 0.03 <=0.2 x10(3)/mcL    IG# 0.01 0 - 0.04 x10(3)/mcL    IG% 0.2 %    NRBC% 0.0 %     Results for orders placed or performed in visit on 09/05/24   Comprehensive Metabolic Panel   Result Value Ref Range    Sodium 141 136 - 145 mmol/L    Potassium 4.1 3.5 - 5.1 mmol/L    Chloride 108 (H) 98 - 107 mmol/L    CO2 25 22 - 29 mmol/L    Glucose 164 (H) 74 - 100 mg/dL    Blood Urea Nitrogen 11.5 9.8 - 20.1 mg/dL    Creatinine 1.18 (H) 0.55 - 1.02 mg/dL    Calcium 9.9 8.4 - 10.2 mg/dL    Protein Total 7.3 6.4 - 8.3 gm/dL    Albumin 3.5 3.5 - 5.0 g/dL    Globulin 3.8 (H) 2.4 - 3.5 gm/dL    Albumin/Globulin Ratio 0.9 (L) 1.1 - 2.0 ratio    Bilirubin Total 0.2 <=1.5 mg/dL     40 - 150 unit/L    ALT 13 0 - 55 unit/L    AST 13 5 - 34 unit/L    eGFR 54 mL/min/1.73/m2    Anion Gap 8.0 mEq/L    BUN/Creatinine Ratio 10        Assessment:  Problem List Items Addressed This Visit       H/O laryngectomy - Primary    Tracheostomy in place    PEG (percutaneous endoscopic gastrostomy)  status    Deep vein thrombosis (DVT) of distal vein of left lower extremity    Recurrent deep vein thrombosis (DVT)    DVT of lower extremity, bilateral    Squamous cell carcinoma of supraglottis    Cavitating mass in right lower lung lobe    History of tobacco abuse    History of head and neck radiation    Squamous cell carcinoma of right lower leg         Orders for 03/06/2025:  Please order FDG PET-CT for staging of newly diagnosed squamous cell carcinoma of right lung  Brain MRI with and without contrast for staging  Continue anticoagulation indefinitely  Refer to Cardiothoracic surgery to consider resection of newly diagnosed right lower lung lobe squamous cell carcinoma  Follow-up in 2 weeks, with scans     Above discussed with the patient.  All questions answered.    Discussed labs and scans and gave her copies of relevant results.  Pathology report discussed.  Gave her a copy.  Plan of management discussed.    She understands and agrees with this plan.  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  # 04/2022:  Left lower extremity positive for poorly visualized short segment age indeterminate nearly occluded-occluded DVT at the level of above the knee popliteal vein  # apparently, given Eliquis X 30 days; subsequently, Xarelto (due to cost); Xarelto until 05/2023 (thus, was anticoagulated for 1 year)  # 10/16/2024:  (bilateral leg pain) Left superficial distal femoral vein abnormal, partial compressibility chronic appearing nonocclusive thrombus left distal femoral vein) (seems to be more as chronic thrombus but not exactly in the same area as previous one)  # started on anticoagulation with Eliquis by PCP in family Medicine (however, she was taking Eliquis only 5 mg daily instead of b.i.d.)  # 12/04/2024 (right leg painful lumps starting above the knee and traveling up to upper thigh x1 week):  DVT  right distal femoral and popliteal vein (a week prior, she was instructed by PCP to start taking Eliquis 5 mg p.o. b.i.d., rather than 5 mg once daily)  # Eliquis resumed by PCP  #12/17/2024:  CT chest without contrast (comparison: 03/10/2022):  Cavitary lesion right lower lobe, inflammatory versus neoplastic (2.5 x 2.3 cm; right lower lobe; associated area of infiltrate)  (Question of a new lung primary versus metastasis, possibly accounting for new DVT right lower extremity)  -12/19/2024:  Antithrombin 3 activity 148% normal/elevated; factor 5 Leiden mutation negative; protein C activity 130% normal; protein S antigen free 106% normal  -02/11/2025: Cardiolipin antibody IgG, IgM: Negative  -02/11/2025: Beta 2 glycoprotein 1 antibodies IgG, IgM: Negative  -02/11/2025:  Prothrombin gene mutation negative  >>>  Plan:   Recurrent lower extremity DVT  -hypercoagulable workup negative  -needs indefinite anticoagulation; continue Xarelto in therapeutic dose, 5 mg p.o. b.i.d., indefinitely; monitor for bleeding  -the latest episode of DVT could be secondary to newly diagnosed squamous cell carcinoma of right lower lung lobe  -with Eliquis on board, can not measure protein C deficiency, protein S deficiency, and antithrombin 3 deficiency using functional assays  -lupus anticoagulant should only be tested in the absence of any anticoagulation as it is a clot based test, functional assay    With DOAC:  -Factor V Leiden, prothrombin gene mutation, antiphospholipid antibody are unaffected;  -cannot measure protein C deficiency, protein S deficiency, and Antithrombin III deficiency using functional assays (can cause overestimation of antithrombin levels; can not measure protein C, protein S, and antithrombin using functional assays with DOAC);  -antiphospholipid antibodies are unaffected because they are serologic assays and are unaffected by anticoagulation (anticardiolipin antibody; beta 2 glycoprotein 1 antibody);  -lupus  anticoagulant should only be tested in the absence of any anticoagulant as it is clot based test    # History of squamous cell carcinoma of supraglottis:  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 Patient had an uneventful hospitalization course and was discharged on postoperative day 9 after an esophagram showed no pharyngeal leak.  However she re-presented to our facility on 4/6/22 with surgical site infection and a pharyngocutaneous fistula. Patient underwent a neck washout with primary repair on 4/11/2022, with placement of a gastrostomy tube the same day. She was maintained on NPO and transferred to [place] for further evaluation and management on 4/15/22 after evidence of persistent pharyngocutaneous fistula. There she underwent a 2nd neck washout with primary closure and placement of salivary bypass tube.   S/P adjuvant radiotherapy, completed 06/16/2022 (6000 cGy/30 fractions/43 days)  One of her post-treatment PET noted hypermetabolism at the site of her TEP. She was taken for a DL on 3/3/23 which was unremarkable.  >>>  # New squamous cell carcinoma right lower lung lobe:  -there was question of a new primary versus metastatic malignancy on CT chest 12/17/2024:   -12/17/2024:  CT chest without contrast (comparison: 03/10/2022):  Cavitary lesion right lower lobe, inflammatory versus neoplastic (2.5 x 2.3 cm; right lower lobe; associated area of infiltrate)    -right lung, transthoracic needle biopsy: Squamous cell carcinoma  >>>  Plan:  -will order FDG PET-CT and brain MRI with and without contrast, for staging  -we will refer to Cardiothoracic surgery to consider resection  -if resection not feasible, then, we will need SABR, after ruling out metastases    Follow-up:  No follow-ups on file.

## 2025-03-07 ENCOUNTER — TELEPHONE (OUTPATIENT)
Dept: HEMATOLOGY/ONCOLOGY | Facility: CLINIC | Age: 60
End: 2025-03-07
Payer: MEDICARE

## 2025-03-07 DIAGNOSIS — C44.722 SQUAMOUS CELL CARCINOMA OF RIGHT LOWER LEG: ICD-10-CM

## 2025-03-07 DIAGNOSIS — C32.1 SQUAMOUS CELL CARCINOMA OF SUPRAGLOTTIS: ICD-10-CM

## 2025-03-07 DIAGNOSIS — J98.4 CAVITATING MASS IN RIGHT LOWER LUNG LOBE: Primary | ICD-10-CM

## 2025-03-17 ENCOUNTER — HOSPITAL ENCOUNTER (OUTPATIENT)
Dept: RADIOLOGY | Facility: HOSPITAL | Age: 60
Discharge: HOME | End: 2025-03-17
Attending: INTERNAL MEDICINE
Payer: MEDICARE

## 2025-03-17 ENCOUNTER — TELEPHONE (OUTPATIENT)
Dept: HEMATOLOGY/ONCOLOGY | Facility: CLINIC | Age: 60
End: 2025-03-17
Payer: MEDICARE

## 2025-03-17 DIAGNOSIS — J98.4 CAVITATING MASS IN RIGHT LOWER LUNG LOBE: ICD-10-CM

## 2025-03-17 DIAGNOSIS — C32.1 SQUAMOUS CELL CARCINOMA OF SUPRAGLOTTIS: ICD-10-CM

## 2025-03-17 DIAGNOSIS — C44.722 SQUAMOUS CELL CARCINOMA OF RIGHT LOWER LEG: ICD-10-CM

## 2025-03-17 PROCEDURE — 78815 PET IMAGE W/CT SKULL-THIGH: CPT | Mod: TC

## 2025-03-17 PROCEDURE — A9552 F18 FDG: HCPCS | Performed by: INTERNAL MEDICINE

## 2025-03-17 RX ORDER — FLUDEOXYGLUCOSE F18 500 MCI/ML
10 INJECTION INTRAVENOUS
Status: COMPLETED | OUTPATIENT
Start: 2025-03-17 | End: 2025-03-17

## 2025-03-17 RX ADMIN — FLUDEOXYGLUCOSE F-18 11 MILLICURIE: 500 INJECTION INTRAVENOUS at 12:03

## 2025-03-18 LAB — POCT GLUCOSE: 162 MG/DL (ref 70–110)

## 2025-03-19 ENCOUNTER — HOSPITAL ENCOUNTER (OUTPATIENT)
Dept: RADIOLOGY | Facility: HOSPITAL | Age: 60
Discharge: HOME | End: 2025-03-19
Attending: INTERNAL MEDICINE
Payer: MEDICARE

## 2025-03-19 DIAGNOSIS — C32.1 SQUAMOUS CELL CARCINOMA OF SUPRAGLOTTIS: ICD-10-CM

## 2025-03-19 DIAGNOSIS — J98.4 CAVITATING MASS IN RIGHT LOWER LUNG LOBE: ICD-10-CM

## 2025-03-19 DIAGNOSIS — C44.722 SQUAMOUS CELL CARCINOMA OF RIGHT LOWER LEG: ICD-10-CM

## 2025-03-19 PROCEDURE — 70553 MRI BRAIN STEM W/O & W/DYE: CPT | Mod: TC

## 2025-03-19 PROCEDURE — A9577 INJ MULTIHANCE: HCPCS

## 2025-03-19 PROCEDURE — 25500020 PHARM REV CODE 255

## 2025-03-19 RX ADMIN — GADOBENATE DIMEGLUMINE 12 ML: 529 INJECTION, SOLUTION INTRAVENOUS at 02:03

## 2025-03-20 ENCOUNTER — OFFICE VISIT (OUTPATIENT)
Dept: CARDIOLOGY | Facility: CLINIC | Age: 60
End: 2025-03-20
Payer: MEDICARE

## 2025-03-20 VITALS
DIASTOLIC BLOOD PRESSURE: 76 MMHG | HEART RATE: 90 BPM | BODY MASS INDEX: 24.81 KG/M2 | RESPIRATION RATE: 18 BRPM | OXYGEN SATURATION: 100 % | SYSTOLIC BLOOD PRESSURE: 124 MMHG | TEMPERATURE: 98 F | WEIGHT: 134.81 LBS | HEIGHT: 62 IN

## 2025-03-20 DIAGNOSIS — I65.23 BILATERAL CAROTID ARTERY STENOSIS: ICD-10-CM

## 2025-03-20 DIAGNOSIS — E78.2 MIXED HYPERLIPIDEMIA: ICD-10-CM

## 2025-03-20 DIAGNOSIS — Z90.02 H/O LARYNGECTOMY: ICD-10-CM

## 2025-03-20 DIAGNOSIS — I47.10 SVT (SUPRAVENTRICULAR TACHYCARDIA): Primary | ICD-10-CM

## 2025-03-20 DIAGNOSIS — I82.409 RECURRENT DEEP VEIN THROMBOSIS (DVT): ICD-10-CM

## 2025-03-20 DIAGNOSIS — I10 PRIMARY HYPERTENSION: ICD-10-CM

## 2025-03-20 DIAGNOSIS — I34.0 NONRHEUMATIC MITRAL VALVE REGURGITATION: ICD-10-CM

## 2025-03-20 DIAGNOSIS — Z93.0 TRACHEOSTOMY IN PLACE: ICD-10-CM

## 2025-03-20 PROBLEM — E78.01 FAMILIAL HYPERCHOLESTEROLEMIA: Status: RESOLVED | Noted: 2022-07-11 | Resolved: 2025-03-20

## 2025-03-20 PROBLEM — R55 SYNCOPE: Status: RESOLVED | Noted: 2022-07-11 | Resolved: 2025-03-20

## 2025-03-20 PROCEDURE — 3074F SYST BP LT 130 MM HG: CPT | Mod: CPTII,,, | Performed by: NURSE PRACTITIONER

## 2025-03-20 PROCEDURE — 1160F RVW MEDS BY RX/DR IN RCRD: CPT | Mod: CPTII,,, | Performed by: NURSE PRACTITIONER

## 2025-03-20 PROCEDURE — 99214 OFFICE O/P EST MOD 30 MIN: CPT | Mod: S$PBB,,, | Performed by: NURSE PRACTITIONER

## 2025-03-20 PROCEDURE — 4010F ACE/ARB THERAPY RXD/TAKEN: CPT | Mod: CPTII,,, | Performed by: NURSE PRACTITIONER

## 2025-03-20 PROCEDURE — 99215 OFFICE O/P EST HI 40 MIN: CPT | Mod: PBBFAC | Performed by: NURSE PRACTITIONER

## 2025-03-20 PROCEDURE — 1159F MED LIST DOCD IN RCRD: CPT | Mod: CPTII,,, | Performed by: NURSE PRACTITIONER

## 2025-03-20 PROCEDURE — 3008F BODY MASS INDEX DOCD: CPT | Mod: CPTII,,, | Performed by: NURSE PRACTITIONER

## 2025-03-20 PROCEDURE — 3078F DIAST BP <80 MM HG: CPT | Mod: CPTII,,, | Performed by: NURSE PRACTITIONER

## 2025-03-20 RX ORDER — METOPROLOL SUCCINATE 25 MG/1
12.5 TABLET, EXTENDED RELEASE ORAL DAILY
Qty: 45 TABLET | Refills: 3 | Status: SHIPPED | OUTPATIENT
Start: 2025-03-20 | End: 2026-03-20

## 2025-03-20 NOTE — PATIENT INSTRUCTIONS
ECHO   Resume Toprol 12.5 mg daily  NV in 2-3 weeks for BP/HR check   Follow up in Cardiology Clinic in 6 months with FLP or sooner pending results   Follow up with PCP and heme/onc as directed

## 2025-03-20 NOTE — PROGRESS NOTES
CARDIOLOGY CLINIC NOTE  03/20/2025 9:01 AM    Subjective:     CHIEF COMPLAINT:   Chief Complaint   Patient presents with    Follow-up     Denies any cardiac problems                           HPI:    Ms. Heaven Boston is a 60 y.o. female with hypertension, hyperlipidemia, controlled type 2 diabetes, supraventricular tachycardia noted on previous event monitor and  Recurrent lower extremity DVT after laryngectomy for laryngeal cancer, newly diagnosed squamous cell carcinoma of right lung and former smoker who present to cardiology clinic for routine follow up and ongoing care.  Latest Echocardiogram obtained in May 2020 revealed low normal left ventricular systolic function, mild MR and negative bubble study.  Previous 30 Day Event Monitor obtained from July to August 2021 revealed Runs of SVT and nonsustained VT versus SVT with aberrancy.  (See reports below)  Since last seen in clinic, the patient was newly diagnosed with squamous cell carcinoma of right lung.  She states that she has a upcoming follow up with Cardiothoracic surgery in a few weeks, likely to consider resection.    Today the patient reports that she feels great.  She denies any acute cardiovascular complaints and endorses significant improvement in her previous pain in her legs.  To note, the patient did complete GAGAN testing on 3.5.25 that demonstrated no significant arterial obstructive disease bilaterally.  Venous ultrasound in December 2024 demonstrated deep vein thrombosis in the right distal femoral vein and popliteal vein and she is currently on anticoagulation.  The patient appears euvolemic on exam without any overt signs or symptoms of volume overload.  She states she is able to perform her routine ADLs, heavy housework and vacuum her car without experiencing any angina or angina equivalent symptoms.    CARDIAC TESTING:  ECHO 5.23.2020  Summary   Overall left ventricular systolic function is low normal.  Left ventricular systolic ejection  fraction  Structurally normal mitral valve.  Mild MR   Normal structure with trace tricuspid valve regurgitation noted   Negative bubble study.  Aneurysmal atrial septal wall with a left-to-right bulge    30 Day Event Monitor (July - Aug. 2021)  Revealed runs of SVT, also nonsustained VT versus SVT with aberrancy.            Lab Results   Component Value Date    CREATININE 0.97 2025    CREATININE 0.98 2025    CREATININE 1.18 (H) 2024       Past Medical History    Patient Active Problem List   Diagnosis    H/O laryngectomy    Laryngeal cancer    Hypertension    Familial hypercholesterolemia    Syncope    SVT (supraventricular tachycardia)    Type 2 diabetes mellitus with diabetic nephropathy, without long-term current use of insulin    Tracheostomy in place    PEG (percutaneous endoscopic gastrostomy) status    History of radiation to head and neck region    Hypothyroidism    Bilateral carotid artery stenosis    Deep vein thrombosis (DVT) of distal vein of left lower extremity    Diverticulosis large intestine w/o perforation or abscess w/o bleeding    Positive colorectal cancer screening using Cologuard test    Diabetes mellitus due to underlying condition with stage 3a chronic kidney disease, without long-term current use of insulin    Recurrent deep vein thrombosis (DVT)    DVT of lower extremity, bilateral    Squamous cell carcinoma of supraglottis    Cavitating mass in right lower lung lobe    History of tobacco abuse    History of head and neck radiation    Squamous cell carcinoma of right lower leg       Surgical History    Past Surgical History:   Procedure Laterality Date     SECTION      COLONOSCOPY N/A 05/15/2023    Procedure: COLONOSCOPY;  Surgeon: Francisco Zavala MD;  Location: [facility] ENDOSCOPY;  Service: Endoscopy;  Laterality: N/A;    DIRECT DIAGNOSTIC LARYNGOSCOPY WITH BRONCHOSCOPY AND ESOPHAGOSCOPY N/A 2023    Procedure: LARYNGOSCOPY, DIRECT, DIAGNOSTIC, WITH  BRONCHOSCOPY AND ESOPHAGOSCOPY;  Surgeon: Connor Patrick MD;  Location: [facility];  Service: ENT;  Laterality: N/A;  Will not need to do bronchoscopy for this procedure    TN REMOVAL OF LARYNX  2022       Social History     Socioeconomic History    Marital status: Single   Tobacco Use    Smoking status: Former     Current packs/day: 0.00     Average packs/day: 0.5 packs/day for 39.0 years (19.5 ttl pk-yrs)     Types: Cigarettes     Start date: 1983     Quit date: 2022     Years since quittin.9    Smokeless tobacco: Former     Quit date:    Substance and Sexual Activity    Alcohol use: Never    Drug use: Never    Sexual activity: Not Currently       No family history on file.  Review of patient's allergies indicates:  No Known Allergies    Current Medications    Current Outpatient Medications   Medication Instructions    ALLERGY RELIEF (CETIRIZINE) 10 mg, Oral    apixaban (ELIQUIS) 5 mg, Oral, 2 times daily, 10 mg ( 2 tablets) twice daily for 7 days followed by 5 mg ( one tablet)  twice daily.    atorvastatin (LIPITOR) 80 mg, Oral, Nightly    carBAMazepine (TEGRETOL XR) 200 mg, Oral, 2 times daily    diclofenac sodium (VOLTAREN ARTHRITIS PAIN) 2 g, Topical (Top), 4 times daily PRN    DULoxetine (CYMBALTA) 30 mg, Oral, 2 times daily    ezetimibe (ZETIA) 10 mg, Oral, Daily    fluticasone propionate (FLONASE) 50 mcg, Each Nostril, 2 times daily    folic acid (FOLVITE) 1,000 mcg, Oral    gabapentin (NEURONTIN) 100 mg, Oral, 3 times daily    JARDIANCE 10 mg tablet TAKE ONE TABLET BY MOUTH DAILY AT 9 AM    levothyroxine (SYNTHROID) 100 mcg, Oral, Before breakfast    lisinopriL (PRINIVIL,ZESTRIL) 2.5 mg, Oral, Daily    metoprolol succinate (TOPROL-XL) 12.5 mg, Oral, Daily    montelukast (SINGULAIR) 5 mg, Every other day    NIFEdipine (PROCARDIA-XL) 60 mg, Oral, Daily         Objective:     BP Readings from Last 3 Encounters:   25 124/76   25 105/70   25 117/61        Pulse Readings  "from Last 3 Encounters:   03/20/25 90   03/06/25 99   02/06/25 84        Temp Readings from Last 3 Encounters:   03/20/25 98.2 °F (36.8 °C) (Oral)   03/06/25 98.1 °F (36.7 °C) (Oral)   02/06/25 97.9 °F (36.6 °C) (Oral)       Wt Readings from Last 3 Encounters:   03/20/25 61.1 kg (134 lb 12.8 oz)   03/06/25 58.9 kg (129 lb 12.8 oz)   02/06/25 60.4 kg (133 lb 3.2 oz)         PE:  Blood pressure 124/76, pulse 90, temperature 98.2 °F (36.8 °C), temperature source Oral, resp. rate 18, height 5' 2" (1.575 m), weight 61.1 kg (134 lb 12.8 oz), SpO2 100%.   Physical Exam  Vitals reviewed.   Constitutional:       General: She is not in acute distress.     Appearance: Normal appearance. She is normal weight. She is not ill-appearing.   HENT:      Head: Normocephalic and atraumatic.      Right Ear: External ear normal.      Left Ear: External ear normal.      Nose: Nose normal.      Mouth/Throat:      Mouth: Mucous membranes are moist.      Comments: Trach in place  Eyes:      Conjunctiva/sclera: Conjunctivae normal.   Neck:      Comments: Speaking valve.   Cardiovascular:      Rate and Rhythm: Normal rate and regular rhythm.      Pulses: Normal pulses.      Heart sounds: Normal heart sounds. No murmur heard.  Pulmonary:      Effort: Pulmonary effort is normal. No respiratory distress.      Breath sounds: Normal breath sounds. No stridor. No wheezing, rhonchi or rales.   Chest:      Chest wall: No tenderness.   Abdominal:      General: Abdomen is flat. Bowel sounds are normal. There is no distension.      Palpations: Abdomen is soft.   Musculoskeletal:      Cervical back: Neck supple.      Right lower leg: No edema.      Left lower leg: No edema.   Skin:     General: Skin is warm and dry.      Capillary Refill: Capillary refill takes less than 2 seconds.   Neurological:      Mental Status: She is alert and oriented to person, place, and time.   Psychiatric:         Mood and Affect: Mood normal.         Behavior: Behavior " normal.                                                                                                                                                                                                                                                                                                                                                                                                                                                                                CARDIAC TESTING:  Echocardiogram  No results found for this or any previous visit.      Stress Test  No results found for this or any previous visit.     Coronary Angiogram  No results found for this or any previous visit.     Holter Monitor  No cardiac monitor results found for the past 12 months    Last BMP BMP  Lab Results   Component Value Date     02/11/2025    K 4.0 02/11/2025     (H) 02/11/2025    CO2 25 02/11/2025    BUN 14.5 02/11/2025    CREATININE 0.97 02/11/2025    CALCIUM 9.7 02/11/2025    ANIONGAP 11 04/23/2022    EGFRNORACEVR >60 02/11/2025      Last CBC     Lab Results   Component Value Date    WBC 4.28 (L) 02/11/2025    HGB 11.6 (L) 02/11/2025    HCT 38.8 02/11/2025    MCV 90.0 02/11/2025     02/11/2025           BNP    Lab Results   Component Value Date    BNP 16.3 03/10/2022    .0 (H) 08/16/2020     Last lipids    Lab Results   Component Value Date    CHOL 149 05/17/2024    CHOL 198 11/06/2023    CHOL 181 04/03/2023    HDL 37 05/17/2024    HDL 43 11/06/2023    HDL 42 04/03/2023    LDL 97.00 05/17/2024    .00 11/06/2023    .00 04/03/2023    TRIG 73 05/17/2024    TRIG 114 11/06/2023    TRIG 107 04/03/2023    TOTALCHOLEST 4 05/17/2024    TOTALCHOLEST 5 11/06/2023    TOTALCHOLEST 4 04/03/2023      LFT     Lab Results   Component Value Date    ALT 18 02/11/2025    ALT 16 02/04/2025    ALT 13 09/05/2024    AST 17 02/11/2025    AST 18 02/04/2025    AST 13 09/05/2024       Assessment:   Ms. Heaven Boston  is a 60 y.o. female with hypertension, hyperlipidemia, controlled type 2 diabetes, supraventricular tachycardia noted on previous event monitor and  Recurrent lower extremity DVT after laryngectomy for laryngeal cancer, newly diagnosed squamous cell carcinoma of right lung and former smoker who present to cardiology clinic for routine follow up and ongoing care.    Plan:   Hypertension  - BP at goal -124/76  - Continue nifedipine 60 mg daily, lisinopril 2.5.    - Advised on low salt diet and exercise as tolerated    Hypercholesterolemia  - LDL 97 on 5/17  - Continue Lipitor 80 mg daily   - Advised on low-cholesterol, low-fat diet and exercise as tolerated  - Repeat lipid panel prior to next visit    History of SVT noted on  previous Event monitor (2021)  - see HPI  - Asymptomatic  - Patient's heart rate elevated in the 90s today.  Continue Toprol 12.5 mg daily.  The patient reports that she has not taking the medications in some time due to needing refills.  Will resume today  - NV in 2-3 weeks for BP/HR check     Hx of Recurrent DVTs  - Recommended lifelong anticoagulation per Heme/onc  -  GAGAN - Normal  - Follow up as directed     DM   - [id number]  - Management per PCP     Newly diagnosed squamous cell carcinoma of right lung  - Management per Heme/ONC as directed    Mitral Regurgitation   - EF- low normal systolic function with mild MR per ECHO May 2020  - Obtain Echocardiogram for continued surveillance of mitral valve          ECHO   Resume Toprol 12.5 mg daily  NV in 2-3 weeks for BP/HR check   Follow up in Cardiology Clinic in 6 months with FLP or sooner pending results   Follow up with PCP and heme/onc as directed

## 2025-03-27 ENCOUNTER — DOCUMENTATION ONLY (OUTPATIENT)
Dept: HEMATOLOGY/ONCOLOGY | Facility: CLINIC | Age: 60
End: 2025-03-27
Payer: MEDICARE

## 2025-03-27 ENCOUNTER — OFFICE VISIT (OUTPATIENT)
Dept: OTOLARYNGOLOGY | Facility: CLINIC | Age: 60
End: 2025-03-27
Payer: MEDICARE

## 2025-03-27 ENCOUNTER — LAB VISIT (OUTPATIENT)
Dept: LAB | Facility: HOSPITAL | Age: 60
End: 2025-03-27
Payer: MEDICARE

## 2025-03-27 VITALS
SYSTOLIC BLOOD PRESSURE: 129 MMHG | TEMPERATURE: 98 F | BODY MASS INDEX: 24.55 KG/M2 | HEART RATE: 76 BPM | WEIGHT: 134.19 LBS | DIASTOLIC BLOOD PRESSURE: 75 MMHG

## 2025-03-27 DIAGNOSIS — Z85.21 HISTORY OF LARYNGEAL CANCER: Primary | ICD-10-CM

## 2025-03-27 DIAGNOSIS — C32.1 SQUAMOUS CELL CARCINOMA OF SUPRAGLOTTIS: ICD-10-CM

## 2025-03-27 DIAGNOSIS — Z08 ROUTINE CANCER FOLLOW-UP VISIT: ICD-10-CM

## 2025-03-27 DIAGNOSIS — Z92.3 HISTORY OF RADIATION TO HEAD AND NECK REGION: ICD-10-CM

## 2025-03-27 DIAGNOSIS — Z90.02 H/O LARYNGECTOMY: ICD-10-CM

## 2025-03-27 DIAGNOSIS — C44.722 SQUAMOUS CELL CARCINOMA OF RIGHT LOWER LEG: ICD-10-CM

## 2025-03-27 DIAGNOSIS — E03.9 HYPOTHYROIDISM, UNSPECIFIED TYPE: ICD-10-CM

## 2025-03-27 DIAGNOSIS — J98.4 CAVITATING MASS IN RIGHT LOWER LUNG LOBE: Primary | ICD-10-CM

## 2025-03-27 LAB — TSH SERPL-ACNC: 3.18 UIU/ML (ref 0.35–4.94)

## 2025-03-27 PROCEDURE — 99214 OFFICE O/P EST MOD 30 MIN: CPT | Mod: PBBFAC | Performed by: OTOLARYNGOLOGY

## 2025-03-27 PROCEDURE — 36415 COLL VENOUS BLD VENIPUNCTURE: CPT

## 2025-03-27 PROCEDURE — 84443 ASSAY THYROID STIM HORMONE: CPT

## 2025-03-27 NOTE — PROGRESS NOTES
Otolaryngology - Head & Neck Surgery  Clinic Note    Patient Name: Heaven Boston   YOB: 1965     Chief Complaint: follow-up for laryngeal cancer       History of Present Illness:  Heaven Boston is a 57 y.o. 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 Patient had an uneventful hospitalization course and was discharged on postoperative day 9 after an esophagram showed no pharyngeal leak.  However she re-presented to our facility on 4/6/22 with surgical site infection and a pharyngocutaneous fistula. Patient underwent a neck washout with primary repair on 4/11/2022, with placement of a gastrostomy tube the same day. She was maintained on NPO and transferred to [facility] for further evaluation and management on 4/15/22 after evidence of persistent pharyngocutaneous fistula. There she underwent a 2nd neck washout with primary closure and placement of salivary bypass tube.      5/2/22: Patient had an uneventful hospitalization course and removal of salivary bypass tube before discharge last week and now presenting for postoperative evaluation.  Patient presents today without any complaints.  She is wondering about when he pointed that her x-rays for swallowing then.  She seen Dr. Laguna today after our visit to undergo-stimulation for radiation.     5/9/22: Pt. Did not answer. Let voicemail to advance diet to [facility]. Also discussed this with son.  Plan to see in clinic in 2 weeks     5/19/22: In radiation therapy, 5x weekly until 6/16/22. Has TEP in place, brought another TEP requesting swap out. Will go to speech therapy for exchange of TEP. Went to ER for bleeding from G-tube site 1 week ago but declines visit to Gen Surg clinic today. On [facility] taking Ensure by G-tube, water and soups by  mouth. Had questions about advancing diet. No weight loss or appetite change. No other issues.      6/21/22:  Returns today for follow up.  Completed radiation last week 6/16/22.  Is having difficulty voicing with TEP though it has been swapped out by [facility].  Able to eat what she wants.  Having some tightness of her throat and soreness, otherwise no issues.     7/21/22: Here today for follow up. Has overall been doing very well. She is tolerating her diet by mouth and gaining weight. No dysphagia. Has not used her PEG tube since prior to radiation. She is interested in having it removed. Has some fullness in the left neck which intermittently swells and then resolves again. Otherwise no new lumps/bumps of the head and neck.      8/23/22: Ms. Boston returns today for follow up. She complains of neck pain exacerbated by flexion and movement to the right that began 2 weeks ago. She also reports neck swelling and tightness which occasionally increases in size and is tender to palpation. She reports cough with clear mucus production as well as nasal congestion without drainage. She also notes dry, itchy eyes that have not improved with Visine. She also notes dysuria. She is tolerating her diet by mouth and gaining weight. She had her PEG tube removed on 8/16/22. She has been to [facility] twice and is still having difficulty voicing with TEP.     9/27/22: Here for follow up. Reports she is doing okay. Still coughing a lot and not voicing well with TEP. No weight loss, tolerating diet. No otalgia/lumps bumps. Still having intermittent neck swelling/pain.      10/27/22:  Returns for surveillance.  Did not get TSH, T4.  Has been going to [facility], now can talk well.  Cough has improved.  No new H&N complaints     12/22/22: Doing very well. Voicing well. No pain. Doing well with speech and lymphedema therapy. Eating well, gaining weight constantly. Always tired. Sometimes sleeps all day.    February 14, 2023:   58-year-old female presents  today for follow-up of her laryngeal squamous cell carcinoma and postoperative hypothyroidism.  In regards to her squamous cell carcinoma of the larynx she is doing well.  She has no complaints of any pain.  He is not noticed any swelling in her neck.  She is not have any difficulty eating or swallowing in his indicated she is gaining weight.  She continues to use her TEP without problems and does not have any leakage around her TEP.  She did have a follow-up PET-CT scan done on December 30, 2022, in the report indicated that there was no evidence of new or progressive hypermetabolic metastatic disease.  On reviewing those images, however, there does appear to be an area of increased FDG activity in the upper cervical esophagus in the area above her trachea stoma and possibly at the site of the TEP.  This was also present on a PET-CT scan done on October 12, 2022 and there does not appear debris any change in this area between the 2 scans.  The report on the scan done on October 12, 2022, had indicated this area had an SUV max of 4.4 without any apparent lesions noted on the corresponding CT.  In regards to her hypothyroidism she continues to take levothyroxine 88 mcg daily.  Lab work from February 6, 2023, showed her TSH level be low at 0.115 with a normal free T4 of 1.48.  Her prior TSH from October 27, 2022, was elevated at 55.1672.     3/30/23:  Patient routine follow-up for squamous cell carcinoma and postop hypothyroidism.  She denies any new lumps, bumps hemoptysis or weight loss.  She is able to swallow without difficulty, TEP functioning well with excellent speech.  She has postop hypothyroidism, does complain of some weakness.  She did have a change in her blood pressure medicine in that it was increased and she felt some of her fatigue might have been related to it.  She currently has blood work ordered by her PCP including thyroid function and she is going to get it next week, we will schedule a TeleMed  follow-up.  Duloxetine 30 mg helps her sleep and helps her neck pain.     5/30/23: Here today for cancer surveillance. Recently had an add on appt with Dr. Aparicio for a new bump on her neck that appeared to be a neuroma. He injected it with lidocaine and she reports that the bump has decreased in size and is no longer painful. She also recently got her CT neck. She denies any new issues. She's eating and drinking well and denies any dysphagia, odynophagia, ear pain or new lumps/bumps.     8/15/23:  Here today for cancer surveillance.  She has no complaints today and is feeling well.  No weight loss.  No hemoptysis.  No new ear pain or voice changes.  No new lumps or bumps.  No nonhealing oral ulcers.  Eating and drinking well.  No throat pain.    11/15/23: Here today for surveillance. Patient underwent repeat CXR which showed resolution of lung opacities. No weight loss.  Has been feeling well.  No new lumps or bumps.  She does not have any dysphagia or odynophagia.  No changes in health.  She is not had any issues with her TEP    2/20/24:  Here today for follow-up.  Patient states she has been eating and gaining weight well.  She has not having any trismus hemoptysis weight loss weakness fatigue ear pain.  She has been taking her Synthroid as instructed she did not get new labs.  No new lumps or bumps.    5/16/24:  Here today for follow.  She is doing well.  She has been eating well and gaining weight.  She denies dysphagia, odynophagia, otalgia, lumps or bumps in the head or neck.  TSH was elevated.  She says that she takes the Synthroid on an empty stomach everyday and has not missed doses.  She takes 88 mcg daily.    9/5/24:   Patient presents today for follow-up of her laryngeal squamous cell carcinoma and hypothyroidism.  Patient has no complaints at this time.  She is tolerating regular diet and doing well with her TEP.  She continues to take levothyroxine 100 mcg daily.  She has not had follow-up thyroid  function studies since her dose was increased.  She has no complaints of any heat or cold intolerance or palpitations.  No other new problems.    24: Presents today in follow up. Doing well without issues. Denies issues swallowing or changes to voice. Weight has increased recently. No issues with TEP. Changed last week. Taking synthroid 100 without issues. Denies otalgia, fevers, chills, night sweats, weight loss, palpitations, diarrhea or any new problems.     3/27/25: Patient presents for surveillance. Recent imaging reveals cavitary lesion on RLL which was positive for SCCa. Plan to follow up w/CT surgery for consideration of resection. No complaints today. No SOB, wheezing, changes in TEP voicing, fever, chills, otalgia, lumps or bumps, weight loss. No issues using TEP. Occasional blood mixed in with mucus when she coughs--this started after her lung biopsy.       Past Medical History:  Past Medical History:   Diagnosis Date    Cancer     Laryngeal    Diabetes mellitus     Hypertension      Past Surgical History:   Procedure Laterality Date     SECTION      TX REMOVAL OF LARYNX  2022       Review of Systems:  Unremarkable except as mentioned above.    Current Medications:  Current Outpatient Medications   Medication Sig    atorvastatin (LIPITOR) 80 MG tablet Take 1 tablet (80 mg total) by mouth every evening.    azelastine (ASTELIN) 137 mcg (0.1 %) nasal spray 1 spray by Nasal route.    carBAMazepine (CARBATROL) 200 MG CM12 Take 200 mg by mouth.    carBAMazepine (TEGRETOL XR) 200 MG 12 hr tablet Take 200 mg by mouth 2 (two) times daily.    cetirizine (ZYRTEC) 10 MG tablet Take 10 mg by mouth once daily.    DULoxetine (CYMBALTA) 30 MG capsule Take 30 mg by mouth 2 (two) times daily.    fluticasone propionate (FLONASE) 50 mcg/actuation nasal spray 1 spray by Each Nostril route 2 (two) times daily.    fluticasone-salmeterol diskus inhaler 100-50 mcg Inhale 1 puff into the lungs every 12 (twelve)  hours.    levothyroxine (SYNTHROID) 100 MCG tablet Take 1 tablet (100 mcg total) by mouth before breakfast.    loratadine (CLARITIN) 10 mg tablet Take 1 tablet (10 mg total) by mouth once daily.    metFORMIN (GLUCOPHAGE) 500 MG tablet Take 1 tablet (500 mg total) by mouth once daily.    metoprolol succinate (TOPROL-XL) 25 MG 24 hr tablet Take 0.5 tablets (12.5 mg total) by mouth once daily.    montelukast (SINGULAIR) 5 MG chewable tablet Take 1 tablet (5 mg total) by mouth every evening.    NIFEdipine (PROCARDIA-XL) 60 MG (OSM) 24 hr tablet Take 1 tablet (60 mg total) by mouth once daily.    rivaroxaban (XARELTO) 20 mg Tab Take 1 tablet (20 mg total) by mouth daily with dinner or evening meal.    aspirin (ECOTRIN) 81 MG EC tablet Take 1 tablet (81 mg total) by mouth once daily.    HYDROcodone-acetaminophen 5-300 mg Tab Take 1 tablet by mouth every 8 (eight) hours as needed.    ibuprofen (ADVIL,MOTRIN) 800 MG tablet Take 800 mg by mouth every 6 (six) hours as needed.    omeprazole (PRILOSEC) 40 MG capsule Take 40 mg by mouth once daily.    prednisoLONE acetate (PRED FORTE) 1 % DrpS Place into both eyes.     Current Facility-Administered Medications   Medication    silver nitrate applicators applicator 1 applicator        Allergies:  Review of patient's allergies indicates:  No Known Allergies       Physical Exam:  Vital signs:   There were no vitals filed for this visit.  General:  Well-developed well-nourished female in no acute distress.  Patient does communicate well with clear speech using her TEP.  Head and face:  Normocephalic.  No facial lesions.  No temporomandibular joint tenderness or click.  Ears:  Right ear-auricle is normally developed.  External auditory canal is clear.  Tympanic membrane is nonerythematous.  No middle ear effusion.  Left ear-auricle is normally developed.  External auditory canal is clear.  Tympanic membrane is nonerythematous.  No middle ear effusion.  Nose:  Nasal dorsum is  unremarkable.  No significant septal deviation.  No significant intranasal congestion.  Secretions are clear.  Oral cavity and oropharynx:  Tongue and floor mouth are without lesions.  Mucosa is moist.  No pharyngeal erythema or exudates.  No oropharyngeal masses.  No tonsillar hypertrophy.  Neck:  Supple without palpable adenopathy.  She does have some induration related to her surgery and postradiation changes.  Trachea stoma is patent.  TEP is in place without any gross drainage from around the prosthesis.    Eyes:  Extraocular muscles intact.  No nystagmus.  No exophthalmos or enophthalmos.  Neurologic:  Alert and oriented.  Cranial nerves 2-12 are grossly normal.      Procedure:   Flexible tracheoscopy, nasopharyngoscopy  The flexible fiberoptic laryngoscope was introduced into the right nostril and advanced. Examination of the nose showed the above mentioned findings. Examination of the nasopharynx showed no nasopharyngeal masses or eustachian tube obstruction. Examination of the base of tongue showed no masses or lesions. Examination of the hypopharynx showed no masses or lesions in the neopharynx or hypopharynx.  The esophageal inlet is without apparent lesions.  No evidence of fungal disease. The scope was passed in the stoma and the trachea was clear down to the aleja.       Labs:   TSH: 0.018 (11/26/24)      Imaging:  MRI Brain (3/19/25):  FINDINGS:  No acute ischemia or infarction.  Scattered focal areas of periventricular and subcortical white matter signal consistent with mild chronic microvascular white matter ischemic change.  No mass effect or midline shift.  No intra or extra-axial mass or hemorrhage.  Posterior fossa and brainstem normal.  Sella and orbits are grossly normal.  Paranasal sinuses essentially clear.  Cranium and extracranial structures otherwise unremarkable.  Postcontrast images demonstrate no abnormal parenchymal or meningeal enhancement     Impression:  No acute intracranial  process or evidence of malignancy.  Mild chronic microvascular white matter ischemic change.     Electronically signed by: Elias Mcmillan MD  Date:                                            03/20/2025  Time:                                           11:13      PET/CT (3/17/25):  FINDINGS:  Head and Neck:  Brain demonstrates normal metabolism.  However, FDG-PET has an approximate 60% sensitivity for brain metastases which are best detected by MRI with gadolinium.  Physiologic uptake is in the neck.     Chest:  INVOLVING THE RIGHT LOWER LOBE A CAVITARY HYPERMETABOLIC LESIONS IDENTIFIED WITH MAXIMUM SUV OF 6.9.  THIS IS BEST IDENTIFIED ON IMAGE NUMBER 108 OF SERIES NUMBER 3 MEASURES 2.3 CM..  No enlarged or FDG avid lymph nodes are in the chest.     Abdomen and Pelvis:  Physiologic uptake is in the liver, spleen, urinary system and bowel. No enlarged or FDG avid lymph nodes are in the abdomen or pelvis. Adrenal glands are normal.     Musculoskeletal:  No FDG avid osseous lesions are present.     Impression:  HYPERMETABOLIC LESIONS IDENTIFIED WITHIN THE RIGHT LOWER LOBE WHICH DEMONSTRATES CAVITATION.  NO EVIDENCE FOR CONTRALATERAL OR IPSILATERAL ADENOPATHY.     Electronically signed by:Nakul Franklin MD  Date:                                            03/18/2025  Time:                                           09:39      Chest XR (2/6/25):  FINDINGS  Lines/tubes/devices: none present     The cardiomediastinal silhouette is within normal limits.  There is a trace right apical pneumothorax.  The lung fields appear otherwise unchanged from prior.  The osseous structures are unchanged.     IMPRESSION  Trace right apical pneumothorax.      Assessment/Plan:  Heaven Boston is a 59 y.o. female with T3N1M0 supraglottic SCCa s/p TL BSND CPM on 3/24/2022, complicated by PCF s/p washout & primary closure x2 (2nd in [place]) now s/p adjuvant RT. One of her post-treatment PET noted hypermetabolism at the site of her TEP. She was  taken for a DL on 3/3/23 which was unremarkable. No evidence of     Recent PET/CT shows cavitary lesion in the RLL, biopsy positive for SCCa.    Plan:  -- Continue Levothyroxine 100 mcg daily; previously offered to lower to 75, but patient declined stating that she is asymptomatic. -- Obtain new TSH after clinic today  -- Continue follow-up with speech therapy  -- F/u with heme/onc regarding PET findings; next appt scheduled 4/3      RTC in 3 months       Christy Armendariz, PGY3  [facility]  3/27/2025 8:17 AM        HEAD & NECK CANCER SURVEILLANCE   Primary Surgical Treatment     Head & Neck Staff: Celina  Medical Oncologist: Drew   Radiation Oncologist: Jase     Primary tumor: T3N1M0 SCCa  of the supraglottis     Surgery Date: 3/24/2022  Induction Chemotherapy: no  Adjuvant Therapy: Radiation  Completion Date: completed 6/2022    Place date if completed   3 6 12 18 24 36 48 60   CT Neck X X 5/2023 11/28/23 3/5/24 X X X   PET/CT 9/2022 12/2022         CXR  X X X X X X X   TFT X  4/2023 9/5/24 X X X     Current Surveillance Interval: post-tx year 1-2, q2-3 mo     Suggested imaging surveillance. Patient and tumor specific factors should always be individually considered.

## 2025-03-27 NOTE — PROGRESS NOTES
Message from radiology:      Good afternoon, just wanted to let you know that I had to cancel Mrs. Boston's PFT. She has a laryngectomy so we are unable to do the test. Patients with laryngectomy or tracheostomy are not able to seal their airways for accurate measurements required for testing.

## 2025-03-27 NOTE — PROGRESS NOTES
Secure chat:     Per Radiology, patient unable to perform PFTs because of laryngectomy status.    Therefore, in the absence of accurate PFTs, per Dr. Landaverde, Cardiothoracic surgeon, SABR 2 lung lesion, we will be best for the patient.  Ideally, patient would need lumpectomy based on size and accurate PFTs which we, unfortunately, can not get.

## 2025-03-27 NOTE — PROGRESS NOTES
The scope used for the exam was:  Flexible scope ENF-P4  Serial Number:  1)    [id number]    []   2)    [id number]    []   3)    [id number]    []   4)    [id number]    []   5)    [id number]    []   6)    [id number]    [x]     7)    [id number]     []     8)    [id number]     []     9)    [id number]     []    10)  [id number]      []       The scope used for the exam was:  Rigid scope   Serial Number:  1)   [id number]    []   2)   [id number]    []   3)   [id number]    []   4)    3384   []   5)    0824   []   6)    5554   []     7)   7425    []   8)   2240    []   9)   1109    []

## 2025-04-01 ENCOUNTER — OFFICE VISIT (OUTPATIENT)
Dept: CARDIAC SURGERY | Facility: CLINIC | Age: 60
End: 2025-04-01
Payer: MEDICARE

## 2025-04-01 VITALS
SYSTOLIC BLOOD PRESSURE: 114 MMHG | OXYGEN SATURATION: 97 % | WEIGHT: 134 LBS | DIASTOLIC BLOOD PRESSURE: 72 MMHG | BODY MASS INDEX: 24.51 KG/M2 | HEART RATE: 86 BPM

## 2025-04-01 DIAGNOSIS — C44.722 SQUAMOUS CELL CARCINOMA OF RIGHT LOWER LEG: ICD-10-CM

## 2025-04-01 DIAGNOSIS — C32.1 SQUAMOUS CELL CARCINOMA OF SUPRAGLOTTIS: ICD-10-CM

## 2025-04-01 DIAGNOSIS — J98.4 CAVITATING MASS IN RIGHT LOWER LUNG LOBE: ICD-10-CM

## 2025-04-01 PROCEDURE — 3078F DIAST BP <80 MM HG: CPT | Mod: CPTII,,, | Performed by: STUDENT IN AN ORGANIZED HEALTH CARE EDUCATION/TRAINING PROGRAM

## 2025-04-01 PROCEDURE — 99204 OFFICE O/P NEW MOD 45 MIN: CPT | Mod: ,,, | Performed by: STUDENT IN AN ORGANIZED HEALTH CARE EDUCATION/TRAINING PROGRAM

## 2025-04-01 PROCEDURE — 4010F ACE/ARB THERAPY RXD/TAKEN: CPT | Mod: CPTII,,, | Performed by: STUDENT IN AN ORGANIZED HEALTH CARE EDUCATION/TRAINING PROGRAM

## 2025-04-01 PROCEDURE — 3074F SYST BP LT 130 MM HG: CPT | Mod: CPTII,,, | Performed by: STUDENT IN AN ORGANIZED HEALTH CARE EDUCATION/TRAINING PROGRAM

## 2025-04-01 PROCEDURE — 3008F BODY MASS INDEX DOCD: CPT | Mod: CPTII,,, | Performed by: STUDENT IN AN ORGANIZED HEALTH CARE EDUCATION/TRAINING PROGRAM

## 2025-04-01 PROCEDURE — 1160F RVW MEDS BY RX/DR IN RCRD: CPT | Mod: CPTII,,, | Performed by: STUDENT IN AN ORGANIZED HEALTH CARE EDUCATION/TRAINING PROGRAM

## 2025-04-01 PROCEDURE — 1159F MED LIST DOCD IN RCRD: CPT | Mod: CPTII,,, | Performed by: STUDENT IN AN ORGANIZED HEALTH CARE EDUCATION/TRAINING PROGRAM

## 2025-04-01 NOTE — PROGRESS NOTES
[facility]  Cardiothoracic Surgery  Office Consult Note    Patient Name: Heaven Boston  MRN: [id number]  Date of Service: 4/1/2025      Subjective:     Chief Complaint   Patient presents with    Pre-op Exam     -DR. DUPONT-LUNG RESECTION. DX: CAVITARY LESION RT LOWER LOBE LUNG FROM CT CHEST 12/17/25, 2.5 X 2.3 CM. TESTING PENDING PET 3/17/25, MRI BRAIN 3/19/25, PFT 3/27/25. PMH: DM, HTN, LARYNGEAL CA, S/P LARYNGECTOMY, BILAT NECK DISSECTION, LT HEMITHYROIDECTOMY,DVT BLE, SQ CELL CA RT LOWER LEG, TOBACCO AUBSE, RT LOWER LUNG LESION.       History of Present Illness:  60-year-old female with a past medical history of laryngeal cancer, status post laryngectomy, bilateral neck dissection, previous smoker presenting for evaluation of right lower lobe squamous cell carcinoma.  She had PET scan on 03/17 which showed 2.3 cm right lower lobe cavitary lesion with a SUV of 6.9, no mediastinal adenopathy.  MRI brain was done which was negative for any obvious metastasis.  She is unable to perform PFTs due to her laryngectomy.    Current Outpatient Medications on File Prior to Visit   Medication Sig    ALLERGY RELIEF, CETIRIZINE, 10 mg tablet Take 1 tablet by mouth once daily    apixaban (ELIQUIS) 5 mg Tab Take 1 tablet (5 mg total) by mouth 2 (two) times daily. 10 mg ( 2 tablets) twice daily for 7 days followed by 5 mg ( one tablet)  twice daily.    atorvastatin (LIPITOR) 80 MG tablet Take 1 tablet (80 mg total) by mouth every evening.    fluticasone propionate (FLONASE) 50 mcg/actuation nasal spray 1 spray (50 mcg total) by Each Nostril route 2 (two) times daily.    folic acid (FOLVITE) 1 MG tablet Take 1 tablet by mouth once daily    JARDIANCE 10 mg tablet TAKE ONE TABLET BY MOUTH DAILY AT 9 AM    levothyroxine (SYNTHROID) 100 MCG tablet Take 1 tablet (100 mcg total) by mouth before breakfast.    lisinopriL (PRINIVIL,ZESTRIL) 2.5 MG tablet Take 1 tablet (2.5 mg total) by mouth once daily.     metoprolol succinate (TOPROL-XL) 25 MG 24 hr tablet Take 0.5 tablets (12.5 mg total) by mouth once daily.    montelukast (SINGULAIR) 5 MG chewable tablet Take 5 mg by mouth every other day.    NIFEdipine (PROCARDIA-XL) 60 MG (OSM) 24 hr tablet Take 1 tablet (60 mg total) by mouth once daily.     Current Facility-Administered Medications on File Prior to Visit   Medication    dextrose 10% bolus 125 mL 125 mL    dextrose 10% bolus 125 mL 125 mL    diphenhydrAMINE injection 6.25 mg    droperidoL injection 0.25 mg    HYDROmorphone injection 0.2 mg    insulin aspart U-100 injection 2-9 Units    insulin aspart U-100 injection 4-12 Units    lactated ringers infusion    LIDOcaine (PF) 10 mg/ml (1%) injection 10 mg    LIDOcaine (PF) 10 mg/ml (1%) injection 10 mg    LIDOcaine HCL 20 mg/ml (2%) injection 1 mL    metoclopramide HCl injection 10 mg    midazolam (VERSED) 1 mg/mL injection 1 mg    oxyCODONE immediate release tablet 5 mg    silver nitrate applicators applicator 1 applicator    triamcinolone acetonide injection 40 mg       Review of patient's allergies indicates:  No Known Allergies    Past Medical History:   Diagnosis Date    Cancer     Laryngeal    Diabetes mellitus     Hypertension      Past Surgical History:   Procedure Laterality Date     SECTION      COLONOSCOPY N/A 05/15/2023    Procedure: COLONOSCOPY;  Surgeon: Francisco Zavala MD;  Location: [facility] ENDOSCOPY;  Service: Endoscopy;  Laterality: N/A;    DIRECT DIAGNOSTIC LARYNGOSCOPY WITH BRONCHOSCOPY AND ESOPHAGOSCOPY N/A 2023    Procedure: LARYNGOSCOPY, DIRECT, DIAGNOSTIC, WITH BRONCHOSCOPY AND ESOPHAGOSCOPY;  Surgeon: Connor Patrick MD;  Location: [facility] OR;  Service: ENT;  Laterality: N/A;  Will not need to do bronchoscopy for this procedure    [place] REMOVAL OF LARYNX  2022     Family History    None       Tobacco Use    Smoking status: Former     Current packs/day: 0.00     Average packs/day: 0.5 packs/day for 39.0 years (19.5 ttl  pk-yrs)     Types: Cigarettes     Start date: 4/1/1983     Quit date: 4/1/2022     Years since quitting: 3.0    Smokeless tobacco: Former     Quit date: 2022   Substance and Sexual Activity    Alcohol use: Never    Drug use: Never    Sexual activity: Not Currently     Review of Systems   Constitutional: Negative for fever and night sweats.   HENT:  Negative for ear discharge and ear pain.    Eyes:  Negative for blurred vision and double vision.   Cardiovascular:  Negative for chest pain.   Respiratory:  Negative for cough and shortness of breath.    Musculoskeletal:  Negative for joint pain.   Gastrointestinal:  Negative for bloating and abdominal pain.   Genitourinary:  Negative for dysuria and hematuria.   Neurological:  Negative for headaches and light-headedness.     Objective:     Vitals:    04/01/25 0953   BP: 114/72   Pulse: 86        Weight: 60.8 kg (134 lb)  Body mass index is 24.51 kg/m².       Physical Exam  Constitutional:       Appearance: Normal appearance.   HENT:      Head: Normocephalic and atraumatic.   Eyes:      Extraocular Movements: Extraocular movements intact.   Neck:      Comments: Tracheostomy with cap in place  Cardiovascular:      Rate and Rhythm: Normal rate and regular rhythm.      Pulses: Normal pulses.   Pulmonary:      Effort: Pulmonary effort is normal. No respiratory distress.      Breath sounds: Normal breath sounds.   Abdominal:      General: Abdomen is flat. There is no distension.      Palpations: Abdomen is soft.   Skin:     General: Skin is warm and dry.   Neurological:      Mental Status: She is alert and oriented to person, place, and time.          Significant Labs:  Reviewed    Significant Diagnostics:  Reviewed    Assessment/Plan:   Right lower lobe squamous cell carcinoma  - given the size of lesion >2cm, she is not an candidate for sublobar resection.  Lobectomy would be ideal for her however she is unable to perform PFTs.  As such I recommend nonsurgical option, such  as SABR.   This was discussed with Dr. Russell who is also in agreement.  - She will f/u prmontse Bright MD  Cardiothoracic Surgery  [facility]

## 2025-04-03 ENCOUNTER — OFFICE VISIT (OUTPATIENT)
Dept: HEMATOLOGY/ONCOLOGY | Facility: CLINIC | Age: 60
End: 2025-04-03
Attending: INTERNAL MEDICINE
Payer: MEDICARE

## 2025-04-03 VITALS
HEART RATE: 82 BPM | WEIGHT: 134.19 LBS | SYSTOLIC BLOOD PRESSURE: 105 MMHG | BODY MASS INDEX: 24.69 KG/M2 | RESPIRATION RATE: 18 BRPM | OXYGEN SATURATION: 99 % | TEMPERATURE: 99 F | HEIGHT: 62 IN | DIASTOLIC BLOOD PRESSURE: 64 MMHG

## 2025-04-03 DIAGNOSIS — C32.1 SQUAMOUS CELL CARCINOMA OF SUPRAGLOTTIS: ICD-10-CM

## 2025-04-03 DIAGNOSIS — I82.4Z2 DEEP VEIN THROMBOSIS (DVT) OF DISTAL VEIN OF LEFT LOWER EXTREMITY, UNSPECIFIED CHRONICITY: ICD-10-CM

## 2025-04-03 DIAGNOSIS — J98.4 CAVITATING MASS IN RIGHT LOWER LUNG LOBE: ICD-10-CM

## 2025-04-03 DIAGNOSIS — Z90.02 H/O LARYNGECTOMY: Primary | ICD-10-CM

## 2025-04-03 DIAGNOSIS — I82.409 RECURRENT DEEP VEIN THROMBOSIS (DVT): ICD-10-CM

## 2025-04-03 DIAGNOSIS — C44.722 SQUAMOUS CELL CARCINOMA OF RIGHT LOWER LEG: ICD-10-CM

## 2025-04-03 DIAGNOSIS — I82.5Y3 CHRONIC DEEP VEIN THROMBOSIS (DVT) OF PROXIMAL VEIN OF BOTH LOWER EXTREMITIES: ICD-10-CM

## 2025-04-03 DIAGNOSIS — Z87.891 HISTORY OF TOBACCO ABUSE: ICD-10-CM

## 2025-04-03 DIAGNOSIS — Z92.3 HISTORY OF HEAD AND NECK RADIATION: ICD-10-CM

## 2025-04-03 DIAGNOSIS — Z93.0 TRACHEOSTOMY IN PLACE: ICD-10-CM

## 2025-04-03 PROCEDURE — 99214 OFFICE O/P EST MOD 30 MIN: CPT | Mod: PBBFAC | Performed by: INTERNAL MEDICINE

## 2025-04-03 RX ORDER — LEVOTHYROXINE SODIUM 100 UG/1
100 TABLET ORAL
Qty: 30 TABLET | Refills: 11 | Status: SHIPPED | OUTPATIENT
Start: 2025-04-03 | End: 2026-04-03

## 2025-04-03 NOTE — PROGRESS NOTES
History:  Past Medical History:   Diagnosis Date    Cancer     Laryngeal    Diabetes mellitus     Hypertension    Past medical history:  Laryngeal cancer, S/P laryngectomy/bilateral neck dissection/left hemithyroidectomy; NIDDM; hypertension  Procedure/surgical history: ; colonoscopy 05/15/2023; direct laryngoscopy with biopsy 2023; laryngectomy 2022    Past Surgical History:   Procedure Laterality Date     SECTION      COLONOSCOPY N/A 05/15/2023    Procedure: COLONOSCOPY;  Surgeon: Francisco Zavala MD;  Location: [facility] ENDOSCOPY;  Service: Endoscopy;  Laterality: N/A;    DIRECT DIAGNOSTIC LARYNGOSCOPY WITH BRONCHOSCOPY AND ESOPHAGOSCOPY N/A 2023    Procedure: LARYNGOSCOPY, DIRECT, DIAGNOSTIC, WITH BRONCHOSCOPY AND ESOPHAGOSCOPY;  Surgeon: Connor Patrick MD;  Location: [facility] OR;  Service: ENT;  Laterality: N/A;  Will not need to do bronchoscopy for this procedure    NE REMOVAL OF LARYNX  2022      Social History     Socioeconomic History    Marital status: Single   Tobacco Use    Smoking status: Former     Current packs/day: 0.00     Average packs/day: 0.5 packs/day for 39.0 years (19.5 ttl pk-yrs)     Types: Cigarettes     Start date: 1983     Quit date: 2022     Years since quitting: 3.0    Smokeless tobacco: Former     Quit date:    Substance and Sexual Activity    Alcohol use: Never    Drug use: Never    Sexual activity: Not Currently      No family history on file.     Reason for Follow-up:  -recurrent bilateral lower extremity DVT  -history of squamous cell carcinoma of supraglottis, S/P total laryngectomy/bilateral neck dissection/left hemithyroidectomy 2022; T3 N1 M0  -Squamous cell carcinoma of right lower leg   -now, cavitary lesion right lower lung lobe on CT chest 2024  -history of heavy tobacco abuse, history of adjuvant radiotherapy     History of Present Illness:   H/O laryngectomy        Oncologic/Hematologic History:  Oncology  History   Laryngeal cancer   3/21/2022 Cancer Staged    Staging form: Larynx - Supraglottis, AJCC 8th Edition  - Pathologic stage from 3/21/2022: Stage III (pT3, pN1, cM0)     2022 Initial Diagnosis    Laryngeal cancer     Squamous cell carcinoma of supraglottis   3/1/2022 Cancer Staged    Staging form: Larynx - Supraglottis, AJCC 8th Edition  - Pathologic stage from 3/1/2022: Stage III (pT3, pN1, cM0)     2024 Initial Diagnosis    Squamous cell carcinoma of supraglottis     Past medical history:  Laryngeal cancer; NIDDM; hypertension; dyslipidemia; heavy tobacco abuse; history of SVT, on metoprolol; history of supraglottic squamous cell carcinoma, T3 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  Lives in [place].  Has a 33-year-old son who lives with her.  Does not work.  Smoked half ppd for 50 years; quit 2 years ago.  Used to drink 4 beers daily; drank for 30 years; quit 20 years ago.  No illicit drugs.    Family history: Negative for cancers or blood dyscrasia.  Health maintenance:  -10/18/2024:  Bilateral screening mammogram (comparison:  10/13/2023 mammogram, etc.):  BI-RADS: 1-negative  -05/15/2023:  Colonoscopy (indication: Positive Cologuard test):  Diverticulosis sigmoid colon and descending colon; otherwise, normal; no biopsies taken (repeat colonoscopy in 5 years for screening)      59-year-old female, referred from [facility], with a history of chronic DVT      Previous 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    10/16/2024:  [facility] note:   Spoke  "to patient over the phone regarding Doppler ultrasound findings of blood clot.    Radiology reached out to me about the result showing:   "There is chronic appearing nonocclusive thrombus in the left distal femoral vein".   Then upon discussion with radiologist, sent last results of previous doppler US in April 2022 showing: "The left lower extremity was positive for a poorly visualized short segment, age-indeterminate, nearly occluded to occluded deep vein thrombus located at the level of the above the knee popliteal vein". It seems to be more as chronic thrombus but not exactly same area as previous one.  Hence, will treat with eliquis 10 mg bid for the first week, then 5 BID afterwards. Patient agreeable.  Of note, she was diagnosed with a DVT in April 2022 and was initially given eliquis for 30 days.  On follow-up with Cardiology, DOAC was resumed (Xarelto not Eliquis due to cost).  Patient was wondering how long she needed to be on anticoagulation  thought dvt to be malignancy related, but pt seems to be in remission. At that time she was told to resume for a year. Took xarelto until May 2023.  Discussed with patient that will send referral to Heme-Onc again to discuss further recommendations.  In the meantime resume Eliquis.  If not covered, will switch to Xarelto.  Patient voiced understanding.  She denies any shortness a breath, chest pain or palpitations, but discussed that if new symptoms develop to go to the emergency room to get further evaluated because of risk of PE.  Also arterial ultrasound with mild to moderate arterial flow reduction of the right and left lower extremities.  will refer to vascular surgery.  Patient on Lipitor 80 mg daily.      12/12/2024: ENT office note:   Heaven Boston is a 57 y.o. female PMH diabetes, hypertension, heavy tobacco smoking, sinus tachycardia on metoprolol, T3 N1 M0 supraglottic squamous cell carcinoma s/p total laryngectomy, bilateral neck dissection, left " hemithyroidectomy, cricopharyngeal myotomy, primary TEP on 03/21/2022. Patient had an uneventful hospitalization course and was discharged on postoperative day 9 after an esophagram showed no pharyngeal leak.  However she re-presented to our facility on 4/6/22 with surgical site infection and a pharyngocutaneous fistula. Patient underwent a neck washout with primary repair on 4/11/2022, with placement of a gastrostomy tube the same day. She was maintained on NPO and transferred to [place] for further evaluation and management on 4/15/22 after evidence of persistent pharyngocutaneous fistula. There she underwent a 2nd neck washout with primary closure and placement of salivary bypass tube.   2/20/24:  Here today for follow-up.  Patient states she has been eating and gaining weight well.  She has not having any trismus hemoptysis weight loss weakness fatigue ear pain.  She has been taking her Synthroid as instructed she did not get new labs.  No new lumps or bumps.  5/19/22: In radiation therapy, 5x weekly until 6/16/22 (6000 cGy/30 fractions/43 days).   5/16/24:  Here today for follow.  She is doing well.  She has been eating well and gaining weight.  She denies dysphagia, odynophagia, otalgia, lumps or bumps in the head or neck.  TSH was elevated.  She says that she takes the Synthroid on an empty stomach everyday and has not missed doses.  She takes 88 mcg daily.  9/5/2024:   Patient presents today for follow-up of her laryngeal squamous cell carcinoma and hypothyroidism.  Patient has no complaints at this time.  She is tolerating regular diet and doing well with her TEP.  She continues to take levothyroxine 100 mcg daily.  She has not had follow-up thyroid function studies since her dose was increased.  She has no complaints of any heat or cold intolerance or palpitations.  No other new problems.  12/12/24: Presents today in follow up. Doing well without issues. Denies issues swallowing or changes to voice.  Weight has increased recently. No issues with TEP. Changed last week. Taking synthroid 100 without issues. Denies otalgia, fevers, chills, night sweats, weight loss, palpitations, diarrhea or any new problems.   Procedure note: Flexible tracheoscopy, nasopharyngoscopy  The flexible fiberoptic laryngoscope was introduced into the right nostril and advanced. Examination of the nose showed the above mentioned findings. Examination of the nasopharynx showed no nasopharyngeal masses or eustachian tube obstruction. Examination of the base of tongue showed no masses or lesions. Examination of the hypopharynx showed no masses or lesions in the neopharynx or hypopharynx.  The esophageal inlet is without apparent lesions.  No evidence of fungal disease. The scope was passed in the stoma and the trachea was clear down to the aleja.   Assessment/Plan:  Heaven Boston is a 59 y.o. female with T3N1M0 supraglottic SCCa s/p TL BSND CPM on 3/24/2022, complicated by PCF s/p washout & primary closure x2 (2nd in [place]) now s/p adjuvant RT. One of her post-treatment PET noted hypermetabolism at the site of her TEP. She was taken for a DL on 3/3/23 which was unremarkable. Doing well today.  No evidence of disease on exam.  Postoperative hypothyroidism.  Plan:  levothyroxine 100 mcg p.o. q.d. Offered to lower to 75, but patient declined stating that she is asymptomatic.      12/04/2024:  [facility] note:   Heaven Boston is a 59 y.o. female here for Leg Pain   (The patient states that she has a lump on her right leg that is blue and it hurts. She says it been like that for about a week.   Also, arm pain that has been keeping her up at night. She used diclofenac from her cousin and she says it works so she ask if some can be prescribed.) and Arm Pain  HPI  Here for above concern of right leg pain and a lump that she noticed that has been traveling.  Right leg pain for a week now.  Initially noticed a lump above knee that  kind of traveled into the upper thigh, painful.    These happened right after our appointment last week.  Last week we advised patient to take Eliquis 5 mg b.i.d. for the chronic DVT instead of 5 mg once a day that she was taking.  She did start to take the Eliquis 5 mg b.i.d. the next day.  Of note, patient used to be on Xarelto for a year last year for left-sided DVT.  Thought to be malignancy related.  Then that repeat ultrasound showed left-sided clot which seemed to be chronic appearing but not exactly the same area so we resumed anticoagulant Eliquis.   Called imaging- accepted pt to have the study now.  Patient agreeable to have the Doppler ultrasound repeated, high concern for clot.  Otherwise denies any chest pain or shortness for breath, no palpitations.   Ever since working at [employer], left shoulder pain intermittent.  When sleeping, can not lay on left side. Tried voltaren gel from her cousin and helped immediately. Would like to have some on her own.  1. Leg DVT (deep venous thromboembolism), acute, right  Updated Doppler ultrasound same day done, revealed right leg DVT.    Patient was just diagnosed with questionable acute on chronic DVT of the left side at that time she had both legs ultrasound and it was new right DVT, but likely developed a right-sided DVT now because of not appropriately taking Eliquis as directed twice a day.  She was taking it only once a day.  Just started to take twice a day and advised patient to continue doing so very strictly.  Failure to DOACs because patient was not taking it appropriately.  Discussed with patient that she will likely need to be on Eliquis 5 mg b.i.d. lifelong but will refer to Heme-Onc for specialized recommendations.  In the meantime check thrombophilia panel.  Will also check CT chest because of patient history of cancer.  Advised also patient to check with her ENT as soon as possible and update them with the current issues of clots to be extra  cautious, having history of laryngeal cancer.  Patient has upcoming appointment with ENT within 2 weeks.  She had CT neck done for this year for surveillance in March.  2. Right leg pain  3. Chronic deep vein thrombosis (DVT) of distal vein of left lower extremity  4. Chronic left shoulder pain  Stable on Voltaren gel.   Declines further meds/referrals because it is not as bothersome and Voltaren gel works great.  Check an x-ray.  5. Pulmonary infiltrate  6. History of laryngeal cancer      Investigations reviewed:    -12/30/2022:  FDG PET-CT (comparison: FDG PET-CT 10/12/2022, CT neck 09/19/2022):  No new or progressive hypermetabolic metastatic disease  -03/05/2024: CT soft tissues of the neck with contrast (comparison: CT neck 11/28/2023): Few ground-glass opacities in the right upper lobe may be infectious or inflammatory.  Otherwise stable exam compared to 11/28/2023.  -12/17/2024:  CT chest without contrast (comparison: 03/10/2022):  Cavitary lesion right lower lobe, inflammatory versus neoplastic (2.5 x 2.3 cm; right lower lobe; associated area of infiltrate)      -10/16/2024:  Bilateral lower extremity venous Doppler (bilateral leg pain; history of DVT left lower extremity):  No right lower extremity DVT; left superficial femoral distal vein abnormal (partial compressibility) (positive for DVT left lower extremity)  -10/16/2024:  Bilateral lower extremity arterial Doppler (bilateral leg pain):  The right lower extremity demonstrated multiphasic waveforms at all levels with the exception of the distal PTA and DPA.  The right lower extremity demostrated mild to moderate arterial flow reduction.   The left lower extremity demonstrated multiphasic waveforms at all levels with the exception of the distal PTA and DPA.  The left lower extremity demonstrated mild to moderate arterial flow reduction.   The ABIs were omitted due to findings of thrombus on prior venous exam  -12/04/2024:  Venous Doppler right lower  extremity (right leg lumps starting above the knee and traveling upper thigh, concern for blood clot; history of recurrent DVT left side):  DVT right distal femoral vein and popliteal vein      Labs reviewed:  -12/03/2024: CBC unremarkable    12/19/2024:   Pleasant lady who presents for initial medical oncology consultation.  In no acute discomfort.  Phonates with the help of TEP; she is S/P laryngectomy.    On anticoagulation with Eliquis.  Reports no bleeding in any form.  No hematemesis, melena, hematochezia, hematuria, hemoptysis, etc..    Denies chest pain, cough, dyspnea, fevers, chills, mucopurulent sputum production, or hemoptysis.  Currently, some pain in the right thigh area, not severe.  Denies weakness or fatigue.  Great appetite.  Denies dysphagia, odynophagia, otalgia, throat pain, etc..  Denies unintentional weight loss, any new lumps or lymphadenopathy, unusual headaches, focal neurological symptoms, abdominal pain, nausea, vomiting, GI bleeding, change in bowel habits, etc..    ECOG 0-1.      Interval History:  [No matching plan found]   [No matching plan found]     04/03/2025:   -03/17/2025:  FDG PET-CT (comparison: 12/30/2022):  HYPERMETABOLIC LESIONS IDENTIFIED WITHIN THE RIGHT LOWER LOBE WHICH DEMONSTRATES CAVITATION.  NO EVIDENCE FOR CONTRALATERAL OR IPSILATERAL ADENOPATHY.  (right lower lobe cavitary hypermetabolic lesion maximum SUV 6.9, 2.3 cm)  -03/19/2025: Brain MRI with and without contrast:  Staging:  No intracranial metastases  -03/27/2025: ENT follow-up:  Continue levothyroxine 100 mcg p.o. q.day; previously, offered to lower 75, but patient declined stating that she is asymptomatic; follow-up with speech pathology  -03/27/2025:  Per pulmonary tech, patient unable to perform PFTs because of laryngectomy status  -04/01/2025: Consultation with Saima Bright MD, Cardiothoracic frozen:  Patient was unable to perform PFTs due to her prior laryngectomy status; given the size of  lesion 2.3 cm on PET-CT(> 2 cm), she is not a candidate for sublobar resection; lobectomy would be ideal for her; however, she is unable to perform PFTs because of laryngectomy status, therefore, nonsurgical option is recommended, such as SABR  -follows up with cardiology for hypertension, dyslipidemia, history of SVT noted on previous event monitor in 2021, history of recurrent DVTs, NIDDM, etc..  Presents for a follow-up visit.  In no acute discomfort.  Great appetite.  In cheerful experienced.  ECOG 0.  Denies cough, dyspnea, chest pain, or hemoptysis.  No weakness or fatigue.  No new lumps or lymphadenopathy.  No unintentional weight loss. Phonates with the help of TEP; she is S/P laryngectomy.    On anticoagulation with Eliquis for bilateral lower extremity DVTs.  Reports no bleeding in any form.  No hematemesis, melena, hematochezia, hematuria, hemoptysis, etc.. Denies dysphagia, odynophagia, otalgia, throat pain, etc..  Denies unintentional weight loss, any new lumps or lymphadenopathy, unusual headaches, focal neurological symptoms, abdominal pain, nausea, vomiting, GI bleeding, change in bowel habits, etc..    She smoked half ppd X 50 years; quit a few years ago.    Medications:  Current Outpatient Medications on File Prior to Visit   Medication Sig Dispense Refill    ALLERGY RELIEF, CETIRIZINE, 10 mg tablet Take 1 tablet by mouth once daily 90 tablet 0    apixaban (ELIQUIS) 5 mg Tab Take 1 tablet (5 mg total) by mouth 2 (two) times daily. 10 mg ( 2 tablets) twice daily for 7 days followed by 5 mg ( one tablet)  twice daily. 180 tablet 2    atorvastatin (LIPITOR) 80 MG tablet Take 1 tablet (80 mg total) by mouth every evening. 90 tablet 3    fluticasone propionate (FLONASE) 50 mcg/actuation nasal spray 1 spray (50 mcg total) by Each Nostril route 2 (two) times daily. 16 g 1    folic acid (FOLVITE) 1 MG tablet Take 1 tablet by mouth once daily 30 tablet 6    JARDIANCE 10 mg tablet TAKE ONE TABLET BY MOUTH  DAILY AT 9 AM 90 tablet 11    levothyroxine (SYNTHROID) 100 MCG tablet Take 1 tablet (100 mcg total) by mouth before breakfast. 30 tablet 11    lisinopriL (PRINIVIL,ZESTRIL) 2.5 MG tablet Take 1 tablet (2.5 mg total) by mouth once daily. 90 tablet 2    metoprolol succinate (TOPROL-XL) 25 MG 24 hr tablet Take 0.5 tablets (12.5 mg total) by mouth once daily. 45 tablet 3    montelukast (SINGULAIR) 5 MG chewable tablet Take 5 mg by mouth every other day.      NIFEdipine (PROCARDIA-XL) 60 MG (OSM) 24 hr tablet Take 1 tablet (60 mg total) by mouth once daily. 30 tablet 11     Current Facility-Administered Medications on File Prior to Visit   Medication Dose Route Frequency Provider Last Rate Last Admin    dextrose 10% bolus 125 mL 125 mL  12.5 g Intravenous PRN Tamie Woody FNP        dextrose 10% bolus 125 mL 125 mL  12.5 g Intravenous PRN Tamie Woody FNP        diphenhydrAMINE injection 6.25 mg  6.25 mg Intravenous Once PRN Lolis Sood MD        droperidoL injection 0.25 mg  0.25 mg Intravenous Once PRN Lolis Sood MD        HYDROmorphone injection 0.2 mg  0.2 mg Intravenous Q5 Min PRN Lolis Sood MD        insulin aspart U-100 injection 2-9 Units  2-9 Units Subcutaneous Q6H PRN Tamie Woody FNP        insulin aspart U-100 injection 4-12 Units  4-12 Units Subcutaneous PRN Tamie Woody FNP        lactated ringers infusion   Intravenous Continuous Lolis Sood  mL/hr at 03/03/23 0656 Rate Change at 03/03/23 0656    LIDOcaine (PF) 10 mg/ml (1%) injection 10 mg  1 mL Intradermal Once Tamie Woody FNP        LIDOcaine (PF) 10 mg/ml (1%) injection 10 mg  1 mL Intradermal Once Lolis Sood MD        LIDOcaine HCL 20 mg/ml (2%) injection 1 mL  1 mL Intradermal 1 time in Clinic/HOD Travis Aparicio MD        metoclopramide HCl injection 10 mg  10 mg Intravenous Once PRN Lolis Sood MD        midazolam (VERSED) 1 mg/mL injection 1 mg  1 mg  Intravenous Once PRN Lolis Sood MD        oxyCODONE immediate release tablet 5 mg  5 mg Oral Q3H PRN Lolis Sood MD        silver nitrate applicators applicator 1 applicator  1 applicator Topical (Top) 1 time in Clinic/HOD Patricia Montes MD        triamcinolone acetonide injection 40 mg  40 mg Intramuscular 1 time in Clinic/HOD Travis Aparicio MD           Review of Systems:   All systems reviewed and found to be negative except for the symptoms detailed above    Physical Examination:   VITAL SIGNS:   Vitals:    04/03/25 1452   BP: 105/64   Pulse: 82   Resp: 18   Temp: 98.6 °F (37 °C)       GENERAL:  In no apparent distress.    HEAD:  No signs of head trauma.  EYES:  Pupils are equal.  Extraocular motions intact.    EARS:  Hearing grossly intact.  MOUTH:  Oropharynx is normal.   NECK:  No adenopathy, no JVD.     CHEST:  Chest with clear breath sounds bilaterally.  No wheezes, rales, rhonchi.    CARDIAC:  Regular rate and rhythm.  S1 and S2, without murmurs, gallops, rubs.  VASCULAR:  No Edema.  Peripheral pulses normal and equal in all extremities.  ABDOMEN:  Soft, without detectable tenderness.  No sign of distention.  No   rebound or guarding, and no masses palpated.   Bowel Sounds normal.  MUSCULOSKELETAL:  Good range of motion of all major joints. Extremities without clubbing, cyanosis or edema.    NEUROLOGIC EXAM:  Alert and oriented x 3.  No focal sensory or strength deficits.   Speech normal.  Follows commands.  PSYCHIATRIC:  Mood normal.  -12/19/2024: S/P laryngectomy in the past; phonates via TEP; no lumps or lymphadenopathy in neck; in no acute discomfort; S/P radiation therapy to the neck, with tissue induration.    Results for orders placed or performed in visit on 12/03/24   CBC Auto Differential    Narrative    The following orders were created for panel order CBC Auto Differential.  Procedure                               Abnormality         Status                     ---------                                -----------         ------                     CBC with Differential[[id number]]       Abnormal            Final result                 Please view results for these tests on the individual orders.   CBC with Differential   Result Value Ref Range    WBC 6.10 4.50 - 11.50 x10(3)/mcL    RBC 4.44 4.20 - 5.40 x10(6)/mcL    Hgb 12.0 12.0 - 16.0 g/dL    Hct 39.4 37.0 - 47.0 %    MCV 88.7 80.0 - 94.0 fL    MCH 27.0 27.0 - 31.0 pg    MCHC 30.5 (L) 33.0 - 36.0 g/dL    RDW 13.4 11.5 - 17.0 %    Platelet 278 130 - 400 x10(3)/mcL    MPV 9.6 7.4 - 10.4 fL    Neut % 71.4 %    Lymph % 19.3 %    Mono % 5.2 %    Eos % 3.4 %    Basophil % 0.5 %    Lymph # 1.18 0.6 - 4.6 x10(3)/mcL    Neut # 4.35 2.1 - 9.2 x10(3)/mcL    Mono # 0.32 0.1 - 1.3 x10(3)/mcL    Eos # 0.21 0 - 0.9 x10(3)/mcL    Baso # 0.03 <=0.2 x10(3)/mcL    IG# 0.01 0 - 0.04 x10(3)/mcL    IG% 0.2 %    NRBC% 0.0 %     Results for orders placed or performed in visit on 09/05/24   Comprehensive Metabolic Panel   Result Value Ref Range    Sodium 141 136 - 145 mmol/L    Potassium 4.1 3.5 - 5.1 mmol/L    Chloride 108 (H) 98 - 107 mmol/L    CO2 25 22 - 29 mmol/L    Glucose 164 (H) 74 - 100 mg/dL    Blood Urea Nitrogen 11.5 9.8 - 20.1 mg/dL    Creatinine 1.18 (H) 0.55 - 1.02 mg/dL    Calcium 9.9 8.4 - 10.2 mg/dL    Protein Total 7.3 6.4 - 8.3 gm/dL    Albumin 3.5 3.5 - 5.0 g/dL    Globulin 3.8 (H) 2.4 - 3.5 gm/dL    Albumin/Globulin Ratio 0.9 (L) 1.1 - 2.0 ratio    Bilirubin Total 0.2 <=1.5 mg/dL     40 - 150 unit/L    ALT 13 0 - 55 unit/L    AST 13 5 - 34 unit/L    eGFR 54 mL/min/1.73/m2    Anion Gap 8.0 mEq/L    BUN/Creatinine Ratio 10        Assessment:  Problem List Items Addressed This Visit    None    Orders for 04/03/2025:   Refer to Radiation Oncology for SABR to right lower lung lobe squamous cell carcinoma   Follow-up visit with me in 1 month initiate surveillance     Above discussed at length with the patient.  All questions  answered.  Discussed labs and scans and gave her copies of relevant results.  Plan of management discussed.    She understands and agrees with this plan.  ==================================    # History of squamous cell carcinoma of supraglottis:  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 Patient had an uneventful hospitalization course and was discharged on postoperative day 9 after an esophagram showed no pharyngeal leak.  However she re-presented to our facility on 4/6/22 with surgical site infection and a pharyngocutaneous fistula. Patient underwent a neck washout with primary repair on 4/11/2022, with placement of a gastrostomy tube the same day. 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 There she underwent a 2nd neck washout with primary closure and placement of salivary bypass tube.   S/P adjuvant radiotherapy, completed 06/16/2022 (6000 cGy/30 fractions/43 days)  One of her post-treatment PET noted hypermetabolism at the site of her TEP. She was taken for a DL on 3/3/23 which was unremarkable.  >>>  # New squamous cell carcinoma right lower lung lobe:  -there was question of a new primary versus metastatic malignancy on CT chest 12/17/2024:   -12/17/2024:  CT chest without contrast (comparison: 03/10/2022):  Cavitary lesion right lower lobe, inflammatory versus neoplastic (2.5 x 2.3 cm; right lower lobe; associated area of infiltrate)    -right lung, transthoracic needle biopsy: Squamous cell carcinoma  -staging FDG PET-CT 03/17/2025:  Right lower lobe malignant lesion 2.3 cm, maximum SUV 6.9  -staging brain MRI 03/19/2025: No brain metastases  -on Synthroid per ENT (S/P laryngectomy in the past)  -03/27/2025:  Could not perform PFTs because of laryngectomy  status  -per Cardiothoracic surgery, 04/01/2025, given the size of the lesion 2.3 cm on PET-CT, i.e.,> 2 cm, she is not a candidate for sublobar resection; lumpectomy would be ideal for her; however, she is unable to perform PFTs because of laryngectomy status, therefore, nonsurgical option is recommended, such as SABR (which is also consistent with NCCN guidelines)  >>>  Plan:  -refer to Radiation Oncology for SABR right lower lung lobe squamous cell carcinoma  -follow-up with me in 1 month, to initiate surveillance    # Recurrent bilateral lower extremity DVTs:  # 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  # 04/2022:  Left lower extremity positive for poorly visualized short segment age indeterminate nearly occluded-occluded DVT at the level of above the knee popliteal vein  # apparently, given Eliquis X 30 days; subsequently, Xarelto (due to cost); Xarelto until 05/2023 (thus, was anticoagulated for 1 year)  # 10/16/2024:  (bilateral leg pain) Left superficial distal femoral vein abnormal, partial compressibility chronic appearing nonocclusive thrombus left distal femoral vein) (seems to be more as chronic thrombus but not exactly in the same area as previous one)  # started on anticoagulation with Eliquis by PCP in family [facility] (however, she was taking Eliquis only 5 mg daily instead of b.i.d.)  # 12/04/2024 (right leg painful lumps starting above the knee and traveling up to upper thigh x1 week):  DVT right distal femoral and popliteal vein (a week prior, she was instructed by PCP to start taking Eliquis 5 mg p.o. b.i.d., rather than 5 mg once daily)  # Eliquis resumed by PCP  #12/17/2024:  CT chest without contrast (comparison: 03/10/2022):  Cavitary lesion right lower lobe, inflammatory versus neoplastic (2.5 x 2.3 cm; right lower lobe; associated area of infiltrate)  (Question of a new lung primary versus metastasis, possibly accounting for new DVT right lower  extremity)  -12/19/2024:  Antithrombin 3 activity 148% normal/elevated; factor 5 Leiden mutation negative; protein C activity 130% normal; protein S antigen free 106% normal  -02/11/2025: Cardiolipin antibody IgG, IgM: Negative  -02/11/2025: Beta 2 glycoprotein 1 antibodies IgG, IgM: Negative  -02/11/2025:  Prothrombin gene mutation negative  >>>  Plan:   Recurrent lower extremity DVT  -hypercoagulable workup negative  -needs indefinite anticoagulation; continue Xarelto in therapeutic dose, 5 mg p.o. b.i.d., indefinitely; monitor for bleeding  -the latest episode of DVT could be secondary to newly diagnosed squamous cell carcinoma of right lower lung lobe  -with Eliquis on board, can not measure protein C deficiency, protein S deficiency, and antithrombin 3 deficiency using functional assays  -lupus anticoagulant should only be tested in the absence of any anticoagulation as it is a clot based test, functional assay    With DOAC:  -Factor V Leiden, prothrombin gene mutation, antiphospholipid antibody are unaffected;  -cannot measure protein C deficiency, protein S deficiency, and Antithrombin III deficiency using functional assays (can cause overestimation of antithrombin levels; can not measure protein C, protein S, and antithrombin using functional assays with DOAC);  -antiphospholipid antibodies are unaffected because they are serologic assays and are unaffected by anticoagulation (anticardiolipin antibody; beta 2 glycoprotein 1 antibody);  -lupus anticoagulant should only be tested in the absence of any anticoagulant as it is clot based test      Follow-up:  No follow-ups on file.

## 2025-04-03 NOTE — Clinical Note
Orders for 04/03/2025:  Refer to Radiation Oncology for SABR to right lower lung lobe squamous cell carcinoma  Follow-up visit with me in 1 month initiate surveillance

## 2025-04-08 ENCOUNTER — TELEPHONE (OUTPATIENT)
Dept: OTOLARYNGOLOGY | Facility: CLINIC | Age: 60
End: 2025-04-08
Payer: MEDICARE

## 2025-04-08 RX ORDER — DULOXETIN HYDROCHLORIDE 30 MG/1
30 CAPSULE, DELAYED RELEASE ORAL DAILY
Qty: 30 CAPSULE | Refills: 11 | Status: SHIPPED | OUTPATIENT
Start: 2025-04-08 | End: 2026-04-08

## 2025-04-08 NOTE — TELEPHONE ENCOUNTER
----- Message from Nurse Veronica sent at 4/8/2025  9:14 AM CDT -----    ----- Message -----  From: Ghazala Carnes  Sent: 4/7/2025   3:09 PM CDT  To: [facility] Otorhinolaryngology Clinical Support [facility]    Pt called and LVM requesting a refill on DULoxetine (CYMBALTA) 30 MG capsule and to please send to [facility] in [place].Thanks!

## 2025-04-10 ENCOUNTER — CLINICAL SUPPORT (OUTPATIENT)
Dept: RADIATION THERAPY | Facility: HOSPITAL | Age: 60
End: 2025-04-10
Attending: RADIOLOGY
Payer: MEDICARE

## 2025-04-10 DIAGNOSIS — N18.31 STAGE 3A CHRONIC KIDNEY DISEASE: ICD-10-CM

## 2025-04-10 DIAGNOSIS — C44.722 SQUAMOUS CELL CARCINOMA OF RIGHT LOWER LEG: ICD-10-CM

## 2025-04-10 DIAGNOSIS — C32.1 SQUAMOUS CELL CARCINOMA OF SUPRAGLOTTIS: ICD-10-CM

## 2025-04-10 DIAGNOSIS — E11.21 TYPE 2 DIABETES MELLITUS WITH DIABETIC NEPHROPATHY, WITHOUT LONG-TERM CURRENT USE OF INSULIN: ICD-10-CM

## 2025-04-10 DIAGNOSIS — J98.4 CAVITATING MASS IN RIGHT LOWER LUNG LOBE: ICD-10-CM

## 2025-04-10 PROCEDURE — 77334 RADIATION TREATMENT AID(S): CPT | Performed by: RADIOLOGY

## 2025-04-10 PROCEDURE — 77332 RADIATION TREATMENT AID(S): CPT | Performed by: RADIOLOGY

## 2025-04-10 RX ORDER — LEVOTHYROXINE SODIUM 100 UG/1
100 TABLET ORAL
Qty: 30 TABLET | Refills: 11 | OUTPATIENT
Start: 2025-04-10 | End: 2026-04-10

## 2025-04-10 RX ORDER — LISINOPRIL 2.5 MG/1
2.5 TABLET ORAL DAILY
Qty: 90 TABLET | Refills: 2 | Status: SHIPPED | OUTPATIENT
Start: 2025-04-10 | End: 2026-04-10

## 2025-04-14 PROCEDURE — 77301 RADIOTHERAPY DOSE PLAN IMRT: CPT | Performed by: RADIOLOGY

## 2025-04-14 PROCEDURE — 77300 RADIATION THERAPY DOSE PLAN: CPT | Performed by: RADIOLOGY

## 2025-04-14 PROCEDURE — 77285 THER RAD SIMULAJ FIELD INTRM: CPT | Performed by: RADIOLOGY

## 2025-04-14 PROCEDURE — 77338 DESIGN MLC DEVICE FOR IMRT: CPT | Performed by: RADIOLOGY

## 2025-04-15 PROCEDURE — 77373 STRTCTC BDY RAD THER TX DLVR: CPT | Performed by: RADIOLOGY

## 2025-04-16 ENCOUNTER — CLINICAL SUPPORT (OUTPATIENT)
Dept: CARDIOLOGY | Facility: CLINIC | Age: 60
End: 2025-04-16
Payer: MEDICARE

## 2025-04-16 VITALS
HEIGHT: 62 IN | RESPIRATION RATE: 18 BRPM | BODY MASS INDEX: 24.84 KG/M2 | HEART RATE: 88 BPM | SYSTOLIC BLOOD PRESSURE: 102 MMHG | DIASTOLIC BLOOD PRESSURE: 59 MMHG | WEIGHT: 135 LBS | OXYGEN SATURATION: 98 % | TEMPERATURE: 98 F

## 2025-04-16 DIAGNOSIS — I10 HYPERTENSION, UNSPECIFIED TYPE: Primary | ICD-10-CM

## 2025-04-16 PROCEDURE — 99211 OFF/OP EST MAY X REQ PHY/QHP: CPT | Mod: S$PBB,,, | Performed by: NURSE PRACTITIONER

## 2025-04-16 PROCEDURE — 99214 OFFICE O/P EST MOD 30 MIN: CPT | Mod: PBBFAC

## 2025-04-16 NOTE — PROGRESS NOTES
Pt presented today for bp and Hr check.  Pt Hr not at goal 90. Pt restarted on toprol 12.5 mg QD at that time.  Pt bp and Hr today is 102/59 and 61. Pt denies any cardiac targets. States she did not obtain her flp but will do before NCV.   This visit presented to Al[facility] for review. Pt instructions are as follows: Continue current medication regimen,keep all follow up aoppointments and Strict ED precautions given. Pt verbalized understanding and understood POC.

## 2025-04-17 ENCOUNTER — TELEPHONE (OUTPATIENT)
Dept: OTOLARYNGOLOGY | Facility: CLINIC | Age: 60
End: 2025-04-17
Payer: MEDICARE

## 2025-04-17 PROCEDURE — 77373 STRTCTC BDY RAD THER TX DLVR: CPT | Performed by: RADIOLOGY

## 2025-04-17 NOTE — TELEPHONE ENCOUNTER
----- Message from Nurse Veronica sent at 4/17/2025 10:52 AM CDT -----    ----- Message -----  From: Mera Nava  Sent: 4/17/2025   8:11 AM CDT  To: [facility] Otorhinolaryngology Clinical Support [facility]    Yanet Espinoza from [facility] just called about a mutual pt , Miss Collado. Yanet stated that Miss Collado is requesting a refill for her levothyroxine medication. I did tell that it shows she has 11 refills but they said she is stil requesting a refill so that they can have it delivered to her home. Thank You    Spoke with: [facility]    Number:above  Re: Rx - confirmed that rx was received

## 2025-04-21 PROCEDURE — 77336 RADIATION PHYSICS CONSULT: CPT | Performed by: RADIOLOGY

## 2025-04-22 PROCEDURE — 77373 STRTCTC BDY RAD THER TX DLVR: CPT | Performed by: RADIOLOGY

## 2025-04-24 PROCEDURE — 77373 STRTCTC BDY RAD THER TX DLVR: CPT | Performed by: RADIOLOGY

## 2025-04-25 PROCEDURE — 77373 STRTCTC BDY RAD THER TX DLVR: CPT

## 2025-04-28 ENCOUNTER — HOSPITAL ENCOUNTER (OUTPATIENT)
Dept: CARDIOLOGY | Facility: HOSPITAL | Age: 60
Discharge: HOME | End: 2025-04-28
Attending: NURSE PRACTITIONER
Payer: MEDICARE

## 2025-04-28 VITALS
SYSTOLIC BLOOD PRESSURE: 114 MMHG | HEIGHT: 62 IN | BODY MASS INDEX: 24.84 KG/M2 | DIASTOLIC BLOOD PRESSURE: 67 MMHG | WEIGHT: 135 LBS

## 2025-04-28 DIAGNOSIS — I34.0 NONRHEUMATIC MITRAL VALVE REGURGITATION: ICD-10-CM

## 2025-04-28 PROCEDURE — 93306 TTE W/DOPPLER COMPLETE: CPT

## 2025-04-29 ENCOUNTER — TELEPHONE (OUTPATIENT)
Dept: CARDIOLOGY | Facility: CLINIC | Age: 60
End: 2025-04-29
Payer: MEDICARE

## 2025-04-29 ENCOUNTER — RESULTS FOLLOW-UP (OUTPATIENT)
Dept: CARDIOLOGY | Facility: CLINIC | Age: 60
End: 2025-04-29

## 2025-04-29 LAB
APICAL FOUR CHAMBER EJECTION FRACTION: 54 %
APICAL TWO CHAMBER EJECTION FRACTION: 56 %
AV INDEX (PROSTH): 0.63
AV MEAN GRADIENT: 3 MMHG
AV PEAK GRADIENT: 5 MMHG
AV REGURGITATION PRESSURE HALF TIME: 497 MS
AV VALVE AREA BY VELOCITY RATIO: 2 CM²
AV VALVE AREA: 2 CM²
AV VELOCITY RATIO: 0.64
BSA FOR ECHO PROCEDURE: 1.64 M2
CV ECHO LV RWT: 0.36 CM
DOP CALC AO PEAK VEL: 1.1 M/S
DOP CALC AO VTI: 24.5 CM
DOP CALC LVOT AREA: 3.1 CM2
DOP CALC LVOT DIAMETER: 2 CM
DOP CALC LVOT PEAK VEL: 0.7 M/S
DOP CALC LVOT STROKE VOLUME: 48.4 CM3
DOP CALC MV VTI: 33.9 CM
DOP CALCLVOT PEAK VEL VTI: 15.4 CM
E WAVE DECELERATION TIME: 203 MSEC
E/A RATIO: 0.72
E/E' RATIO: 12 M/S
ECHO LV POSTERIOR WALL: 0.8 CM (ref 0.6–1.1)
FRACTIONAL SHORTENING: 26.7 % (ref 28–44)
HR MV ECHO: 68 BPM
INTERVENTRICULAR SEPTUM: 0.8 CM (ref 0.6–1.1)
IVC DIAMETER: 1.2 CM
LEFT ATRIUM AREA SYSTOLIC (APICAL 2 CHAMBER): 10.99 CM2
LEFT ATRIUM AREA SYSTOLIC (APICAL 4 CHAMBER): 12.2 CM2
LEFT ATRIUM SIZE: 3 CM
LEFT ATRIUM VOLUME INDEX MOD: 19 ML/M2
LEFT ATRIUM VOLUME MOD: 31 ML
LEFT INTERNAL DIMENSION IN SYSTOLE: 3.3 CM (ref 2.1–4)
LEFT VENTRICLE DIASTOLIC VOLUME INDEX: 57.41 ML/M2
LEFT VENTRICLE DIASTOLIC VOLUME: 93 ML
LEFT VENTRICLE END DIASTOLIC VOLUME APICAL 2 CHAMBER: 48.88 ML
LEFT VENTRICLE END DIASTOLIC VOLUME APICAL 4 CHAMBER: 51.8 ML
LEFT VENTRICLE END SYSTOLIC VOLUME APICAL 2 CHAMBER: 28.28 ML
LEFT VENTRICLE END SYSTOLIC VOLUME APICAL 4 CHAMBER: 28.76 ML
LEFT VENTRICLE MASS INDEX: 70.1 G/M2
LEFT VENTRICLE SYSTOLIC VOLUME INDEX: 27.2 ML/M2
LEFT VENTRICLE SYSTOLIC VOLUME: 44 ML
LEFT VENTRICULAR INTERNAL DIMENSION IN DIASTOLE: 4.5 CM (ref 3.5–6)
LEFT VENTRICULAR MASS: 113.6 G
LV LATERAL E/E' RATIO: 11.6 M/S
LV SEPTAL E/E' RATIO: 11.6 M/S
LVED V (TEICH): 93.25 ML
LVES V (TEICH): 43.77 ML
LVOT MG: 0.99 MMHG
LVOT MV: 0.46 CM/S
MV MEAN GRADIENT: 2 MMHG
MV PEAK A VEL: 0.81 M/S
MV PEAK E VEL: 0.58 M/S
MV PEAK GRADIENT: 5 MMHG
MV VALVE AREA BY CONTINUITY EQUATION: 1.43 CM2
OHS LV EJECTION FRACTION SIMPSONS BIPLANE MOD: 54 %
PISA AR MAX VEL: 3.39 M/S
PISA MRMAX VEL: 3.91 M/S
PISA TR MAX VEL: 2.3 M/S
RA MAJOR: 3.8 CM
RA PRESSURE ESTIMATED: 3 MMHG
RV TB RVSP: 5 MMHG
TDI LATERAL: 0.05 M/S
TDI SEPTAL: 0.05 M/S
TDI: 0.05 M/S
TR MAX PG: 21 MMHG
TRICUSPID ANNULAR PLANE SYSTOLIC EXCURSION: 2.1 CM
TV REST PULMONARY ARTERY PRESSURE: 24 MMHG
Z-SCORE OF LEFT VENTRICULAR DIMENSION IN END DIASTOLE: -0.18
Z-SCORE OF LEFT VENTRICULAR DIMENSION IN END SYSTOLE: 1.18

## 2025-04-29 NOTE — TELEPHONE ENCOUNTER
Notified patient of results of Echocardiogram.  All questions answered.  Verbalizes understanding.

## 2025-05-02 ENCOUNTER — TELEPHONE (OUTPATIENT)
Dept: HEMATOLOGY/ONCOLOGY | Facility: CLINIC | Age: 60
End: 2025-05-02
Payer: MEDICARE

## 2025-05-04 PROBLEM — Z92.3 STATUS POST STEREOTACTIC RADIOSURGERY: Status: ACTIVE | Noted: 2025-05-04

## 2025-05-04 NOTE — PROGRESS NOTES
History:  Past Medical History:   Diagnosis Date    Cancer     Laryngeal    Diabetes mellitus     Hypertension    Past medical history:  Laryngeal cancer, S/P laryngectomy/bilateral neck dissection/left hemithyroidectomy; NIDDM; hypertension  Procedure/surgical history: ; colonoscopy 05/15/2023; direct laryngoscopy with biopsy 2023; laryngectomy 2022    Past Surgical History:   Procedure Laterality Date     SECTION      COLONOSCOPY N/A 05/15/2023    Procedure: COLONOSCOPY;  Surgeon: Francisco Zavala MD;  Location: [facility] ENDOSCOPY;  Service: Endoscopy;  Laterality: N/A;    DIRECT DIAGNOSTIC LARYNGOSCOPY WITH BRONCHOSCOPY AND ESOPHAGOSCOPY N/A 2023    Procedure: LARYNGOSCOPY, DIRECT, DIAGNOSTIC, WITH BRONCHOSCOPY AND ESOPHAGOSCOPY;  Surgeon: Connor Patrick MD;  Location: [facility] OR;  Service: ENT;  Laterality: N/A;  Will not need to do bronchoscopy for this procedure    SD REMOVAL OF LARYNX  2022      Social History     Socioeconomic History    Marital status: Single   Tobacco Use    Smoking status: Former     Current packs/day: 0.00     Average packs/day: 0.5 packs/day for 39.0 years (19.5 ttl pk-yrs)     Types: Cigarettes     Start date: 1983     Quit date: 2022     Years since quitting: 3.0    Smokeless tobacco: Former     Quit date:    Substance and Sexual Activity    Alcohol use: Never    Drug use: Never    Sexual activity: Not Currently      No family history on file.     Reason for Follow-up:  -recurrent bilateral lower extremity DVT  -history of squamous cell carcinoma of supraglottis, S/P total laryngectomy/bilateral neck dissection/left hemithyroidectomy 2022; T3 N1 M0  -Squamous cell carcinoma of right lower leg   -now, cavitary lesion right lower lung lobe on CT chest 2024  -history of heavy tobacco abuse, history of adjuvant radiotherapy     History of Present Illness:   Cancer (Laryngeal cancer)        Oncologic/Hematologic  History:  Oncology History   Laryngeal cancer   3/21/2022 Cancer Staged    Staging form: Larynx - Supraglottis, AJCC 8th Edition  - Pathologic stage from 3/21/2022: Stage III (pT3, pN1, cM0)     2022 Initial Diagnosis    Laryngeal cancer     Squamous cell carcinoma of supraglottis   3/1/2022 Cancer Staged    Staging form: Larynx - Supraglottis, AJCC 8th Edition  - Pathologic stage from 3/1/2022: Stage III (pT3, pN1, cM0)     2024 Initial Diagnosis    Squamous cell carcinoma of supraglottis     Squamous cell carcinoma of right lower leg   Past medical history:  Laryngeal cancer; NIDDM; hypertension; dyslipidemia; heavy tobacco abuse; history of SVT, on metoprolol; history of supraglottic squamous cell carcinoma, T3 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  Lives in [place].  Has a 33-year-old son who lives with her.  Does not work.  Smoked half ppd for 50 years; quit 2 years ago.  Used to drink 4 beers daily; drank for 30 years; quit 20 years ago.  No illicit drugs.    Family history: Negative for cancers or blood dyscrasia.  Health maintenance:  -10/18/2024:  Bilateral screening mammogram (comparison:  10/13/2023 mammogram, etc.):  BI-RADS: 1-negative  -05/15/2023:  Colonoscopy (indication: Positive Cologuard test):  Diverticulosis sigmoid colon and descending colon; otherwise, normal; no biopsies taken (repeat colonoscopy in 5 years for screening)      59-year-old female, referred from [facility], with a history of chronic DVT  She has a history of recurrent DVTs of both lower extremities, right  lower lung lobe squamous cell carcinoma, and history of supraglottic squamous cell carcinoma S/P laryngectomy)   Please refer to assessment and plan section for details.    12/19/2024:   Pleasant lady who presents for initial medical oncology consultation.  In no acute discomfort.  Phonates with the help of TEP; she is S/P laryngectomy.    On anticoagulation with Eliquis.  Reports no bleeding in any form.  No hematemesis, melena, hematochezia, hematuria, hemoptysis, etc..    Denies chest pain, cough, dyspnea, fevers, chills, mucopurulent sputum production, or hemoptysis.  Currently, some pain in the right thigh area, not severe.  Denies weakness or fatigue.  Great appetite.  Denies dysphagia, odynophagia, otalgia, throat pain, etc..  Denies unintentional weight loss, any new lumps or lymphadenopathy, unusual headaches, focal neurological symptoms, abdominal pain, nausea, vomiting, GI bleeding, change in bowel habits, etc..    ECOG 0-1.    Interval History:  [No matching plan found]   [No matching plan found]     05/05/2025:  -S/P SABR right lower lung lesion 04/15/2025-04/25/2025 (5500 cGy; 5/5; 10 elapsed days)  -04/28/2025: TTE (nonrheumatic mitral valve regurgitation):  LVEF 54%, diastolic dysfunction, RV normal in size and systolic function, pulmonary artery systolic pressure 24 mm Hg  Presents for a follow-up visit.  In great spirits.  No complaints.  Great appetite.  No unusual headaches, focal neurological symptoms, chest pain, cough, dyspnea, hemoptysis, any new lumps or lymphadenopathy, weakness, fatigue, throat pain, otalgia, dysphagia, odynophagia, abdominal pain, nausea, vomiting, etc..  ECOG 0.  Phone is with the help of TEP; she is S/P laryngectomy in the past.  On anticoagulation with the Eliquis for bilateral lower extremity DVTs.  No bleeding.  No hematemesis, melena, hematochezia, hematuria, hemoptysis, etc..  She smoked half ppd X 50 years; quit a few years ago.    Immunization History    Administered Date(s) Administered    COVID-19, MRNA, LN-S, PF (MODERNA FULL 0.5 ML DOSE) 05/25/2021, 06/22/2021    Influenza - Quadrivalent - PF *Preferred* (6 months and older) 09/15/2023    Influenza - Trivalent - Fluzone High Dose - PF (65 years and older) 10/25/2024    Pneumococcal Conjugate - 20 Valent 04/27/2023    Tdap 04/27/2023    Zoster Recombinant 04/27/2023, 06/28/2023     Review of patient's allergies indicates:  No Known Allergies    Medications:  Current Outpatient Medications on File Prior to Visit   Medication Sig Dispense Refill    ALLERGY RELIEF, CETIRIZINE, 10 mg tablet Take 1 tablet by mouth once daily 90 tablet 0    apixaban (ELIQUIS) 5 mg Tab Take 1 tablet (5 mg total) by mouth 2 (two) times daily. 10 mg ( 2 tablets) twice daily for 7 days followed by 5 mg ( one tablet)  twice daily. 180 tablet 2    atorvastatin (LIPITOR) 80 MG tablet Take 1 tablet (80 mg total) by mouth every evening. 90 tablet 3    DULoxetine (CYMBALTA) 30 MG capsule Take 1 capsule (30 mg total) by mouth once daily. 30 capsule 11    empagliflozin (JARDIANCE) 10 mg tablet Take 1 tablet (10 mg total) by mouth once daily. 90 tablet 2    fluticasone propionate (FLONASE) 50 mcg/actuation nasal spray 1 spray (50 mcg total) by Each Nostril route 2 (two) times daily. 16 g 1    folic acid (FOLVITE) 1 MG tablet Take 1 tablet by mouth once daily 30 tablet 6    levothyroxine (SYNTHROID) 100 MCG tablet Take 1 tablet (100 mcg total) by mouth before breakfast. 30 tablet 11    lisinopriL (PRINIVIL,ZESTRIL) 2.5 MG tablet Take 1 tablet (2.5 mg total) by mouth once daily. 90 tablet 2    metoprolol succinate (TOPROL-XL) 25 MG 24 hr tablet Take 0.5 tablets (12.5 mg total) by mouth once daily. 45 tablet 3    NIFEdipine (PROCARDIA-XL) 60 MG (OSM) 24 hr tablet Take 1 tablet (60 mg total) by mouth once daily. 30 tablet 11    montelukast (SINGULAIR) 5 MG chewable tablet Take 5 mg by mouth every other day. (Patient not taking: Reported on 5/5/2025)        Current Facility-Administered Medications on File Prior to Visit   Medication Dose Route Frequency Provider Last Rate Last Admin    dextrose 10% bolus 125 mL 125 mL  12.5 g Intravenous PRN Tamie Woody S, FNP        dextrose 10% bolus 125 mL 125 mL  12.5 g Intravenous PRN Tamie Woody S, FNP        diphenhydrAMINE injection 6.25 mg  6.25 mg Intravenous Once PRN Lolis Sood MD        droperidoL injection 0.25 mg  0.25 mg Intravenous Once PRN Lolis Sood MD        HYDROmorphone injection 0.2 mg  0.2 mg Intravenous Q5 Min PRN Lolis Sood MD        insulin aspart U-100 injection 2-9 Units  2-9 Units Subcutaneous Q6H PRN Tamie Woody S, GERALDP        insulin aspart U-100 injection 4-12 Units  4-12 Units Subcutaneous PRN Tamie Woody S, FNP        lactated ringers infusion   Intravenous Continuous Lolis Sood  mL/hr at 03/03/23 0656 Rate Change at 03/03/23 0656    LIDOcaine (PF) 10 mg/ml (1%) injection 10 mg  1 mL Intradermal Once Tamie Woody, HAVEN        LIDOcaine (PF) 10 mg/ml (1%) injection 10 mg  1 mL Intradermal Once Lolis Sood MD        LIDOcaine HCL 20 mg/ml (2%) injection 1 mL  1 mL Intradermal 1 time in Clinic/HOD Travis Aparicio MD        metoclopramide HCl injection 10 mg  10 mg Intravenous Once PRN Lolis Sood MD        midazolam (VERSED) 1 mg/mL injection 1 mg  1 mg Intravenous Once PRN Lolis Sood MD        oxyCODONE immediate release tablet 5 mg  5 mg Oral Q3H PRN Lolis Sood MD        silver nitrate applicators applicator 1 applicator  1 applicator Topical (Top) 1 time in Clinic/HOD Patricia Montes MD        triamcinolone acetonide injection 40 mg  40 mg Intramuscular 1 time in Clinic/HOD Travis Aparicio MD         Review of Systems:   All systems reviewed and found to be negative except for the symptoms detailed above    Physical Examination:   VITAL SIGNS:   Vitals:    05/05/25 0937   BP: 128/73   Pulse:  79   Resp: 15   Temp: 97.8 °F (36.6 °C)       GENERAL:  In no apparent distress.    HEAD:  No signs of head trauma.  EYES:  Pupils are equal.  Extraocular motions intact.    EARS:  Hearing grossly intact.  MOUTH:  Oropharynx is normal.   NECK:  No adenopathy, no JVD.     CHEST:  Chest with clear breath sounds bilaterally.  No wheezes, rales, rhonchi.    CARDIAC:  Regular rate and rhythm.  S1 and S2, without murmurs, gallops, rubs.  VASCULAR:  No Edema.  Peripheral pulses normal and equal in all extremities.  ABDOMEN:  Soft, without detectable tenderness.  No sign of distention.  No   rebound or guarding, and no masses palpated.   Bowel Sounds normal.  MUSCULOSKELETAL:  Good range of motion of all major joints. Extremities without clubbing, cyanosis or edema.    NEUROLOGIC EXAM:  Alert and oriented x 3.  No focal sensory or strength deficits.   Speech normal.  Follows commands.  PSYCHIATRIC:  Mood normal.  -12/19/2024: S/P laryngectomy in the past; phonates via TEP; no lumps or lymphadenopathy in neck; in no acute discomfort; S/P radiation therapy to the neck, with tissue induration.    Assessment:  Problem List Items Addressed This Visit       H/O laryngectomy - Primary    Laryngeal cancer    Tracheostomy in place    Deep vein thrombosis (DVT) of distal vein of left lower extremity    Positive colorectal cancer screening using Cologuard test    Recurrent deep vein thrombosis (DVT)    DVT of lower extremity, bilateral    Squamous cell carcinoma of supraglottis    Cavitating mass in right lower lung lobe    History of tobacco abuse    History of head and neck radiation    Squamous cell carcinoma of right lower leg    Status post stereotactic radiosurgery     Orders for 05/05/2025:   At this time, please order a baseline CT chest without contrast  Follow-up with NP in 2 weeks with CT chest  Repeat CT chest without contrast in 3 months for surveillance, then follow-up with me with CBC and CMP.  Continue indefinite  anticoagulation with Eliquis    Above discussed at length with the patient.  All questions answered.  Discussed labs and scans and gave her copies of relevant results.  Plan of management discussed.    She understands and agrees with this plan.  ==================================    # History of squamous cell carcinoma of supraglottis:  T3 N1 M0 supraglottic squamous cell carcinoma   s/p total laryngectomy, bilateral neck dissection, left hemithyroidectomy, cricopharyngeal myotomy, primary TEP on 03/21/2022.   She re-presented to our facility on 4/6/22 with surgical site infection and a pharyngocutaneous fistula. Patient underwent a neck washout with primary repair on 4/11/2022, with placement of a gastrostomy tube the same day. She was maintained on NPO and transferred to [place] for further evaluation and management on 4/15/22 after evidence of persistent pharyngocutaneous fistula. There she underwent a 2nd neck washout with primary closure and placement of salivary bypass tube.   S/P adjuvant radiotherapy, completed 06/16/2022 (6000 cGy/30 fractions/43 days)  One of her post-treatment PET noted hypermetabolism at the site of her TEP. She was taken for a DL on 3/3/23 which was unremarkable.  >>>  # New squamous cell carcinoma right lower lung lobe:  -there was question of a new primary versus metastatic malignancy on CT chest 12/17/2024:   -12/17/2024:  CT chest without contrast (comparison: 03/10/2022):  Cavitary lesion right lower lobe, inflammatory versus neoplastic (2.5 x 2.3 cm; right lower lobe; associated area of infiltrate)    -right lung, transthoracic needle biopsy: Squamous cell carcinoma  -staging FDG PET-CT 03/17/2025:  Right lower lobe malignant lesion 2.3 cm, maximum SUV 6.9  -staging brain MRI 03/19/2025: No brain metastases  -on Synthroid per ENT (S/P laryngectomy in the past)  -03/27/2025:  Could not perform PFTs because of laryngectomy status  -per Cardiothoracic surgery, 04/01/2025, given  the size of the lesion 2.3 cm on PET-CT, i.e.,> 2 cm, she is not a candidate for sublobar resection; lumpectomy would be ideal for her; however, she is unable to perform PFTs because of laryngectomy status, therefore, nonsurgical option is recommended, such as SABR (which is also consistent with NCCN guidelines)  -S/P SBRT to right lower lung lobe squamous cell carcinoma:  04/15/2025-04/25/2025 (5500 cGy, 5/5 fractions, 10 elapsed days)  >>>  Plan:  -initiate surveillance (see below)  -obtain a baseline CT chest without contrast now, then, in 3 months for surveillance, then follow-up   with CBC and CMP    Surveillance after SBRT:  1. History and physical and chest CT +/- contrast every 3-6 months x3 years (04/2025-04/2028),   then history and physical chest CT +/- contrast every 6 months x2 years (04/2028-04/2030), then   history and physical and low-dose noncontrast enhanced chest CT annually  2. Residual or new radiographic abnormalities may require more frequent imaging  3. Smoking cessation advice, counseling, and pharmacotherapy  4. FDG PET-CT is not routinely indicated  Member 5 brain MRI as clinically indicated based on risk assessment    # Recurrent bilateral lower extremity DVTs:  # 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  # 04/2022:  Left lower extremity positive for poorly visualized short segment age indeterminate nearly occluded-occluded DVT at the level of above the knee popliteal vein  # apparently, given Eliquis X 30 days; subsequently, Xarelto (due to cost); Xarelto until 05/2023 (thus, was anticoagulated for 1 year)  # 10/16/2024:  (bilateral leg pain) Left superficial distal femoral vein abnormal, partial compressibility chronic appearing nonocclusive thrombus left distal femoral vein) (seems to be more as chronic thrombus but not exactly in the same area as previous one)  # started on anticoagulation with Eliquis by PCP in family Medicine (however, she was taking  Eliquis only 5 mg daily instead of b.i.d.)  # 12/04/2024 (right leg painful lumps starting above the knee and traveling up to upper thigh x1 week):  DVT right distal femoral and popliteal vein (a week prior, she was instructed by PCP to start taking Eliquis 5 mg p.o. b.i.d., rather than 5 mg once daily)  # Eliquis resumed by PCP  #12/17/2024:  CT chest without contrast (comparison: 03/10/2022):  Cavitary lesion right lower lobe, inflammatory versus neoplastic (2.5 x 2.3 cm; right lower lobe; associated area of infiltrate)  (Question of a new lung primary versus metastasis, possibly accounting for new DVT right lower extremity)  -12/19/2024:  Antithrombin 3 activity 148% normal/elevated; factor 5 Leiden mutation negative; protein C activity 130% normal; protein S antigen free 106% normal  -02/11/2025: Cardiolipin antibody IgG, IgM: Negative  -02/11/2025: Beta 2 glycoprotein 1 antibodies IgG, IgM: Negative  -02/11/2025:  Prothrombin gene mutation negative  >>>  Plan:   Recurrent lower extremity DVT  -hypercoagulable workup negative  -needs indefinite anticoagulation; continue Eliquis in therapeutic dose, 5 mg p.o. b.i.d., indefinitely; monitor for bleeding  -the latest episode of DVT could be secondary to newly diagnosed squamous cell carcinoma of right lower lung lobe  -with Eliquis on board, can not measure protein C deficiency, protein S deficiency, and antithrombin 3 deficiency using functional assays  -lupus anticoagulant should only be tested in the absence of any anticoagulation as it is a clot based test, functional assay    With DOAC:  -Factor V Leiden, prothrombin gene mutation, antiphospholipid antibody are unaffected;  -cannot measure protein C deficiency, protein S deficiency, and Antithrombin III deficiency using functional assays (can cause overestimation of antithrombin levels; can not measure protein C, protein S, and antithrombin using functional assays with DOAC);  -antiphospholipid antibodies are  unaffected because they are serologic assays and are unaffected by anticoagulation (anticardiolipin antibody; beta 2 glycoprotein 1 antibody);  -lupus anticoagulant should only be tested in the absence of any anticoagulant as it is clot based test      Follow-up:  No follow-ups on file.

## 2025-05-05 ENCOUNTER — OFFICE VISIT (OUTPATIENT)
Dept: HEMATOLOGY/ONCOLOGY | Facility: CLINIC | Age: 60
End: 2025-05-05
Attending: INTERNAL MEDICINE
Payer: MEDICARE

## 2025-05-05 VITALS
HEIGHT: 62 IN | WEIGHT: 137.19 LBS | SYSTOLIC BLOOD PRESSURE: 128 MMHG | TEMPERATURE: 98 F | RESPIRATION RATE: 15 BRPM | DIASTOLIC BLOOD PRESSURE: 73 MMHG | HEART RATE: 79 BPM | OXYGEN SATURATION: 98 % | BODY MASS INDEX: 25.25 KG/M2

## 2025-05-05 DIAGNOSIS — I82.5Y3 CHRONIC DEEP VEIN THROMBOSIS (DVT) OF PROXIMAL VEIN OF BOTH LOWER EXTREMITIES: ICD-10-CM

## 2025-05-05 DIAGNOSIS — J98.4 CAVITATING MASS IN RIGHT LOWER LUNG LOBE: ICD-10-CM

## 2025-05-05 DIAGNOSIS — C32.1 SQUAMOUS CELL CARCINOMA OF SUPRAGLOTTIS: ICD-10-CM

## 2025-05-05 DIAGNOSIS — Z92.3 STATUS POST STEREOTACTIC RADIOSURGERY: ICD-10-CM

## 2025-05-05 DIAGNOSIS — I82.409 RECURRENT DEEP VEIN THROMBOSIS (DVT): ICD-10-CM

## 2025-05-05 DIAGNOSIS — I82.4Z2 DEEP VEIN THROMBOSIS (DVT) OF DISTAL VEIN OF LEFT LOWER EXTREMITY, UNSPECIFIED CHRONICITY: ICD-10-CM

## 2025-05-05 DIAGNOSIS — R19.5 POSITIVE COLORECTAL CANCER SCREENING USING COLOGUARD TEST: ICD-10-CM

## 2025-05-05 DIAGNOSIS — Z87.891 HISTORY OF TOBACCO ABUSE: ICD-10-CM

## 2025-05-05 DIAGNOSIS — C32.9 LARYNGEAL CANCER: ICD-10-CM

## 2025-05-05 DIAGNOSIS — Z90.02 H/O LARYNGECTOMY: Primary | ICD-10-CM

## 2025-05-05 DIAGNOSIS — Z93.0 TRACHEOSTOMY IN PLACE: ICD-10-CM

## 2025-05-05 DIAGNOSIS — Z93.0 TRACHEOSTOMY IN PLACE: Primary | ICD-10-CM

## 2025-05-05 DIAGNOSIS — C44.722 SQUAMOUS CELL CARCINOMA OF RIGHT LOWER LEG: ICD-10-CM

## 2025-05-05 DIAGNOSIS — Z92.3 HISTORY OF HEAD AND NECK RADIATION: ICD-10-CM

## 2025-05-05 PROCEDURE — 99214 OFFICE O/P EST MOD 30 MIN: CPT | Mod: PBBFAC | Performed by: INTERNAL MEDICINE

## 2025-05-05 NOTE — Clinical Note
Orders for 05/05/2025:  At this time, please order a baseline CT chest without contrast Follow-up with NP in 2 weeks with CT chest Repeat CT chest without contrast in 3 months for surveillance, then follow-up with me with CBC and CMP. Continue indefinite anticoagulation with Eliquis

## 2025-05-12 ENCOUNTER — HOSPITAL ENCOUNTER (OUTPATIENT)
Dept: RADIOLOGY | Facility: HOSPITAL | Age: 60
Discharge: HOME | End: 2025-05-12
Attending: INTERNAL MEDICINE
Payer: MEDICARE

## 2025-05-12 DIAGNOSIS — J98.4 CAVITATING MASS IN RIGHT LOWER LUNG LOBE: ICD-10-CM

## 2025-05-12 DIAGNOSIS — I82.4Z2 DEEP VEIN THROMBOSIS (DVT) OF DISTAL VEIN OF LEFT LOWER EXTREMITY, UNSPECIFIED CHRONICITY: ICD-10-CM

## 2025-05-12 DIAGNOSIS — Z93.0 TRACHEOSTOMY IN PLACE: ICD-10-CM

## 2025-05-12 PROCEDURE — 71250 CT THORAX DX C-: CPT | Mod: TC

## 2025-05-19 ENCOUNTER — TELEPHONE (OUTPATIENT)
Dept: HEMATOLOGY/ONCOLOGY | Facility: CLINIC | Age: 60
End: 2025-05-19
Payer: MEDICARE

## 2025-05-19 DIAGNOSIS — J98.4 CAVITATING MASS IN RIGHT LOWER LUNG LOBE: Primary | ICD-10-CM

## 2025-05-19 NOTE — PROGRESS NOTES
Reason for Follow-up:  -recurrent bilateral lower extremity DVT  -history of squamous cell carcinoma of supraglottis, S/P total laryngectomy/bilateral neck dissection/left hemithyroidectomy 2022; T3 N1 M0  -Squamous cell carcinoma of right lower leg   -now, cavitary lesion right lower lung lobe on CT chest 2024  -history of heavy tobacco abuse, history of adjuvant radiotherapy     History:   Past medical history:  Laryngeal cancer; NIDDM; hypertension; dyslipidemia; heavy tobacco abuse; history of SVT, on metoprolol; history of supraglottic squamous cell carcinoma, T3 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  Lives in [place].  Has a 33-year-old son who lives with her.  Does not work.  Smoked half ppd for 50 years; quit 2 years ago.  Used to drink 4 beers daily; drank for 30 years; quit 20 years ago.  No illicit drugs.    Family history: Negative for cancers or blood dyscrasia.  Health maintenance:  -10/18/2024:  Bilateral screening mammogram (comparison:  10/13/2023 mammogram, etc.):  BI-RADS: 1-negative  -05/15/2023:  Colonoscopy (indication: Positive Cologuard test):  Diverticulosis sigmoid colon and descending colon; otherwise, normal; no biopsies taken (repeat colonoscopy in 5 years for screening)       No family history on file.         History of Present Illness:   Follow-up (Patient reported no concerns today.)        Oncologic/Hematologic History:  Oncology History   Laryngeal cancer   3/21/2022 Cancer Staged    Staging form: Larynx - Supraglottis, AJCC 8th Edition  - Pathologic stage from  3/21/2022: Stage III (pT3, pN1, cM0)     6/21/2022 Initial Diagnosis    Laryngeal cancer     Squamous cell carcinoma of supraglottis   3/1/2022 Cancer Staged    Staging form: Larynx - Supraglottis, AJCC 8th Edition  - Pathologic stage from 3/1/2022: Stage III (pT3, pN1, cM0)     12/19/2024 Initial Diagnosis    Squamous cell carcinoma of supraglottis     Squamous cell carcinoma of right lower leg       59-year-old female, referred from [facility], with a history of chronic DVT  She has a history of recurrent DVTs of both lower extremities, right lower lung lobe squamous cell carcinoma, and history of supraglottic squamous cell carcinoma S/P laryngectomy)   Please refer to assessment and plan section for details.    12/19/2024:   Pleasant lady who presents for initial medical oncology consultation.  In no acute discomfort.  Phonates with the help of TEP; she is S/P laryngectomy.    On anticoagulation with Eliquis.  Reports no bleeding in any form.  No hematemesis, melena, hematochezia, hematuria, hemoptysis, etc..    Denies chest pain, cough, dyspnea, fevers, chills, mucopurulent sputum production, or hemoptysis.  Currently, some pain in the right thigh area, not severe.  Denies weakness or fatigue.  Great appetite.  Denies dysphagia, odynophagia, otalgia, throat pain, etc..  Denies unintentional weight loss, any new lumps or lymphadenopathy, unusual headaches, focal neurological symptoms, abdominal pain, nausea, vomiting, GI bleeding, change in bowel habits, etc..    ECOG 0-1.    Interval History 5/19/25 :  Patient presented to the clinic today for a scheduled clinic visit to review results of her CT scan.  Patient completed her CT scans on May 5th and the results were as follows:Right lower lobe cavitary nodule similar in overall size. Wall thickness has decreased by approximately 50% compared to 12/17/2024.  Patient verbalized an understanding of the results.  Lab work was also reviewed with the  patient-stable.  All future appointments were discussed.    05/05/2025:  -S/P SABR right lower lung lesion 04/15/2025-04/25/2025 (5500 cGy; 5/5; 10 elapsed days)  -04/28/2025: TTE (nonrheumatic mitral valve regurgitation):  LVEF 54%, diastolic dysfunction, RV normal in size and systolic function, pulmonary artery systolic pressure 24 mm Hg  Presents for a follow-up visit.  In great spirits.  No complaints.  Great appetite.  No unusual headaches, focal neurological symptoms, chest pain, cough, dyspnea, hemoptysis, any new lumps or lymphadenopathy, weakness, fatigue, throat pain, otalgia, dysphagia, odynophagia, abdominal pain, nausea, vomiting, etc..  ECOG 0.  Phone is with the help of TEP; she is S/P laryngectomy in the past.  On anticoagulation with the Eliquis for bilateral lower extremity DVTs.  No bleeding.  No hematemesis, melena, hematochezia, hematuria, hemoptysis, etc..  She smoked half ppd X 50 years; quit a few years ago.    Immunization History   Administered Date(s) Administered    COVID-19, MRNA, LN-S, PF (MODERNA FULL 0.5 ML DOSE) 05/25/2021, 06/22/2021    Influenza - Quadrivalent - PF *Preferred* (6 months and older) 09/15/2023    Influenza - Trivalent - Fluzone High Dose - PF (65 years and older) 10/25/2024    Pneumococcal Conjugate - 20 Valent 04/27/2023    Tdap 04/27/2023    Zoster Recombinant 04/27/2023, 06/28/2023     Review of patient's allergies indicates:  No Known Allergies        Review of Systems:   All systems reviewed and found to be negative except for the symptoms detailed above    Physical Examination:   VITAL SIGNS:   Vitals:    05/20/25 1200   BP: 107/63   Pulse: 80   Resp: 16   Temp: 97.9 °F (36.6 °C)         Physical Exam  Vitals reviewed.   Constitutional:       Appearance: Normal appearance.   HENT:      Head: Normocephalic and atraumatic.      Mouth/Throat:      Mouth: Mucous membranes are moist.   Cardiovascular:      Rate and Rhythm: Normal rate and regular rhythm.       Pulses: Normal pulses.      Heart sounds: Normal heart sounds.   Pulmonary:      Effort: Pulmonary effort is normal.      Breath sounds: Normal breath sounds.   Abdominal:      General: Bowel sounds are normal.      Palpations: Abdomen is soft.   Skin:     General: Skin is warm and dry.   Neurological:      Mental Status: She is alert and oriented to person, place, and time.   Psychiatric:         Mood and Affect: Mood normal.         Behavior: Behavior normal.         Thought Content: Thought content normal.         Judgment: Judgment normal.            Assessment:  Problem List Items Addressed This Visit          ENT    Tracheostomy in place       Pulmonary    Cavitating mass in right lower lung lobe - Primary           Above discussed at length with the patient.  All questions answered.  Discussed labs and scans and gave her copies of relevant results.  Plan of management discussed.    She understands and agrees with this plan.  ==================================    # History of squamous cell carcinoma of supraglottis:  T3 N1 M0 supraglottic squamous cell carcinoma   s/p total laryngectomy, bilateral neck dissection, left hemithyroidectomy, cricopharyngeal myotomy, primary TEP on 03/21/2022.   She re-presented to our facility on 4/6/22 with surgical site infection and a pharyngocutaneous fistula. Patient underwent a neck washout with primary repair on 4/11/2022, with placement of a gastrostomy tube the same day. She was maintained on NPO and transferred to [facility] for further evaluation and management on 4/15/22 after evidence of persistent pharyngocutaneous fistula. There she underwent a 2nd neck washout with primary closure and placement of salivary bypass tube.   S/P adjuvant radiotherapy, completed 06/16/2022 (6000 cGy/30 fractions/43 days)  One of her post-treatment PET noted hypermetabolism at the site of her TEP. She was taken for a DL on 3/3/23 which was unremarkable.  >>>  # New squamous cell  carcinoma right lower lung lobe:  -there was question of a new primary versus metastatic malignancy on CT chest 12/17/2024:   -12/17/2024:  CT chest without contrast (comparison: 03/10/2022):  Cavitary lesion right lower lobe, inflammatory versus neoplastic (2.5 x 2.3 cm; right lower lobe; associated area of infiltrate)    -right lung, transthoracic needle biopsy: Squamous cell carcinoma  -staging FDG PET-CT 03/17/2025:  Right lower lobe malignant lesion 2.3 cm, maximum SUV 6.9  -staging brain MRI 03/19/2025: No brain metastases  -on Synthroid per ENT (S/P laryngectomy in the past)  -03/27/2025:  Could not perform PFTs because of laryngectomy status  -per Cardiothoracic surgery, 04/01/2025, given the size of the lesion 2.3 cm on PET-CT, i.e.,> 2 cm, she is not a candidate for sublobar resection; lumpectomy would be ideal for her; however, she is unable to perform PFTs because of laryngectomy status, therefore, nonsurgical option is recommended, such as SABR (which is also consistent with NCCN guidelines)  -S/P SBRT to right lower lung lobe squamous cell carcinoma:  04/15/2025-04/25/2025 (5500 cGy, 5/5 fractions, 10 elapsed days)  >>>  Plan:  Squamous cell carcinoma of supraglottis  Keep all follow up appointments as scheduled     Recurrent bilateral lower extremity DVTs:  Continue indefinite anticoagulation with Eliquis    -initiate surveillance (see below)  -obtain a baseline CT chest without contrast now, then, in 3 months for surveillance, then follow-up   with CBC and CMP    Surveillance after SBRT:  1. History and physical and chest CT +/- contrast every 3-6 months x3 years (04/2025-04/2028),   then history and physical chest CT +/- contrast every 6 months x2 years (04/2028-04/2030), then   history and physical and low-dose noncontrast enhanced chest CT annually  2. Residual or new radiographic abnormalities may require more frequent imaging  3. Smoking cessation advice, counseling, and pharmacotherapy  4. FDG  PET-CT is not routinely indicated  Member 5 brain MRI as clinically indicated based on risk assessment    # Recurrent bilateral lower extremity DVTs:  # 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  # 04/2022:  Left lower extremity positive for poorly visualized short segment age indeterminate nearly occluded-occluded DVT at the level of above the knee popliteal vein  # apparently, given Eliquis X 30 days; subsequently, Xarelto (due to cost); Xarelto until 05/2023 (thus, was anticoagulated for 1 year)  # 10/16/2024:  (bilateral leg pain) Left superficial distal femoral vein abnormal, partial compressibility chronic appearing nonocclusive thrombus left distal femoral vein) (seems to be more as chronic thrombus but not exactly in the same area as previous one)  # started on anticoagulation with Eliquis by PCP in family Medicine (however, she was taking Eliquis only 5 mg daily instead of b.i.d.)  # 12/04/2024 (right leg painful lumps starting above the knee and traveling up to upper thigh x1 week):  DVT right distal femoral and popliteal vein (a week prior, she was instructed by PCP to start taking Eliquis 5 mg p.o. b.i.d., rather than 5 mg once daily)  # Eliquis resumed by PCP  #12/17/2024:  CT chest without contrast (comparison: 03/10/2022):  Cavitary lesion right lower lobe, inflammatory versus neoplastic (2.5 x 2.3 cm; right lower lobe; associated area of infiltrate)  (Question of a new lung primary versus metastasis, possibly accounting for new DVT right lower extremity)  -12/19/2024:  Antithrombin 3 activity 148% normal/elevated; factor 5 Leiden mutation negative; protein C activity 130% normal; protein S antigen free 106% normal  -02/11/2025: Cardiolipin antibody IgG, IgM: Negative  -02/11/2025: Beta 2 glycoprotein 1 antibodies IgG, IgM: Negative  -02/11/2025:  Prothrombin gene mutation negative  >>>  Plan:   Recurrent lower extremity DVT  -hypercoagulable workup negative  -needs  indefinite anticoagulation; continue Eliquis in therapeutic dose, 5 mg p.o. b.i.d., indefinitely; monitor for bleeding  -the latest episode of DVT could be secondary to newly diagnosed squamous cell carcinoma of right lower lung lobe  -with Eliquis on board, can not measure protein C deficiency, protein S deficiency, and antithrombin 3 deficiency using functional assays  -lupus anticoagulant should only be tested in the absence of any anticoagulation as it is a clot based test, functional assay    With DOAC:  -Factor V Leiden, prothrombin gene mutation, antiphospholipid antibody are unaffected;  -cannot measure protein C deficiency, protein S deficiency, and Antithrombin III deficiency using functional assays (can cause overestimation of antithrombin levels; can not measure protein C, protein S, and antithrombin using functional assays with DOAC);  -antiphospholipid antibodies are unaffected because they are serologic assays and are unaffected by anticoagulation (anticardiolipin antibody; beta 2 glycoprotein 1 antibody);  -lupus anticoagulant should only be tested in the absence of any anticoagulant as it is clot based test      Follow-up:  No follow-ups on file.

## 2025-05-19 NOTE — TELEPHONE ENCOUNTER
Called patient to move up appointment time to 12:00 pm Labs and 1:00 pm NP visit for 5/20/25 . Patient did not answer the phone left a voice message. Patient called clinic back and spoke with Neal Sharpe LPN. Patient confirmed and verbalized understanding.

## 2025-05-20 ENCOUNTER — LAB VISIT (OUTPATIENT)
Dept: HEMATOLOGY/ONCOLOGY | Facility: CLINIC | Age: 60
End: 2025-05-20
Payer: MEDICARE

## 2025-05-20 ENCOUNTER — OFFICE VISIT (OUTPATIENT)
Dept: HEMATOLOGY/ONCOLOGY | Facility: CLINIC | Age: 60
End: 2025-05-20
Payer: MEDICARE

## 2025-05-20 VITALS
SYSTOLIC BLOOD PRESSURE: 107 MMHG | RESPIRATION RATE: 16 BRPM | DIASTOLIC BLOOD PRESSURE: 63 MMHG | OXYGEN SATURATION: 99 % | BODY MASS INDEX: 24.73 KG/M2 | WEIGHT: 134.38 LBS | TEMPERATURE: 98 F | HEART RATE: 80 BPM | HEIGHT: 62 IN

## 2025-05-20 DIAGNOSIS — J98.4 CAVITATING MASS IN RIGHT LOWER LUNG LOBE: Primary | ICD-10-CM

## 2025-05-20 DIAGNOSIS — Z93.0 TRACHEOSTOMY IN PLACE: ICD-10-CM

## 2025-05-20 DIAGNOSIS — I82.409 RECURRENT DEEP VEIN THROMBOSIS (DVT): ICD-10-CM

## 2025-05-20 DIAGNOSIS — J98.4 CAVITATING MASS IN RIGHT LOWER LUNG LOBE: ICD-10-CM

## 2025-05-20 LAB
ALBUMIN SERPL-MCNC: 3.6 G/DL (ref 3.5–5)
ALBUMIN/GLOB SERPL: 0.9 RATIO (ref 1.1–2)
ALP SERPL-CCNC: 131 UNIT/L (ref 40–150)
ALT SERPL-CCNC: 11 UNIT/L (ref 0–55)
ANION GAP SERPL CALC-SCNC: 5 MEQ/L
AST SERPL-CCNC: 14 UNIT/L (ref 11–45)
BASOPHILS # BLD AUTO: 0.02 X10(3)/MCL
BASOPHILS NFR BLD AUTO: 0.6 %
BILIRUB SERPL-MCNC: 0.4 MG/DL
BUN SERPL-MCNC: 14.5 MG/DL (ref 9.8–20.1)
CALCIUM SERPL-MCNC: 9.6 MG/DL (ref 8.4–10.2)
CHLORIDE SERPL-SCNC: 112 MMOL/L (ref 98–107)
CO2 SERPL-SCNC: 24 MMOL/L (ref 22–29)
CREAT SERPL-MCNC: 1.05 MG/DL (ref 0.55–1.02)
CREAT/UREA NIT SERPL: 14
EOSINOPHIL # BLD AUTO: 0.12 X10(3)/MCL (ref 0–0.9)
EOSINOPHIL NFR BLD AUTO: 3.4 %
ERYTHROCYTE [DISTWIDTH] IN BLOOD BY AUTOMATED COUNT: 13.2 % (ref 11.5–17)
GFR SERPLBLD CREATININE-BSD FMLA CKD-EPI: >60 ML/MIN/1.73/M2
GLOBULIN SER-MCNC: 4 GM/DL (ref 2.4–3.5)
GLUCOSE SERPL-MCNC: 171 MG/DL (ref 74–100)
HCT VFR BLD AUTO: 36.6 % (ref 37–47)
HGB BLD-MCNC: 11.4 G/DL (ref 12–16)
IMM GRANULOCYTES # BLD AUTO: 0 X10(3)/MCL (ref 0–0.04)
IMM GRANULOCYTES NFR BLD AUTO: 0 %
LYMPHOCYTES # BLD AUTO: 0.75 X10(3)/MCL (ref 0.6–4.6)
LYMPHOCYTES NFR BLD AUTO: 21.4 %
MAGNESIUM SERPL-MCNC: 2.1 MG/DL (ref 1.6–2.6)
MCH RBC QN AUTO: 28 PG (ref 27–31)
MCHC RBC AUTO-ENTMCNC: 31.1 G/DL (ref 33–36)
MCV RBC AUTO: 89.9 FL (ref 80–94)
MONOCYTES # BLD AUTO: 0.25 X10(3)/MCL (ref 0.1–1.3)
MONOCYTES NFR BLD AUTO: 7.1 %
NEUTROPHILS # BLD AUTO: 2.36 X10(3)/MCL (ref 2.1–9.2)
NEUTROPHILS NFR BLD AUTO: 67.5 %
NRBC BLD AUTO-RTO: 0 %
PLATELET # BLD AUTO: 267 X10(3)/MCL (ref 130–400)
PMV BLD AUTO: 9.5 FL (ref 7.4–10.4)
POTASSIUM SERPL-SCNC: 4.1 MMOL/L (ref 3.5–5.1)
PROT SERPL-MCNC: 7.6 GM/DL (ref 6.4–8.3)
RBC # BLD AUTO: 4.07 X10(6)/MCL (ref 4.2–5.4)
SODIUM SERPL-SCNC: 141 MMOL/L (ref 136–145)
WBC # BLD AUTO: 3.5 X10(3)/MCL (ref 4.5–11.5)

## 2025-05-20 PROCEDURE — 80053 COMPREHEN METABOLIC PANEL: CPT

## 2025-05-20 PROCEDURE — 3074F SYST BP LT 130 MM HG: CPT | Mod: CPTII,,,

## 2025-05-20 PROCEDURE — 4010F ACE/ARB THERAPY RXD/TAKEN: CPT | Mod: CPTII,,,

## 2025-05-20 PROCEDURE — 3078F DIAST BP <80 MM HG: CPT | Mod: CPTII,,,

## 2025-05-20 PROCEDURE — 1159F MED LIST DOCD IN RCRD: CPT | Mod: CPTII,,,

## 2025-05-20 PROCEDURE — 3008F BODY MASS INDEX DOCD: CPT | Mod: CPTII,,,

## 2025-05-20 PROCEDURE — 99213 OFFICE O/P EST LOW 20 MIN: CPT | Mod: S$PBB,,,

## 2025-05-20 PROCEDURE — 99214 OFFICE O/P EST MOD 30 MIN: CPT | Mod: PBBFAC

## 2025-05-20 PROCEDURE — 85025 COMPLETE CBC W/AUTO DIFF WBC: CPT

## 2025-05-20 PROCEDURE — 1160F RVW MEDS BY RX/DR IN RCRD: CPT | Mod: CPTII,,,

## 2025-05-20 PROCEDURE — 83735 ASSAY OF MAGNESIUM: CPT

## 2025-05-20 RX ORDER — PHENYLEPHRINE HCL 10 MG/1
10 TABLET, FILM COATED ORAL EVERY 4 HOURS PRN
COMMUNITY

## 2025-06-02 ENCOUNTER — RESULTS FOLLOW-UP (OUTPATIENT)
Dept: RADIOLOGY | Facility: HOSPITAL | Age: 60
End: 2025-06-02

## 2025-07-03 ENCOUNTER — OFFICE VISIT (OUTPATIENT)
Dept: OTOLARYNGOLOGY | Facility: CLINIC | Age: 60
End: 2025-07-03
Payer: MEDICARE

## 2025-07-03 VITALS — DIASTOLIC BLOOD PRESSURE: 71 MMHG | SYSTOLIC BLOOD PRESSURE: 130 MMHG | HEART RATE: 88 BPM | TEMPERATURE: 98 F

## 2025-07-03 DIAGNOSIS — C32.1 SQUAMOUS CELL CARCINOMA OF SUPRAGLOTTIS: Primary | ICD-10-CM

## 2025-07-03 DIAGNOSIS — Z90.02 H/O LARYNGECTOMY: ICD-10-CM

## 2025-07-03 PROCEDURE — 99213 OFFICE O/P EST LOW 20 MIN: CPT | Mod: PBBFAC | Performed by: OTOLARYNGOLOGY

## 2025-07-03 PROCEDURE — 31575 DIAGNOSTIC LARYNGOSCOPY: CPT | Mod: PBBFAC

## 2025-07-03 RX ORDER — LIDOCAINE HYDROCHLORIDE 40 MG/ML
1 INJECTION, SOLUTION RETROBULBAR
Status: COMPLETED | OUTPATIENT
Start: 2025-07-03 | End: 2025-07-03

## 2025-07-03 RX ADMIN — LIDOCAINE HYDROCHLORIDE 1 ML: 40 INJECTION, SOLUTION RETROBULBAR at 11:07

## 2025-07-03 NOTE — PROGRESS NOTES
Otolaryngology - Head & Neck Surgery  Clinic Note    Patient Name: Heaven Boston   YOB: 1965     Chief Complaint: follow-up for laryngeal cancer       History of Present Illness:  Heaven Boston is a 57 y.o. 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 Patient had an uneventful hospitalization course and was discharged on postoperative day 9 after an esophagram showed no pharyngeal leak.  However she re-presented to our facility on 4/6/22 with surgical site infection and a pharyngocutaneous fistula. Patient underwent a neck washout with primary repair on 4/11/2022, with placement of a gastrostomy tube the same day. She was maintained on NPO and transferred to [facility] for further evaluation and management on 4/15/22 after evidence of persistent pharyngocutaneous fistula. There she underwent a 2nd neck washout with primary closure and placement of salivary bypass tube.      5/2/22: Patient had an uneventful hospitalization course and removal of salivary bypass tube before discharge last week and now presenting for postoperative evaluation.  Patient presents today without any complaints.  She is wondering about when he pointed that her x-rays for swallowing then.  She seen Dr. Laguna today after our visit to undergo-stimulation for radiation.     5/9/22: Pt. Did not answer. Let voicemail to advance diet to [facility]. Also discussed this with son.  Plan to see in clinic in 2 weeks     5/19/22: In radiation therapy, 5x weekly until 6/16/22. Has TEP in place, brought another TEP requesting swap out. Will go to speech therapy for exchange of TEP. Went to ER for bleeding from G-tube site 1 week ago but declines visit to Gen Surg clinic today. On [facility] taking Ensure by G-tube, water and soups by  mouth. Had questions about advancing diet. No weight loss or appetite change. No other issues.      6/21/22:  Returns today for follow up.  Completed radiation last week 6/16/22.  Is having difficulty voicing with TEP though it has been swapped out by [facility].  Able to eat what she wants.  Having some tightness of her throat and soreness, otherwise no issues.     7/21/22: Here today for follow up. Has overall been doing very well. She is tolerating her diet by mouth and gaining weight. No dysphagia. Has not used her PEG tube since prior to radiation. She is interested in having it removed. Has some fullness in the left neck which intermittently swells and then resolves again. Otherwise no new lumps/bumps of the head and neck.      8/23/22: Ms. Boston returns today for follow up. She complains of neck pain exacerbated by flexion and movement to the right that began 2 weeks ago. She also reports neck swelling and tightness which occasionally increases in size and is tender to palpation. She reports cough with clear mucus production as well as nasal congestion without drainage. She also notes dry, itchy eyes that have not improved with Visine. She also notes dysuria. She is tolerating her diet by mouth and gaining weight. She had her PEG tube removed on 8/16/22. She has been to [facility] twice and is still having difficulty voicing with TEP.     9/27/22: Here for follow up. Reports she is doing okay. Still coughing a lot and not voicing well with TEP. No weight loss, tolerating diet. No otalgia/lumps bumps. Still having intermittent neck swelling/pain.      10/27/22:  Returns for surveillance.  Did not get TSH, T4.  Has been going to [facility], now can talk well.  Cough has improved.  No new H&N complaints     12/22/22: Doing very well. Voicing well. No pain. Doing well with speech and lymphedema therapy. Eating well, gaining weight constantly. Always tired. Sometimes sleeps all day.    February 14, 2023:   58-year-old female presents  today for follow-up of her laryngeal squamous cell carcinoma and postoperative hypothyroidism.  In regards to her squamous cell carcinoma of the larynx she is doing well.  She has no complaints of any pain.  He is not noticed any swelling in her neck.  She is not have any difficulty eating or swallowing in his indicated she is gaining weight.  She continues to use her TEP without problems and does not have any leakage around her TEP.  She did have a follow-up PET-CT scan done on December 30, 2022, in the report indicated that there was no evidence of new or progressive hypermetabolic metastatic disease.  On reviewing those images, however, there does appear to be an area of increased FDG activity in the upper cervical esophagus in the area above her trachea stoma and possibly at the site of the TEP.  This was also present on a PET-CT scan done on October 12, 2022 and there does not appear debris any change in this area between the 2 scans.  The report on the scan done on October 12, 2022, had indicated this area had an SUV max of 4.4 without any apparent lesions noted on the corresponding CT.  In regards to her hypothyroidism she continues to take levothyroxine 88 mcg daily.  Lab work from February 6, 2023, showed her TSH level be low at 0.115 with a normal free T4 of 1.48.  Her prior TSH from October 27, 2022, was elevated at 55.1672.     3/30/23:  Patient routine follow-up for squamous cell carcinoma and postop hypothyroidism.  She denies any new lumps, bumps hemoptysis or weight loss.  She is able to swallow without difficulty, TEP functioning well with excellent speech.  She has postop hypothyroidism, does complain of some weakness.  She did have a change in her blood pressure medicine in that it was increased and she felt some of her fatigue might have been related to it.  She currently has blood work ordered by her PCP including thyroid function and she is going to get it next week, we will schedule a TeleMed  follow-up.  Duloxetine 30 mg helps her sleep and helps her neck pain.     5/30/23: Here today for cancer surveillance. Recently had an add on appt with Dr. Aparicio for a new bump on her neck that appeared to be a neuroma. He injected it with lidocaine and she reports that the bump has decreased in size and is no longer painful. She also recently got her CT neck. She denies any new issues. She's eating and drinking well and denies any dysphagia, odynophagia, ear pain or new lumps/bumps.     8/15/23:  Here today for cancer surveillance.  She has no complaints today and is feeling well.  No weight loss.  No hemoptysis.  No new ear pain or voice changes.  No new lumps or bumps.  No nonhealing oral ulcers.  Eating and drinking well.  No throat pain.    11/15/23: Here today for surveillance. Patient underwent repeat CXR which showed resolution of lung opacities. No weight loss.  Has been feeling well.  No new lumps or bumps.  She does not have any dysphagia or odynophagia.  No changes in health.  She is not had any issues with her TEP    2/20/24:  Here today for follow-up.  Patient states she has been eating and gaining weight well.  She has not having any trismus hemoptysis weight loss weakness fatigue ear pain.  She has been taking her Synthroid as instructed she did not get new labs.  No new lumps or bumps.    5/16/24:  Here today for follow.  She is doing well.  She has been eating well and gaining weight.  She denies dysphagia, odynophagia, otalgia, lumps or bumps in the head or neck.  TSH was elevated.  She says that she takes the Synthroid on an empty stomach everyday and has not missed doses.  She takes 88 mcg daily.    9/5/24:   Patient presents today for follow-up of her laryngeal squamous cell carcinoma and hypothyroidism.  Patient has no complaints at this time.  She is tolerating regular diet and doing well with her TEP.  She continues to take levothyroxine 100 mcg daily.  She has not had follow-up thyroid  function studies since her dose was increased.  She has no complaints of any heat or cold intolerance or palpitations.  No other new problems.    24: Presents today in follow up. Doing well without issues. Denies issues swallowing or changes to voice. Weight has increased recently. No issues with TEP. Changed last week. Taking synthroid 100 without issues. Denies otalgia, fevers, chills, night sweats, weight loss, palpitations, diarrhea or any new problems.     3/27/25: Patient presents for surveillance. Recent imaging reveals cavitary lesion on RLL which was positive for SCCa. Plan to follow up w/CT surgery for consideration of resection. No complaints today. No SOB, wheezing, changes in TEP voicing, fever, chills, otalgia, lumps or bumps, weight loss. No issues using TEP. Occasional blood mixed in with mucus when she coughs--this started after her lung biopsy.     7/3/25:  Patient returns in follow-up.  She completed SBRT for right lower lobe squamous cell carcinoma on 2025.  She feels go with no complaints today.  TP functioning well.  Denies shortness of breath, new lumps or bumps or weight loss.      Past Medical History:  Past Medical History:   Diagnosis Date    Cancer     Laryngeal    Diabetes mellitus     Hypertension      Past Surgical History:   Procedure Laterality Date     SECTION      GA REMOVAL OF LARYNX  2022       Review of Systems:  Unremarkable except as mentioned above.    Current Medications:  Current Outpatient Medications   Medication Sig    atorvastatin (LIPITOR) 80 MG tablet Take 1 tablet (80 mg total) by mouth every evening.    azelastine (ASTELIN) 137 mcg (0.1 %) nasal spray 1 spray by Nasal route.    carBAMazepine (CARBATROL) 200 MG CM12 Take 200 mg by mouth.    carBAMazepine (TEGRETOL XR) 200 MG 12 hr tablet Take 200 mg by mouth 2 (two) times daily.    cetirizine (ZYRTEC) 10 MG tablet Take 10 mg by mouth once daily.    DULoxetine (CYMBALTA) 30 MG capsule Take 30  mg by mouth 2 (two) times daily.    fluticasone propionate (FLONASE) 50 mcg/actuation nasal spray 1 spray by Each Nostril route 2 (two) times daily.    fluticasone-salmeterol diskus inhaler 100-50 mcg Inhale 1 puff into the lungs every 12 (twelve) hours.    levothyroxine (SYNTHROID) 100 MCG tablet Take 1 tablet (100 mcg total) by mouth before breakfast.    loratadine (CLARITIN) 10 mg tablet Take 1 tablet (10 mg total) by mouth once daily.    metFORMIN (GLUCOPHAGE) 500 MG tablet Take 1 tablet (500 mg total) by mouth once daily.    metoprolol succinate (TOPROL-XL) 25 MG 24 hr tablet Take 0.5 tablets (12.5 mg total) by mouth once daily.    montelukast (SINGULAIR) 5 MG chewable tablet Take 1 tablet (5 mg total) by mouth every evening.    NIFEdipine (PROCARDIA-XL) 60 MG (OSM) 24 hr tablet Take 1 tablet (60 mg total) by mouth once daily.    rivaroxaban (XARELTO) 20 mg Tab Take 1 tablet (20 mg total) by mouth daily with dinner or evening meal.    aspirin (ECOTRIN) 81 MG EC tablet Take 1 tablet (81 mg total) by mouth once daily.    HYDROcodone-acetaminophen 5-300 mg Tab Take 1 tablet by mouth every 8 (eight) hours as needed.    ibuprofen (ADVIL,MOTRIN) 800 MG tablet Take 800 mg by mouth every 6 (six) hours as needed.    omeprazole (PRILOSEC) 40 MG capsule Take 40 mg by mouth once daily.    prednisoLONE acetate (PRED FORTE) 1 % DrpS Place into both eyes.     Current Facility-Administered Medications   Medication    silver nitrate applicators applicator 1 applicator        Allergies:  Review of patient's allergies indicates:  No Known Allergies       Physical Exam:  Vital signs:   Vitals:    07/03/25 0838   BP: 130/71   Pulse: 88   Temp: 98.1 °F (36.7 °C)     General:  Well-developed well-nourished female in no acute distress.  Patient does communicate well with clear speech using her TEP.  Head and face:  Normocephalic.  No facial lesions.  No temporomandibular joint tenderness or click.  Ears:  Right ear-auricle is normally  developed.  External auditory canal is clear.  Tympanic membrane is nonerythematous.  No middle ear effusion.  Left ear-auricle is normally developed.  External auditory canal is clear.  Tympanic membrane is nonerythematous.  No middle ear effusion.  Nose:  Nasal dorsum is unremarkable.  No significant septal deviation.  No significant intranasal congestion.  Secretions are clear.  Oral cavity and oropharynx:  Tongue and floor mouth are without lesions.  Mucosa is moist.  No pharyngeal erythema or exudates.  No oropharyngeal masses.  No tonsillar hypertrophy.  Neck:  Supple without palpable adenopathy.  She does have some induration related to her surgery and postradiation changes.  Trachea stoma is patent.  TEP is in place without any gross drainage from around the prosthesis.    Eyes:  Extraocular muscles intact.  No nystagmus.  No exophthalmos or enophthalmos.  Neurologic:  Alert and oriented.  Cranial nerves 2-12 are grossly normal.      Procedure:   Flexible tracheoscopy, nasopharyngoscopy  The flexible fiberoptic laryngoscope was introduced into the right nostril and advanced. Examination of the nose showed the above mentioned findings. Examination of the nasopharynx showed no nasopharyngeal masses or eustachian tube obstruction. Examination of the base of tongue showed no masses or lesions. Examination of the hypopharynx showed no masses or lesions in the neopharynx or hypopharynx.  The esophageal inlet is without apparent lesions.  No evidence of fungal disease. The scope was passed in the stoma and the trachea was clear down to the aleja.       Labs:   TSH:  3.184 (3/27/25)      Imaging:  CT CHEST WITHOUT CONTRAST (5/12/25):    FINDINGS:  Status post tracheostomy.  Heart size is normal.  No pericardial effusion.  Mild atherosclerosis of the thoracic aorta and its branches including the coronary arteries.  No axillary or mediastinal lymphadenopathy.  Evaluation for perihilar lymph nodes is nondiagnostic  without IV contrast.     Patent central airways.  No filling defects.  Cavitary right lower lobe pulmonary nodule measures 2.0 x 2.8 cm, unchanged in size compared to 12/17/2024.  Degree of cavitation has increased with overall thinning of the wall compared to the prior.  Wall thickness on today's exam measures 5 mm, previously measuring 10 mm.     No other suspicious pulmonary nodule.  No effusion.  Partially visualized liver, spleen, pancreas and adrenal glands appear normal.  Partially visualized right upper pole renal stone measures 3 mm.     No suspicious osteolytic or osteoblastic lesion.     Impression:     1. Right lower lobe cavitary nodule similar in overall size.  Wall thickness has decreased by approximately 50% compared to 12/17/2024.         Assessment/Plan:  Heaven Boston is a 59 y.o. female with T3N1M0 supraglottic SCCa s/p TL BSND CPM on 3/24/2022, complicated by PCF s/p washout & primary closure x2 (2nd in [place]) now s/p adjuvant RT. One of her post-treatment PET noted hypermetabolism at the site of her TEP. She was taken for a DL on 3/3/23 which was unremarkable. No evidence of disease and head and neck.  Found to have right lower lobe squamous cell carcinoma s/p SBRT completed in 4/25/25.    Plan:  -- Continue Levothyroxine 100 mcg daily, TSH 3 months ago within normal limits  -- Continue follow-up with speech therapy  -- F/u with heme/onc for lung cancer surveillance.  S/p SBRT for right lower lobe squamous cell carcinoma  -- CT neck prior to next visit    RTC in 4 months     Emiliano Garcia MD  [employer] Otolaryngology PGY-III  7/3/2025 9:19 AM        HEAD & NECK CANCER SURVEILLANCE   Primary Surgical Treatment     Head & Neck Staff: Celina  Medical Oncologist: Drew   Radiation Oncologist: Jase     Primary tumor: T3N1M0 SCCa  of the supraglottis     Surgery Date: 3/24/2022  Induction Chemotherapy: no  Adjuvant Therapy: Radiation  Completion Date: completed 6/2022    Place date if  completed   3 6 12 18 24 36 48 60   CT Neck X X 5/2023 11/28/23 3/5/24 X X X   PET/CT 9/2022 12/2022    3/17/25     CXR  X X X X X X X   TFT X  4/2023  9/5/24 3/27/25 X X     Current Surveillance Interval: post-tx year 3-5, q4-6 mo     Suggested imaging surveillance. Patient and tumor specific factors should always be individually considered.

## 2025-07-03 NOTE — PROGRESS NOTES
The scope used for the exam was:  Flexible scope ENF-P4  Serial Number:  1)    [id number]    [x]   2)    [id number]    []   3)    [id number]    []   4)    [id number]    []   5)    [id number]    []   6)    [id number]    []     7)    [id number]     []     8)    [id number]     []     9)    [id number]     []    10)  [id number]      []       The scope used for the exam was:  Rigid scope   Serial Number:  1)   [id number]    []   2)   [id number]    []   3)   7330    []   4)    3384   []   5)    0824   []   6)    5554   []     7)   7425    []   8)   2240    []   9)   1109    []

## 2025-07-22 ENCOUNTER — OFFICE VISIT (OUTPATIENT)
Dept: VASCULAR SURGERY | Facility: CLINIC | Age: 60
End: 2025-07-22
Payer: MEDICARE

## 2025-07-22 VITALS
WEIGHT: 131 LBS | TEMPERATURE: 99 F | HEIGHT: 62 IN | SYSTOLIC BLOOD PRESSURE: 104 MMHG | BODY MASS INDEX: 24.11 KG/M2 | RESPIRATION RATE: 19 BRPM | DIASTOLIC BLOOD PRESSURE: 64 MMHG | OXYGEN SATURATION: 98 % | HEART RATE: 95 BPM

## 2025-07-22 DIAGNOSIS — I73.9 PAD (PERIPHERAL ARTERY DISEASE): Primary | ICD-10-CM

## 2025-07-22 PROCEDURE — 99215 OFFICE O/P EST HI 40 MIN: CPT | Mod: PBBFAC

## 2025-07-22 NOTE — PROGRESS NOTES
I have reviewed the notes, assessments, and/or procedures performed by resident, I concur with her/his documentation of Heaven Boston.  Date of Service: 7/22/2025

## 2025-07-22 NOTE — PROGRESS NOTES
[facility] General Surgery Clinic Note    HPI: 60-y.o. female with PMHx of laryngeal cancer s/p laryngectomy and radiation, right lower lobe cavitary lung nodule s/p radiation, HTN, DM, and recurrent DVT presents to clinic for evaluation of peripheral artery disease. She reports bilateral LE pain occurring daily that is worse with exertion but will occur spontaneously. She reports pain is most prominent in the left thigh and calf with burning pain at the soles of both feet. She is currently taking eliquis. She denies recent tobacco, alcohol or drug use. She smoked 0.5 packs a day for 30 years and quit 2 years ago.     PMH:   Past Medical History:   Diagnosis Date    Cancer     Laryngeal    Diabetes mellitus     Hypertension       Meds: Current Medications[1]  Allergies: Review of patient's allergies indicates:  No Known Allergies  Social History: Social History[2]  Family History: No family history on file.  Surgical History:   Past Surgical History:   Procedure Laterality Date     SECTION      COLONOSCOPY N/A 05/15/2023    Procedure: COLONOSCOPY;  Surgeon: Francisco Zavala MD;  Location: [facility] ENDOSCOPY;  Service: Endoscopy;  Laterality: N/A;    DIRECT DIAGNOSTIC LARYNGOSCOPY WITH BRONCHOSCOPY AND ESOPHAGOSCOPY N/A 2023    Procedure: LARYNGOSCOPY, DIRECT, DIAGNOSTIC, WITH BRONCHOSCOPY AND ESOPHAGOSCOPY;  Surgeon: Connor Patrick MD;  Location: [facility] OR;  Service: ENT;  Laterality: N/A;  Will not need to do bronchoscopy for this procedure    LA REMOVAL OF LARYNX  2022     Review of Systems:  Skin: No rashes or itching.  Head: Denies headache or recent trauma.  Eyes: Denies eye pain or double vision.  Neck: S/p total laryngectomy with TEP.  Respiratory: Denies shortness of breath or chest pain  Cardiac: Denies palpitations or swelling in hands/feet.  Gastrointestinal: Denies nausea, denies vomiting.   Urinary: Denies dysuria or hematuria.  Vascular: Reports cramping bilateral calf pain with walking,  burning at soles of feet.  Neuro: Denies motor deficits. Denies weakness.  Endocrine: Denies excessive sweating or cold intolerance.  Psych: Denies memory problems. Denies anxiety.    Objective:    Vitals:  Vitals:    07/22/25 1341   BP: 104/64   Pulse: 95   Resp: 19   Temp: 98.7 °F (37.1 °C)        Physical Exam:  Gen: NAD  Neuro: awake, alert, answering questions appropriately  CV: RRR  Resp: non-labored breathing, CHE, Trach in place.  Abd: soft, ND, NT  Ext: moves all 4 spontaneously and purposefully  Vasc: Carotid, radial, DP pulses palpable bilaterally. PT pulses audible on Doppler.  Skin: warm, well perfused    Pertinent Labs:  None available.    Imaging:    10/16/24 CV US doppler arterial legs bilateral:  The right lower extremity demonstrated multiphasic waveforms at all levels with the exception of the distal PTA and DPA.  The right lower extremity demostrated mild to moderate arterial flow reduction.     The left lower extremity demonstrated multiphasic waveforms at all levels with the exception of the distal PTA and DPA.  The left lower extremity demonstrated mild to moderate arterial flow reduction.    03/05/25 ABIs: normal  R 1.03  L 0.90    Post-Stress ABIs: normal  R 1.08  L 0.91    Micro/Path/Other:  None available.    Assessment/Plan:  60-y.o. female with PMHx of laryngeal cancer s/p laryngectomy and radiation, right lower lobe cavitary lung nodule s/p radiation, HTN, DM, recurrent DVT who presents with bilateral LE pain occurring daily that is worse with exertion but will occur spontaneously. She reports pain is most prominent in the left thigh and calf with burning pain at the soles of both feet. ABIs are normal at rest and post-stress. Left thigh pain is not consistent with claudication. Surgical intervention not recommended at the moment.    - CV Ultrasound Bilateral Doppler Carotid in 1 year  - Follow up with PCP for MSK pain    Skylar Moncada  General Surgery, MS3  07/22/2025 2:46 PM       PGY-3 Attestation:  Patient is a 60 year old female who presents for PAD follow-up. Arterial US and GAGAN obtained and normal. Patient with palpable pedal pulses on exam. Low concern for PAD as cause of her pain. She is due for carotid screening, will have her RTC in 1 year with carotid duplex.    Antonio Hallman MD  [facility] General Surgery P[facility]       [1]   Current Outpatient Medications:     apixaban (ELIQUIS) 5 mg Tab, Take 1 tablet (5 mg total) by mouth 2 (two) times daily. 10 mg ( 2 tablets) twice daily for 7 days followed by 5 mg ( one tablet)  twice daily., Disp: 180 tablet, Rfl: 2    DULoxetine (CYMBALTA) 30 MG capsule, Take 1 capsule (30 mg total) by mouth once daily., Disp: 30 capsule, Rfl: 11    empagliflozin (JARDIANCE) 10 mg tablet, Take 1 tablet (10 mg total) by mouth once daily., Disp: 90 tablet, Rfl: 2    fluticasone propionate (FLONASE) 50 mcg/actuation nasal spray, 1 spray (50 mcg total) by Each Nostril route 2 (two) times daily., Disp: 16 g, Rfl: 1    folic acid (FOLVITE) 1 MG tablet, Take 1 tablet by mouth once daily, Disp: 30 tablet, Rfl: 6    levothyroxine (SYNTHROID) 100 MCG tablet, Take 1 tablet (100 mcg total) by mouth before breakfast., Disp: 30 tablet, Rfl: 11    lisinopriL (PRINIVIL,ZESTRIL) 2.5 MG tablet, Take 1 tablet (2.5 mg total) by mouth once daily., Disp: 90 tablet, Rfl: 2    metoprolol succinate (TOPROL-XL) 25 MG 24 hr tablet, Take 0.5 tablets (12.5 mg total) by mouth once daily., Disp: 45 tablet, Rfl: 3    NIFEdipine (PROCARDIA-XL) 60 MG (OSM) 24 hr tablet, Take 1 tablet (60 mg total) by mouth once daily., Disp: 30 tablet, Rfl: 11    ALLERGY RELIEF, CETIRIZINE, 10 mg tablet, Take 1 tablet by mouth once daily (Patient not taking: Reported on 5/20/2025), Disp: 90 tablet, Rfl: 0    atorvastatin (LIPITOR) 80 MG tablet, Take 1 tablet (80 mg total) by mouth every evening. (Patient not taking: Reported on 7/22/2025), Disp: 90 tablet, Rfl: 3    montelukast (SINGULAIR) 5 MG chewable tablet,  Take 5 mg by mouth every other day. (Patient not taking: Reported on 7/22/2025), Disp: , Rfl:     phenylephrine (SUDAFED PE) 10 MG Tab, Take 10 mg by mouth every 4 (four) hours as needed. Over the counter  Compare to active ingredient in Sudafed PE, Nasal Decongestant  Maximum Strength  Takes 1 tablet by mouth once daily (Patient not taking: Reported on 7/22/2025), Disp: , Rfl:     Current Facility-Administered Medications:     LIDOcaine HCL 20 mg/ml (2%) injection 1 mL, 1 mL, Intradermal, 1 time in Clinic/HOD, Travis Aparicio MD    silver nitrate applicators applicator 1 applicator, 1 applicator, Topical (Top), 1 time in Clinic/HOD, Patricia Montes MD    triamcinolone acetonide injection 40 mg, 40 mg, Intramuscular, 1 time in Clinic/HOD, Travis Aparicio MD    Facility-Administered Medications Ordered in Other Visits:     dextrose 10% bolus 125 mL 125 mL, 12.5 g, Intravenous, PRN, Tamie Woody S, FNP    dextrose 10% bolus 125 mL 125 mL, 12.5 g, Intravenous, PRN, Tamie Woody S, FNP    diphenhydrAMINE injection 6.25 mg, 6.25 mg, Intravenous, Once PRN, Lolis Sood MD    droperidoL injection 0.25 mg, 0.25 mg, Intravenous, Once PRN, Lolis Sood MD    HYDROmorphone injection 0.2 mg, 0.2 mg, Intravenous, Q5 Min PRN, Lolis Sood MD    insulin aspart U-100 injection 2-9 Units, 2-9 Units, Subcutaneous, Q6H PRN, Tamie Woody, FNP    insulin aspart U-100 injection 4-12 Units, 4-12 Units, Subcutaneous, PRNBong Tammy S, GERALDP    lactated ringers infusion, , Intravenous, Continuous, Lolis Sood MD, Last Rate: 200 mL/hr at 03/03/23 0656, Rate Change at 03/03/23 0656    LIDOcaine (PF) 10 mg/ml (1%) injection 10 mg, 1 mL, Intradermal, Once, Tamie Woody FNP    LIDOcaine (PF) 10 mg/ml (1%) injection 10 mg, 1 mL, Intradermal, Once, Lolis Sood MD    metoclopramide HCl injection 10 mg, 10 mg, Intravenous, Once PRN, Lolis Sood MD    midazolam  (VERSED) 1 mg/mL injection 1 mg, 1 mg, Intravenous, Once PRN, Lolis Sood MD    oxyCODONE immediate release tablet 5 mg, 5 mg, Oral, Q3H PRN, Lolis Sood MD  [2]   Social History  Tobacco Use    Smoking status: Former     Current packs/day: 0.00     Average packs/day: 0.5 packs/day for 39.0 years (19.5 ttl pk-yrs)     Types: Cigarettes     Start date: 4/1/1983     Quit date: 4/1/2022     Years since quitting: 3.3    Smokeless tobacco: Former     Quit date: 2022   Substance Use Topics    Alcohol use: Never    Drug use: Never

## 2025-07-28 ENCOUNTER — OFFICE VISIT (OUTPATIENT)
Dept: OPHTHALMOLOGY | Facility: CLINIC | Age: 60
End: 2025-07-28
Payer: MEDICARE

## 2025-07-28 VITALS — WEIGHT: 130.94 LBS | BODY MASS INDEX: 24.09 KG/M2 | HEIGHT: 62 IN

## 2025-07-28 DIAGNOSIS — H52.223 REGULAR ASTIGMATISM OF BOTH EYES: ICD-10-CM

## 2025-07-28 DIAGNOSIS — H25.13 AGE-RELATED NUCLEAR CATARACT OF BOTH EYES: ICD-10-CM

## 2025-07-28 DIAGNOSIS — E11.9 TYPE 2 DIABETES MELLITUS WITHOUT COMPLICATION, WITHOUT LONG-TERM CURRENT USE OF INSULIN: Primary | ICD-10-CM

## 2025-07-28 DIAGNOSIS — H17.9 BILATERAL CORNEAL SCARS: ICD-10-CM

## 2025-07-28 DIAGNOSIS — H53.15 VISUAL DISTORTIONS OF SHAPE AND SIZE: ICD-10-CM

## 2025-07-28 PROCEDURE — 99214 OFFICE O/P EST MOD 30 MIN: CPT | Mod: PBBFAC,PN

## 2025-07-28 PROCEDURE — 92134 CPTRZ OPH DX IMG PST SGM RTA: CPT | Mod: PBBFAC,PN

## 2025-07-28 PROCEDURE — 92134 CPTRZ OPH DX IMG PST SGM RTA: CPT | Mod: PBBFAC,PN | Performed by: OPHTHALMOLOGY

## 2025-07-28 RX ORDER — PHENYLEPH/TROPICAMIDE IN WATER 2.5 %-1 %
1 DROPS OPHTHALMIC (EYE) ONCE
Status: COMPLETED | OUTPATIENT
Start: 2025-07-28 | End: 2025-07-28

## 2025-07-28 RX ADMIN — Medication 1 DROP: at 01:07

## 2025-07-28 NOTE — PROGRESS NOTES
HPI    RTC 1 year for annual exam  Last edited by Pratima Carroll MA on 7/28/2025 12:59 PM.            Assessment /Plan     For exam results, see Encounter Report.    Type 2 diabetes mellitus without complication, without long-term current use of insulin  -     tropicamide /PHENYLephrine opthalmic solution 1 drop    Regular astigmatism of both eyes    Age-related nuclear cataract of both eyes    Bilateral corneal scars      HPI    RTC 1 year for annual exam  Last edited by Pratmia Carroll MA on 7/28/2025 12:59 PM.            Assessment /Plan     For exam results, see Encounter Report.    Type 2 diabetes mellitus without complication, without long-term current use of insulin  -     tropicamide /PHENYLephrine opthalmic solution 1 drop    Regular astigmatism of both eyes    Age-related nuclear cataract of both eyes    Bilateral corneal scars        OCT MAC   07/28/2025  OD: , intact foveal contour, no IRF/SRF  OS: , intact foveal contour, no IRF/SRF    OCT RNFL 7/28/25  OD: 93 all green  OS: 94 N white rest green    1. DM2 without Diabetic retinopathy   - A1C 6.3% 9/5/24 in records  - no retinopathy on exam today  - BG/BP control  - RTC 1 year DFE    2. 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 Astigmatism, OU  - New Mrx on 7/24/24  - CTM    4. Cornea Scar, OU  - Symmetric between both eyes, appears as faint iron-like deposition in a ring-like pattern paracentral cornea  - Corrects to 20/20 OU, appears chronic. Can continue to monitor     RTC 1 year for annual exam or sooner PRN

## 2025-07-30 PROBLEM — H17.9 BILATERAL CORNEAL SCARS: Status: ACTIVE | Noted: 2025-07-30

## 2025-08-12 ENCOUNTER — TELEPHONE (OUTPATIENT)
Dept: HEMATOLOGY/ONCOLOGY | Facility: CLINIC | Age: 60
End: 2025-08-12
Payer: MEDICARE

## 2025-08-13 ENCOUNTER — HOSPITAL ENCOUNTER (OUTPATIENT)
Dept: RADIOLOGY | Facility: HOSPITAL | Age: 60
Discharge: HOME | End: 2025-08-13
Attending: INTERNAL MEDICINE
Payer: MEDICARE

## 2025-08-13 DIAGNOSIS — I82.4Z2 DEEP VEIN THROMBOSIS (DVT) OF DISTAL VEIN OF LEFT LOWER EXTREMITY, UNSPECIFIED CHRONICITY: ICD-10-CM

## 2025-08-13 DIAGNOSIS — J98.4 CAVITATING MASS IN RIGHT LOWER LUNG LOBE: ICD-10-CM

## 2025-08-13 DIAGNOSIS — Z93.0 TRACHEOSTOMY IN PLACE: ICD-10-CM

## 2025-08-13 PROCEDURE — 71250 CT THORAX DX C-: CPT | Mod: TC

## 2025-08-28 ENCOUNTER — TELEPHONE (OUTPATIENT)
Dept: CARDIOLOGY | Facility: CLINIC | Age: 60
End: 2025-08-28
Payer: MEDICARE

## 2025-08-28 ENCOUNTER — LAB VISIT (OUTPATIENT)
Dept: LAB | Facility: HOSPITAL | Age: 60
End: 2025-08-28
Attending: NURSE PRACTITIONER
Payer: MEDICARE

## 2025-08-28 DIAGNOSIS — E78.2 MIXED HYPERLIPIDEMIA: ICD-10-CM

## 2025-08-28 LAB
CHOLEST SERPL-MCNC: 169 MG/DL
CHOLEST/HDLC SERPL: 4 {RATIO} (ref 0–5)
HDLC SERPL-MCNC: 38 MG/DL (ref 35–60)
LDLC SERPL CALC-MCNC: 113 MG/DL (ref 50–140)
TRIGL SERPL-MCNC: 89 MG/DL (ref 37–140)
VLDLC SERPL CALC-MCNC: 18 MG/DL

## 2025-08-28 PROCEDURE — 36415 COLL VENOUS BLD VENIPUNCTURE: CPT

## 2025-08-28 PROCEDURE — 80061 LIPID PANEL: CPT

## 2025-09-03 ENCOUNTER — OFFICE VISIT (OUTPATIENT)
Dept: CARDIOLOGY | Facility: CLINIC | Age: 60
End: 2025-09-03
Payer: MEDICARE

## 2025-09-03 VITALS
RESPIRATION RATE: 18 BRPM | OXYGEN SATURATION: 98 % | HEIGHT: 65 IN | WEIGHT: 132.38 LBS | SYSTOLIC BLOOD PRESSURE: 118 MMHG | DIASTOLIC BLOOD PRESSURE: 72 MMHG | BODY MASS INDEX: 22.06 KG/M2 | TEMPERATURE: 98 F | HEART RATE: 75 BPM

## 2025-09-03 DIAGNOSIS — I10 PRIMARY HYPERTENSION: ICD-10-CM

## 2025-09-03 DIAGNOSIS — I82.409 RECURRENT DEEP VEIN THROMBOSIS (DVT): ICD-10-CM

## 2025-09-03 DIAGNOSIS — J98.4 CAVITATING MASS IN RIGHT LOWER LUNG LOBE: Primary | ICD-10-CM

## 2025-09-03 DIAGNOSIS — Z87.891 HISTORY OF TOBACCO ABUSE: ICD-10-CM

## 2025-09-03 DIAGNOSIS — I65.23 BILATERAL CAROTID ARTERY STENOSIS: ICD-10-CM

## 2025-09-03 DIAGNOSIS — Z93.0 TRACHEOSTOMY IN PLACE: ICD-10-CM

## 2025-09-03 DIAGNOSIS — E78.2 MIXED HYPERLIPIDEMIA: ICD-10-CM

## 2025-09-03 DIAGNOSIS — I47.10 SVT (SUPRAVENTRICULAR TACHYCARDIA): Primary | ICD-10-CM

## 2025-09-03 PROCEDURE — 99214 OFFICE O/P EST MOD 30 MIN: CPT | Mod: S$PBB,,, | Performed by: NURSE PRACTITIONER

## 2025-09-03 PROCEDURE — 3078F DIAST BP <80 MM HG: CPT | Mod: CPTII,,, | Performed by: NURSE PRACTITIONER

## 2025-09-03 PROCEDURE — 1159F MED LIST DOCD IN RCRD: CPT | Mod: CPTII,,, | Performed by: NURSE PRACTITIONER

## 2025-09-03 PROCEDURE — 1160F RVW MEDS BY RX/DR IN RCRD: CPT | Mod: CPTII,,, | Performed by: NURSE PRACTITIONER

## 2025-09-03 PROCEDURE — 99215 OFFICE O/P EST HI 40 MIN: CPT | Mod: PBBFAC | Performed by: NURSE PRACTITIONER

## 2025-09-03 PROCEDURE — 3074F SYST BP LT 130 MM HG: CPT | Mod: CPTII,,, | Performed by: NURSE PRACTITIONER

## 2025-09-03 PROCEDURE — 4010F ACE/ARB THERAPY RXD/TAKEN: CPT | Mod: CPTII,,, | Performed by: NURSE PRACTITIONER

## 2025-09-03 PROCEDURE — 3008F BODY MASS INDEX DOCD: CPT | Mod: CPTII,,, | Performed by: NURSE PRACTITIONER

## 2025-09-03 RX ORDER — NIFEDIPINE 60 MG/1
60 TABLET, EXTENDED RELEASE ORAL DAILY
Qty: 30 TABLET | Refills: 11 | Status: SHIPPED | OUTPATIENT
Start: 2025-09-03 | End: 2026-09-03

## 2025-09-04 ENCOUNTER — TELEPHONE (OUTPATIENT)
Dept: FAMILY MEDICINE | Facility: CLINIC | Age: 60
End: 2025-09-04
Payer: MEDICARE

## (undated) DEVICE — SOL IRRI STRL WATER 1000ML

## (undated) DEVICE — MARKER WRITESITE SKIN CHLRAPRP

## (undated) DEVICE — CONTAINER SPECIMEN 4.5OZ

## (undated) DEVICE — GOWN POLY REINF X-LONG 2XL

## (undated) DEVICE — KIT ANTIFOG W/SPONG & FLUID

## (undated) DEVICE — SCOPE EXALT BRONCHSCP B REG

## (undated) DEVICE — GLOVE PROTEXIS NEOPRN SZ6.5

## (undated) DEVICE — SPONGE PATTY SURGICAL .5X3IN

## (undated) DEVICE — MANIFOLD 4 PORT

## (undated) DEVICE — GLOVE PROTEXIS BLUE LATEX 7

## (undated) DEVICE — TOWEL OR DISP STRL BLUE 4/PK

## (undated) DEVICE — VALVE OLYMPUS SCOPE SUCTION

## (undated) DEVICE — KIT SURGICAL COLON .25 1.1OZ

## (undated) DEVICE — PAD CURAD NONADH 3X4IN

## (undated) DEVICE — VALVE OLYMPUS SCOPE BIOPSY

## (undated) DEVICE — GLOVE PROTEXIS BLUE LATEX 6.5

## (undated) DEVICE — KIT VALVE DEFENDO ENDOSCOPY

## (undated) DEVICE — GAUZE VISTEC XR DTECT 16 4X4IN

## (undated) DEVICE — TUBING MEDI-VAC 20FT .25IN

## (undated) DEVICE — PACK HEAD & NECK